# Patient Record
Sex: FEMALE | Race: BLACK OR AFRICAN AMERICAN | Employment: UNEMPLOYED | URBAN - METROPOLITAN AREA
[De-identification: names, ages, dates, MRNs, and addresses within clinical notes are randomized per-mention and may not be internally consistent; named-entity substitution may affect disease eponyms.]

---

## 2017-01-19 ENCOUNTER — NURSE NAVIGATOR (OUTPATIENT)
Dept: FAMILY MEDICINE CLINIC | Age: 72
End: 2017-01-19

## 2017-03-21 RX ORDER — HYDROXYCHLOROQUINE SULFATE 200 MG/1
TABLET, FILM COATED ORAL
Qty: 90 TAB | Refills: 1 | Status: SHIPPED | OUTPATIENT
Start: 2017-03-21 | End: 2017-10-03 | Stop reason: SDUPTHER

## 2017-04-28 NOTE — TELEPHONE ENCOUNTER
Pt needs a refill for:  Per Lindsey Card - guardian     levothyroxine (SYNTHROID) 25 mcg tablet     Also wants topical gel, Valterin for joint pain  (originally written by another MD but Lindsey Card says she doesn't see the lupus doctor any more)     Best number to reach her is [phone]

## 2017-05-01 ENCOUNTER — TELEPHONE (OUTPATIENT)
Dept: FAMILY MEDICINE CLINIC | Age: 72
End: 2017-05-01

## 2017-05-01 RX ORDER — DICLOFENAC SODIUM 10 MG/G
GEL TOPICAL 4 TIMES DAILY
Qty: 1 EACH | Refills: 3 | Status: SHIPPED | OUTPATIENT
Start: 2017-05-01 | End: 2018-02-02

## 2017-05-01 RX ORDER — LEVOTHYROXINE SODIUM 25 UG/1
TABLET ORAL
Qty: 90 TAB | Refills: 3 | Status: SHIPPED | OUTPATIENT
Start: 2017-05-01

## 2017-05-01 NOTE — TELEPHONE ENCOUNTER
----- Message from Teri Vegas sent at 5/1/2017 10:55 AM EDT -----  Regarding: Dr. Irma Schulte / telephone  Pt requesting status update on her Rx request made on Thursday.  Pt best contact number is  [phone] call this number first.

## 2017-05-08 ENCOUNTER — OFFICE VISIT (OUTPATIENT)
Dept: FAMILY MEDICINE CLINIC | Age: 72
End: 2017-05-08

## 2017-05-08 VITALS
HEART RATE: 64 BPM | BODY MASS INDEX: 30.63 KG/M2 | RESPIRATION RATE: 16 BRPM | HEIGHT: 60 IN | WEIGHT: 156 LBS | TEMPERATURE: 95.8 F | DIASTOLIC BLOOD PRESSURE: 60 MMHG | OXYGEN SATURATION: 92 % | SYSTOLIC BLOOD PRESSURE: 132 MMHG

## 2017-05-08 DIAGNOSIS — Z00.00 ROUTINE GENERAL MEDICAL EXAMINATION AT A HEALTH CARE FACILITY: Primary | ICD-10-CM

## 2017-05-08 DIAGNOSIS — Z79.899 ENCOUNTER FOR LONG-TERM (CURRENT) USE OF MEDICATIONS: ICD-10-CM

## 2017-05-08 DIAGNOSIS — M35.00 SJOGREN'S SYNDROME, WITH UNSPECIFIED ORGAN INVOLVEMENT (HCC): ICD-10-CM

## 2017-05-08 DIAGNOSIS — D64.9 ANEMIA, UNSPECIFIED TYPE: ICD-10-CM

## 2017-05-08 DIAGNOSIS — M32.0 DRUG-INDUCED SYSTEMIC LUPUS ERYTHEMATOSUS, UNSPECIFIED ORGAN INVOLVEMENT STATUS (HCC): ICD-10-CM

## 2017-05-08 DIAGNOSIS — M81.0 OSTEOPOROSIS, SENILE: ICD-10-CM

## 2017-05-08 DIAGNOSIS — I10 ESSENTIAL HYPERTENSION: ICD-10-CM

## 2017-05-08 DIAGNOSIS — Z13.39 SCREENING FOR ALCOHOLISM: ICD-10-CM

## 2017-05-08 DIAGNOSIS — Z79.899 ENCOUNTER FOR LONG-TERM (CURRENT) USE OF HIGH-RISK MEDICATION: ICD-10-CM

## 2017-05-08 DIAGNOSIS — H61.23 BILATERAL IMPACTED CERUMEN: ICD-10-CM

## 2017-05-08 DIAGNOSIS — E03.9 ACQUIRED HYPOTHYROIDISM: ICD-10-CM

## 2017-05-08 NOTE — MR AVS SNAPSHOT
Visit Information Date & Time Provider Department Dept. Phone Encounter #  
 5/8/2017  2:00 PM Alexis Vyas MD [facility] at [address] [id number] Follow-up Instructions Return in about 6 months (around 11/8/2017), or if symptoms worsen or fail to improve. Upcoming Health Maintenance Date Due  
 MEDICARE YEARLY EXAM 5/3/2017 INFLUENZA AGE 9 TO ADULT 8/1/2017 Pneumococcal 65+ Low/Medium Risk (2 of 2 - PPSV23) 11/18/2017 GLAUCOMA SCREENING Q2Y 5/2/2018 COLONOSCOPY 2/17/2021 DTaP/Tdap/Td series (3 - Td) 5/8/2027 Allergies as of 5/8/2017  Review Complete On: 5/8/2017 By: Alexis Vyas MD  
  
 Severity Noted Reaction Type Reactions Adhesive Tape  04/27/2010    Other (comments) Redness under that adhesive Current Immunizations  Reviewed on 4/28/2015 Name Date Influenza High Dose Vaccine PF 10/2/2016, 10/9/2015  5:28 PM, 9/5/2014 Influenza Vaccine Split 9/18/2012 Pneumococcal Vaccine (Unspecified Type) 11/18/2012 TB Skin Test (PPD) Intradermal 6/28/2016, 4/28/2015 TDAP Vaccine 4/30/2007 Not reviewed this visit You Were Diagnosed With   
  
 Codes Comments Routine general medical examination at a health care facility    -  Primary ICD-10-CM: Z00.00 ICD-9-CM: V70.0 Screening for alcoholism     ICD-10-CM: Z13.89 ICD-9-CM: V79.1 Sjogren's syndrome, with unspecified organ involvement     ICD-10-CM: M35.00 ICD-9-CM: 710.2 Drug-induced systemic lupus erythematosus, unspecified organ involvement status     ICD-10-CM: M32.0 ICD-9-CM: 710.0, E980.5 Acquired hypothyroidism     ICD-10-CM: E03.9 ICD-9-CM: 244.9 Anemia, unspecified type     ICD-10-CM: D64.9 ICD-9-CM: 285.9 Essential hypertension     ICD-10-CM: I10 
ICD-9-CM: 401.9 Osteoporosis, senile     ICD-10-CM: M81.0 ICD-9-CM: 733.01 Bilateral impacted cerumen     ICD-10-CM: H61.23 
ICD-9-CM: 380.4 Encounter for long-term (current) use of medications     ICD-10-CM: Z79.899 ICD-9-CM: V58.69 Encounter for long-term (current) use of high-risk medication     ICD-10-CM: Z79.899 ICD-9-CM: V58.69 Vitals BP Pulse Temp Resp Height(growth percentile) Weight(growth percentile) 132/60 (BP 1 Location: Right arm, BP Patient Position: Sitting) 64 95.8 °F (35.4 °C) (Oral) 16 5' (1.524 m) 156 lb (70.8 kg) SpO2 BMI OB Status Smoking Status 92% 30.47 kg/m2 Postmenopausal Never Smoker Vitals History BMI and BSA Data Body Mass Index Body Surface Area  
 30.47 kg/m 2 1.73 m 2 Preferred Pharmacy Pharmacy Name Phone [facility] , [facility] [address] [facility] [address] [place] [address] [phone] Your Updated Medication List  
  
   
This list is accurate as of: 5/8/17  3:47 PM.  Always use your most recent med list.  
  
  
  
  
 cetirizine 10 mg tablet Commonly known as:  ZYRTEC Take 1 Tab by mouth daily as needed for Allergies. cholecalciferol 1,000 unit Cap Commonly known as:  VITAMIN D3 Take  by mouth. diclofenac 1 % Gel Commonly known as:  VOLTAREN Apply  to affected area four (4) times daily. hydroxychloroquine 200 mg tablet Commonly known as:  PLAQUENIL  
TAKE 1 TABLET EVERY DAY  
  
 ibuprofen 600 mg tablet Commonly known as:  MOTRIN Take 600 mg by mouth every four (4) hours as needed. levothyroxine 25 mcg tablet Commonly known as:  SYNTHROID  
TAKE 1 TABLET BY MOUTH DAILY BEFORE BREAKFAST  
  
 multivitamin tablet Commonly known as:  ONE A DAY Take 1 Tab by mouth daily. We Performed the Following CBC W/O DIFF [93575 CPT(R)] CRP, HIGH SENSITIVITY [99031 CPT(R)] LIPID PANEL [10112 CPT(R)] METABOLIC PANEL, COMPREHENSIVE [60558 CPT(R)] SED RATE (ESR) [id number] CPT(R)] TSH 3RD GENERATION [59020 CPT(R)] Follow-up Instructions Return in about 6 months (around 11/8/2017), or if symptoms worsen or fail to improve. Introducing [facility] HE[facility]! Antionette Guerrero introduces [facility] patient portal. Now you can access parts of your medical record, email your doctor's office, and request medication refills online. 1. In your internet browser, go to [email or web address] 2. Click on the First Time User? Click Here link in the Sign In box. You will see the New Member Sign Up page. 3. Enter your [facility] Access Code exactly as it appears below. You will not need to use this code after youve completed the sign-up process. If you do not sign up before the expiration date, you must request a new code. · [facility] Access Code: [email or web address] Expires: 7/6/2017  3:42 PM 
 
4. Enter the last four digits of your Social Security Number (xxxx) and Date of Birth (mm/dd/yyyy) as indicated and click Submit. You will be taken to the next sign-up page. 5. Create a [facility] ID. This will be your [facility] login ID and cannot be changed, so think of one that is secure and easy to remember. 6. Create a [facility] password. You can change your password at any time. 7. Enter your Password Reset Question and Answer. This can be used at a later time if you forget your password. 8. Enter your e-mail address. You will receive e-mail notification when new information is available in [address] 9. Click Sign Up. You can now view and download portions of your medical record. 10. Click the Download Summary menu link to download a portable copy of your medical information. If you have questions, please visit the Frequently Asked Questions section of the [facility] website. Remember, [facility] is NOT to be used for urgent needs. For medical emergencies, dial 911. Now available from your iPhone and Android! Please provide this summary of care documentation to your next provider. Your primary care clinician is listed as Momo Barge. If you have any questions after today's visit, please call [phone].

## 2017-05-08 NOTE — PROGRESS NOTES
This is a Subsequent Medicare Annual Wellness Visit providing Personalized Prevention Plan Services (PPPS) (Performed 12 months after initial AWV and PPPS )    I have reviewed the patient's medical history in detail and updated the computerized patient record. History   Poor historian - sister gives hx. Hx of MR. Prev followed by Hospice in the past few yeas but no longer doing this. Hx of HTN and hypothyroidism which are both stable. Anemic and doing well with last Hgb in 10 range and no sx. UTD on colonoscopies and not doing mammograms. Declines tx for osteoporosis. ROS:  Constitutional: negative except for fevers, chills and fatigue  Eyes: On Plaquenil for Sjogren's - Saw Rheum last in 2011 for Sjogren's.   Ears, nose, mouth, throat, and face: negative for hearing loss and sore throat  Respiratory: negative for cough or dyspnea on exertion  Cardiovascular: negative for chest pain, dyspnea, palpitations, fatigue  Gastrointestinal: negative for nausea, vomiting, change in bowel habits, diarrhea and abdominal pain  Genitourinary:negative for frequency and dysuria  Integument/breast: negative for rash and skin lesion(s)  Hematologic/lymphatic: negative for easy bruising and bleeding  Musculoskeletal: + Lupus thought to be from [place] and doing well after stopping this and with tx as above  Neurological: negative for headaches and dizziness  Behavioral/Psych: negative for anxiety and depression    Past Medical History:   Diagnosis Date    Arthritis     Colon polyp     Diabetes ([facility] [address] [address]     borderline no medications    Environmental allergies 4/28/2010    GERD (gastroesophageal reflux disease)     HTN (hypertension) 4/28/2010    dosent take medication now    Lupus     MR (mental retardation)     Osteoporosis, senile     Other ill-defined conditions     parkinson's disease possibly    Psychiatric disorder     anxious    PUD (peptic ulcer disease)     Scolioses     Sjogren's syndrome ([facility] [address] 3/4/2015      Past Surgical History:   Procedure Laterality Date    HX TONSILLECTOMY      GA COLONOSCOPY FLX DX W/COLLJ SPEC WHEN PFRMD  2/17/2011    due 2013     Current Outpatient Prescriptions   Medication Sig Dispense Refill    levothyroxine (SYNTHROID) 25 mcg tablet TAKE 1 TABLET BY MOUTH DAILY BEFORE BREAKFAST 90 Tab 3    diclofenac (VOLTAREN) 1 % gel Apply  to affected area four (4) times daily. 1 Each 3    hydroxychloroquine (PLAQUENIL) 200 mg tablet TAKE 1 TABLET EVERY DAY 90 Tab 1    cetirizine (ZYRTEC) 10 mg tablet Take 1 Tab by mouth daily as needed for Allergies. 30 Tab 0    Cholecalciferol, Vitamin D3, 1,000 unit cap Take  by mouth.  ibuprofen (MOTRIN) 600 mg tablet Take 600 mg by mouth every four (4) hours as needed.  multivitamin (ONE A DAY) tablet Take 1 Tab by mouth daily. Allergies   Allergen Reactions    Adhesive Tape Other (comments)     Redness under that adhesive     Family History   Problem Relation Age of Onset    Cancer Mother      pancreas    Heart Disease Father     Heart Attack Father      Social History   Substance Use Topics    Smoking status: Never Smoker    Smokeless tobacco: Never Used    Alcohol use No     Patient Active Problem List   Diagnosis Code    Environmental allergies Z91.09    HTN (hypertension) I10    Weight loss R63.4    Anemia D64.9    MR (mental retardation) F79    Osteoporosis, senile M81.0    Colon polyp K63.5    Hypothyroid E03.9    Hypersomnia, unspecified G47.10    Encounter for long-term (current) use of medications Z79.899    SLE (systemic lupus erythematosus) ([facility] M32.9    Sjogren's syndrome ([facility] [address] M35.00    Advance care planning Z71.89       Depression Risk Factor Screening:     PHQ over the last two weeks 5/8/2017   PHQ Not Done -   Little interest or pleasure in doing things Not at all   Feeling down, depressed or hopeless Not at all   Total Score PHQ 2 0     Alcohol Risk Factor Screening:    On any occasion during the past 3 months, have you had more than 3 drinks containing alcohol? No    Do you average more than 7 drinks per week? No      Functional Ability and Level of Safety:     Hearing Loss   none    Activities of Daily Living   Self-care. Requires assistance with: all ADLs    Fall Risk     Fall Risk Assessment, last 12 mths 5/8/2017   Able to walk? Yes   Fall in past 12 months? No     Abuse Screen   Patient is not abused    Review of Systems   A comprehensive review of systems was negative except for that written in the HPI. Physical Examination     Evaluation of Cognitive Function:  Mood/affect:  neutral  Appearance: age appropriate  Family member/caregiver input: Sister    Physical Examination:  General appearance - alert, well appearing, and in no distress, in wheelchair, mostly non-verbal  Eyes -sclera anicteric  Neck - supple, no significant adenopathy, no thyromegaly, no bruits  Breast - Nml exam with no masses or LAD, nml areola, no discharge  Chest - clear to auscultation, no wheezes, rales or rhonchi, symmetric air entry  Heart - normal rate, regular rhythm, normal S1, S2, no murmurs, rubs, clicks or gallops  Extr - no edema    Patient Care Team:  Bryce Waldron MD as PCP - General (Family Practice)  Zulma Fierro MD (Gastroenterology)    Advice/Referrals/Counseling   Education and counseling provided:  Are appropriate based on today's review and evaluation      Assessment/Plan       ICD-10-CM ICD-9-CM    1. Routine general medical examination at a health care facility Z00.00 V70.0    2. Screening for alcoholism Z13.89 V79.1    3. Sjogren's syndrome, with unspecified organ involvement M35.00 710.2    4. Drug-induced systemic lupus erythematosus, unspecified organ involvement status M32.0 710.0      E980.5    5. Acquired hypothyroidism E03.9 244.9    6. Anemia, unspecified type D64.9 285.9    7. Essential hypertension I10 401.9    8. Osteoporosis, senile M81.0 733.01    9.  Bilateral impacted cerumen H61.23 380.4

## 2017-05-09 LAB
ALBUMIN SERPL-MCNC: 3.6 G/DL (ref 3.5–4.8)
ALBUMIN/GLOB SERPL: 0.9 {RATIO} (ref 1.2–2.2)
ALP SERPL-CCNC: 121 IU/L (ref 39–117)
ALT SERPL-CCNC: 13 IU/L (ref 0–32)
AST SERPL-CCNC: 20 IU/L (ref 0–40)
BILIRUB SERPL-MCNC: <0.2 MG/DL (ref 0–1.2)
BUN SERPL-MCNC: 21 MG/DL (ref 8–27)
BUN/CREAT SERPL: 25 (ref 12–28)
CALCIUM SERPL-MCNC: 9.3 MG/DL (ref 8.7–10.3)
CHLORIDE SERPL-SCNC: 102 MMOL/L (ref 96–106)
CHOLEST SERPL-MCNC: 280 MG/DL (ref 100–199)
CO2 SERPL-SCNC: 27 MMOL/L (ref 18–29)
CREAT SERPL-MCNC: 0.85 MG/DL (ref 0.57–1)
CRP SERPL HS-MCNC: 1.94 MG/L (ref 0–3)
ERYTHROCYTE [DISTWIDTH] IN BLOOD BY AUTOMATED COUNT: 16 % (ref 12.3–15.4)
ERYTHROCYTE [SEDIMENTATION RATE] IN BLOOD BY WESTERGREN METHOD: 44 MM/HR (ref 0–40)
GLOBULIN SER CALC-MCNC: 3.8 G/DL (ref 1.5–4.5)
GLUCOSE SERPL-MCNC: 100 MG/DL (ref 65–99)
HCT VFR BLD AUTO: 34.5 % (ref 34–46.6)
HDLC SERPL-MCNC: 105 MG/DL
HGB BLD-MCNC: 10.9 G/DL (ref 11.1–15.9)
LDLC SERPL CALC-MCNC: 164 MG/DL (ref 0–99)
MCH RBC QN AUTO: 28.4 PG (ref 26.6–33)
MCHC RBC AUTO-ENTMCNC: 31.6 G/DL (ref 31.5–35.7)
MCV RBC AUTO: 90 FL (ref 79–97)
PLATELET # BLD AUTO: 176 X10E3/UL (ref 150–379)
POTASSIUM SERPL-SCNC: 4.6 MMOL/L (ref 3.5–5.2)
PROT SERPL-MCNC: 7.4 G/DL (ref 6–8.5)
RBC # BLD AUTO: 3.84 X10E6/UL (ref 3.77–5.28)
SODIUM SERPL-SCNC: 141 MMOL/L (ref 134–144)
TRIGL SERPL-MCNC: 56 MG/DL (ref 0–149)
TSH SERPL DL<=0.005 MIU/L-ACNC: 1.56 UIU/ML (ref 0.45–4.5)
VLDLC SERPL CALC-MCNC: 11 MG/DL (ref 5–40)
WBC # BLD AUTO: 5.4 X10E3/UL (ref 3.4–10.8)

## 2017-05-23 ENCOUNTER — APPOINTMENT (OUTPATIENT)
Dept: CT IMAGING | Age: 72
End: 2017-05-23
Attending: EMERGENCY MEDICINE
Payer: MEDICARE

## 2017-05-23 ENCOUNTER — HOSPITAL ENCOUNTER (EMERGENCY)
Age: 72
Discharge: HOME | End: 2017-05-23
Attending: EMERGENCY MEDICINE
Payer: MEDICARE

## 2017-05-23 VITALS
OXYGEN SATURATION: 98 % | SYSTOLIC BLOOD PRESSURE: 119 MMHG | DIASTOLIC BLOOD PRESSURE: 81 MMHG | TEMPERATURE: 98.1 F | RESPIRATION RATE: 18 BRPM | HEIGHT: 61 IN | BODY MASS INDEX: 29.45 KG/M2 | HEART RATE: 78 BPM | WEIGHT: 156 LBS

## 2017-05-23 DIAGNOSIS — S01.91XA LACERATION OF HEAD WITHOUT FOREIGN BODY, UNSPECIFIED PART OF HEAD, INITIAL ENCOUNTER: ICD-10-CM
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
## 2017-05-23 PROCEDURE — 90471 IMMUNIZATION ADMIN: CPT

## 2017-05-23 PROCEDURE — 93005 ELECTROCARDIOGRAM TRACING: CPT

## 2017-05-23 PROCEDURE — 77030031132 HC SUT NYL COVD -A

## 2017-05-23 PROCEDURE — 74011000250 HC RX REV CODE- 250: Performed by: PHYSICIAN ASSISTANT

## 2017-05-23 PROCEDURE — 90715 TDAP VACCINE 7 YRS/> IM: CPT | Performed by: EMERGENCY MEDICINE

## 2017-05-23 PROCEDURE — 99285 EMERGENCY DEPT VISIT HI MDM: CPT

## 2017-05-23 PROCEDURE — 74011250636 HC RX REV CODE- 250/636: Performed by: EMERGENCY MEDICINE

## 2017-05-23 PROCEDURE — 75810000293 HC SIMP/SUPERF WND  RPR

## 2017-05-23 PROCEDURE — 77030018836 HC SOL IRR NACL ICUM -A

## 2017-05-23 RX ORDER — LIDOCAINE HYDROCHLORIDE 20 MG/ML
5 INJECTION, SOLUTION EPIDURAL; INFILTRATION; INTRACAUDAL; PERINEURAL ONCE
Status: COMPLETED | OUTPATIENT
Start: 2017-05-23 | End: 2017-05-23

## 2017-05-23 RX ORDER — LIDOCAINE HYDROCHLORIDE 20 MG/ML
5 INJECTION, SOLUTION INFILTRATION; PERINEURAL ONCE
Status: DISCONTINUED | OUTPATIENT
Start: 2017-05-23 | End: 2017-05-23

## 2017-05-23 RX ORDER — BUPIVACAINE HYDROCHLORIDE 5 MG/ML
5 INJECTION, SOLUTION EPIDURAL; INTRACAUDAL
Status: COMPLETED | OUTPATIENT
Start: 2017-05-23 | End: 2017-05-23

## 2017-05-23 RX ADMIN — TETANUS TOXOID, REDUCED DIPHTHERIA TOXOID AND ACELLULAR PERTUSSIS VACCINE, ADSORBED 0.5 ML: 5; 2.5; 8; 8; 2.5 SUSPENSION INTRAMUSCULAR at 17:50

## 2017-05-23 RX ADMIN — LIDOCAINE HYDROCHLORIDE 100 MG: 20 INJECTION, SOLUTION EPIDURAL; INFILTRATION; INTRACAUDAL; PERINEURAL at 17:26

## 2017-05-23 RX ADMIN — Medication 2 ML: at 17:25

## 2017-05-23 RX ADMIN — BUPIVACAINE HYDROCHLORIDE 25 MG: 5 INJECTION, SOLUTION EPIDURAL; INTRACAUDAL at 17:26

## 2017-05-23 NOTE — ED NOTES
Pt seen and discharged by RN and MD. Pt and family informed of wound care technique. Pt helped into wheelchair, and family accompanied pt with wheelchair to ED exit. VSS.

## 2017-05-23 NOTE — PROGRESS NOTES
Spiritual Care Assessment/Progress Notes    Elli Lewis [id number]  [id number]    1945  81 y.o.  female    Patient Telephone Number: [phone] (home)   Christianity Affiliation: Govind Feliz   Language: English   Extended Emergency Contact Information  Primary Emergency Contact: Karolina Jacobsen  Mobile Phone: [phone]  Relation: Other Relative  Secondary Emergency Contact: [address] [place] [address]  Mobile Phone: [phone]  Relation: Other Relative   Patient Active Problem List    Diagnosis Date Noted    Advance care planning 05/02/2016    SLE (systemic lupus erythematosus) ([facility] [address] 03/04/2015    Sjogren's syndrome ([facility] 03/04/2015    Encounter for long-term (current) use of medications 03/05/2013    Hypersomnia, unspecified 09/26/2011    Hypothyroid 06/13/2011    MR (mental retardation)     Osteoporosis, senile     Colon polyp     Weight loss 02/17/2011    Anemia 02/17/2011    Environmental allergies 04/28/2010    HTN (hypertension) 04/28/2010        Date: 5/23/2017       Level of Christianity/Spiritual Activity:  [x]         Involved in racquel tradition/spiritual practice    []         Not involved in racquel tradition/spiritual practice  [x]         Spiritually oriented    []         Claims no spiritual orientation    []         seeking spiritual identity  []         Feels alienated from Adventism practice/tradition  []         Feels angry about Adventism practice/tradition  [x]         Spirituality/Adventism tradition is a resource for coping at this time.   []         Not able to assess due to medical condition    Services Provided Today:  []         crisis intervention    []         reading Scriptures  [x]         spiritual assessment    [x]         prayer  [x]         empathic listening/emotional support  []         rites and rituals (cite in comments)  []         life review     []         Adventism support  []         theological development   [] advocacy  []         ethical dialog     []         blessing  []         bereavement support    [x]         support to family  []         anticipatory grief support   []         help with AMD  []         spiritual guidance    []         meditation      Spiritual Care Needs  [x]         Emotional Support  [x]         Spiritual/Hinduism Care  []         Loss/Adjustment  []         Advocacy/Referral                /Ethics  []         No needs expressed at               this time  []         Other: (note in               comments)  [address]  [x]         Follow up visits with               pt/family as able  []         Provide materials  []         Schedule sacraments  []         Contact Community               Clergy  []         Follow up as needed  []         Other: (note in               comments)     Comments:  requested for support to sister of pt who fell. Per sister, pt is mentally disabled and so the sister Irving Tatum)  had been the main caretaker for the pt for 30 plus years and was concerned for the pt's safety when pt fell in the bathroom. Ms. Lang Mcintosh shared that she is a member of [address] [place] [address] but they have not been very supportive through this time. She mentioned that she will call the  later for spiritual support. Ms. Lang Mcintosh has 2 grown children in the area.  provided empathic listening, compassionate presence, and prayer.  acknowledged Ms. Jackson's emotions and her fatigue of caring for pt by herself. She appreciated  support and prayer. Advised of  availability and assured her of continued support through this time. Marlo Lema M.S.   Spiritual Care Department  If needs arise please call [facility] ([phone])

## 2017-05-23 NOTE — ED NOTES
Patient moved to ED room 24. Verbal report given to Heber Payne RN using SBAR format. Opportunity for questions and clarification was provided.

## 2017-05-23 NOTE — DISCHARGE INSTRUCTIONS
Cuts Closed With Stitches: Care Instructions  Your Care Instructions  A cut can happen anywhere on your body. The doctor used stitches to close the cut. Using stitches also helps the cut heal and reduces scarring. Sometimes pieces of tape called Steri-Strips are put over the stitches. If the cut went deep and through the skin, the doctor may have put in two layers of stitches. The deeper layer brings the deep part of the cut together. These stitches will dissolve and don't need to be removed. The stitches in the upper layer are the ones you see on the cut. You will probably have a bandage over the stitches. You will need to have the stitches removed, usually in 10 to 14 days. The doctor has checked you carefully, but problems can develop later. If you notice any problems or new symptoms, get medical treatment right away. Follow-up care is a key part of your treatment and safety. Be sure to make and go to all appointments, and call your doctor if you are having problems. It's also a good idea to know your test results and keep a list of the medicines you take. How can you care for yourself at home? · Keep the cut dry for the first 24 to 48 hours. After this, you can shower if your doctor okays it. Pat the cut dry. · Don't soak the cut, such as in a bathtub. Your doctor will tell you when it's safe to get the cut wet. · If your doctor told you how to care for your cut, follow your doctor's instructions. If you did not get instructions, follow this general advice:  ¨ After the first 24 to 48 hours, wash around the cut with clean water 2 times a day. Don't use hydrogen peroxide or alcohol, which can slow healing. ¨ You may cover the cut with a thin layer of petroleum jelly, such as Vaseline, and a nonstick bandage. ¨ Apply more petroleum jelly and replace the bandage as needed. · Prop up the sore area on a pillow anytime you sit or lie down during the next 3 days.  Try to keep it above the level of your heart. This will help reduce swelling. · Avoid any activity that could cause your cut to reopen. · Do not remove the stitches on your own. Your doctor will tell you when to come back to have the stitches removed. · Leave Steri-Strips on until they fall off. · Be safe with medicines. Read and follow all instructions on the label. ¨ If the doctor gave you a prescription medicine for pain, take it as prescribed. ¨ If you are not taking a prescription pain medicine, ask your doctor if you can take an over-the-counter medicine. When should you call for help? Call your doctor now or seek immediate medical care if:  · You have new pain, or your pain gets worse. · The skin near the cut is cold or pale or changes color. · You have tingling, weakness, or numbness near the cut. · The cut starts to bleed, and blood soaks through the bandage. Oozing small amounts of blood is normal.  · You have trouble moving the area near the cut. · You have symptoms of infection, such as:  ¨ Increased pain, swelling, warmth, or redness around the cut. ¨ Red streaks leading from the cut. ¨ Pus draining from the cut. ¨ A fever. Watch closely for changes in your health, and be sure to contact your doctor if:  · The cut reopens. · You do not get better as expected. Where can you learn more? Go to http://hector-kev.info/. Enter R217 in the search box to learn more about \"Cuts Closed With Stitches: Care Instructions. \"  Current as of: May 27, 2016  Content Version: 11.2  © 2290-5099 [employer]. Care instructions adapted under license by [employer] (which disclaims liability or warranty for this information). If you have questions about a medical condition or this instruction, always ask your healthcare professional. [address] any warranty or liability for your use of this information.

## 2017-05-23 NOTE — ED TRIAGE NOTES
Family states she ambulated pt with walker to bathroom and left to grab pt's medications. [place] thud and came in to bathroom to see pt had fallen and hit her head on toilet and had a bloody nose. EMS bandaged head and reported no LOC and hx of MR. Sister is pt's caregiver. Patient unable to verbalize pain. Baseline does not verbalized well per sister.

## 2017-05-23 NOTE — ED PROVIDER NOTES
HPI Comments: Gayle Grande, 81 y.o. Female with PMHx of PUD, GERD, HTN, DM, arthritis, and mental retardation presents via EMS to the ED with cc of GLF and multiple lacerations to face. Pt's sister notes that the pt is normally ambulatory with a walker and needs assistance going to the bathroom at baseline. Today, the sister assisted the pt to the bathroom. She left the room and then heard a thud and rushed back into the room, finding the pt on the floor. The sister believes the pt attempted to get herself up from the toilet and then fell. EMS reports right-sided nose bleed and lacerations to R and L eyebrows. Pt has not vomited since and sister denies LOC. Of note the pt has severe mental retardation and communicates nonverbally. She is not taking any anticoagulants. PCP: Nikole James MD    Social history significant for: - Tobacco, - EtOH, - Illicit Drug Use    HPI is limited as pt is nonverbal  Written by CAROL Grossibsharon, as dictated by Sarah Perla MD.      The history is provided by a relative and the EMS personnel. No  was used.         Past Medical History:   Diagnosis Date    Arthritis     Colon polyp     Diabetes ([address]     borderline no medications    Environmental allergies 4/28/2010    GERD (gastroesophageal reflux disease)     HTN (hypertension) 4/28/2010    dosent take medication now    Lupus     MR (mental retardation)     Osteoporosis, senile     Other ill-defined conditions     parkinson's disease possibly    Psychiatric disorder     anxious    PUD (peptic ulcer disease)     Scolioses     Sjogren's syndrome ([address] 3/4/2015       Past Surgical History:   Procedure Laterality Date    HX TONSILLECTOMY      MS COLONOSCOPY FLX DX W/COLLJ SPEC WHEN PFRMD  2/17/2011    due 2013         Family History:   Problem Relation Age of Onset    Cancer Mother      pancreas    Heart Disease Father     Heart Attack Father        Social History     Social History    Marital status: SINGLE     Spouse name: N/A    Number of children: N/A    Years of education: N/A     Occupational History    Not on file. Social History Main Topics    Smoking status: Never Smoker    Smokeless tobacco: Never Used    Alcohol use No    Drug use: No    Sexual activity: No     Other Topics Concern    Not on file     Social History Narrative         ALLERGIES: Adhesive tape    Review of Systems   Unable to perform ROS: Patient nonverbal       Patient Vitals for the past 12 hrs:   Temp Pulse Resp BP SpO2   05/23/17 1611 98.1 °F (36.7 °C) 78 18 119/81 98 %       Physical Exam   Constitutional: She is oriented to person, place, and time. She appears well-developed and well-nourished. No distress. Pt is nonverbal at baseline   HENT:   Nose: Nose normal.   Mouth/Throat: Oropharynx is clear and moist. No oropharyngeal exudate. Eyes: Conjunctivae and EOM are normal. Pupils are equal, round, and reactive to light. Right eye exhibits no discharge. Left eye exhibits no discharge. No scleral icterus. Neck: Normal range of motion. Neck supple. No JVD present. Cardiovascular: Normal rate, regular rhythm, normal heart sounds and intact distal pulses. No murmur heard. Pulmonary/Chest: Effort normal and breath sounds normal. No stridor. No respiratory distress. She has no wheezes. She has no rales. Abdominal: Soft. Bowel sounds are normal. She exhibits no distension. There is no tenderness. There is no rebound and no guarding. Musculoskeletal: She exhibits no edema or tenderness. Neurological: She is alert and oriented to person, place, and time. Skin: Skin is warm and dry. No rash noted. She is not diaphoretic. 3 cm laceration just above L eyebrow  1 mm laceration above right eyebrow  2 mm laceration to R intranasolabial fold  Contusion to central forehead   Psychiatric: She has a normal mood and affect. Nursing note and vitals reviewed.        MDM  Number of Diagnoses or Management Options  Fall, initial encounter:   Laceration of head without foreign body, unspecified part of head, initial encounter:   Diagnosis management comments: GLF with a few facial lacerations. Pt alert and at her neurologic baseline. Tdap updated, stable for d/c. Amount and/or Complexity of Data Reviewed  Tests in the medicine section of CPT®: ordered and reviewed  Obtain history from someone other than the patient: yes (Sister, EMS)  Review and summarize past medical records: yes  Independent visualization of images, tracings, or specimens: yes    Patient Progress  Patient progress: stable    ED Course       Procedures     EKG interpretation: (Preliminary) 16:28  Rhythm: accelerated junctional rhythm; and irregular. Rate (approx.): 72; Axis: normal; W[place] interval: normal; QRS interval: normal ; ST/T wave: nonspecific ST and T wave abnormality; Other findings: left ventricular hypertrophy. Written by Jesus Velázquez ED Scribe, as dictated by Corina Olea MD.    PROCEDURES:  Procedure Note - Laceration Repair:  6:15 PM  Procedure by Chano Murrieta PA-C. Complexity: Simple  4 cm linear laceration to left eyebrown was irrigated copiously with NS under jet lavage, prepped with Chlorprep and draped in a sterile fashion. The area was anesthetized with topical LET and 1 mLs of 50/50 mix of lidocaine 2% without epinephrine and Bupivacaine 0.5% without epinephrine via local infiltration. The wound was explored with the following results: No foreign bodies found. The wound was repaired with One layer suture closure: Skin Layer:  7 sutures placed, stitch type:simple interrupted, suture: 6-0 nylon. .  The wound was closed with good hemostasis and approximation. Sterile dressing applied. Estimated blood loss: 5 mL  The procedure took 20 minutes, and pt tolerated moderately. Procedure Note - Laceration Repair:  6:30 PM  Procedure by Chano Murrieta PA-C.   Complexity: Simple  0.5 cm linear laceration to right lateral forehead  was irrigated copiously with NS under jet lavage, prepped with Chlorprep and draped in a sterile fashion. The area was anesthetized with 1/2 mLs of 50/50 mix of lidocaine 2% without epinephrine and Bupivacaine 0.5% without epinephrine via local infiltration. The wound was explored with the following results: No foreign bodies found. The wound was repaired with One layer suture closure: Skin Layer:  1 sutures placed, stitch type:simple interrupted, suture: 6-0 nylon. .  The wound was closed with good hemostasis and approximation. Sterile dressing applied. Estimated blood loss: < 5 mL  The procedure took 10 minutes, and pt tolerated moderately. Procedure Note - Laceration Repair:  6:40 PM  Procedure by Sean Heredia PA-C. Complexity: Simple  1 cm linear laceration to right lateral nose  was irrigated copiously with NS under jet lavage, prepped with Chlorprep and draped in a sterile fashion. The area was anesthetized with 1 mLs of 50/50 mix of lidocaine 2% without epinephrine and Bupivacaine 0.5% without epinephrine via local infiltration. The wound was explored with the following results: No foreign bodies found. The wound was repaired with One layer suture closure: Skin Layer:  1 sutures placed, stitch type:simple interrupted, suture: 6-0 nylon. The wound was closed with good hemostasis and approximation. Sterile dressing applied. Estimated blood loss: 5 mL  The procedure took 10 minutes, and pt tolerated moderately.       LABORATORY TESTS:  Recent Results (from the past 12 hour(s))   EKG, 12 LEAD, INITIAL    Collection Time: 05/23/17  4:28 PM   Result Value Ref Range    Ventricular Rate 72 BPM    Atrial Rate 75 BPM    P-R Interval 194 ms    QRS Duration 92 ms    Q-T Interval 458 ms    QTC Calculation (Bezet) 501 ms    Calculated P Axis 14 degrees    Calculated R Axis 13 degrees    Calculated T Axis 69 degrees    Diagnosis       Accelerated Junctional rhythm  Minimal voltage criteria for LVH, may be normal variant  Nonspecific ST and T wave abnormality  Prolonged QT  Abnormal ECG  When compared with ECG of 05-AUG-2013 10:14,  Junctional rhythm has replaced Sinus rhythm  T wave inversion no longer evident in Inferior leads  Nonspecific T wave abnormality no longer evident in Anterior leads  Nonspecific T wave abnormality, worse in Lateral leads         MEDICATIONS GIVEN:  Medications   lidocaine/EPINEPHrine/tetracaine (LET) topical soln (2 mL Topical Given 5/23/17 1725)   lidocaine/EPINEPHrine/tetracaine (LET) topical soln (2 mL Topical Given 5/23/17 1725)   bupivacaine (PF) (MARCAINE) 0.5 % (5 mg/mL) injection 25 mg (25 mg SubCUTAneous Given 5/23/17 1726)   diph,Pertuss(AC),Tet Vac-PF (BOOSTRIX) suspension 0.5 mL (0.5 mL IntraMUSCular Given 5/23/17 1750)   lidocaine (PF) (XYLOCAINE) 20 mg/mL (2 %) injection 100 mg (100 mg SubCUTAneous Given 5/23/17 1726)       IMPRESSION:  1. Fall, initial encounter    2. Laceration of head without foreign body, unspecified part of head, initial encounter        PLAN:  1. Discharge home  2. F/u with PCP in 5 days to have sutures removed  3. Return precautions reviewed    Discharge Medication List as of 5/23/2017  7:35 PM        2. Follow-up Information     Follow up With Details Comments Contact Info    Bryce Waldron MD Go in 5 days For suture removal [address]  [phone]      \[facility]\"" EMERGENCY DEPT  As needed [address]  [address] [facility]  [phone]        Return to ED if worse     Discharge Note:  6:59 PM  The pt is ready for discharge. The pt's signs, symptoms, diagnosis, and discharge instructions have been discussed and pt has conveyed their understanding. The pt is to follow up as recommended or return to ER should their symptoms worsen. Plan has been discussed and pt is in agreement.     This note is prepared by Chanelle Floyd acting as a Scribe for Katherine Mcgraw MD.    Katherine Mcgraw MD: The scribe's documentation has been prepared under my direction and personally reviewed by me in its entirety. I confirm that the notes above accurately reflects all work, treatment, procedures, and medical decision making performed by me.

## 2017-05-24 ENCOUNTER — TELEPHONE (OUTPATIENT)
Dept: FAMILY MEDICINE CLINIC | Age: 72
End: 2017-05-24

## 2017-05-24 LAB
ATRIAL RATE: 75 BPM
CALCULATED P AXIS, ECG09: 14 DEGREES
CALCULATED R AXIS, ECG10: 13 DEGREES
CALCULATED T AXIS, ECG11: 69 DEGREES
DIAGNOSIS, 93000: NORMAL
P-R INTERVAL, ECG05: 194 MS
Q-T INTERVAL, ECG07: 458 MS
QRS DURATION, ECG06: 92 MS
QTC CALCULATION (BEZET), ECG08: 501 MS
VENTRICULAR RATE, ECG03: 72 BPM

## 2017-05-24 NOTE — TELEPHONE ENCOUNTER
Pt went to ER yesterday (5/23/17) and needs stitch removal approx.  5-30-17       Best number to reach her is [phone]

## 2017-05-31 ENCOUNTER — OFFICE VISIT (OUTPATIENT)
Dept: FAMILY MEDICINE CLINIC | Age: 72
End: 2017-05-31

## 2017-05-31 VITALS
BODY MASS INDEX: 29.45 KG/M2 | OXYGEN SATURATION: 98 % | WEIGHT: 156 LBS | DIASTOLIC BLOOD PRESSURE: 53 MMHG | SYSTOLIC BLOOD PRESSURE: 119 MMHG | HEART RATE: 68 BPM | HEIGHT: 61 IN | RESPIRATION RATE: 14 BRPM | TEMPERATURE: 97 F

## 2017-05-31 DIAGNOSIS — Z48.02 ENCOUNTER FOR REMOVAL OF SUTURES: Primary | ICD-10-CM

## 2017-05-31 NOTE — PROGRESS NOTES
Gayle Grande is a 81 y.o. female, pt of Dr. Jhon Zepeda    PMHx of PUD, GERD, HTN, DM, arthritis, and mental retardation     5/23/17 walks using walker. Had a fall hit face and went via EMS to the ED with cc of GLF and multiple lacerations to face. EKG was done. Laceration repair done. Since discharge she is at base line per her sister. Denies vomiting, LOC, AMS. Report baseline Mental status. Here for sutures removal.     Reviewed: active problem list, medication list, allergies, notes from last encounter    Pertinent items are noted in HPI. Allergies   Allergen Reactions    Adhesive Tape Other (comments)     Redness under that adhesive     Current Outpatient Prescriptions on File Prior to Visit   Medication Sig Dispense Refill    levothyroxine (SYNTHROID) 25 mcg tablet TAKE 1 TABLET BY MOUTH DAILY BEFORE BREAKFAST 90 Tab 3    diclofenac (VOLTAREN) 1 % gel Apply  to affected area four (4) times daily. 1 Each 3    hydroxychloroquine (PLAQUENIL) 200 mg tablet TAKE 1 TABLET EVERY DAY 90 Tab 1    cetirizine (ZYRTEC) 10 mg tablet Take 1 Tab by mouth daily as needed for Allergies. 30 Tab 0    multivitamin (ONE A DAY) tablet Take 1 Tab by mouth daily.  Cholecalciferol, Vitamin D3, 1,000 unit cap Take  by mouth.  ibuprofen (MOTRIN) 600 mg tablet Take 600 mg by mouth every four (4) hours as needed. No current facility-administered medications on file prior to visit.       Patient Active Problem List   Diagnosis Code    Environmental allergies Z91.09    HTN (hypertension) I10    Weight loss R63.4    Anemia D64.9    MR (mental retardation) F79    Osteoporosis, senile M81.0    Colon polyp K63.5    Hypothyroid E03.9    Hypersomnia, unspecified G47.10    Encounter for long-term (current) use of medications Z79.899    SLE (systemic lupus erythematosus) ([facility] M32.9    Sjogren's syndrome ([facility] M35.00    Advance care planning Z71.89       Visit Vitals    /53 (BP 1 Location: Left arm, BP Patient Position: Sitting)    Pulse 68    Temp 97 °F (36.1 °C) (Oral)    Resp 14    Ht 5' 1\" (1.549 m)    Wt 156 lb (70.8 kg)    SpO2 98%    BMI 29.48 kg/m2     General appearance: alert, cooperative, no distress, appears stated age  Neurologic: Alert, sits in wheel chairs, doesn't speak. Baseline per her sister. Head: Normocephalic, without obvious abnormality, atraumatic  Eyes: conjunctivae/corneas clear. PERRL, EOM's intact. Lungs: clear to auscultation bilaterally  Heart: regular rate and rhythm, S1, S2 normal, no murmur, click, rub or gallop    9 sutures removed. Pt tolerated procedure well. Assessment/Plans:    Jagruti was seen today for suture removal.    Diagnoses and all orders for this visit:    Encounter for removal of sutures  -     REMOVAL OF SUTURES      Discussed plans, risk/benefits of treatments/observations. Through the use of shared decision making, above plans were agreed upon. Medication compliance advised. Patient verbalized understanding. Follow-up Disposition:  Return for to Dr. Gamal Martinez as needed.       Júnior Calix MD  5/31/2017

## 2017-05-31 NOTE — MR AVS SNAPSHOT
Visit Information Date & Time Provider Department Dept. Phone Encounter #  
 5/31/2017 11:30 AM Yee Weaver MD [facility] at [address] [id number] Follow-up Instructions Return for to Dr. Osei Chawla as needed. Follow-up and Disposition History Upcoming Health Maintenance Date Due INFLUENZA AGE 9 TO ADULT 8/1/2017 Pneumococcal 65+ Low/Medium Risk (2 of 2 - PPSV23) 11/18/2017 GLAUCOMA SCREENING Q2Y 5/2/2018 MEDICARE YEARLY EXAM 5/9/2018 COLONOSCOPY 2/17/2021 DTaP/Tdap/Td series (3 - Td) 5/23/2027 Allergies as of 5/31/2017  Review Complete On: 5/31/2017 By: Yee Weaver MD  
  
 Severity Noted Reaction Type Reactions Adhesive Tape  04/27/2010    Other (comments) Redness under that adhesive Current Immunizations  Reviewed on 4/28/2015 Name Date Influenza High Dose Vaccine PF 10/2/2016, 10/9/2015  5:28 PM, 9/5/2014 Influenza Vaccine Split 9/18/2012 Pneumococcal Vaccine (Unspecified Type) 11/18/2012 TB Skin Test (PPD) Intradermal 6/28/2016, 4/28/2015 TDAP Vaccine 4/30/2007 Tdap 5/23/2017  5:50 PM  
  
 Not reviewed this visit You Were Diagnosed With   
  
 Codes Comments Encounter for removal of sutures    -  Primary ICD-10-CM: Z48.02 
ICD-9-CM: V58.32 Vitals BP Pulse Temp Resp Height(growth percentile) Weight(growth percentile) 119/53 (BP 1 Location: Left arm, BP Patient Position: Sitting) 68 97 °F (36.1 °C) (Oral) 14 5' 1\" (1.549 m) 156 lb (70.8 kg) SpO2 BMI OB Status Smoking Status 98% 29.48 kg/m2 Postmenopausal Never Smoker BMI and BSA Data Body Mass Index Body Surface Area  
 29.48 kg/m 2 1.75 m 2 Preferred Pharmacy Pharmacy Name [facility] [place] [address] [phone] Your Updated Medication List  
  
   
This list is accurate as of: 5/31/17 12:14 PM.  Always use your most recent med list.  
  
  
  
 cetirizine 10 mg tablet Commonly known as:  ZYRTEC Take 1 Tab by mouth daily as needed for Allergies. cholecalciferol 1,000 unit Cap Commonly known as:  VITAMIN D3 Take  by mouth. diclofenac 1 % Gel Commonly known as:  VOLTAREN Apply  to affected area four (4) times daily. hydroxychloroquine 200 mg tablet Commonly known as:  PLAQUENIL  
TAKE 1 TABLET EVERY DAY  
  
 ibuprofen 600 mg tablet Commonly known as:  MOTRIN Take 600 mg by mouth every four (4) hours as needed. levothyroxine 25 mcg tablet Commonly known as:  SYNTHROID  
TAKE 1 TABLET BY MOUTH DAILY BEFORE BREAKFAST  
  
 multivitamin tablet Commonly known as:  ONE A DAY Take 1 Tab by mouth daily. We Performed the Following REMOVAL OF SUTURES [ [facility]] Follow-up Instructions Return for to Dr. Juwan Kramer as needed. Introducing [facility]! [employer] introduces [employer] patient portal. Now you can access parts of your medical record, email your doctor's office, and request medication refills online. 1. In your internet browser, go to [email or web address] 2. Click on the First Time User? Click Here link in the Sign In box. You will see the New Member Sign Up page. 3. Enter your [employer] Access Code exactly as it appears below. You will not need to use this code after youve completed the sign-up process. If you do not sign up before the expiration date, you must request a new code. · [employer] Access Code: [email or web address] Expires: 7/6/2017  3:42 PM 
 
4. Enter the last four digits of your Social Security Number (xxxx) and Date of Birth (mm/dd/yyyy) as indicated and click Submit. You will be taken to the next sign-up page. 5. Create a [employer] ID. This will be your [employer] login ID and cannot be changed, so think of one that is secure and easy to remember. 6. Create a [employer] password. You can change your password at any time. 7. Enter your Password Reset Question and Answer. This can be used at a later time if you forget your password. 8. Enter your e-mail address. You will receive e-mail notification when new information is available in [address] 9. Click Sign Up. You can now view and download portions of your medical record. 10. Click the Download Summary menu link to download a portable copy of your medical information. If you have questions, please visit the Frequently Asked Questions section of the [employer] website. Remember, [employer] is NOT to be used for urgent needs. For medical emergencies, dial 911. Now available from your iPhone and Android! Please provide this summary of care documentation to your next provider. Your primary care clinician is listed as Abad Lorenzo. If you have any questions after today's visit, please call [phone].

## 2017-10-03 RX ORDER — HYDROXYCHLOROQUINE SULFATE 200 MG/1
TABLET, FILM COATED ORAL
Qty: 90 TAB | Refills: 1 | Status: SHIPPED | OUTPATIENT
Start: 2017-10-03

## 2018-02-02 ENCOUNTER — APPOINTMENT (OUTPATIENT)
Dept: CT IMAGING | Age: 73
DRG: 871 | End: 2018-02-02
Attending: EMERGENCY MEDICINE
Payer: MEDICARE

## 2018-02-02 ENCOUNTER — APPOINTMENT (OUTPATIENT)
Dept: GENERAL RADIOLOGY | Age: 73
DRG: 871 | End: 2018-02-02
Attending: INTERNAL MEDICINE
Payer: MEDICARE

## 2018-02-02 ENCOUNTER — APPOINTMENT (OUTPATIENT)
Dept: GENERAL RADIOLOGY | Age: 73
DRG: 871 | End: 2018-02-02
Attending: EMERGENCY MEDICINE
Payer: MEDICARE

## 2018-02-02 ENCOUNTER — HOSPITAL ENCOUNTER (INPATIENT)
Age: 73
LOS: 38 days | Discharge: HOME HEALTH | DRG: 871 | End: 2018-03-12
Attending: EMERGENCY MEDICINE | Admitting: INTERNAL MEDICINE
Payer: MEDICARE

## 2018-02-02 DIAGNOSIS — N17.0 ACUTE RENAL FAILURE WITH TUBULAR NECROSIS (HCC): ICD-10-CM

## 2018-02-02 DIAGNOSIS — A41.9 SEPSIS, DUE TO UNSPECIFIED ORGANISM: Primary | ICD-10-CM

## 2018-02-02 DIAGNOSIS — E16.2 HYPOGLYCEMIA: ICD-10-CM

## 2018-02-02 DIAGNOSIS — E87.5 ACUTE HYPERKALEMIA: ICD-10-CM

## 2018-02-02 DIAGNOSIS — T17.908D ASPIRATION INTO RESPIRATORY TRACT, SUBSEQUENT ENCOUNTER: ICD-10-CM

## 2018-02-02 DIAGNOSIS — J11.1 INFLUENZA: ICD-10-CM

## 2018-02-02 DIAGNOSIS — I95.0 IDIOPATHIC HYPOTENSION: ICD-10-CM

## 2018-02-02 DIAGNOSIS — R56.9 SEIZURE (HCC): ICD-10-CM

## 2018-02-02 DIAGNOSIS — Z71.89 ADVANCE CARE PLANNING: ICD-10-CM

## 2018-02-02 DIAGNOSIS — R41.82 ALTERED MENTAL STATUS, UNSPECIFIED ALTERED MENTAL STATUS TYPE: ICD-10-CM

## 2018-02-02 DIAGNOSIS — R63.4 WEIGHT LOSS: ICD-10-CM

## 2018-02-02 DIAGNOSIS — T68.XXXA HYPOTHERMIA, INITIAL ENCOUNTER: ICD-10-CM

## 2018-02-02 DIAGNOSIS — Z71.89 COUNSELING REGARDING GOALS OF CARE: ICD-10-CM

## 2018-02-02 DIAGNOSIS — F79 MENTAL RETARDATION: ICD-10-CM

## 2018-02-02 DIAGNOSIS — Z79.899 ENCOUNTER FOR LONG-TERM (CURRENT) USE OF MEDICATIONS: ICD-10-CM

## 2018-02-02 PROBLEM — Z86.79 HX OF ESSENTIAL HYPERTENSION: Status: ACTIVE | Noted: 2018-02-02

## 2018-02-02 PROBLEM — N39.0 UTI (URINARY TRACT INFECTION): Status: ACTIVE | Noted: 2018-02-02

## 2018-02-02 PROBLEM — Z86.39 HX OF DIABETES MELLITUS: Status: ACTIVE | Noted: 2018-02-02

## 2018-02-02 PROBLEM — M32.9 HX OF SYSTEMIC LUPUS ERYTHEMATOSUS (SLE) (HCC): Status: ACTIVE | Noted: 2018-02-02

## 2018-02-02 LAB
ALBUMIN SERPL-MCNC: 2.9 G/DL (ref 3.5–5)
ALBUMIN/GLOB SERPL: 0.6 {RATIO} (ref 1.1–2.2)
ALP SERPL-CCNC: 159 U/L (ref 45–117)
ALT SERPL-CCNC: 251 U/L (ref 12–78)
AMPHET UR QL SCN: NEGATIVE
ANION GAP BLD CALC-SCNC: 13 MMOL/L (ref 5–15)
ANION GAP SERPL CALC-SCNC: 4 MMOL/L (ref 5–15)
APPEARANCE UR: ABNORMAL
APTT PPP: 29.6 SEC (ref 22.1–32.5)
ARTERIAL PATENCY WRIST A: ABNORMAL
AST SERPL-CCNC: 224 U/L (ref 15–37)
BACTERIA URNS QL MICRO: ABNORMAL /HPF
BARBITURATES UR QL SCN: NEGATIVE
BASE DEFICIT BLDA-SCNC: 0.8 MMOL/L
BASOPHILS # BLD: 0 K/UL (ref 0–0.1)
BASOPHILS NFR BLD: 0 % (ref 0–1)
BDY SITE: ABNORMAL
BENZODIAZ UR QL: NEGATIVE
BILIRUB SERPL-MCNC: 0.2 MG/DL (ref 0.2–1)
BILIRUB UR QL: NEGATIVE
BUN BLD-MCNC: 28 MG/DL (ref 9–20)
BUN SERPL-MCNC: 29 MG/DL (ref 6–20)
BUN/CREAT SERPL: 39 (ref 12–20)
CA-I BLD-MCNC: 1.21 MMOL/L (ref 1.12–1.32)
CALCIUM SERPL-MCNC: 9.1 MG/DL (ref 8.5–10.1)
CANNABINOIDS UR QL SCN: NEGATIVE
CHLORIDE BLD-SCNC: 110 MMOL/L (ref 98–107)
CHLORIDE SERPL-SCNC: 107 MMOL/L (ref 97–108)
CK SERPL-CCNC: 149 U/L (ref 26–192)
CO2 BLD-SCNC: 27 MMOL/L (ref 21–32)
CO2 SERPL-SCNC: 33 MMOL/L (ref 21–32)
COCAINE UR QL SCN: NEGATIVE
COLOR UR: ABNORMAL
CREAT BLD-MCNC: 0.8 MG/DL (ref 0.6–1.3)
CREAT SERPL-MCNC: 0.75 MG/DL (ref 0.55–1.02)
DIFFERENTIAL METHOD BLD: ABNORMAL
DRUG SCRN COMMENT,DRGCM: NORMAL
EOSINOPHIL # BLD: 0 K/UL (ref 0–0.4)
EOSINOPHIL NFR BLD: 1 % (ref 0–7)
EPITH CASTS URNS QL MICRO: ABNORMAL /LPF
ERYTHROCYTE [DISTWIDTH] IN BLOOD BY AUTOMATED COUNT: 16.7 % (ref 11.5–14.5)
ETHANOL SERPL-MCNC: <10 MG/DL
FLUAV AG NPH QL IA: NEGATIVE
FLUBV AG NOSE QL IA: POSITIVE
GAS FLOW.O2 O2 DELIVERY SYS: 1.5 L/MIN
GLOBULIN SER CALC-MCNC: 4.5 G/DL (ref 2–4)
GLUCOSE BLD STRIP.AUTO-MCNC: 114 MG/DL (ref 65–100)
GLUCOSE BLD STRIP.AUTO-MCNC: 200 MG/DL (ref 65–100)
GLUCOSE BLD STRIP.AUTO-MCNC: 27 MG/DL (ref 65–100)
GLUCOSE BLD STRIP.AUTO-MCNC: 54 MG/DL (ref 65–100)
GLUCOSE BLD STRIP.AUTO-MCNC: 91 MG/DL (ref 65–100)
GLUCOSE BLD-MCNC: 41 MG/DL (ref 65–100)
GLUCOSE SERPL-MCNC: 26 MG/DL (ref 65–100)
GLUCOSE UR STRIP.AUTO-MCNC: 100 MG/DL
HCO3 BLDA-SCNC: 28 MMOL/L (ref 22–26)
HCT VFR BLD AUTO: 32.2 % (ref 35–47)
HCT VFR BLD CALC: 24 % (ref 35–47)
HGB BLD-MCNC: 10 G/DL (ref 11.5–16)
HGB BLD-MCNC: 8.2 GM/DL (ref 11.5–16)
HGB UR QL STRIP: ABNORMAL
HYALINE CASTS URNS QL MICRO: ABNORMAL /LPF (ref 0–5)
IMM GRANULOCYTES # BLD: 0 K/UL
IMM GRANULOCYTES NFR BLD AUTO: 0 %
INR PPP: 1.1 (ref 0.9–1.1)
KETONES UR QL STRIP.AUTO: 15 MG/DL
LACTATE SERPL-SCNC: 0.4 MMOL/L (ref 0.4–2)
LEUKOCYTE ESTERASE UR QL STRIP.AUTO: ABNORMAL
LIPASE SERPL-CCNC: 504 U/L (ref 73–393)
LYMPHOCYTES # BLD: 0.5 K/UL (ref 0.8–3.5)
LYMPHOCYTES NFR BLD: 17 % (ref 12–49)
MAGNESIUM SERPL-MCNC: 2.2 MG/DL (ref 1.6–2.4)
MCH RBC QN AUTO: 28.2 PG (ref 26–34)
MCHC RBC AUTO-ENTMCNC: 31.1 G/DL (ref 30–36.5)
MCV RBC AUTO: 91 FL (ref 80–99)
METHADONE UR QL: NEGATIVE
MONOCYTES # BLD: 0.2 K/UL (ref 0–1)
MONOCYTES NFR BLD: 7 % (ref 5–13)
NEUTS SEG # BLD: 2.4 K/UL (ref 1.8–8)
NEUTS SEG NFR BLD: 75 % (ref 32–75)
NITRITE UR QL STRIP.AUTO: POSITIVE
NRBC # BLD: 0.02 K/UL (ref 0–0.01)
NRBC BLD-RTO: 0.6 PER 100 WBC
OPIATES UR QL: NEGATIVE
PCO2 BLDA: 63 MMHG (ref 35–45)
PCP UR QL: NEGATIVE
PH BLDA: 7.26 [PH] (ref 7.35–7.45)
PH UR STRIP: 5.5 [PH] (ref 5–8)
PLATELET # BLD AUTO: 54 K/UL (ref 150–400)
PMV BLD AUTO: 12.2 FL (ref 8.9–12.9)
PO2 BLDA: 247 MMHG (ref 80–100)
POTASSIUM BLD-SCNC: 4.8 MMOL/L (ref 3.5–5.1)
POTASSIUM SERPL-SCNC: 6.5 MMOL/L (ref 3.5–5.1)
PROT SERPL-MCNC: 7.4 G/DL (ref 6.4–8.2)
PROT UR STRIP-MCNC: ABNORMAL MG/DL
PROTHROMBIN TIME: 10.6 SEC (ref 9–11.1)
RBC # BLD AUTO: 3.54 M/UL (ref 3.8–5.2)
RBC #/AREA URNS HPF: ABNORMAL /HPF (ref 0–5)
RBC MORPH BLD: ABNORMAL
RBC MORPH BLD: ABNORMAL
SAO2 % BLD: 99 % (ref 92–97)
SAO2% DEVICE SAO2% SENSOR NAME: ABNORMAL
SERVICE CMNT-IMP: ABNORMAL
SERVICE CMNT-IMP: NORMAL
SODIUM BLD-SCNC: 145 MMOL/L (ref 136–145)
SODIUM SERPL-SCNC: 144 MMOL/L (ref 136–145)
SP GR UR REFRACTOMETRY: 1.02 (ref 1–1.03)
SPECIMEN SITE: ABNORMAL
THERAPEUTIC RANGE,PTTT: NORMAL SECS (ref 58–77)
TROPONIN I SERPL-MCNC: <0.04 NG/ML
UA: UC IF INDICATED,UAUC: ABNORMAL
UROBILINOGEN UR QL STRIP.AUTO: 0.2 EU/DL (ref 0.2–1)
WBC # BLD AUTO: 3.1 K/UL (ref 3.6–11)
WBC URNS QL MICRO: ABNORMAL /HPF (ref 0–4)

## 2018-02-02 PROCEDURE — 74011000250 HC RX REV CODE- 250: Performed by: INTERNAL MEDICINE

## 2018-02-02 PROCEDURE — 77030011256 HC DRSG MEPILEX <16IN NO BORD MOLN -A

## 2018-02-02 PROCEDURE — 77030013079 HC BLNKT BAIR HGGR 3M -A

## 2018-02-02 PROCEDURE — 82803 BLOOD GASES ANY COMBINATION: CPT | Performed by: EMERGENCY MEDICINE

## 2018-02-02 PROCEDURE — 74011000250 HC RX REV CODE- 250: Performed by: EMERGENCY MEDICINE

## 2018-02-02 PROCEDURE — 87040 BLOOD CULTURE FOR BACTERIA: CPT | Performed by: EMERGENCY MEDICINE

## 2018-02-02 PROCEDURE — 93005 ELECTROCARDIOGRAM TRACING: CPT

## 2018-02-02 PROCEDURE — 77010033678 HC OXYGEN DAILY

## 2018-02-02 PROCEDURE — 74011250636 HC RX REV CODE- 250/636: Performed by: INTERNAL MEDICINE

## 2018-02-02 PROCEDURE — 81001 URINALYSIS AUTO W/SCOPE: CPT | Performed by: EMERGENCY MEDICINE

## 2018-02-02 PROCEDURE — 83735 ASSAY OF MAGNESIUM: CPT | Performed by: EMERGENCY MEDICINE

## 2018-02-02 PROCEDURE — 74011250636 HC RX REV CODE- 250/636: Performed by: EMERGENCY MEDICINE

## 2018-02-02 PROCEDURE — 77030005534 HC CATH URETH FOL TEMP SENS SIMS -B

## 2018-02-02 PROCEDURE — 96361 HYDRATE IV INFUSION ADD-ON: CPT

## 2018-02-02 PROCEDURE — 96375 TX/PRO/DX INJ NEW DRUG ADDON: CPT

## 2018-02-02 PROCEDURE — 85610 PROTHROMBIN TIME: CPT | Performed by: EMERGENCY MEDICINE

## 2018-02-02 PROCEDURE — 75810000137 HC PLCMT CENT VENOUS CATH

## 2018-02-02 PROCEDURE — 80053 COMPREHEN METABOLIC PANEL: CPT | Performed by: EMERGENCY MEDICINE

## 2018-02-02 PROCEDURE — 83690 ASSAY OF LIPASE: CPT | Performed by: EMERGENCY MEDICINE

## 2018-02-02 PROCEDURE — 87804 INFLUENZA ASSAY W/OPTIC: CPT | Performed by: EMERGENCY MEDICINE

## 2018-02-02 PROCEDURE — 77030038269 HC DRN EXT URIN PURWCK BARD -A

## 2018-02-02 PROCEDURE — 82962 GLUCOSE BLOOD TEST: CPT

## 2018-02-02 PROCEDURE — 85730 THROMBOPLASTIN TIME PARTIAL: CPT | Performed by: EMERGENCY MEDICINE

## 2018-02-02 PROCEDURE — C1751 CATH, INF, PER/CENT/MIDLINE: HCPCS

## 2018-02-02 PROCEDURE — 96365 THER/PROPH/DIAG IV INF INIT: CPT

## 2018-02-02 PROCEDURE — 80047 BASIC METABLC PNL IONIZED CA: CPT

## 2018-02-02 PROCEDURE — 96366 THER/PROPH/DIAG IV INF ADDON: CPT

## 2018-02-02 PROCEDURE — 71250 CT THORAX DX C-: CPT

## 2018-02-02 PROCEDURE — 74011250636 HC RX REV CODE- 250/636

## 2018-02-02 PROCEDURE — 36415 COLL VENOUS BLD VENIPUNCTURE: CPT | Performed by: EMERGENCY MEDICINE

## 2018-02-02 PROCEDURE — 87086 URINE CULTURE/COLONY COUNT: CPT | Performed by: EMERGENCY MEDICINE

## 2018-02-02 PROCEDURE — 85025 COMPLETE CBC W/AUTO DIFF WBC: CPT | Performed by: EMERGENCY MEDICINE

## 2018-02-02 PROCEDURE — 74011250637 HC RX REV CODE- 250/637: Performed by: INTERNAL MEDICINE

## 2018-02-02 PROCEDURE — 84484 ASSAY OF TROPONIN QUANT: CPT | Performed by: EMERGENCY MEDICINE

## 2018-02-02 PROCEDURE — 80307 DRUG TEST PRSMV CHEM ANLYZR: CPT | Performed by: EMERGENCY MEDICINE

## 2018-02-02 PROCEDURE — 77030008771 HC TU NG SALEM SUMP -A

## 2018-02-02 PROCEDURE — 36600 WITHDRAWAL OF ARTERIAL BLOOD: CPT | Performed by: EMERGENCY MEDICINE

## 2018-02-02 PROCEDURE — 70450 CT HEAD/BRAIN W/O DYE: CPT

## 2018-02-02 PROCEDURE — 99285 EMERGENCY DEPT VISIT HI MDM: CPT

## 2018-02-02 PROCEDURE — 83605 ASSAY OF LACTIC ACID: CPT | Performed by: EMERGENCY MEDICINE

## 2018-02-02 PROCEDURE — 74018 RADEX ABDOMEN 1 VIEW: CPT

## 2018-02-02 PROCEDURE — 87186 SC STD MICRODIL/AGAR DIL: CPT | Performed by: EMERGENCY MEDICINE

## 2018-02-02 PROCEDURE — 65610000006 HC RM INTENSIVE CARE

## 2018-02-02 PROCEDURE — 51702 INSERT TEMP BLADDER CATH: CPT

## 2018-02-02 PROCEDURE — 87077 CULTURE AEROBIC IDENTIFY: CPT | Performed by: EMERGENCY MEDICINE

## 2018-02-02 PROCEDURE — 82550 ASSAY OF CK (CPK): CPT | Performed by: EMERGENCY MEDICINE

## 2018-02-02 PROCEDURE — 77030019563 HC DEV ATTCH FEED HOLL -A

## 2018-02-02 PROCEDURE — 74011000250 HC RX REV CODE- 250

## 2018-02-02 PROCEDURE — 71045 X-RAY EXAM CHEST 1 VIEW: CPT

## 2018-02-02 PROCEDURE — 74011000258 HC RX REV CODE- 258: Performed by: EMERGENCY MEDICINE

## 2018-02-02 PROCEDURE — 74176 CT ABD & PELVIS W/O CONTRAST: CPT

## 2018-02-02 RX ORDER — LORAZEPAM 2 MG/ML
INJECTION INTRAMUSCULAR
Status: COMPLETED
Start: 2018-02-02 | End: 2018-02-02

## 2018-02-02 RX ORDER — LORAZEPAM 2 MG/ML
1 INJECTION INTRAMUSCULAR
Status: COMPLETED | OUTPATIENT
Start: 2018-02-02 | End: 2018-02-02

## 2018-02-02 RX ORDER — VANCOMYCIN 1.75 GRAM/500 ML IN 0.9 % SODIUM CHLORIDE INTRAVENOUS
1750
Status: COMPLETED | OUTPATIENT
Start: 2018-02-02 | End: 2018-02-02

## 2018-02-02 RX ORDER — DEXTROSE 50 % IN WATER (D50W) INTRAVENOUS SYRINGE
Status: COMPLETED
Start: 2018-02-02 | End: 2018-02-02

## 2018-02-02 RX ORDER — LORAZEPAM 2 MG/ML
INJECTION INTRAMUSCULAR
Status: DISCONTINUED
Start: 2018-02-02 | End: 2018-02-02

## 2018-02-02 RX ORDER — SODIUM CHLORIDE 0.9 % (FLUSH) 0.9 %
5-10 SYRINGE (ML) INJECTION AS NEEDED
Status: DISCONTINUED | OUTPATIENT
Start: 2018-02-02 | End: 2018-02-05 | Stop reason: SDUPTHER

## 2018-02-02 RX ORDER — SODIUM BICARBONATE 1 MEQ/ML
50 SYRINGE (ML) INTRAVENOUS
Status: COMPLETED | OUTPATIENT
Start: 2018-02-02 | End: 2018-02-02

## 2018-02-02 RX ORDER — DEXTROMETHORPHAN HYDROBROMIDE, GUAIFENESIN 5; 100 MG/5ML; MG/5ML
650 LIQUID ORAL
COMMUNITY

## 2018-02-02 RX ORDER — CALCIUM GLUCONATE 94 MG/ML
1 INJECTION, SOLUTION INTRAVENOUS
Status: COMPLETED | OUTPATIENT
Start: 2018-02-02 | End: 2018-02-02

## 2018-02-02 RX ORDER — SODIUM CHLORIDE 0.9 % (FLUSH) 0.9 %
5-10 SYRINGE (ML) INJECTION EVERY 8 HOURS
Status: DISCONTINUED | OUTPATIENT
Start: 2018-02-02 | End: 2018-02-13

## 2018-02-02 RX ORDER — DEXTROSE MONOHYDRATE AND SODIUM CHLORIDE 5; .45 G/100ML; G/100ML
50 INJECTION, SOLUTION INTRAVENOUS CONTINUOUS
Status: DISCONTINUED | OUTPATIENT
Start: 2018-02-02 | End: 2018-02-02

## 2018-02-02 RX ORDER — MUPIROCIN 20 MG/G
OINTMENT TOPICAL 2 TIMES DAILY
Status: DISCONTINUED | OUTPATIENT
Start: 2018-02-03 | End: 2018-02-03

## 2018-02-02 RX ORDER — OSELTAMIVIR PHOSPHATE 6 MG/ML
75 FOR SUSPENSION ORAL 2 TIMES DAILY
Status: DISCONTINUED | OUTPATIENT
Start: 2018-02-02 | End: 2018-02-03

## 2018-02-02 RX ORDER — SODIUM POLYSTYRENE SULFONATE 15 G/60ML
45 SUSPENSION ORAL; RECTAL
Status: DISCONTINUED | OUTPATIENT
Start: 2018-02-02 | End: 2018-02-03

## 2018-02-02 RX ORDER — LEVETIRACETAM 10 MG/ML
1000 INJECTION INTRAVASCULAR ONCE
Status: COMPLETED | OUTPATIENT
Start: 2018-02-02 | End: 2018-02-02

## 2018-02-02 RX ORDER — CHOLECALCIFEROL (VITAMIN D3) 125 MCG
1 CAPSULE ORAL DAILY
COMMUNITY

## 2018-02-02 RX ORDER — HEPARIN SODIUM 5000 [USP'U]/ML
5000 INJECTION, SOLUTION INTRAVENOUS; SUBCUTANEOUS EVERY 8 HOURS
Status: DISCONTINUED | OUTPATIENT
Start: 2018-02-02 | End: 2018-02-04

## 2018-02-02 RX ORDER — SODIUM CHLORIDE 0.9 % (FLUSH) 0.9 %
5-10 SYRINGE (ML) INJECTION AS NEEDED
Status: DISCONTINUED | OUTPATIENT
Start: 2018-02-02 | End: 2018-03-12 | Stop reason: HOSPADM

## 2018-02-02 RX ORDER — NOREPINEPHRINE BITARTRATE/D5W 8 MG/250ML
2-30 PLASTIC BAG, INJECTION (ML) INTRAVENOUS
Status: DISCONTINUED | OUTPATIENT
Start: 2018-02-02 | End: 2018-02-03

## 2018-02-02 RX ORDER — DEXTROSE 50 % IN WATER (D50W) INTRAVENOUS SYRINGE
25 AS NEEDED
Status: DISCONTINUED | OUTPATIENT
Start: 2018-02-02 | End: 2018-02-12 | Stop reason: SDUPTHER

## 2018-02-02 RX ORDER — NOREPINEPHRINE BITARTRATE/D5W 4MG/250ML
2-16 PLASTIC BAG, INJECTION (ML) INTRAVENOUS
Status: DISCONTINUED | OUTPATIENT
Start: 2018-02-02 | End: 2018-02-02 | Stop reason: SDUPTHER

## 2018-02-02 RX ORDER — AMPICILLIN TRIHYDRATE 250 MG
600 CAPSULE ORAL DAILY
COMMUNITY

## 2018-02-02 RX ADMIN — VANCOMYCIN HYDROCHLORIDE 1750 MG: 10 INJECTION, POWDER, LYOPHILIZED, FOR SOLUTION INTRAVENOUS at 18:01

## 2018-02-02 RX ADMIN — Medication 20 MCG/MIN: at 22:00

## 2018-02-02 RX ADMIN — LEVETIRACETAM 1000 MG: 1000 INJECTION, SOLUTION INTRAVENOUS at 15:30

## 2018-02-02 RX ADMIN — Medication 10 ML: at 22:51

## 2018-02-02 RX ADMIN — OSELTAMIVIR PHOSPHATE 75 MG: 6 POWDER, FOR SUSPENSION ORAL at 22:51

## 2018-02-02 RX ADMIN — SODIUM BICARBONATE 50 MEQ: 84 INJECTION, SOLUTION INTRAVENOUS at 17:16

## 2018-02-02 RX ADMIN — HEPARIN SODIUM 5000 UNITS: 5000 INJECTION, SOLUTION INTRAVENOUS; SUBCUTANEOUS at 22:51

## 2018-02-02 RX ADMIN — PIPERACILLIN SODIUM AND TAZOBACTAM SODIUM 3.38 G: .375; 3 INJECTION, POWDER, LYOPHILIZED, FOR SOLUTION INTRAVENOUS at 17:15

## 2018-02-02 RX ADMIN — SODIUM CHLORIDE: 234 INJECTION, SOLUTION, CONCENTRATE INTRAVENOUS; SUBCUTANEOUS at 18:55

## 2018-02-02 RX ADMIN — DEXTROSE MONOHYDRATE 25 G: 25 INJECTION, SOLUTION INTRAVENOUS at 17:12

## 2018-02-02 RX ADMIN — CEFTRIAXONE SODIUM 1 G: 1 INJECTION, POWDER, FOR SOLUTION INTRAMUSCULAR; INTRAVENOUS at 22:51

## 2018-02-02 RX ADMIN — LORAZEPAM 2 MG: 2 INJECTION INTRAMUSCULAR; INTRAVENOUS at 15:04

## 2018-02-02 RX ADMIN — Medication 5 MCG/MIN: at 18:19

## 2018-02-02 RX ADMIN — DEXTROSE MONOHYDRATE 25 G: 25 INJECTION, SOLUTION INTRAVENOUS at 14:46

## 2018-02-02 RX ADMIN — SODIUM CHLORIDE 1000 ML: 900 INJECTION, SOLUTION INTRAVENOUS at 15:30

## 2018-02-02 RX ADMIN — CALCIUM GLUCONATE 1 G: 94 INJECTION, SOLUTION INTRAVENOUS at 17:16

## 2018-02-02 RX ADMIN — SODIUM CHLORIDE 2124 ML: 900 INJECTION, SOLUTION INTRAVENOUS at 16:15

## 2018-02-02 RX ADMIN — LORAZEPAM 2 MG: 2 INJECTION INTRAMUSCULAR at 15:04

## 2018-02-02 NOTE — H&P
Hospitalist Admission Note    NAME: Ce Lovelace   :  1945   MRN:  [id number]     Date/Time:  2018 6:30 PM    Patient PCP: Sofia Tatum MD  ________________________________________________________________________    My assessment of this patient's clinical condition and my plan of care is as follows. Assessment / Plan:    1) Sepsis: Combined etiology Influenza B and UTI. Patient with Mental retardation non responsive, will put and NGT to give Oseltamir liquid. Continue with ceftriaxone for UTI. F/u CX. Also patient has been hypotensive despite fluid resuscitation, will start levophed and transfer patient to ICU. 2) Mental retardation    3) Hx Lupus: Holding po meds    4) Hx HTN: hold for now     5) Hx DM: actually with hypoglycemia. Will continue fluids with D5, monitor FSG Q 2 hrs    6) poor hear rate response: Despite hypotension, sepsis, patient HR in the 40;s-50; EKG no heart block. Maybe related to lupus? Conduction abnormality. ?? Code Status: full  Surrogate Decision Maker:    DVT Prophylaxis:yes  GI Prophylaxis: yes    Baseline: Mental retardation, lives with family        Subjective:   CHIEF COMPLAINT: Not eating and becoming more unresponsive than usual    HISTORY OF PRESENT ILLNESS:     Ce Lovelace is a 72 y.o. Hx MR, LE,HTN,DM, who presnts from a group home ( daughter who normally takes care of her had a recent sx so she put the patient ion a group home until she recovers from sx) apparently while in the group home satff from it reported that she was not eating much and it was more lethargic. She was then transferred here for further evaluation    We were asked to admit for work up and evaluation of the above problems.      Past Medical History:   Diagnosis Date    Arthritis     Colon polyp     Diabetes ([facility] [address]     borderline no medications    Environmental allergies 2010    GERD (gastroesophageal reflux disease)     HTN (hypertension) 2010 dosent take medication now    Lupus     MR (mental retardation)     Osteoporosis, senile     Other ill-defined conditions     parkinson's disease possibly    Psychiatric disorder     anxious    PUD (peptic ulcer disease)     Scolioses     Sjogren's syndrome ([facility] [address] 3/4/2015        Past Surgical History:   Procedure Laterality Date    HX TONSILLECTOMY      DE COLONOSCOPY FLX DX W/COLLJ SPEC WHEN PFRMD  2/17/2011    due 2013       Social History   Substance Use Topics    Smoking status: Never Smoker    Smokeless tobacco: Never Used    Alcohol use No      Allergies   Allergen Reactions    Adhesive Tape Other (comments)     Redness under that adhesive        Prior to Admission medications    Medication Sig Start Date End Date Taking? Authorizing Provider   cholecalciferol, vitamin D3, (VITAMIN D3) 2,000 unit tab Take 1 Tab by mouth daily. Yes Historical Provider   red yeast rice extract 600 mg cap Take 600 mg by mouth daily. Yes Historical Provider   acetaminophen (TYLENOL ARTHRITIS PAIN) 650 mg TbER Take 650 mg by mouth every eight (8) hours as needed for Pain. Yes Historical Provider   hydroxychloroquine (PLAQUENIL) 200 mg tablet TAKE 1 TABLET EVERY DAY 10/3/17  Yes Georgette Cristobal MD   levothyroxine (SYNTHROID) 25 mcg tablet TAKE 1 TABLET BY MOUTH DAILY BEFORE BREAKFAST 5/1/17  Yes Georgette Cristobal MD   cetirizine (ZYRTEC) 10 mg tablet Take 1 Tab by mouth daily as needed for Allergies. 2/1/16   Georgette Cristobal MD       REVIEW OF SYSTEMS:     I am not able to complete the review of systems because:    The patient is intubated and sedated   x The patient has altered mental status due to his acute medical problems    The patient has baseline aphasia from prior stroke(s)    The patient has baseline dementia and is not reliable historian    The patient is in acute medical distress and unable to provide information           Total of 12 systems reviewed as follows:       POSITIVE= underlined text Negative = text not underlined  General:  fever, chills, sweats, generalized weakness, weight loss/gain,      loss of appetite   Eyes:    blurred vision, eye pain, loss of vision, double vision  ENT:    rhinorrhea, pharyngitis   Respiratory:   cough, sputum production, SOB, YOUNG, wheezing, pleuritic pain   Cardiology:   chest pain, palpitations, orthopnea, PND, edema, syncope   Gastrointestinal:  abdominal pain , N/V, diarrhea, dysphagia, constipation, bleeding   Genitourinary:  frequency, urgency, dysuria, hematuria, incontinence   Muskuloskeletal :  arthralgia, myalgia, back pain  Hematology:  easy bruising, nose or gum bleeding, lymphadenopathy   Dermatological: rash, ulceration, pruritis, color change / jaundice  Endocrine:   hot flashes or polydipsia   Neurological:  headache, dizziness, confusion, focal weakness, paresthesia,     Speech difficulties, memory loss, gait difficulty  Psychological: Feelings of anxiety, depression, agitation    Objective:   VITALS:    Visit Vitals    BP (!) 63/33 (BP 1 Location: Left arm, BP Patient Position: At rest)    Pulse (!) 54    Temp (!) 90.7 °F (32.6 °C)    Resp 15    Ht 5' 1\" (1.549 m)    Wt 70.8 kg (156 lb 1.4 oz)    SpO2 100%    BMI 29.49 kg/m2       PHYSICAL EXAM:    General:    Lethargic, poor response . HEENT: Atraumatic, anicteric sclerae, pink conjunctivae     No oral ulcers, mucosa moist, throat clear, dentition fair  Neck:  Supple, symmetrical,  thyroid: non tender  Lungs:   Clear to auscultation bilaterally. No Wheezing or Rhonchi. No rales. Chest wall:  No tenderness  No Accessory muscle use. Heart:   Bradycardia , No  murmur   No edema  Abdomen:   Soft, non-tender. Not distended. Bowel sounds normal  Extremities: No cyanosis. No clubbing,      Skin turgor normal, Capillary refill normal, Radial dial pulse 2+  Skin:     Not pale. Not Jaundiced  No rashes   Psych:  Good insight. Not depressed.   Not anxious or agitated.    _______________________________________________________________________  Care Plan discussed with:    Comments   Patient     Family      RN x    Care Manager                    Consultant:      _______________________________________________________________________  Expected  Disposition:   Home with Family    HH/PT/OT/RN    SNF/LTC x   LUIS    ________________________________________________________________________  TOTAL TIME:  48 Minutes    Critical Care Provided     Minutes non procedure based      Comments    x Reviewed previous records   >50% of visit spent in counseling and coordination of care  Discussion with patient and/or family and questions answered       ________________________________________________________________________  Signed: Federico Ellis MD    Procedures: see electronic medical records for all procedures/Xrays and details which were not copied into this note but were reviewed prior to creation of Plan.     LAB DATA REVIEWED:    Recent Results (from the past 24 hour(s))   GLUCOSE, POC    Collection Time: 02/02/18  2:39 PM   Result Value Ref Range    Glucose (POC) 27 (LL) 65 - 100 mg/dL    Performed by Lucero Ya    GLUCOSE, POC    Collection Time: 02/02/18  2:53 PM   Result Value Ref Range    Glucose (POC) 200 (H) 65 - 100 mg/dL    Performed by Aleksandra Guidry (SON)    CBC WITH AUTOMATED DIFF    Collection Time: 02/02/18  3:06 PM   Result Value Ref Range    WBC 3.1 (L) 3.6 - 11.0 K/uL    RBC 3.54 (L) 3.80 - 5.20 M/uL    HGB 10.0 (L) 11.5 - 16.0 g/dL    HCT 32.2 (L) 35.0 - 47.0 %    MCV 91.0 80.0 - 99.0 FL    MCH 28.2 26.0 - 34.0 PG    MCHC 31.1 30.0 - 36.5 g/dL    RDW 16.7 (H) 11.5 - 14.5 %    PLATELET 54 (L) 827 - 400 K/uL    MPV 12.2 8.9 - 12.9 FL    NRBC 0.6 (H) 0  WBC    ABSOLUTE NRBC 0.02 (H) 0.00 - 0.01 K/uL    NEUTROPHILS 75 32 - 75 %    LYMPHOCYTES 17 12 - 49 %    MONOCYTES 7 5 - 13 %    EOSINOPHILS 1 0 - 7 %    BASOPHILS 0 0 - 1 %    IMMATURE GRANULOCYTES 0 %    ABS. NEUTROPHILS 2.4 1.8 - 8.0 K/UL    ABS. LYMPHOCYTES 0.5 (L) 0.8 - 3.5 K/UL    ABS. MONOCYTES 0.2 0.0 - 1.0 K/UL    ABS. EOSINOPHILS 0.0 0.0 - 0.4 K/UL    ABS. BASOPHILS 0.0 0.0 - 0.1 K/UL    ABS. IMM. GRANS. 0.0 K/UL    DF SMEAR SCANNED      RBC COMMENTS TARGET CELLS  1+        RBC COMMENTS ANISOCYTOSIS  1+       METABOLIC PANEL, COMPREHENSIVE    Collection Time: 02/02/18  3:06 PM   Result Value Ref Range    Sodium 144 136 - 145 mmol/L    Potassium 6.5 (H) 3.5 - 5.1 mmol/L    Chloride 107 97 - 108 mmol/L    CO2 33 (H) 21 - 32 mmol/L    Anion gap 4 (L) 5 - 15 mmol/L    Glucose 26 (LL) 65 - 100 mg/dL    BUN 29 (H) 6 - 20 MG/DL    Creatinine 0.75 0.55 - 1.02 MG/DL    BUN/Creatinine ratio 39 (H) 12 - 20      GFR est AA >60 >60 ml/min/1.73m2    GFR est non-AA >60 >60 ml/min/1.73m2    Calcium 9.1 8.5 - 10.1 MG/DL    Bilirubin, total 0.2 0.2 - 1.0 MG/DL    ALT (SGPT) 251 (H) 12 - 78 U/L    AST (SGOT) 224 (H) 15 - 37 U/L    Alk.  phosphatase 159 (H) 45 - 117 U/L    Protein, total 7.4 6.4 - 8.2 g/dL    Albumin 2.9 (L) 3.5 - 5.0 g/dL    Globulin 4.5 (H) 2.0 - 4.0 g/dL    A-G Ratio 0.6 (L) 1.1 - 2.2     LACTIC ACID    Collection Time: 02/02/18  3:06 PM   Result Value Ref Range    Lactic acid 0.4 0.4 - 2.0 MMOL/L   LIPASE    Collection Time: 02/02/18  3:06 PM   Result Value Ref Range    Lipase 504 (H) 73 - 393 U/L   TROPONIN I    Collection Time: 02/02/18  3:06 PM   Result Value Ref Range    Troponin-I, Qt. <0.04 <0.05 ng/mL   MAGNESIUM    Collection Time: 02/02/18  3:06 PM   Result Value Ref Range    Magnesium 2.2 1.6 - 2.4 mg/dL   PROTHROMBIN TIME + INR    Collection Time: 02/02/18  3:06 PM   Result Value Ref Range    INR 1.1 0.9 - 1.1      Prothrombin time 10.6 9.0 - 11.1 sec   PTT    Collection Time: 02/02/18  3:06 PM   Result Value Ref Range    aPTT 29.6 22.1 - 32.5 sec    aPTT, therapeutic range     58.0 - 77.0 SECS   CK    Collection Time: 02/02/18  3:06 PM   Result Value Ref Range     26 - 192 U/L   EKG, 12 LEAD, INITIAL    Collection Time: 02/02/18  3:17 PM   Result Value Ref Range    Ventricular Rate 40 BPM    Atrial Rate 24 BPM    QRS Duration 124 ms    Q-T Interval 634 ms    QTC Calculation (Bezet) 516 ms    Calculated R Axis 7 degrees    Calculated T Axis -74 degrees    Diagnosis       Atrial fibrillation with slow ventricular response  Left ventricular hypertrophy with QRS widening  Nonspecific ST and T wave abnormality  Abnormal ECG  When compared with ECG of 23-MAY-2017 16:28,  Significant changes have occurred     INFLUENZA A & B AG (RAPID TEST)    Collection Time: 02/02/18  3:25 PM   Result Value Ref Range    Influenza A Antigen NEGATIVE  NEG      Influenza B Antigen POSITIVE (A) NEG     ETHYL ALCOHOL    Collection Time: 02/02/18  3:25 PM   Result Value Ref Range    ALCOHOL(ETHYL),SERUM <10 <10 MG/DL   GLUCOSE, POC    Collection Time: 02/02/18  3:58 PM   Result Value Ref Range    Glucose (POC) 114 (H) 65 - 100 mg/dL    Performed by [employer]    BLOOD GAS, ARTERIAL    Collection Time: 02/02/18  4:15 PM   Result Value Ref Range    pH 7.26 (L) 7.35 - 7.45      PCO2 63 (H) 35.0 - 45.0 mmHg    PO2 247 (H) 80 - 100 mmHg    O2 SAT 99 (H) 92 - 97 %    BICARBONATE 28 (H) 22 - 26 mmol/L    BASE DEFICIT 0.8 mmol/L    O2 METHOD NASAL O2      O2 FLOW RATE 1.50 L/min    Sample source ARTERIAL      SITE RIGHT BRACHIAL      HUNTER'S TEST N/A     GLUCOSE, POC    Collection Time: 02/02/18  4:54 PM   Result Value Ref Range    Glucose (POC) 54 (L) 65 - 100 mg/dL    Performed by Wynn Collette    URINALYSIS W/ REFLEX CULTURE    Collection Time: 02/02/18  4:57 PM   Result Value Ref Range    Color YELLOW/STRAW      Appearance CLOUDY (A) CLEAR      Specific gravity 1.020 1.003 - 1.030      pH (UA) 5.5 5.0 - 8.0      Protein TRACE (A) NEG mg/dL    Glucose 100 (A) NEG mg/dL    Ketone 15 (A) NEG mg/dL    Bilirubin NEGATIVE  NEG      Blood MODERATE (A) NEG      Urobilinogen 0.2 0.2 - 1.0 EU/dL    Nitrites POSITIVE (A) NEG Leukocyte Esterase MODERATE (A) NEG      WBC 20-50 0 - 4 /hpf    RBC 5-10 0 - 5 /hpf    Epithelial cells FEW FEW /lpf    Bacteria 4+ (A) NEG /hpf    UA:UC IF INDICATED URINE CULTURE ORDERED (A) CNI      Hyaline cast 2-5 0 - 5 /lpf   DRUG SCREEN, URINE    Collection Time: 02/02/18  4:57 PM   Result Value Ref Range    AMPHETAMINES NEGATIVE  NEG      BARBITURATES NEGATIVE  NEG      BENZODIAZEPINES NEGATIVE  NEG      COCAINE NEGATIVE  NEG      METHADONE NEGATIVE  NEG      OPIATES NEGATIVE  NEG      PCP(PHENCYCLIDINE) NEGATIVE  NEG      THC (TH-CANNABINOL) NEGATIVE  NEG      Drug screen comment (NOTE)    POC CHEM8    Collection Time: 02/02/18  5:23 PM   Result Value Ref Range    Calcium, ionized (POC) 1.21 1.12 - 1.32 MMOL/L    Sodium (POC) 145 136 - 145 MMOL/L    Potassium (POC) 4.8 3.5 - 5.1 MMOL/L    Chloride (POC) 110 (H) 98 - 107 MMOL/L    CO2 (POC) 27 21 - 32 MMOL/L    Anion gap (POC) 13 5 - 15 mmol/L    Glucose (POC) 41 (LL) 65 - 100 MG/DL    BUN (POC) 28 (H) 9 - 20 MG/DL    Creatinine (POC) 0.8 0.6 - 1.3 MG/DL    GFRAA, POC >60 >60 ml/min/1.73m2    GFRNA, POC >60 >60 ml/min/1.73m2    Hemoglobin (POC) 8.2 (L) 11.5 - 16.0 GM/DL    Hematocrit (POC) 24 (L) 35.0 - 47.0 %    Comment Comment Not Indicated.

## 2018-02-02 NOTE — ED NOTES
Assumed care of pt, report received from Tan Vragas RN. Updated family on plan of care. Pt placed on seizure precautions, Elizabet Palencia on pt. Started 2nd fluid bolus using fluid warmer. 1615: Pastoral Care at bedside with family.

## 2018-02-02 NOTE — ED NOTES
Bedside shift change report given to RODRIGUEZ Mills (oncoming nurse) by RODRIGUEZ Allison (offgoing nurse). Report included the following information SBAR, ED Summary and MAR.

## 2018-02-02 NOTE — PROGRESS NOTES
Pharmacy Clarification of Prior to Admission Medication Regimen     The patient was not interviewed regarding clarification of the prior to admission medication regimen due to the patient being unresponsive. MHT spoke to the patient's family who verified the patient has been at [facility] for the past two weeks and Fortino Sales manages her medications. MHT called [facility], [phone], and spoke with Fortino Sales CNA, who verified the patients medications, medication strengths, the dosing, and the last doses administered. Information Obtained From: Rx Query, patient's family, and SNF    Pertinent Pharmacy Findings:   Updated patients preferred outpatient pharmacy to: Pharmacy did not update the outpatient pharmacy due to patient living at a SNF  · cetirizine (ZYRTEC) 10 mg tablet: Per SNF, the patient has this agent and takes it on an \"as needed basis. \"      PTA medication list was corrected to the following:     Prior to Admission Medications   Prescriptions Last Dose Informant Patient Reported? Taking?   acetaminophen (TYLENOL ARTHRITIS PAIN) 650 mg TbER 1/26/2018 at Unknown time Other Yes Yes   Sig: Take 650 mg by mouth every eight (8) hours as needed for Pain. cetirizine (ZYRTEC) 10 mg tablet Not Taking at Unknown time Other No No   Sig: Take 1 Tab by mouth daily as needed for Allergies. cholecalciferol, vitamin D3, (VITAMIN D3) 2,000 unit tab 1/31/2018 at Unknown time Other Yes Yes   Sig: Take 1 Tab by mouth daily. hydroxychloroquine (PLAQUENIL) 200 mg tablet 1/31/2018 at Unknown time Other No Yes   Sig: TAKE 1 TABLET EVERY DAY   levothyroxine (SYNTHROID) 25 mcg tablet 1/31/2018 at Unknown time Other No Yes   Sig: TAKE 1 TABLET BY MOUTH DAILY BEFORE BREAKFAST   red yeast rice extract 600 mg cap 1/31/2018 at Unknown time Other Yes Yes   Sig: Take 600 mg by mouth daily.       Facility-Administered Medications: None          Thank you,  Abbey Fuentes, CPhT  Medication History Pharmacy Technician

## 2018-02-02 NOTE — ED NOTES
MD Cornel Morales at bedside to evaluate patient. Patient medicated for seizure activity and taken to CT by this RN. No seizure activity noted after Ativan given.  Warm blankets and Elizabet Hugger placed on patient for temperature of 85.3

## 2018-02-02 NOTE — ED NOTES
Temperature sensing raines placed, urine specimen collected. Pt responds to painful stimuli, not opening eyes at this time. According to sister patient speaks couple of words, baseline patient is awake, ambulatory with assistance. Updated family on plan of care to be admitted. Pt placed on droplet precautions, updated family on Flu status.

## 2018-02-02 NOTE — IP AVS SNAPSHOT
[address] [place] [address] 
[phone] Patient: Suzette Cam MRN: [id number] AEY:[birth year] You are allergic to the following Allergen Reactions Adhesive Tape Other (comments) Redness under that adhesive Recent Documentation Height Weight BMI OB Status Smoking Status 1.549 m 75.9 kg 31.63 kg/m2 Postmenopausal Never Smoker Emergency Contacts  (Rel.) Home Phone Work Phone Mobile Phone Giovana Jackson (Sister) [phone] -- [phone] Vonda Cervantes (Other Relative) -- -- [phone] About your hospitalization You were admitted on:  February 2, 2018 You last received care in the:  [address] You were discharged on:  March 12, 2018 Why you were hospitalized Your primary diagnosis was:  Not on File Your diagnoses also included:  Uti (Urinary Tract Infection), Mental Retardation, Sepsis (Hcc), Hx Of Systemic Lupus Erythematosus (Sle), Hx Of Diabetes Mellitus, Hx Of Essential Hypertension, Idiopathic Hypotension, Acute Renal Failure With Tubular Necrosis (Hcc), Counseling Regarding Goals Of Care, Aspiration Into Respiratory Tract Providers Seen During Your Hospitalization Provider Specialty Primary office phone Renea Feng MD Emergency Medicine [phone] Renita Bernstein MD Internal Medicine [phone] Jose Alberto Gross MD Internal Medicine [phone] Desi Knox MD Internal Medicine [phone] Germán Sorto MD Internal Medicine [phone] Destiny Huerta MD Internal Medicine [phone] Queen Shreyas MD Internal Medicine [phone] Chasity Santos MD Internal Medicine [phone] Olinda Guo MD Internal Medicine [phone] Daksha Woody MD Internal Medicine [phone] Adán Betancourt MD Internal Medicine [phone] Your Primary Care Physician (PCP) Primary Care Physician Office Phone Office Fax Baljeet Ayala [phone] Follow-up Information Follow up With Details Comments Contact Info Carlos Navarro MD In 5 days 5 days after discharge [address] [id number] [phone] My Medications TAKE these medications as instructed Instructions Each Dose to Equal  
 Morning Noon Evening Bedtime  
 cetirizine 10 mg tablet Commonly known as:  ZYRTEC Your last dose was: Your next dose is: Take 1 Tab by mouth daily as needed for Allergies. 10 mg  
    
   
   
   
  
 hydroxychloroquine 200 mg tablet Commonly known as:  PLAQUENIL Your last dose was: Your next dose is: TAKE 1 TABLET EVERY DAY  
     
   
   
   
  
 levothyroxine 25 mcg tablet Commonly known as:  SYNTHROID Your last dose was: Your next dose is: TAKE 1 TABLET BY MOUTH DAILY BEFORE BREAKFAST  
     
   
   
   
  
 metoclopramide HCl 5 mg tablet Commonly known as:  REGLAN Your last dose was: Your next dose is: Take 1 Tab by mouth Before breakfast, lunch, dinner and at bedtime for 10 days. 5 mg  
    
   
   
   
  
 midodrine 10 mg tablet Commonly known as:  Junious Ashly Your last dose was: Your next dose is: Take 1 Tab by mouth three (3) times daily for 30 days. 10 mg  
    
   
   
   
  
 pantoprazole 40 mg tablet Commonly known as:  PROTONIX Your last dose was: Your next dose is: Take 1 Tab by mouth Daily (before breakfast). 40 mg  
    
   
   
   
  
 red yeast rice extract 600 mg Cap Your last dose was: Your next dose is: Take 600 mg by mouth daily. 600 mg  
    
   
   
   
  
 TYLENOL ARTHRITIS PAIN 650 mg Tber Generic drug:  acetaminophen Your last dose was: Your next dose is: Take 650 mg by mouth every eight (8) hours as needed for Pain. 650 mg  
    
   
   
   
  
 VITAMIN D3 2,000 unit Tab Generic drug:  cholecalciferol (vitamin D3) Your last dose was: Your next dose is: Take 1 Tab by mouth daily. 1 Tab Where to Get Your Medications Information on where to get these meds will be given to you by the nurse or doctor. ! Ask your nurse or doctor about these medications  
  metoclopramide HCl 5 mg tablet  
 midodrine 10 mg tablet  
 pantoprazole 40 mg tablet Discharge Instructions None Discharge Orders None [employer] Announcement We are excited to announce that we are making your provider's discharge notes available to you in [employer]. You will see these notes when they are completed and signed by the physician that discharged you from your recent hospital stay. If you have any questions or concerns about any information you see in [employer], please call the Health Information Department where you were seen or reach out to your Primary Care Provider for more information about your plan of care. Introducing [facility] SERVICES! Jarrell Lema introduces [employer] patient portal. Now you can access parts of your medical record, email your doctor's office, and request medication refills online. 1. In your internet browser, go to [email or web address] 2. Click on the First Time User? Click Here link in the Sign In box. You will see the New Member Sign Up page. 3. Enter your [employer] Access Code exactly as it appears below. You will not need to use this code after youve completed the sign-up process. If you do not sign up before the expiration date, you must request a new code. · [employer] Access Code: [email or web address] Expires: 6/6/2018  4:25 PM 
 
4.  Enter the last four digits of your Social Security Number (xxxx) and Date of Birth (mm/dd/yyyy) as indicated and click Submit. You will be taken to the next sign-up page. 5. Create a [employer] ID. This will be your [employer] login ID and cannot be changed, so think of one that is secure and easy to remember. 6. Create a [employer] password. You can change your password at any time. 7. Enter your Password Reset Question and Answer. This can be used at a later time if you forget your password. 8. Enter your e-mail address. You will receive e-mail notification when new information is available in [address] 9. Click Sign Up. You can now view and download portions of your medical record. 10. Click the Download Summary menu link to download a portable copy of your medical information. If you have questions, please visit the Frequently Asked Questions section of the [employer] website. Remember, [employer] is NOT to be used for urgent needs. For medical emergencies, dial 911. Now available from your iPhone and Android! General Information Please provide this summary of care documentation to your next provider. Patient Signature:  ____________________________________________________________ Date:  ____________________________________________________________  
  
Maria G Levi Provider Signature:  ____________________________________________________________ Date:  ____________________________________________________________

## 2018-02-02 NOTE — ED NOTES
Patient arrived responsive per EMS. Glucose noted to be 27 MD Marius Crigler at bedside to evaluate patient. Dextrose IVP given. Patient actively seizing.  Patient placed on seizure precautions

## 2018-02-02 NOTE — ED NOTES
MD Racquel Evans at bedside placing central line. Pressors to follow. 1805: Hematoma formed around central line. Pressure placed for 5 minutes. Will continue to monitor. 1900: Checked femoral site, no acute changes.

## 2018-02-02 NOTE — PROGRESS NOTES
Spiritual Care Assessment/Progress Notes    Vishnu Harris [id number]  [id number]    1945  72 y.o.  female    Patient Telephone Number: [phone] (home)   Jainism Affiliation: Fredrick Moreland   Language: English   Extended Emergency Contact Information  Primary Emergency Contact: Karolina Jacobsen  Mobile Phone: [phone]  Relation: Other Relative  Secondary Emergency Contact: [address] [place] [address]  Mobile Phone: [phone]  Relation: Other Relative   Patient Active Problem List    Diagnosis Date Noted    Advance care planning 05/02/2016    SLE (systemic lupus erythematosus) ([facility] 03/04/2015    Sjogren's syndrome ([facility] 03/04/2015    Encounter for long-term (current) use of medications 03/05/2013    Hypersomnia, unspecified 09/26/2011    Hypothyroid 06/13/2011    MR (mental retardation)     Osteoporosis, senile     Colon polyp     Weight loss 02/17/2011    Anemia 02/17/2011    Environmental allergies 04/28/2010    HTN (hypertension) 04/28/2010        Date: 2/2/2018       Level of Jainism/Spiritual Activity:  [x]         Involved in racquel tradition/spiritual practice    []         Not involved in racquel tradition/spiritual practice  [x]         Spiritually oriented    []         Claims no spiritual orientation    []         seeking spiritual identity  []         Feels alienated from Synagogue practice/tradition  []         Feels angry about Synagogue practice/tradition  [x]         Spirituality/Synagogue tradition is a resource for coping at this time.   []         Not able to assess due to medical condition    Services Provided Today:  [x]         crisis intervention    []         reading Scriptures  [x]         spiritual assessment    [x]         prayer  [x]         empathic listening/emotional support  []         rites and rituals (cite in comments)  [x]         life review     []         Synagogue support  []         theological development   [] advocacy  []         ethical dialog     []         blessing  []         bereavement support    [x]         support to family  []         anticipatory grief support   []         help with AMD  []         spiritual guidance    []         meditation      Spiritual Care Needs  [x]         Emotional Support  [x]         Spiritual/Jewish Care  []         Loss/Adjustment  []         Advocacy/Referral                /Ethics  []         No needs expressed at               this time  []         Other: (note in               comments)  [address]  []         Follow up visits with               pt/family  []         Provide materials  []         Schedule sacraments  []         Contact Community               Clergy  [x]         Follow up as needed  []         Other: (note in               comments)     Responded to request from staff to support family of pt in ER18. Met pt's sister Heike Torres who has cared for pt over 30 years. Jacob Randolph's daughter entered shortly after. Heike Torres shared how she was feeling bad because she had had to put pt in a home for the past two weeks as Heike Torres recovered from surgery on her back. Encouraged Heike Torres not to dwell on feelings of guilt especially as it was clear how dedicated and caring she has been towards pt. Instead encouraged to focus on receiving peace during this crisis. Sister invoked God's presence, provided words of reassurance drawing from [employer]. Prayed with family as requested and provided water and chairs afterwards. RN reported on diagnosis of flu. Stepped away at this time advising of availability for support as desired throughout admittance. Heike Torres expressed appreciation for pastoral visit. TESHA Foster

## 2018-02-03 ENCOUNTER — APPOINTMENT (OUTPATIENT)
Dept: MRI IMAGING | Age: 73
DRG: 871 | End: 2018-02-03
Attending: PSYCHIATRY & NEUROLOGY
Payer: MEDICARE

## 2018-02-03 LAB
ALBUMIN SERPL-MCNC: 2.5 G/DL (ref 3.5–5)
ALBUMIN/GLOB SERPL: 0.6 {RATIO} (ref 1.1–2.2)
ALP SERPL-CCNC: 187 U/L (ref 45–117)
ALT SERPL-CCNC: 259 U/L (ref 12–78)
ANION GAP SERPL CALC-SCNC: 2 MMOL/L (ref 5–15)
AST SERPL-CCNC: 253 U/L (ref 15–37)
ATRIAL RATE: 24 BPM
BILIRUB SERPL-MCNC: 0.3 MG/DL (ref 0.2–1)
BUN SERPL-MCNC: 25 MG/DL (ref 6–20)
BUN/CREAT SERPL: 25 (ref 12–20)
CALCIUM SERPL-MCNC: 7.8 MG/DL (ref 8.5–10.1)
CALCULATED R AXIS, ECG10: 7 DEGREES
CALCULATED T AXIS, ECG11: -74 DEGREES
CHLORIDE SERPL-SCNC: 111 MMOL/L (ref 97–108)
CO2 SERPL-SCNC: 29 MMOL/L (ref 21–32)
CREAT SERPL-MCNC: 1.02 MG/DL (ref 0.55–1.02)
DIAGNOSIS, 93000: NORMAL
ERYTHROCYTE [DISTWIDTH] IN BLOOD BY AUTOMATED COUNT: 17.2 % (ref 11.5–14.5)
GLOBULIN SER CALC-MCNC: 4.2 G/DL (ref 2–4)
GLUCOSE BLD STRIP.AUTO-MCNC: 114 MG/DL (ref 65–100)
GLUCOSE BLD STRIP.AUTO-MCNC: 118 MG/DL (ref 65–100)
GLUCOSE BLD STRIP.AUTO-MCNC: 138 MG/DL (ref 65–100)
GLUCOSE SERPL-MCNC: 121 MG/DL (ref 65–100)
HCT VFR BLD AUTO: 30.5 % (ref 35–47)
HGB BLD-MCNC: 9.5 G/DL (ref 11.5–16)
MCH RBC QN AUTO: 28.5 PG (ref 26–34)
MCHC RBC AUTO-ENTMCNC: 31.1 G/DL (ref 30–36.5)
MCV RBC AUTO: 91.6 FL (ref 80–99)
NRBC # BLD: 0.15 K/UL (ref 0–0.01)
NRBC BLD-RTO: 4.4 PER 100 WBC
PLATELET # BLD AUTO: 56 K/UL (ref 150–400)
PMV BLD AUTO: 11.8 FL (ref 8.9–12.9)
POTASSIUM SERPL-SCNC: 5.8 MMOL/L (ref 3.5–5.1)
PROT SERPL-MCNC: 6.7 G/DL (ref 6.4–8.2)
Q-T INTERVAL, ECG07: 634 MS
QRS DURATION, ECG06: 124 MS
QTC CALCULATION (BEZET), ECG08: 516 MS
RBC # BLD AUTO: 3.33 M/UL (ref 3.8–5.2)
SERVICE CMNT-IMP: ABNORMAL
SODIUM SERPL-SCNC: 142 MMOL/L (ref 136–145)
VENTRICULAR RATE, ECG03: 40 BPM
WBC # BLD AUTO: 3.4 K/UL (ref 3.6–11)

## 2018-02-03 PROCEDURE — 95816 EEG AWAKE AND DROWSY: CPT | Performed by: PSYCHIATRY & NEUROLOGY

## 2018-02-03 PROCEDURE — 70551 MRI BRAIN STEM W/O DYE: CPT

## 2018-02-03 PROCEDURE — 74011000258 HC RX REV CODE- 258: Performed by: EMERGENCY MEDICINE

## 2018-02-03 PROCEDURE — 85027 COMPLETE CBC AUTOMATED: CPT | Performed by: INTERNAL MEDICINE

## 2018-02-03 PROCEDURE — 82962 GLUCOSE BLOOD TEST: CPT

## 2018-02-03 PROCEDURE — 74011250636 HC RX REV CODE- 250/636: Performed by: PSYCHIATRY & NEUROLOGY

## 2018-02-03 PROCEDURE — 74011250637 HC RX REV CODE- 250/637: Performed by: INTERNAL MEDICINE

## 2018-02-03 PROCEDURE — 65610000006 HC RM INTENSIVE CARE

## 2018-02-03 PROCEDURE — 74011250636 HC RX REV CODE- 250/636: Performed by: INTERNAL MEDICINE

## 2018-02-03 PROCEDURE — 74011250636 HC RX REV CODE- 250/636: Performed by: EMERGENCY MEDICINE

## 2018-02-03 PROCEDURE — 36415 COLL VENOUS BLD VENIPUNCTURE: CPT | Performed by: INTERNAL MEDICINE

## 2018-02-03 PROCEDURE — 74011000250 HC RX REV CODE- 250: Performed by: INTERNAL MEDICINE

## 2018-02-03 PROCEDURE — 77030038269 HC DRN EXT URIN PURWCK BARD -A

## 2018-02-03 PROCEDURE — 80053 COMPREHEN METABOLIC PANEL: CPT | Performed by: INTERNAL MEDICINE

## 2018-02-03 RX ORDER — OSELTAMIVIR PHOSPHATE 6 MG/ML
30 FOR SUSPENSION ORAL 2 TIMES DAILY
Status: COMPLETED | OUTPATIENT
Start: 2018-02-03 | End: 2018-02-08

## 2018-02-03 RX ORDER — NOREPINEPHRINE BITARTRATE/D5W 8 MG/250ML
2-200 PLASTIC BAG, INJECTION (ML) INTRAVENOUS
Status: DISPENSED | OUTPATIENT
Start: 2018-02-03 | End: 2018-02-03

## 2018-02-03 RX ORDER — MUPIROCIN 20 MG/G
OINTMENT TOPICAL 2 TIMES DAILY
Status: COMPLETED | OUTPATIENT
Start: 2018-02-03 | End: 2018-02-07

## 2018-02-03 RX ORDER — LEVETIRACETAM 5 MG/ML
500 INJECTION INTRAVASCULAR EVERY 12 HOURS
Status: DISCONTINUED | OUTPATIENT
Start: 2018-02-04 | End: 2018-02-08

## 2018-02-03 RX ORDER — ACETAMINOPHEN 650 MG/1
650 SUPPOSITORY RECTAL
Status: DISCONTINUED | OUTPATIENT
Start: 2018-02-03 | End: 2018-03-12 | Stop reason: HOSPADM

## 2018-02-03 RX ORDER — ONDANSETRON 2 MG/ML
4 INJECTION INTRAMUSCULAR; INTRAVENOUS
Status: DISCONTINUED | OUTPATIENT
Start: 2018-02-03 | End: 2018-02-03

## 2018-02-03 RX ORDER — LEVETIRACETAM 10 MG/ML
1000 INJECTION INTRAVASCULAR ONCE
Status: COMPLETED | OUTPATIENT
Start: 2018-02-03 | End: 2018-02-03

## 2018-02-03 RX ADMIN — Medication 10 ML: at 15:03

## 2018-02-03 RX ADMIN — Medication 35 MCG/MIN: at 01:23

## 2018-02-03 RX ADMIN — HEPARIN SODIUM 5000 UNITS: 5000 INJECTION, SOLUTION INTRAVENOUS; SUBCUTANEOUS at 10:45

## 2018-02-03 RX ADMIN — HEPARIN SODIUM 5000 UNITS: 5000 INJECTION, SOLUTION INTRAVENOUS; SUBCUTANEOUS at 03:37

## 2018-02-03 RX ADMIN — Medication 50 MCG/MIN: at 07:37

## 2018-02-03 RX ADMIN — SODIUM CHLORIDE: 234 INJECTION, SOLUTION, CONCENTRATE INTRAVENOUS; SUBCUTANEOUS at 10:00

## 2018-02-03 RX ADMIN — MUPIROCIN: 20 OINTMENT TOPICAL at 17:31

## 2018-02-03 RX ADMIN — CEFTRIAXONE SODIUM 1 G: 1 INJECTION, POWDER, FOR SOLUTION INTRAMUSCULAR; INTRAVENOUS at 17:34

## 2018-02-03 RX ADMIN — NOREPINEPHRINE BITARTRATE 45 MCG/MIN: 1 INJECTION INTRAVENOUS at 10:18

## 2018-02-03 RX ADMIN — MUPIROCIN: 20 OINTMENT TOPICAL at 08:17

## 2018-02-03 RX ADMIN — Medication 10 ML: at 03:44

## 2018-02-03 RX ADMIN — HEPARIN SODIUM 5000 UNITS: 5000 INJECTION, SOLUTION INTRAVENOUS; SUBCUTANEOUS at 21:03

## 2018-02-03 RX ADMIN — Medication 10 ML: at 21:05

## 2018-02-03 RX ADMIN — Medication 50 MCG/MIN: at 04:55

## 2018-02-03 RX ADMIN — LEVETIRACETAM 1000 MG: 1000 INJECTION, SOLUTION INTRAVENOUS at 16:02

## 2018-02-03 RX ADMIN — OSELTAMIVIR PHOSPHATE 30 MG: 6 POWDER, FOR SUSPENSION ORAL at 17:31

## 2018-02-03 RX ADMIN — NOREPINEPHRINE BITARTRATE 40 MCG/MIN: 1 INJECTION INTRAVENOUS at 21:04

## 2018-02-03 RX ADMIN — OSELTAMIVIR PHOSPHATE 75 MG: 6 POWDER, FOR SUSPENSION ORAL at 08:16

## 2018-02-03 NOTE — PROGRESS NOTES
2100  Patient arrived on the unit. Drips verified: Levophed at 8 mcg/min and D10NS at 75 mL/hr. Primary Nurse Andrew Conroy, RN and Jennifer Rockwell RN performed a dual skin assessment on this patient. No impairment noted. Adolph score is 11.     2200  Admission assessment completed. Patient is only responsive to pain and does not open eyes to any stimulus. Per family, this is not patient's baseline. At baseline, patient is alert and able to communicate needs to the family. Airway patent and oxygen saturation is 99% on room air. NGT inserted per order for meds, placement verified with KUB. Elizabet Hugger applied for hypothermia. 2300  BP continuing to drop, increased Levophed to maintain MAP >65.     0338  T 99.2, Elizabet Hugger off.    0730  Bedside and Verbal shift change report given to Maite Baker (oncoming nurse) by Susan Austin (offgoing nurse). Report included the following information SBAR, Kardex, ED Summary, Intake/Output, MAR, Recent Results and Cardiac Rhythm SR/ST. Drips verified: Levophed at 50 mcg/min and D10NS at 75 mL/hr.

## 2018-02-03 NOTE — PROGRESS NOTES
Problem: Falls - Risk of  Goal: *Absence of Falls  Document Cole Fall Risk and appropriate interventions in the flowsheet.    Outcome: Progressing Towards Goal  Fall Risk Interventions:            Medication Interventions: Bed/chair exit alarm    Elimination Interventions: Bed/chair exit alarm, Call light in reach    History of Falls Interventions: Bed/chair exit alarm, Consult care management for discharge planning        Problem: Hypotension  Goal: *Blood pressure within specified parameters  Outcome: Not Progressing Towards Goal  On Levophed

## 2018-02-03 NOTE — ED NOTES
Rounded on pt and family, updated on plan of care. Pt still only responding to pain, appears to be comfortable, no distress noted.

## 2018-02-03 NOTE — PROGRESS NOTES
0730-Bedside and Verbal shift change report given to Omar Osorio, RN (oncoming nurse) by Jael Richardson RN (offgoing nurse). Report included the following information SBAR, Kardex, ED Summary, Procedure Summary, Intake/Output, MAR, Recent Results and Cardiac Rhythm NSR.     0745-Patient noted to be having what appears to be myoclonus. Patient rhythmically pulling arms into decorticate posture and eye deviating off to the right. Pupils 1s and very sluggish. Dr. Shahla Vegas consulted Dr. Kalie Alicea. 0755-Dr. Kalie Alicea verbalized that he will order an MRI and an EEG.    0900-Dr. Keith at bedside and updated about patient. Aware of her myoclonus and minimal UOP.      0900-Spoke to Dr. Shahla Vegas. Aware that patient will have to have raines with temp probe removed for MRI today. Agrees with leaving it in until MRI and then attempting to use a Purwick after MRI. 1025-MRI screening sheet completed. 1330-Raines removed for MRI. 1345-Patient downstairs for MRI. 1400-Each time patient is turned or stimulated in any way, BP increases to 200/100s. It then takes about 5 minutes for it to return to normal.  Levophed at 40mcgs most of the day. 1530-Patient back from MRI. Dr. Kalie Alicea at bedside. Plan is for EEG tomorrow. Keppra to start today. 1550-Patient's sister Jaynie Buerger updated. 1630-Went to place a purwick post MRI. Patient had already voided. Cleaned up and purwick place with 2 people. Suction on.    1800-Patient desating to 89%. 2L NC applied.

## 2018-02-03 NOTE — PROGRESS NOTES
Hospitalist Progress Note    NAME: Juliette Calderon   :  1945   MRN:  [id number]       Assessment / Plan:  Acute encephalopathy and septic shock due to UTI and Influenza B, present on admission: +eye deviation, remains nonresponsive but will withdrawal to touch  - CT head no acute findings  - CT chest/abd/pelvis with severe scoliosis. Atelectasis in the lower lobes, left greater than right. Anemia. Atherosclerotic abdominal aorta without aneurysm.  - get MRI brain today if remains stable  - UA >263971 gram negative rods, blood cultures pending  - influenza B positive, con't tamiflu  - con't emperic rocephin  - IV fluids and levophed prn for BP support  Hypoglycemia:  H/o \"borderline DM,\" not on meds at home  - con't D10 gtt  - serial glucose checks  - check HgA1c in AM  Transaminitis:  Presumed due to sepsis  - serial LFTs  - consider hepatitis panel and US if not improving  Baseline mental retardation  Lupus: holding plaquenil while NPO  Hypothyroidism:  Holding synthroid while NPO  Obesity (Body mass index is 31.37 kg/(m^2)    Code Status: full  Surrogate Decision Maker: sister   DVT Prophylaxis: heparin     Baseline: Mental retardation, lives with sister normally but placed in group home after recent back surgery     Subjective:     Chief Complaint / Reason for Physician Visit  Nonresponsive. Discussed with RN events overnight. Review of Systems:  Symptom Y/N Comments  Symptom Y/N Comments   Fever/Chills    Chest Pain     Poor Appetite    Edema     Cough    Abdominal Pain     Sputum    Joint Pain     SOB/YOUNG    Pruritis/Rash     Nausea/vomit    Tolerating PT/OT     Diarrhea    Tolerating Diet     Constipation    Other       Could NOT obtain due to: encephalopathy     Objective:     VITALS:   Last 24hrs VS reviewed since prior progress note.  Most recent are:  Patient Vitals for the past 24 hrs:   Temp Pulse Resp BP SpO2   18 0830 - 99 27 130/67 99 %   18 0815 - 99 26 112/69 99 % 02/03/18 0800 98.2 °F (36.8 °C) 99 22 118/56 100 %   02/03/18 0746 - (!) 104 25 108/56 100 %   02/03/18 0730 - 99 22 - 99 %   02/03/18 0700 - 99 21 - 100 %   02/03/18 0630 - (!) 101 24 - 99 %   02/03/18 0600 - 98 22 - 99 %   02/03/18 0530 - 99 23 - 99 %   02/03/18 0500 - 92 24 (!) 92/39 98 %   02/03/18 0430 - (!) 103 24 (!) 104/37 99 %   02/03/18 0400 98.8 °F (37.1 °C) (!) 101 24 100/48 98 %   02/03/18 0338 99.1 °F (37.3 °C) - - - -   02/03/18 0330 - 100 25 (!) 84/39 97 %   02/03/18 0300 - (!) 101 24 94/44 97 %   02/03/18 0230 - 98 21 98/42 98 %   02/03/18 0215 - 98 19 97/44 97 %   02/03/18 0200 - 97 20 98/45 98 %   02/03/18 0145 - 96 20 101/45 98 %   02/03/18 0130 - 93 20 94/51 97 %   02/03/18 0115 - 94 20 100/47 98 %   02/03/18 0100 - 92 17 90/56 98 %   02/03/18 0045 - 90 17 94/41 98 %   02/03/18 0037 95.2 °F (35.1 °C) - - - -   02/03/18 0030 - 88 17 92/45 99 %   02/03/18 0015 - 87 17 94/43 100 %   02/03/18 0000 96.1 °F (35.6 °C) 88 15 96/46 100 %   02/02/18 2345 - 85 15 98/45 99 %   02/02/18 2330 - 84 15 102/48 99 %   02/02/18 2315 - 83 15 100/46 99 %   02/02/18 2300 - 81 14 95/41 98 %   02/02/18 2245 - 81 14 97/47 97 %   02/02/18 2230 - 77 14 (!) 87/46 97 %   02/02/18 2215 - 74 13 90/46 97 %   02/02/18 2200 - 73 15 90/48 97 %   02/02/18 2145 - 73 15 90/47 97 %   02/02/18 2130 - 72 14 94/47 98 %   02/02/18 2115 - 76 13 104/50 100 %   02/02/18 2100 (!) 93.4 °F (34.1 °C) 71 16 (!) 87/42 100 %   02/02/18 1935 (!) 93.2 °F (34 °C) 67 15 102/58 100 %   02/02/18 1910 - 60 15 97/54 100 %   02/02/18 1900 (!) 91.9 °F (33.3 °C) (!) 57 16 92/54 100 %   02/02/18 1815 (!) 90.7 °F (32.6 °C) (!) 54 15 (!) 63/33 100 %   02/02/18 1808 (!) 90.1 °F (32.3 °C) (!) 56 14 (!) 65/40 100 %   02/02/18 1745 - (!) 57 14 (!) 68/41 100 %   02/02/18 1700 - (!) 47 16 (!) 60/41 100 %   02/02/18 1635 - (!) 47 15 (!) 69/42 100 %   02/02/18 1615 - (!) 42 16 (!) 70/46 100 %   02/02/18 1545 - (!) 40 16 90/51 100 %   02/02/18 1530 - (!) 37 15 103/60 100 %   02/02/18 1451 (!) 85.3 °F (29.6 °C) (!) 52 20 (!) 131/109 -       Intake/Output Summary (Last 24 hours) at 02/03/18 0902  Last data filed at 02/03/18 0800   Gross per 24 hour   Intake           1557.2 ml   Output              605 ml   Net            952.2 ml        PHYSICAL EXAM:  General: WD, WN. Not alert, not cooperative, no acute distress    EENT:  EOMI. Anicteric sclerae. MMM. Pupils nonreactive. Resp:  CTA bilaterally, no wheezing or rales. No accessory muscle use  CV:  Regular rhythm,  No edema  GI:  Soft, moderately distended, Non tender.  +Bowel sounds  Neurologic:  Not alert and oriented X 0, no speech,   Psych:   No insight. Not anxious nor agitated  Skin:  No rashes. No jaundice    Reviewed most current lab test results and cultures  YES  Reviewed most current radiology test results   YES  Review and summation of old records today    NO  Reviewed patient's current orders and MAR    YES  PMH/ reviewed - no change compared to H&P  ________________________________________________________________________  Care Plan discussed with:    Comments   Patient x    Family      RN x    Care Manager     Consultant                        Multidiciplinary team rounds were held today with , nursing, pharmacist and clinical coordinator. Patient's plan of care was discussed; medications were reviewed and discharge planning was addressed. ________________________________________________________________________  Total NON critical care TIME:  35 Minutes    Total CRITICAL CARE TIME Spent:   Minutes non procedure based      Comments   >50% of visit spent in counseling and coordination of care x    ________________________________________________________________________  Eliseonia MD Shayna     Procedures: see electronic medical records for all procedures/Xrays and details which were not copied into this note but were reviewed prior to creation of Plan.       LABS:  I reviewed today's most current labs and imaging studies.   Pertinent labs include:  Recent Labs      02/03/18   0339  02/02/18   1506   WBC  3.4*  3.1*   HGB  9.5*  10.0*   HCT  30.5*  32.2*   PLT  56*  54*     Recent Labs      02/03/18   0339  02/02/18   1506   NA  142  144   K  5.8*  6.5*   CL  111*  107   CO2  29  33*   GLU  121*  26*   BUN  25*  29*   CREA  1.02  0.75   CA  7.8*  9.1   MG   --   2.2   ALB  2.5*  2.9*   TBILI  0.3  0.2   SGOT  253*  224*   ALT  259*  251*   INR   --   1.1       Signed: Dolly Ferraro MD

## 2018-02-03 NOTE — PROGRESS NOTES
Pharmacy Automatic Renal Dosing Protocol - Antimicrobials    Indication for Antimicrobials: UTI; Influenza B    Current Regimen of Each Antimicrobial:  Ceftriaxone 1 gm IV every 24 hours (Started 18; Day #2)  Oseltamivir 75 mg PO twice daily (Started 18; Day #2)    Previous Antimicrobial Therapy:  Zosyn 3.375g IV x 1 in ED (Start Date ; Day # 1)  Vancomycin loading dose 1750mg IV x 1 (start date: , day #1)    Significant Cultures:   2/3/18 Influenza = Influenza B positive (FINAL)  18 Urine culture = Results pending  18 Blood culture = No growth x 16 hours (Results pending)    Labs:  Recent Labs      18   0339  18   1506   CREA  1.02  0.75   BUN  25*  29*   WBC  3.4*  3.1*   Temp (24hrs), Av.8 °  F (34.3 °  C), Min:85.3 °  F (29.6 °  C), Max:99.1 °  F (37.3 °  C)    Creatinine Clearance (mL/min) or Dialysis: 38-47 mL/min    No results found for: PCT    Impression/Plan:   - Ceftriaxone dosed appropriately based on indication. Continue current regimen. - Due to worsening renal function, oseltamivir dose adjusted to 30 mg PO twice daily for CrCl between 30-60 mL/min. Pharmacy will follow daily and adjust medications as appropriate for renal function and/or serum levels.     Thank you,  Severiano Fox, PHARMD

## 2018-02-03 NOTE — PROGRESS NOTES
[facility]  Pulmonary, Critical Care, and Sleep Medicine    Name: Robin Harvey MRN: [id number]   : 1945 Hospital: [facility]   Date: 2/3/2018        Critical Care Initial Patient Consult    IMPRESSION:   · Encephalopathy, possible myoclonus versus seizures. Was down for unknown duration of time. Possible severe hypoglycemia event. Possible CNS injury. Possible Seizure. · Severe Hypothermia: Temp of 85.3. · Severe Hypoglycemia on D10 infusion. · Shock, on levophed. · Sepsis  · Flu Positive. · UTI, GNR. · Mental Retardation  · Lupus holding plaquenil. · Hypothyroid  · Obese  · Hematoma at right neck from CVC placement. · Has right femoral CVC in place. · GERD  · DM  · Critically ill, 35 min CC, EOP. Very high risk of decompensation. Multiple organ failure. · Guarded prognosis. · Full code at this point. RECOMMENDATIONS:   · ON Ctx, Tamiflu,. · Discussed with nurse and Neurology  · Will need further neuro work up. · Pressors as needed  · D10 infusion  · Close monitoring of glucose levels. · Serial labs     Subjective/History: This patient has been seen and evaluated at the request of Dr. Brittani Gerber for above. Patient is a 72 y.o. female   Who present for above. Was found unresponsive by care provider. Brought in from assisted living facility. Noted to have blood glucose of 27. HR of 30. Was given glucagon. The patient is critically ill and can not provide additional history due to Unable to speak.      Past Medical History:   Diagnosis Date    Arthritis     Colon polyp     Diabetes ([facility] [address]     borderline no medications    Environmental allergies 2010    GERD (gastroesophageal reflux disease)     HTN (hypertension) 2010    dosent take medication now    Lupus     MR (mental retardation)     Osteoporosis, senile     Other ill-defined conditions     parkinson's disease possibly    Psychiatric disorder     anxious  PUD (peptic ulcer disease)     Scolioses     Sjogren's syndrome ([facility] [address] 3/4/2015      Past Surgical History:   Procedure Laterality Date    HX TONSILLECTOMY      MA COLONOSCOPY FLX DX W/COLLJ SPEC WHEN PFRMD  2/17/2011    due 2013      Prior to Admission medications    Medication Sig Start Date End Date Taking? Authorizing Provider   cholecalciferol, vitamin D3, (VITAMIN D3) 2,000 unit tab Take 1 Tab by mouth daily. Yes Historical Provider   red yeast rice extract 600 mg cap Take 600 mg by mouth daily. Yes Historical Provider   acetaminophen (TYLENOL ARTHRITIS PAIN) 650 mg TbER Take 650 mg by mouth every eight (8) hours as needed for Pain. Yes Historical Provider   hydroxychloroquine (PLAQUENIL) 200 mg tablet TAKE 1 TABLET EVERY DAY 10/3/17  Yes Carlos Navarro MD   levothyroxine (SYNTHROID) 25 mcg tablet TAKE 1 TABLET BY MOUTH DAILY BEFORE BREAKFAST 5/1/17  Yes Carlos Navarro MD   cetirizine (ZYRTEC) 10 mg tablet Take 1 Tab by mouth daily as needed for Allergies.  2/1/16   Carlos Navarro MD     Current Facility-Administered Medications   Medication Dose Route Frequency    NOREPINephrine (LEVOPHED) 8 mg in 5% dextrose 250mL infusion  2-200 mcg/min IntraVENous TITRATE    sodium chloride 0.9 % in dextrose 10% 1,040 mL infusion   IntraVENous CONTINUOUS    sodium chloride (NS) flush 5-10 mL  5-10 mL IntraVENous Q8H    heparin (porcine) injection 5,000 Units  5,000 Units SubCUTAneous Q8H    cefTRIAXone (ROCEPHIN) 1 g/50 mL NS IVPB  1 g IntraVENous Q24H    oseltamivir (TAMIFLU) 6 mg/mL oral suspension 75 mg  75 mg Per G Tube BID    mupirocin (BACTROBAN) 2 % ointment   Both Nostrils BID     Allergies   Allergen Reactions    Adhesive Tape Other (comments)     Redness under that adhesive      Social History   Substance Use Topics    Smoking status: Never Smoker    Smokeless tobacco: Never Used    Alcohol use No      Family History   Problem Relation Age of Onset    Cancer Mother      pancreas  Heart Disease Father     Heart Attack Father         Review of Systems:  Review of systems not obtained due to patient factors. Objective:   Vital Signs:    Visit Vitals    BP 94/44    Pulse (!) 101    Temp 99.1 °F (37.3 °C)    Resp 24    Ht 5' 1\" (1.549 m)    Wt 75.3 kg (166 lb 0.1 oz)    SpO2 97%    BMI 31.37 kg/m2       O2 Device: Room air   O2 Flow Rate (L/min): 2 l/min   Temp (24hrs), Av.8 °F (33.8 °C), Min:85.3 °F (29.6 °C), Max:99.1 °F (37.3 °C)       Intake/Output:   Last shift:         Last 3 shifts:  1901 -  0700  In: 653 [I.V.:628]  Out: 515 [Urine:515]    Intake/Output Summary (Last 24 hours) at 18 0736  Last data filed at 18 0645   Gross per 24 hour   Intake           652.98 ml   Output              515 ml   Net           137.98 ml     Hemodynamics:   PAP:   CO:     Wedge:   CI:     CVP:    SVR:       PVR:       Ventilator Settings:  Mode Rate Tidal Volume Pressure FiO2 PEEP                    Peak airway pressure:      Minute ventilation:        Physical Exam:    General:  Eye deviated to the Right. no distress, appears stated age. Intermittent myoclonus. Head:  Normocephalic, without obvious abnormality, atraumatic. Eyes:  Conjunctivae/corneas clear. PERRL, EOMs intact. Nose: Nares normal. Septum midline. Mucosa normal. No drainage or sinus tenderness. Throat: Lips, mucosa, and tongue normal. Teeth and gums normal.   Neck: Supple, symmetrical, trachea midline, no adenopathy, thyroid: no enlargment/tenderness/nodules, no carotid bruit and no JVD. Back:   Symmetric, no curvature. ROM normal.   Lungs:   Clear to auscultation bilaterally. Chest wall:  No tenderness or deformity. Heart:  Regular rate and rhythm, S1, S2 normal, no murmur, click, rub or gallop. Abdomen:   Soft, non-tender. Bowel sounds normal. No masses,  No organomegaly. Extremities: Extremities normal, atraumatic, no cyanosis or edema. Pulses: 2+ and symmetric all extremities. Skin: Skin color, texture, turgor normal. No rashes or lesions   Lymph nodes: Cervical, supraclavicular, and axillary nodes normal.   Neurologic: The seems to have rightward gaze, Pupils are pinpoint. Pt seems to withdrawal to pain UE and LE       Data:     Recent Results (from the past 24 hour(s))   GLUCOSE, POC    Collection Time: 02/02/18  2:39 PM   Result Value Ref Range    Glucose (POC) 27 (LL) 65 - 100 mg/dL    Performed by Ernstita Erp, POC    Collection Time: 02/02/18  2:53 PM   Result Value Ref Range    Glucose (POC) 200 (H) 65 - 100 mg/dL    Performed by Marah Guerra (SON)    CBC WITH AUTOMATED DIFF    Collection Time: 02/02/18  3:06 PM   Result Value Ref Range    WBC 3.1 (L) 3.6 - 11.0 K/uL    RBC 3.54 (L) 3.80 - 5.20 M/uL    HGB 10.0 (L) 11.5 - 16.0 g/dL    HCT 32.2 (L) 35.0 - 47.0 %    MCV 91.0 80.0 - 99.0 FL    MCH 28.2 26.0 - 34.0 PG    MCHC 31.1 30.0 - 36.5 g/dL    RDW 16.7 (H) 11.5 - 14.5 %    PLATELET 54 (L) 535 - 400 K/uL    MPV 12.2 8.9 - 12.9 FL    NRBC 0.6 (H) 0  WBC    ABSOLUTE NRBC 0.02 (H) 0.00 - 0.01 K/uL    NEUTROPHILS 75 32 - 75 %    LYMPHOCYTES 17 12 - 49 %    MONOCYTES 7 5 - 13 %    EOSINOPHILS 1 0 - 7 %    BASOPHILS 0 0 - 1 %    IMMATURE GRANULOCYTES 0 %    ABS. NEUTROPHILS 2.4 1.8 - 8.0 K/UL    ABS. LYMPHOCYTES 0.5 (L) 0.8 - 3.5 K/UL    ABS. MONOCYTES 0.2 0.0 - 1.0 K/UL    ABS. EOSINOPHILS 0.0 0.0 - 0.4 K/UL    ABS. BASOPHILS 0.0 0.0 - 0.1 K/UL    ABS. IMM.  GRANS. 0.0 K/UL    DF SMEAR SCANNED      RBC COMMENTS TARGET CELLS  1+        RBC COMMENTS ANISOCYTOSIS  1+       METABOLIC PANEL, COMPREHENSIVE    Collection Time: 02/02/18  3:06 PM   Result Value Ref Range    Sodium 144 136 - 145 mmol/L    Potassium 6.5 (H) 3.5 - 5.1 mmol/L    Chloride 107 97 - 108 mmol/L    CO2 33 (H) 21 - 32 mmol/L    Anion gap 4 (L) 5 - 15 mmol/L    Glucose 26 (LL) 65 - 100 mg/dL    BUN 29 (H) 6 - 20 MG/DL    Creatinine 0.75 0.55 - 1.02 MG/DL    BUN/Creatinine ratio 39 (H) 12 - 20      GFR est AA >60 >60 ml/min/1.73m2    GFR est non-AA >60 >60 ml/min/1.73m2    Calcium 9.1 8.5 - 10.1 MG/DL    Bilirubin, total 0.2 0.2 - 1.0 MG/DL    ALT (SGPT) 251 (H) 12 - 78 U/L    AST (SGOT) 224 (H) 15 - 37 U/L    Alk.  phosphatase 159 (H) 45 - 117 U/L    Protein, total 7.4 6.4 - 8.2 g/dL    Albumin 2.9 (L) 3.5 - 5.0 g/dL    Globulin 4.5 (H) 2.0 - 4.0 g/dL    A-G Ratio 0.6 (L) 1.1 - 2.2     LACTIC ACID    Collection Time: 02/02/18  3:06 PM   Result Value Ref Range    Lactic acid 0.4 0.4 - 2.0 MMOL/L   LIPASE    Collection Time: 02/02/18  3:06 PM   Result Value Ref Range    Lipase 504 (H) 73 - 393 U/L   TROPONIN I    Collection Time: 02/02/18  3:06 PM   Result Value Ref Range    Troponin-I, Qt. <0.04 <0.05 ng/mL   MAGNESIUM    Collection Time: 02/02/18  3:06 PM   Result Value Ref Range    Magnesium 2.2 1.6 - 2.4 mg/dL   PROTHROMBIN TIME + INR    Collection Time: 02/02/18  3:06 PM   Result Value Ref Range    INR 1.1 0.9 - 1.1      Prothrombin time 10.6 9.0 - 11.1 sec   PTT    Collection Time: 02/02/18  3:06 PM   Result Value Ref Range    aPTT 29.6 22.1 - 32.5 sec    aPTT, therapeutic range     58.0 - 77.0 SECS   CK    Collection Time: 02/02/18  3:06 PM   Result Value Ref Range     26 - 192 U/L   EKG, 12 LEAD, INITIAL    Collection Time: 02/02/18  3:17 PM   Result Value Ref Range    Ventricular Rate 40 BPM    Atrial Rate 24 BPM    QRS Duration 124 ms    Q-T Interval 634 ms    QTC Calculation (Bezet) 516 ms    Calculated R Axis 7 degrees    Calculated T Axis -74 degrees    Diagnosis       Marked sinus bradycardia  First degree AV conduction delay  Left ventricular hypertrophy with QRS widening  Nonspecific ST and T wave abnormality  Prolonged QT  Abnormal ECG  When compared with ECG of 23-MAY-2017 16:28,  Significant changes have occurred  Confirmed by Mercedes Hinkle ([id number]) on 2/3/2018 7:19:31 AM     INFLUENZA A & B AG (RAPID TEST)    Collection Time: 02/02/18  3:25 PM   Result Value Ref Range    Influenza A Antigen NEGATIVE  NEG      Influenza B Antigen POSITIVE (A) NEG     ETHYL ALCOHOL    Collection Time: 02/02/18  3:25 PM   Result Value Ref Range    ALCOHOL(ETHYL),SERUM <10 <10 MG/DL   GLUCOSE, POC    Collection Time: 02/02/18  3:58 PM   Result Value Ref Range    Glucose (POC) 114 (H) 65 - 100 mg/dL    Performed by Duffy Collette    BLOOD GAS, ARTERIAL    Collection Time: 02/02/18  4:15 PM   Result Value Ref Range    pH 7.26 (L) 7.35 - 7.45      PCO2 63 (H) 35.0 - 45.0 mmHg    PO2 247 (H) 80 - 100 mmHg    O2 SAT 99 (H) 92 - 97 %    BICARBONATE 28 (H) 22 - 26 mmol/L    BASE DEFICIT 0.8 mmol/L    O2 METHOD NASAL O2      O2 FLOW RATE 1.50 L/min    Sample source ARTERIAL      SITE RIGHT BRACHIAL      HUNTER'S TEST N/A     GLUCOSE, POC    Collection Time: 02/02/18  4:54 PM   Result Value Ref Range    Glucose (POC) 54 (L) 65 - 100 mg/dL    Performed by Wynny Collette    URINALYSIS W/ REFLEX CULTURE    Collection Time: 02/02/18  4:57 PM   Result Value Ref Range    Color YELLOW/STRAW      Appearance CLOUDY (A) CLEAR      Specific gravity 1.020 1.003 - 1.030      pH (UA) 5.5 5.0 - 8.0      Protein TRACE (A) NEG mg/dL    Glucose 100 (A) NEG mg/dL    Ketone 15 (A) NEG mg/dL    Bilirubin NEGATIVE  NEG      Blood MODERATE (A) NEG      Urobilinogen 0.2 0.2 - 1.0 EU/dL    Nitrites POSITIVE (A) NEG      Leukocyte Esterase MODERATE (A) NEG      WBC 20-50 0 - 4 /hpf    RBC 5-10 0 - 5 /hpf    Epithelial cells FEW FEW /lpf    Bacteria 4+ (A) NEG /hpf    UA:UC IF INDICATED URINE CULTURE ORDERED (A) CNI      Hyaline cast 2-5 0 - 5 /lpf   DRUG SCREEN, URINE    Collection Time: 02/02/18  4:57 PM   Result Value Ref Range    AMPHETAMINES NEGATIVE  NEG      BARBITURATES NEGATIVE  NEG      BENZODIAZEPINES NEGATIVE  NEG      COCAINE NEGATIVE  NEG      METHADONE NEGATIVE  NEG      OPIATES NEGATIVE  NEG      PCP(PHENCYCLIDINE) NEGATIVE  NEG      THC (TH-CANNABINOL) NEGATIVE  NEG      Drug screen comment (NOTE)    POC CHEM8    Collection Time: 02/02/18 5:23 PM   Result Value Ref Range    Calcium, ionized (POC) 1.21 1.12 - 1.32 MMOL/L    Sodium (POC) 145 136 - 145 MMOL/L    Potassium (POC) 4.8 3.5 - 5.1 MMOL/L    Chloride (POC) 110 (H) 98 - 107 MMOL/L    CO2 (POC) 27 21 - 32 MMOL/L    Anion gap (POC) 13 5 - 15 mmol/L    Glucose (POC) 41 (LL) 65 - 100 MG/DL    BUN (POC) 28 (H) 9 - 20 MG/DL    Creatinine (POC) 0.8 0.6 - 1.3 MG/DL    GFRAA, POC >60 >60 ml/min/1.73m2    GFRNA, POC >60 >60 ml/min/1.73m2    Hemoglobin (POC) 8.2 (L) 11.5 - 16.0 GM/DL    Hematocrit (POC) 24 (L) 35.0 - 47.0 %    Comment Comment Not Indicated. GLUCOSE, POC    Collection Time: 02/02/18  8:55 PM   Result Value Ref Range    Glucose (POC) 91 65 - 100 mg/dL    Performed by [place], COMPREHENSIVE    Collection Time: 02/03/18  3:39 AM   Result Value Ref Range    Sodium 142 136 - 145 mmol/L    Potassium 5.8 (H) 3.5 - 5.1 mmol/L    Chloride 111 (H) 97 - 108 mmol/L    CO2 29 21 - 32 mmol/L    Anion gap 2 (L) 5 - 15 mmol/L    Glucose 121 (H) 65 - 100 mg/dL    BUN 25 (H) 6 - 20 MG/DL    Creatinine 1.02 0.55 - 1.02 MG/DL    BUN/Creatinine ratio 25 (H) 12 - 20      GFR est AA >60 >60 ml/min/1.73m2    GFR est non-AA 53 (L) >60 ml/min/1.73m2    Calcium 7.8 (L) 8.5 - 10.1 MG/DL    Bilirubin, total 0.3 0.2 - 1.0 MG/DL    ALT (SGPT) 259 (H) 12 - 78 U/L    AST (SGOT) 253 (H) 15 - 37 U/L    Alk.  phosphatase 187 (H) 45 - 117 U/L    Protein, total 6.7 6.4 - 8.2 g/dL    Albumin 2.5 (L) 3.5 - 5.0 g/dL    Globulin 4.2 (H) 2.0 - 4.0 g/dL    A-G Ratio 0.6 (L) 1.1 - 2.2     CBC W/O DIFF    Collection Time: 02/03/18  3:39 AM   Result Value Ref Range    WBC 3.4 (L) 3.6 - 11.0 K/uL    RBC 3.33 (L) 3.80 - 5.20 M/uL    HGB 9.5 (L) 11.5 - 16.0 g/dL    HCT 30.5 (L) 35.0 - 47.0 %    MCV 91.6 80.0 - 99.0 FL    MCH 28.5 26.0 - 34.0 PG    MCHC 31.1 30.0 - 36.5 g/dL    RDW 17.2 (H) 11.5 - 14.5 %    PLATELET 56 (L) 993 - 400 K/uL    MPV 11.8 8.9 - 12.9 FL    NRBC 4.4 (H) 0  WBC    ABSOLUTE NRBC 0.15 (H) 0.00 - 0.01 K/uL   GLUCOSE, POC    Collection Time: 02/03/18  5:35 AM   Result Value Ref Range    Glucose (POC) 114 (H) 65 - 100 mg/dL    Performed by Jennifer Sequeira              Telemetry:   Marked sinus bradycardia   First degree AV conduction delay   Left ventricular hypertrophy with QRS widening   Nonspecific ST and T wave abnormality   Prolonged QT     Imaging:  I have personally reviewed the patients radiographs and have reviewed the reports:  2-2-18: CXR is normal    CT of head: 2/2/18: NO acute changes. Ct of chest: 2-2-18: IMPRESSION:   1. Severe scoliosis. 2. Atelectasis in the lower lobes, left greater than right. 3. Anemia. 4. Atherosclerotic abdominal aorta without aneurysm. 5. Status post hysterectomy.        Clarisa Tobar MD

## 2018-02-03 NOTE — ED NOTES
TRANSFER - OUT REPORT:    Verbal report given to RODRIGUEZ Mcfadden(name) on Elis Singh  being transferred to CCU(unit) for routine progression of care       Report consisted of patients Situation, Background, Assessment and   Recommendations(SBAR). Information from the following report(s) SBAR, Kardex and ED Summary was reviewed with the receiving nurse. Lines:   Triple Lumen Right Femoral 02/02/18 Right Femoral (Active)   Central Line Being Utilized Yes 2/2/2018  6:42 PM   Site Assessment Clean, dry, & intact 2/2/2018  6:42 PM   Infiltration Assessment 0 2/2/2018  6:42 PM   Dressing Status Clean, dry, & intact 2/2/2018  6:42 PM   Positive Blood Return (Medial Site) Yes 2/2/2018  6:42 PM   Positive Blood Return (Lateral Site) Yes 2/2/2018  6:42 PM   Positive Blood Return (Site #3) Yes 2/2/2018  6:42 PM       Peripheral IV 02/02/18 Right Antecubital (Active)   Site Assessment Clean, dry, & intact 2/2/2018  3:09 PM   Phlebitis Assessment 0 2/2/2018  3:09 PM   Infiltration Assessment 0 2/2/2018  3:09 PM   Dressing Status Clean, dry, & intact 2/2/2018  3:09 PM   Dressing Type Transparent 2/2/2018  3:09 PM   Hub Color/Line Status Pink 2/2/2018  3:09 PM       Peripheral IV 02/02/18 Left Antecubital (Active)   Site Assessment Clean, dry, & intact 2/2/2018  6:39 PM   Phlebitis Assessment 0 2/2/2018  6:39 PM   Infiltration Assessment 0 2/2/2018  6:39 PM   Dressing Status Clean, dry, & intact 2/2/2018  6:39 PM   Hub Color/Line Status Pink 2/2/2018  6:39 PM        Opportunity for questions and clarification was provided.       Patient transported with:   Registered Nurse

## 2018-02-03 NOTE — PROGRESS NOTES
Problem: Falls - Risk of  Goal: *Absence of Falls  Document Cole Fall Risk and appropriate interventions in the flowsheet.    Outcome: Progressing Towards Goal  Fall Risk Interventions:            Medication Interventions: Bed/chair exit alarm, Evaluate medications/consider consulting pharmacy    Elimination Interventions: Bed/chair exit alarm, Call light in reach    History of Falls Interventions: Bed/chair exit alarm, Door open when patient unattended, Evaluate medications/consider consulting pharmacy, Room close to nurse's station

## 2018-02-03 NOTE — PROGRESS NOTES
TRANSFER - IN REPORT:    Verbal report received from Lina Wynn(name) on Eugene Bone  being received from ED(unit) for change in patient condition(increased monitoring)      Report consisted of patients Situation, Background, Assessment and Recommendations(SBAR). Information from the following report(s) ED Summary was reviewed with the receiving nurse. Opportunity for questions and clarification was provided.

## 2018-02-04 ENCOUNTER — APPOINTMENT (OUTPATIENT)
Dept: GENERAL RADIOLOGY | Age: 73
DRG: 871 | End: 2018-02-04
Attending: INTERNAL MEDICINE
Payer: MEDICARE

## 2018-02-04 LAB
ALBUMIN SERPL-MCNC: 2.1 G/DL (ref 3.5–5)
ALBUMIN/GLOB SERPL: 0.5 {RATIO} (ref 1.1–2.2)
ALP SERPL-CCNC: 170 U/L (ref 45–117)
ALT SERPL-CCNC: 191 U/L (ref 12–78)
ANION GAP SERPL CALC-SCNC: 5 MMOL/L (ref 5–15)
ARTERIAL PATENCY WRIST A: YES
ARTERIAL PATENCY WRIST A: YES
AST SERPL-CCNC: 145 U/L (ref 15–37)
BACTERIA SPEC CULT: ABNORMAL
BASE DEFICIT BLDA-SCNC: 2.4 MMOL/L
BASE DEFICIT BLDA-SCNC: 3.3 MMOL/L
BASOPHILS # BLD: 0 K/UL (ref 0–0.1)
BASOPHILS NFR BLD: 0 % (ref 0–1)
BDY SITE: ABNORMAL
BDY SITE: ABNORMAL
BILIRUB SERPL-MCNC: 0.2 MG/DL (ref 0.2–1)
BREATHS.SPONTANEOUS ON VENT: 30
BUN SERPL-MCNC: 20 MG/DL (ref 6–20)
BUN/CREAT SERPL: 17 (ref 12–20)
CALCIUM SERPL-MCNC: 7.6 MG/DL (ref 8.5–10.1)
CC UR VC: ABNORMAL
CHLORIDE SERPL-SCNC: 109 MMOL/L (ref 97–108)
CO2 SERPL-SCNC: 28 MMOL/L (ref 21–32)
CREAT SERPL-MCNC: 1.17 MG/DL (ref 0.55–1.02)
DIFFERENTIAL METHOD BLD: ABNORMAL
EOSINOPHIL # BLD: 0 K/UL (ref 0–0.4)
EOSINOPHIL NFR BLD: 0 % (ref 0–7)
ERYTHROCYTE [DISTWIDTH] IN BLOOD BY AUTOMATED COUNT: 16.9 % (ref 11.5–14.5)
GAS FLOW.O2 O2 DELIVERY SYS: 2 L/MIN
GAS FLOW.O2 O2 DELIVERY SYS: 6 L/MIN
GLOBULIN SER CALC-MCNC: 4.3 G/DL (ref 2–4)
GLUCOSE BLD STRIP.AUTO-MCNC: 104 MG/DL (ref 65–100)
GLUCOSE BLD STRIP.AUTO-MCNC: 142 MG/DL (ref 65–100)
GLUCOSE BLD STRIP.AUTO-MCNC: 145 MG/DL (ref 65–100)
GLUCOSE BLD STRIP.AUTO-MCNC: 98 MG/DL (ref 65–100)
GLUCOSE SERPL-MCNC: 166 MG/DL (ref 65–100)
HCO3 BLDA-SCNC: 20 MMOL/L (ref 22–26)
HCO3 BLDA-SCNC: 24 MMOL/L (ref 22–26)
HCT VFR BLD AUTO: 31.1 % (ref 35–47)
HGB BLD-MCNC: 9.8 G/DL (ref 11.5–16)
IMM GRANULOCYTES # BLD: 0 K/UL (ref 0–0.04)
IMM GRANULOCYTES NFR BLD AUTO: 0 % (ref 0–0.5)
LYMPHOCYTES # BLD: 0.9 K/UL (ref 0.8–3.5)
LYMPHOCYTES NFR BLD: 7 % (ref 12–49)
MAGNESIUM SERPL-MCNC: 1.6 MG/DL (ref 1.6–2.4)
MCH RBC QN AUTO: 28.3 PG (ref 26–34)
MCHC RBC AUTO-ENTMCNC: 31.5 G/DL (ref 30–36.5)
MCV RBC AUTO: 89.9 FL (ref 80–99)
METAMYELOCYTES NFR BLD MANUAL: 8 %
MONOCYTES # BLD: 0.2 K/UL (ref 0–1)
MONOCYTES NFR BLD: 2 % (ref 5–13)
NEUTS BAND NFR BLD MANUAL: 25 %
NEUTS SEG # BLD: 10.2 K/UL (ref 1.8–8)
NEUTS SEG NFR BLD: 58 % (ref 32–75)
NRBC # BLD: 0.06 K/UL (ref 0–0.01)
NRBC BLD-RTO: 0.5 PER 100 WBC
PATH REV BLD -IMP: NORMAL
PCO2 BLDA: 33 MMHG (ref 35–45)
PCO2 BLDA: 47 MMHG (ref 35–45)
PH BLDA: 7.33 [PH] (ref 7.35–7.45)
PH BLDA: 7.41 [PH] (ref 7.35–7.45)
PHOSPHATE SERPL-MCNC: 1.7 MG/DL (ref 2.6–4.7)
PLATELET # BLD AUTO: 34 K/UL (ref 150–400)
PLATELET COMMENTS,PCOM: ABNORMAL
PMV BLD AUTO: 11.6 FL (ref 8.9–12.9)
PO2 BLDA: 50 MMHG (ref 80–100)
PO2 BLDA: 70 MMHG (ref 80–100)
POTASSIUM SERPL-SCNC: 5.6 MMOL/L (ref 3.5–5.1)
PROT SERPL-MCNC: 6.4 G/DL (ref 6.4–8.2)
RBC # BLD AUTO: 3.46 M/UL (ref 3.8–5.2)
RBC MORPH BLD: ABNORMAL
SAO2 % BLD: 83 % (ref 92–97)
SAO2 % BLD: 94 % (ref 92–97)
SAO2% DEVICE SAO2% SENSOR NAME: ABNORMAL
SAO2% DEVICE SAO2% SENSOR NAME: ABNORMAL
SERVICE CMNT-IMP: ABNORMAL
SERVICE CMNT-IMP: NORMAL
SODIUM SERPL-SCNC: 142 MMOL/L (ref 136–145)
SPECIMEN SITE: ABNORMAL
SPECIMEN SITE: ABNORMAL
WBC # BLD AUTO: 12.3 K/UL (ref 3.6–11)
WBC MORPH BLD: ABNORMAL

## 2018-02-04 PROCEDURE — 77010033678 HC OXYGEN DAILY

## 2018-02-04 PROCEDURE — 80053 COMPREHEN METABOLIC PANEL: CPT | Performed by: INTERNAL MEDICINE

## 2018-02-04 PROCEDURE — 71045 X-RAY EXAM CHEST 1 VIEW: CPT

## 2018-02-04 PROCEDURE — 36600 WITHDRAWAL OF ARTERIAL BLOOD: CPT | Performed by: INTERNAL MEDICINE

## 2018-02-04 PROCEDURE — 74011250636 HC RX REV CODE- 250/636: Performed by: INTERNAL MEDICINE

## 2018-02-04 PROCEDURE — 74011000250 HC RX REV CODE- 250: Performed by: INTERNAL MEDICINE

## 2018-02-04 PROCEDURE — 82803 BLOOD GASES ANY COMBINATION: CPT

## 2018-02-04 PROCEDURE — 36600 WITHDRAWAL OF ARTERIAL BLOOD: CPT

## 2018-02-04 PROCEDURE — 51798 US URINE CAPACITY MEASURE: CPT

## 2018-02-04 PROCEDURE — 84100 ASSAY OF PHOSPHORUS: CPT | Performed by: INTERNAL MEDICINE

## 2018-02-04 PROCEDURE — 74011250636 HC RX REV CODE- 250/636: Performed by: EMERGENCY MEDICINE

## 2018-02-04 PROCEDURE — 83735 ASSAY OF MAGNESIUM: CPT | Performed by: INTERNAL MEDICINE

## 2018-02-04 PROCEDURE — 36415 COLL VENOUS BLD VENIPUNCTURE: CPT | Performed by: INTERNAL MEDICINE

## 2018-02-04 PROCEDURE — 74011250637 HC RX REV CODE- 250/637: Performed by: INTERNAL MEDICINE

## 2018-02-04 PROCEDURE — 85025 COMPLETE CBC W/AUTO DIFF WBC: CPT | Performed by: INTERNAL MEDICINE

## 2018-02-04 PROCEDURE — 82962 GLUCOSE BLOOD TEST: CPT

## 2018-02-04 PROCEDURE — 74011250636 HC RX REV CODE- 250/636: Performed by: PSYCHIATRY & NEUROLOGY

## 2018-02-04 PROCEDURE — 74011000258 HC RX REV CODE- 258: Performed by: INTERNAL MEDICINE

## 2018-02-04 PROCEDURE — 74011000258 HC RX REV CODE- 258: Performed by: EMERGENCY MEDICINE

## 2018-02-04 PROCEDURE — 65610000006 HC RM INTENSIVE CARE

## 2018-02-04 RX ORDER — FUROSEMIDE 10 MG/ML
20 INJECTION INTRAMUSCULAR; INTRAVENOUS ONCE
Status: COMPLETED | OUTPATIENT
Start: 2018-02-04 | End: 2018-02-04

## 2018-02-04 RX ORDER — MAGNESIUM SULFATE HEPTAHYDRATE 40 MG/ML
2 INJECTION, SOLUTION INTRAVENOUS ONCE
Status: COMPLETED | OUTPATIENT
Start: 2018-02-04 | End: 2018-02-04

## 2018-02-04 RX ORDER — BUMETANIDE 0.25 MG/ML
0.5 INJECTION INTRAMUSCULAR; INTRAVENOUS ONCE
Status: COMPLETED | OUTPATIENT
Start: 2018-02-04 | End: 2018-02-04

## 2018-02-04 RX ORDER — HYDROCORTISONE SODIUM SUCCINATE 100 MG/2ML
50 INJECTION, POWDER, FOR SOLUTION INTRAMUSCULAR; INTRAVENOUS EVERY 8 HOURS
Status: DISCONTINUED | OUTPATIENT
Start: 2018-02-04 | End: 2018-02-05

## 2018-02-04 RX ADMIN — SODIUM CHLORIDE: 234 INJECTION, SOLUTION, CONCENTRATE INTRAVENOUS; SUBCUTANEOUS at 16:43

## 2018-02-04 RX ADMIN — BUMETANIDE 0.5 MG: 0.25 INJECTION INTRAMUSCULAR; INTRAVENOUS at 14:53

## 2018-02-04 RX ADMIN — Medication 10 ML: at 14:44

## 2018-02-04 RX ADMIN — MUPIROCIN: 20 OINTMENT TOPICAL at 09:26

## 2018-02-04 RX ADMIN — NOREPINEPHRINE BITARTRATE 30 MCG/MIN: 1 INJECTION INTRAVENOUS at 15:12

## 2018-02-04 RX ADMIN — SODIUM PHOSPHATE, MONOBASIC, MONOHYDRATE AND SODIUM PHOSPHATE, DIBASIC ANHYDROUS: 276; 142 INJECTION, SOLUTION INTRAVENOUS at 12:05

## 2018-02-04 RX ADMIN — SODIUM CHLORIDE: 234 INJECTION, SOLUTION, CONCENTRATE INTRAVENOUS; SUBCUTANEOUS at 00:17

## 2018-02-04 RX ADMIN — LEVETIRACETAM 500 MG: 5 INJECTION INTRAVENOUS at 12:08

## 2018-02-04 RX ADMIN — FUROSEMIDE 20 MG: 10 INJECTION, SOLUTION INTRAMUSCULAR; INTRAVENOUS at 03:11

## 2018-02-04 RX ADMIN — MAGNESIUM SULFATE HEPTAHYDRATE 2 G: 40 INJECTION, SOLUTION INTRAVENOUS at 10:52

## 2018-02-04 RX ADMIN — Medication 10 ML: at 21:11

## 2018-02-04 RX ADMIN — OSELTAMIVIR PHOSPHATE 30 MG: 6 POWDER, FOR SUSPENSION ORAL at 09:28

## 2018-02-04 RX ADMIN — OSELTAMIVIR PHOSPHATE 30 MG: 6 POWDER, FOR SUSPENSION ORAL at 17:28

## 2018-02-04 RX ADMIN — PIPERACILLIN SODIUM AND TAZOBACTAM SODIUM 3.38 G: .375; 3 INJECTION, POWDER, LYOPHILIZED, FOR SOLUTION INTRAVENOUS at 19:54

## 2018-02-04 RX ADMIN — HYDROCORTISONE SODIUM SUCCINATE 50 MG: 100 INJECTION, POWDER, FOR SOLUTION INTRAMUSCULAR; INTRAVENOUS at 14:47

## 2018-02-04 RX ADMIN — MUPIROCIN: 20 OINTMENT TOPICAL at 17:34

## 2018-02-04 RX ADMIN — LEVETIRACETAM 500 MG: 5 INJECTION INTRAVENOUS at 00:19

## 2018-02-04 RX ADMIN — HEPARIN SODIUM 5000 UNITS: 5000 INJECTION, SOLUTION INTRAVENOUS; SUBCUTANEOUS at 03:12

## 2018-02-04 RX ADMIN — HYDROCORTISONE SODIUM SUCCINATE 50 MG: 100 INJECTION, POWDER, FOR SOLUTION INTRAMUSCULAR; INTRAVENOUS at 21:11

## 2018-02-04 RX ADMIN — Medication 10 ML: at 05:14

## 2018-02-04 RX ADMIN — PIPERACILLIN SODIUM AND TAZOBACTAM SODIUM 3.38 G: .375; 3 INJECTION, POWDER, LYOPHILIZED, FOR SOLUTION INTRAVENOUS at 14:47

## 2018-02-04 NOTE — PROGRESS NOTES
1900  Received bedside/verbal report and assumed care of patient from Rea Hernandez RN. Drips verified: Levophed at 40 mcg/min and D10NS at 75 mL/hr. 2000  Shift assessment completed. See doc flowsheet for details. Patient remains mostly unresponsive. She grimaces with noxious stimuli, withdraws from pain in all extremities, and opens eyes to pain. Eyes deviated to the right. Large amount of pink tinged tan secretions suctioned from the back of throat. Patient is tachypneic, work of breathing increases with stimulation. Patient makes snoring/grunting sounds while breathing. Lung sounds coarse with vast rhonchi. 0012  Patient has not voided since beginning of the shift, bladder scan showed 425 mL in bladder. Patient also has audible crackles, is more tachypneic with respirations in the 40's, and work of breathing has increased. Suctioned pink frothy sputum out of the back of throat. Will request CXR to r/o pulmonary edema. 5  Spoke with  regarding patient's CXR results of bilateral lung infiltrates indicating pna and/or pulmonary edema. MD ordered ABGs due to patient's unresponsive status, previously low pH and high CO2. RT notified. Also advised MD that patient is retaining urine since raines removal during the day, order for new raines obtained. 0130  Increased NC to 6L due to low pO2 on blood gases. 0230  Spoke with  regarding patient's current condition and ABG results (pH 7.3, CO2 46, pO2 50, HCO3 23). Only received order for Lasix at this time. 0031  Raines catheter inserted using sterile technique. 7919  Lab called critical Platelet value of 34.  notified. 0700  Bedside and Verbal shift change report given to Rea Hernandez (oncoming nurse) by Shahnaz Rios (offgoing nurse). Report included the following information SBAR, Kardex, ED Summary, Intake/Output, MAR, Recent Results and Cardiac Rhythm NSR.   Jennifer verified: Levophed at 15 mcg/min and D10NS at 75 mL/hr.

## 2018-02-04 NOTE — PROGRESS NOTES
0700: Bedside shift change report given to Earnestine Benoit (oncoming nurse) by Royer Bryant RN (offgoing nurse). Report included the following information SBAR.     0466: Pt with low blood pressure. Map below 65. Titrated Levophed up to 25mcg/min. Map up to 65 after increasing Levophed. 0830: AM assessment complete. 8610: Dr. Freddy Rogers in to see pt. New orders received to d/c heparin due to low platelets. 1015: Dr. Freddy Rogers aware of mag and phos level. New orders received to replace both. 1200: Pt temp is 99.5 axillary. Turned warmer off. Will monitor temp and turn back on if needed. 1239: Reassessment complete. 1315: EEG tech present at bedside hooking patient up for test.  1405: Dr. Claudette Roup in to see pt.  1600: Reassessment complete. 1730: Pt resting comfortably in the bed.   1900: Bedside shift change report given to Ashly Lemos RN (oncoming nurse) by Jason Ott RN (offgoing nurse). Report included the following information SBAR.

## 2018-02-04 NOTE — PROGRESS NOTES
1520-Patient continues to become increasingly tachypnic in the mid to upper 40's. Dr. Ravindra Agudelo made aware. ABG ordered. Respiratory notified. Results for Jeri Bar (MRN [id number]) as of 2/4/2018 15:55   Ref.  Range 2/4/2018 15:33   pH Latest Ref Range: 7.35 - 7.45   7.41   PCO2 Latest Ref Range: 35.0 - 45.0 mmHg 33 (L)   PO2 Latest Ref Range: 80 - 100 mmHg 70 (L)   BICARBONATE Latest Ref Range: 22 - 26 mmol/L 20 (L)   O2 SAT Latest Ref Range: 92 - 97 % 94   BASE DEFICIT Latest Units: mmol/L 3.3   Sample source Latest Units:   ARTERIAL   SITE Latest Units:   RIGHT RADIAL   HUNTER'S TEST Latest Units:   YES   O2 FLOW RATE Latest Units: L/min 6.00   O2 METHOD Latest Units:   NASAL O2

## 2018-02-04 NOTE — PROGRESS NOTES
Hospitalist Progress Note    NAME: Rishi Harvey   :  1945   MRN:  [id number]       Assessment / Plan:  Acute hypoxic respiratory failure due to acute pulmonary edema:  +respiratory distress, concern that she may need intubation  - CXR today with interval development of diffuse bilateral airspace disease, representing either pneumonia or pulmonary edema.  - ABG 7.33//50, worsening hypoxia (was on room air on admission)  - repeat CXR and ABG in AM  - Abx changed to zosyn  - BP unfortunately too low for diuresis at this time  - appreciate pulmonology assistance with respiratory issues. Palliative care consult requested for tomorrow as well. Acute encephalopathy and septic shock due to UTI and Influenza B, present on admission: +eye deviation, remains nonresponsive with ?myoclonus. Worsening renal function today with hyperkalemia but not yet \"TIFFANIE\"  - MRI brain with mild small vessel ischemic changes. No acute abnormality. - CT chest/abd/pelvis with severe scoliosis. Atelectasis in the lower lobes, left greater than right. Anemia. Atherosclerotic abdominal aorta without aneurysm. - UA >[id number] pansensitive E Coli, blood cultures no growth. Con't zosyn as above. - influenza B positive, con't tamiflu  - IV fluids and levophed prn for BP support  - appreciate neuro consult. Marshall Pennant started for possible seizures, EEG pending.   Hypoglycemia:  H/o \"borderline DM,\" not on meds at home  - con't D10 gtt as needed  - serial glucose checks  - will check HgA1c in AM  Transaminitis:  Presumed due to sepsis, LFTs with slight improvement today  Baseline mental retardation  Lupus: holding plaquenil while NPO  Hypothyroidism:  Holding synthroid while NPO  Obesity (Body mass index is 33.07 kg/(m^2)     Code Status: full  Surrogate Decision Maker: sister   DVT Prophylaxis: heparin      Baseline: Mental retardation, lives with sister normally but placed in group home after recent back surgery     Subjective:     Chief Complaint / Reason for Physician Visit  Remains unresponsive. Discussed with RN events overnight. Review of Systems:  Symptom Y/N Comments  Symptom Y/N Comments   Fever/Chills    Chest Pain     Poor Appetite    Edema     Cough    Abdominal Pain     Sputum    Joint Pain     SOB/YOUNG    Pruritis/Rash     Nausea/vomit    Tolerating PT/OT     Diarrhea    Tolerating Diet     Constipation    Other       Could NOT obtain due to: unresponsive     Objective:     VITALS:   Last 24hrs VS reviewed since prior progress note.  Most recent are:  Patient Vitals for the past 24 hrs:   Temp Pulse Resp BP SpO2   02/04/18 0809 - - - (!) 77/49 -   02/04/18 0807 - - - (!) 77/35 -   02/04/18 0800 97.6 °F (36.4 °C) 83 (!) 31 (!) 75/44 99 %   02/04/18 0700 - 73 (!) 34 (!) 86/54 100 %   02/04/18 0630 - 71 (!) 33 (!) 87/55 100 %   02/04/18 0600 - 71 (!) 33 105/72 100 %   02/04/18 0530 - 71 28 108/65 100 %   02/04/18 0500 - 80 (!) 31 121/69 97 %   02/04/18 0430 - 74 27 111/48 100 %   02/04/18 0400 96.2 °F (35.7 °C) 76 27 100/63 100 %   02/04/18 0330 - 73 (!) 31 104/61 100 %   02/04/18 0300 - 81 (!) 39 104/57 95 %   02/04/18 0230 - 76 (!) 31 118/68 97 %   02/04/18 0200 - 77 27 117/63 98 %   02/04/18 0130 - 77 28 123/73 99 %   02/04/18 0100 - 77 (!) 37 115/71 97 %   02/04/18 0030 - 86 22 119/74 95 %   02/04/18 0000 96.8 °F (36 °C) 83 26 108/53 97 %   02/03/18 2330 - 81 (!) 31 111/73 97 %   02/03/18 2300 - 80 (!) 31 109/67 98 %   02/03/18 2230 - 82 30 110/64 97 %   02/03/18 2200 - 85 (!) 32 106/52 97 %   02/03/18 2130 - 82 25 104/57 97 %   02/03/18 2100 - 88 25 (!) 107/36 96 %   02/03/18 2030 - 87 30 114/63 98 %   02/03/18 2000 97.8 °F (36.6 °C) 86 30 114/63 97 %   02/03/18 1930 - 85 29 107/57 96 %   02/03/18 1900 - 86 (!) 32 120/64 96 %   02/03/18 1800 - 79 30 120/72 99 %   02/03/18 1730 - 85 28 110/58 93 %   02/03/18 1700 - 84 26 116/52 93 %   02/03/18 1630 - 86 17 (!) 157/129 (!) 89 %   02/03/18 1615 - 84 30 121/78 93 %   02/03/18 1600 97.2 °F (36.2 °C) 85 (!) 31 123/64 94 %   02/03/18 1545 - 85 (!) 36 118/66 93 %   02/03/18 1530 - 87 (!) 32 117/55 95 %   02/03/18 1515 - 86 (!) 34 116/64 96 %   02/03/18 1500 - 89 25 (!) 230/160 93 %   02/03/18 1430 - 92 18 137/75 93 %   02/03/18 1415 - 93 20 136/78 94 %   02/03/18 1400 - 93 20 146/80 97 %   02/03/18 1300 - 92 24 124/52 93 %   02/03/18 1245 - 96 22 131/62 97 %   02/03/18 1230 - 88 23 148/64 97 %   02/03/18 1215 - 90 22 148/66 97 %   02/03/18 1200 97.3 °F (36.3 °C) 90 24 145/70 98 %   02/03/18 1145 - 88 24 148/66 98 %   02/03/18 1130 - 90 24 145/66 99 %   02/03/18 1115 - 96 28 123/60 96 %   02/03/18 1100 - 85 24 (!) 84/47 99 %   02/03/18 1050 - 79 23 142/63 98 %   02/03/18 1045 - 82 28 (!) 66/32 99 %   02/03/18 1041 - 83 23 (!) 60/27 98 %   02/03/18 1015 - 84 23 (!) 56/22 99 %   02/03/18 1000 - 96 24 112/55 98 %   02/03/18 0945 - 94 23 115/52 99 %   02/03/18 0930 - 95 25 113/49 99 %   02/03/18 0915 - 94 22 111/49 99 %   02/03/18 0900 - 95 26 109/47 99 %   02/03/18 0845 - 98 24 121/63 99 %   02/03/18 0830 - 99 27 130/67 99 %   02/03/18 0815 - 99 26 112/69 99 %       Intake/Output Summary (Last 24 hours) at 02/04/18 0811  Last data filed at 02/04/18 0800   Gross per 24 hour   Intake          3402.16 ml   Output             1570 ml   Net          1832.16 ml        PHYSICAL EXAM:  General: WD, WN. Not alert, not cooperative, no acute distress    EENT:  EOM not intact (still with R deviation). Anicteric sclerae. MMM  Resp:  Tachypneic, courses. No wheeze. No accessory muscle use  CV:  Regular rhythm,  No edema  GI:  Soft, Non distended, Non tender.  +Bowel sounds  Neurologic:  Not alert and oriented X 0, no speech,   Psych:   No insight. Not anxious nor agitated  Skin:  No rashes.   No jaundice    Reviewed most current lab test results and cultures  YES  Reviewed most current radiology test results   YES  Review and summation of old records today    NO  Reviewed patient's current orders and [facility] YES  PMH/SH reviewed - no change compared to H&P  ________________________________________________________________________  Care Plan discussed with:    Comments   Patient x    Family      RN x    Care Manager     Consultant  x pulm                     Multidiciplinary team rounds were held today with , nursing, pharmacist and clinical coordinator. Patient's plan of care was discussed; medications were reviewed and discharge planning was addressed. ________________________________________________________________________  Total NON critical care TIME:  25 Minutes    Total CRITICAL CARE TIME Spent:   Minutes non procedure based      Comments   >50% of visit spent in counseling and coordination of care x    ________________________________________________________________________  Eze Lutz MD     Procedures: see electronic medical records for all procedures/Xrays and details which were not copied into this note but were reviewed prior to creation of Plan. LABS:  I reviewed today's most current labs and imaging studies.   Pertinent labs include:  Recent Labs      02/04/18   0433  02/03/18   0339  02/02/18   1506   WBC  12.3*  3.4*  3.1*   HGB  9.8*  9.5*  10.0*   HCT  31.1*  30.5*  32.2*   PLT  34*  56*  54*     Recent Labs      02/04/18   0433  02/03/18   0339  02/02/18   1506   NA  142  142  144   K  5.6*  5.8*  6.5*   CL  109*  111*  107   CO2  28  29  33*   GLU  166*  121*  26*   BUN  20  25*  29*   CREA  1.17*  1.02  0.75   CA  7.6*  7.8*  9.1   MG  1.6   --   2.2   PHOS  1.7*   --    --    ALB  2.1*  2.5*  2.9*   TBILI  0.2  0.3  0.2   SGOT  145*  253*  224*   ALT  191*  259*  251*   INR   --    --   1.1       Signed: Eze Lutz MD

## 2018-02-04 NOTE — PROGRESS NOTES
[facility]  Pulmonary, Critical Care, and Sleep Medicine    Name: Kike Mckoy MRN: [id number]   : 1945 Hospital: [facility]   Date: 2018        Critical Care Patient Consult    IMPRESSION:   · Hypoxia: on 5-6 L. Appears tachypneic. May be due to  Acute Pulmonary edema, ARDS? ? Not able to diurese due to hypotension. · Encephalopathy, possible myoclonus versus seizures. Was down for unknown duration of time. Possible severe hypoglycemia event. Possible CNS injury. Possible Seizure. Ongoing evaluation per Neurology. · Given shock, hypoglycemia, hypothermia. Will add stress steroids. · Severe Hypothermia: Temp of 85.3. · Severe Hypoglycemia on D10 infusion. · Shock, on levophed. · Sepsis  · Flu Positive. · UTI, GNR. · Mental Retardation  · Lupus holding plaquenil. · Hypothyroid  · Obese  · Hematoma at right neck from CVC placement. · Has right femoral CVC in place. · GERD  · DM  · Critically ill, 35 min CC, EOP. Very high risk of decompensation. Multiple organ failure. And progression of organ failure. · Very poor and Guarded prognosis. · Full code at this point. RECOMMENDATIONS:   · Will ask Palliative Care to evaluate in am.   · ON Ctx, Tamiflu,-will broaden abx to zosyn and d/c Ctx. .   · Discussed with nurse. · Will need further neuro work up. · Pressors as needed  · D10 infusion  · Close monitoring of glucose levels. · Serial labs     Subjective/History:   Last 24 hrs, events, testing and clinical course reviewed. Required increased oxygen. Still not responsive. HPI and ROS not obtainable from pt. This patient has been seen and evaluated at the request of Dr. Jose Ervin for above. Patient is a 72 y.o. female   Who present for above. Was found unresponsive by care provider. Brought in from assisted living facility. Noted to have blood glucose of 27. HR of 30. Was given glucagon.        The patient is critically ill and can not provide additional history due to Unable to speak. Past Medical History:   Diagnosis Date    Arthritis     Colon polyp     Diabetes ([facility] [address]     borderline no medications    Environmental allergies 4/28/2010    GERD (gastroesophageal reflux disease)     HTN (hypertension) 4/28/2010    dosent take medication now    Lupus     MR (mental retardation)     Osteoporosis, senile     Other ill-defined conditions     parkinson's disease possibly    Psychiatric disorder     anxious    PUD (peptic ulcer disease)     Scolioses     Sjogren's syndrome ([facility] [address] 3/4/2015      Past Surgical History:   Procedure Laterality Date    HX TONSILLECTOMY      AL COLONOSCOPY FLX DX W/COLLJ SPEC WHEN PFRMD  2/17/2011    due 2013      Prior to Admission medications    Medication Sig Start Date End Date Taking? Authorizing Provider   cholecalciferol, vitamin D3, (VITAMIN D3) 2,000 unit tab Take 1 Tab by mouth daily. Yes Historical Provider   red yeast rice extract 600 mg cap Take 600 mg by mouth daily. Yes Historical Provider   acetaminophen (TYLENOL ARTHRITIS PAIN) 650 mg TbER Take 650 mg by mouth every eight (8) hours as needed for Pain. Yes Historical Provider   hydroxychloroquine (PLAQUENIL) 200 mg tablet TAKE 1 TABLET EVERY DAY 10/3/17  Yes Saurabh Lantigua MD   levothyroxine (SYNTHROID) 25 mcg tablet TAKE 1 TABLET BY MOUTH DAILY BEFORE BREAKFAST 5/1/17  Yes Saurabh Lantigua MD   cetirizine (ZYRTEC) 10 mg tablet Take 1 Tab by mouth daily as needed for Allergies.  2/1/16   Saurabh Lantigua MD     Current Facility-Administered Medications   Medication Dose Route Frequency    sodium phosphate 30 mmol in dextrose 5% 250 mL infusion   IntraVENous ONCE    NOREPINephrine (LEVOPHED) 32 mg in 5% dextrose 250 mL infusion  2-200 mcg/min IntraVENous TITRATE    mupirocin (BACTROBAN) 2 % ointment   Both Nostrils BID    oseltamivir (TAMIFLU) 6 mg/mL oral suspension 30 mg  30 mg Per G Tube BID    levETIRAcetam (KEPPRA) 500 mg in saline (iso-osm) 100 ml IVPB  500 mg IntraVENous Q12H    sodium chloride 0.9 % in dextrose 10% 1,040 mL infusion   IntraVENous CONTINUOUS    sodium chloride (NS) flush 5-10 mL  5-10 mL IntraVENous Q8H    cefTRIAXone (ROCEPHIN) 1 g/50 mL NS IVPB  1 g IntraVENous Q24H     Allergies   Allergen Reactions    Adhesive Tape Other (comments)     Redness under that adhesive      Social History   Substance Use Topics    Smoking status: Never Smoker    Smokeless tobacco: Never Used    Alcohol use No      Family History   Problem Relation Age of Onset    Cancer Mother      pancreas    Heart Disease Father     Heart Attack Father         Review of Systems:  Review of systems not obtained due to patient factors. Objective:   Vital Signs:    Visit Vitals    BP 91/50    Pulse 99    Temp 97.6 °F (36.4 °C)    Resp (!) 39    Ht 5' 1\" (1.549 m)    Wt 79.4 kg (175 lb 0.7 oz)    SpO2 100%    BMI 33.07 kg/m2       O2 Device: Nasal cannula   O2 Flow Rate (L/min): 6 l/min   Temp (24hrs), Av.2 °F (36.2 °C), Min:96.2 °F (35.7 °C), Max:97.8 °F (36.6 °C)       Intake/Output:   Last shift:       07 -  1900  In: 319.6 [I.V.:254.6]  Out: 620 [Urine:600]  Last 3 shifts:  190 -  0700  In: 5334.6 [I.V.:5257.1]  Out: 4427 [Urine:1470]    Intake/Output Summary (Last 24 hours) at 18 1152  Last data filed at 18 1000   Gross per 24 hour   Intake          3244.97 ml   Output             1840 ml   Net          1404.97 ml     Hemodynamics:   PAP:   CO:     Wedge:   CI:     CVP:    SVR:       PVR:       Ventilator Settings:  Mode Rate Tidal Volume Pressure FiO2 PEEP                    Peak airway pressure:      Minute ventilation:        Physical Exam:    General:  Eye deviated to the Right. no distress, appears stated age. Intermittent myoclonus. Has warming blanket in place. Head:  Normocephalic, without obvious abnormality, atraumatic. Eyes:  Conjunctivae/corneas clear. PERRL, EOMs intact. Nose: Nares normal. Septum midline. Mucosa normal. No drainage or sinus tenderness. Throat: Lips, mucosa, and tongue normal. Teeth and gums normal.   Neck: Supple, symmetrical, trachea midline, no adenopathy, thyroid: no enlargment/tenderness/nodules, no carotid bruit and no JVD. Back:   Symmetric, no curvature. ROM normal.   Lungs:   Has increased resp rate but actually pretty clear. Chest wall:  No tenderness or deformity. Heart:  Regular rate and rhythm, S1, S2 normal, no murmur, click, rub or gallop. Abdomen:   Soft, non-tender. Bowel sounds decreased. No masses,  No organomegaly. Extremities: Extremities normal, atraumatic, no cyanosis or edema. Pulses: 2+ and symmetric all extremities. Skin: Skin color, texture, turgor normal. No rashes or lesions   Lymph nodes: Cervical, supraclavicular, and axillary nodes normal.   Neurologic: The seems to have rightward gaze, Pupils are pinpoint.   Pt seems to withdrawal to pain with bilateral  UE and LEs       Data:     Recent Results (from the past 24 hour(s))   GLUCOSE, POC    Collection Time: 02/03/18 12:27 PM   Result Value Ref Range    Glucose (POC) 118 (H) 65 - 100 mg/dL    Performed by Inna Burnette    GLUCOSE, POC    Collection Time: 02/03/18  7:22 PM   Result Value Ref Range    Glucose (POC) 138 (H) 65 - 100 mg/dL    Performed by Inna Burnette    GLUCOSE, POC    Collection Time: 02/04/18 12:23 AM   Result Value Ref Range    Glucose (POC) 145 (H) 65 - 100 mg/dL    Performed by Ronald Phalen    BLOOD GAS, ARTERIAL    Collection Time: 02/04/18  1:13 AM   Result Value Ref Range    pH 7.33 (L) 7.35 - 7.45      PCO2 47 (H) 35.0 - 45.0 mmHg    PO2 50 (L) 80 - 100 mmHg    O2 SAT 83 (L) 92 - 97 %    BICARBONATE 24 22 - 26 mmol/L    BASE DEFICIT 2.4 mmol/L    O2 METHOD NASAL O2      O2 FLOW RATE 2.00 L/min    SPONTANEOUS RATE 30.0      Sample source ARTERIAL      SITE RIGHT RADIAL      HUNTER'S TEST YES     CBC WITH AUTOMATED DIFF    Collection Time: 02/04/18  4:33 AM   Result Value Ref Range    WBC 12.3 (H) 3.6 - 11.0 K/uL    RBC 3.46 (L) 3.80 - 5.20 M/uL    HGB 9.8 (L) 11.5 - 16.0 g/dL    HCT 31.1 (L) 35.0 - 47.0 %    MCV 89.9 80.0 - 99.0 FL    MCH 28.3 26.0 - 34.0 PG    MCHC 31.5 30.0 - 36.5 g/dL    RDW 16.9 (H) 11.5 - 14.5 %    PLATELET 34 (LL) 045 - 400 K/uL    MPV 11.6 8.9 - 12.9 FL    NRBC 0.5 (H) 0  WBC    ABSOLUTE NRBC 0.06 (H) 0.00 - 0.01 K/uL    NEUTROPHILS 58 32 - 75 %    BAND NEUTROPHILS 25 %    LYMPHOCYTES 7 (L) 12 - 49 %    MONOCYTES 2 (L) 5 - 13 %    EOSINOPHILS 0 0 - 7 %    BASOPHILS 0 0 - 1 %    METAMYELOCYTES 8 %    IMMATURE GRANULOCYTES 0 0.0 - 0.5 %    ABS. NEUTROPHILS 10.2 (H) 1.8 - 8.0 K/UL    ABS. LYMPHOCYTES 0.9 0.8 - 3.5 K/UL    ABS. MONOCYTES 0.2 0.0 - 1.0 K/UL    ABS. EOSINOPHILS 0.0 0.0 - 0.4 K/UL    ABS. BASOPHILS 0.0 0.0 - 0.1 K/UL    ABS. IMM. GRANS. 0.0 0.00 - 0.04 K/UL    DF MANUAL      PLATELET COMMENTS DECREASED PLATELETS      RBC COMMENTS ANISOCYTOSIS  1+        WBC COMMENTS TOXIC GRANULATION     METABOLIC PANEL, COMPREHENSIVE    Collection Time: 02/04/18  4:33 AM   Result Value Ref Range    Sodium 142 136 - 145 mmol/L    Potassium 5.6 (H) 3.5 - 5.1 mmol/L    Chloride 109 (H) 97 - 108 mmol/L    CO2 28 21 - 32 mmol/L    Anion gap 5 5 - 15 mmol/L    Glucose 166 (H) 65 - 100 mg/dL    BUN 20 6 - 20 MG/DL    Creatinine 1.17 (H) 0.55 - 1.02 MG/DL    BUN/Creatinine ratio 17 12 - 20      GFR est AA 55 (L) >60 ml/min/1.73m2    GFR est non-AA 46 (L) >60 ml/min/1.73m2    Calcium 7.6 (L) 8.5 - 10.1 MG/DL    Bilirubin, total 0.2 0.2 - 1.0 MG/DL    ALT (SGPT) 191 (H) 12 - 78 U/L    AST (SGOT) 145 (H) 15 - 37 U/L    Alk.  phosphatase 170 (H) 45 - 117 U/L    Protein, total 6.4 6.4 - 8.2 g/dL    Albumin 2.1 (L) 3.5 - 5.0 g/dL    Globulin 4.3 (H) 2.0 - 4.0 g/dL    A-G Ratio 0.5 (L) 1.1 - 2.2     MAGNESIUM    Collection Time: 02/04/18  4:33 AM   Result Value Ref Range    Magnesium 1.6 1.6 - 2.4 mg/dL   PHOSPHORUS    Collection Time: 02/04/18  4:33 AM   Result Value Ref Range    Phosphorus 1.7 (L) 2.6 - 4.7 MG/DL   GLUCOSE, POC    Collection Time: 02/04/18  6:18 AM   Result Value Ref Range    Glucose (POC) 142 (H) 65 - 100 mg/dL    Performed by Zuleyka Wilkerson              Telemetry:   Marked sinus bradycardia   First degree AV conduction delay   Left ventricular hypertrophy with QRS widening   Nonspecific ST and T wave abnormality   Prolonged QT     Imaging:  I have personally reviewed the patients radiographs and have reviewed the reports:  2-2-18: CXR is normal    CT of head: 2/2/18: NO acute changes. Ct of chest: 2-2-18: IMPRESSION:   1. Severe scoliosis. 2. Atelectasis in the lower lobes, left greater than right. 3. Anemia. 4. Atherosclerotic abdominal aorta without aneurysm. 5. Status post hysterectomy. 2-4-18: CXR:   Has bilateral acute pulmonary edema. Versus ARDS, possible acute pneumonia.        Tabitha Chapa MD

## 2018-02-04 NOTE — PROGRESS NOTES
NEUROLOGY NOTE         Chief Complaint   Patient presents with   Maxine Calvo     patient arrived via EMS for unresponsive and glucose of 27       SUBJECTIVE:  Myoclonus resolved  Continues to be encephalopathic    HISTORY OF PRESENT ILLNESS  Brian Carreon is a 72 y.o. female who presents to the hospital because of altered mental status. Hx obtained by chart review. According to ER notes \" presents via EMS from assisted living home to the ED after being found unresponsive by a caregiver. EMS reports vitals of HR in the 30s and a BG of 27. EMS gave the pt Glucagon IM en route and now has a BG of 200 in the ED. Pt is currently seizing and has a NRB in place. She has had 2 mg of ativan since arrival without change in seizing. \"    Pt did have MRi of the brain and it was normal. Pt has baseline MR.    ROS  Cannot obtain    PMH  Past Medical History:   Diagnosis Date    Arthritis     Colon polyp     Diabetes ([address]     borderline no medications    Environmental allergies 4/28/2010    GERD (gastroesophageal reflux disease)     HTN (hypertension) 4/28/2010    dosent take medication now    Lupus     MR (mental retardation)     Osteoporosis, senile     Other ill-defined conditions     parkinson's disease possibly    Psychiatric disorder     anxious    PUD (peptic ulcer disease)     Scolioses     Sjogren's syndrome ([address] 3/4/2015       FH  Family History   Problem Relation Age of Onset    Cancer Mother      pancreas    Heart Disease Father     Heart Attack Father        SH  Social History     Social History    Marital status: SINGLE     Spouse name: N/A    Number of children: N/A    Years of education: N/A     Social History Main Topics    Smoking status: Never Smoker    Smokeless tobacco: Never Used    Alcohol use No    Drug use: No    Sexual activity: No     Other Topics Concern    Not on file     Social History Narrative       ALLERGIES  Allergies   Allergen Reactions    Adhesive Tape Other (comments)     Redness under that adhesive       PHYSICAL EXAM  EXAMINATION:   Patient Vitals for the past 24 hrs:   Temp Pulse Resp BP SpO2   02/04/18 1300 - 96 (!) 35 94/51 99 %   02/04/18 1230 - 97 (!) 31 100/52 100 %   02/04/18 1200 99.5 °F (37.5 °C) 97 (!) 36 96/51 100 %   02/04/18 1130 - 98 (!) 34 91/54 100 %   02/04/18 1100 - 99 (!) 39 91/50 100 %   02/04/18 1030 - 95 (!) 36 90/57 100 %   02/04/18 1000 - 91 (!) 37 94/56 100 %   02/04/18 0930 - 90 (!) 37 97/57 100 %   02/04/18 0900 - 93 (!) 33 100/59 100 %   02/04/18 0830 - 83 29 102/58 100 %   02/04/18 0816 - - - 95/55 -   02/04/18 0812 - - - (!) 84/48 -   02/04/18 0809 - - - (!) 77/49 -   02/04/18 0807 - - - (!) 77/35 -   02/04/18 0800 97.6 °F (36.4 °C) 83 (!) 31 (!) 75/44 99 %   02/04/18 0730 - 78 (!) 36 (!) 86/54 100 %   02/04/18 0700 - 73 (!) 34 (!) 86/54 100 %   02/04/18 0630 - 71 (!) 33 (!) 87/55 100 %   02/04/18 0600 - 71 (!) 33 105/72 100 %   02/04/18 0530 - 71 28 108/65 100 %   02/04/18 0500 - 80 (!) 31 121/69 97 %   02/04/18 0430 - 74 27 111/48 100 %   02/04/18 0400 96.2 °F (35.7 °C) 76 27 100/63 100 %   02/04/18 0330 - 73 (!) 31 104/61 100 %   02/04/18 0300 - 81 (!) 39 104/57 95 %   02/04/18 0230 - 76 (!) 31 118/68 97 %   02/04/18 0200 - 77 27 117/63 98 %   02/04/18 0130 - 77 28 123/73 99 %   02/04/18 0100 - 77 (!) 37 115/71 97 %   02/04/18 0030 - 86 22 119/74 95 %   02/04/18 0000 96.8 °F (36 °C) 83 26 108/53 97 %   02/03/18 2330 - 81 (!) 31 111/73 97 %   02/03/18 2300 - 80 (!) 31 109/67 98 %   02/03/18 2230 - 82 30 110/64 97 %   02/03/18 2200 - 85 (!) 32 106/52 97 %   02/03/18 2130 - 82 25 104/57 97 %   02/03/18 2100 - 88 25 (!) 107/36 96 %   02/03/18 2030 - 87 30 114/63 98 %   02/03/18 2000 97.8 °F (36.6 °C) 86 30 114/63 97 %   02/03/18 1930 - 85 29 107/57 96 %   02/03/18 1900 - 86 (!) 32 120/64 96 %   02/03/18 1800 - 79 30 120/72 99 %   02/03/18 1730 - 85 28 110/58 93 %   02/03/18 1700 - 84 26 116/52 93 %   02/03/18 1630 - 86 17 (!) 157/129 (!) 89 %   02/03/18 1615 - 84 30 121/78 93 %   02/03/18 1600 97.2 °F (36.2 °C) 85 (!) 31 123/64 94 %   02/03/18 1545 - 85 (!) 36 118/66 93 %   02/03/18 1530 - 87 (!) 32 117/55 95 %   02/03/18 1515 - 86 (!) 34 116/64 96 %   02/03/18 1500 - 89 25 (!) 230/160 93 %   02/03/18 1430 - 92 18 137/75 93 %   02/03/18 1415 - 93 20 136/78 94 %        General:   General appearance: Pt is in no acute distress   Distal pulses are preserved    Neurological Examination:   Mental Status:  Pt stuporous. Does not follow commands. Cranial Nerves: Pupils are not reactive. Face symm. Motor: 1/5.      Sensation: Responds to pain    LAB DATA REVIEWED:    Recent Results (from the past 24 hour(s))   GLUCOSE, POC    Collection Time: 02/03/18  7:22 PM   Result Value Ref Range    Glucose (POC) 138 (H) 65 - 100 mg/dL    Performed by Rosemary Madrid, POC    Collection Time: 02/04/18 12:23 AM   Result Value Ref Range    Glucose (POC) 145 (H) 65 - 100 mg/dL    Performed by Paula Neal    BLOOD GAS, ARTERIAL    Collection Time: 02/04/18  1:13 AM   Result Value Ref Range    pH 7.33 (L) 7.35 - 7.45      PCO2 47 (H) 35.0 - 45.0 mmHg    PO2 50 (L) 80 - 100 mmHg    O2 SAT 83 (L) 92 - 97 %    BICARBONATE 24 22 - 26 mmol/L    BASE DEFICIT 2.4 mmol/L    O2 METHOD NASAL O2      O2 FLOW RATE 2.00 L/min    SPONTANEOUS RATE 30.0      Sample source ARTERIAL      SITE RIGHT RADIAL      HUNTER'S TEST YES     CBC WITH AUTOMATED DIFF    Collection Time: 02/04/18  4:33 AM   Result Value Ref Range    WBC 12.3 (H) 3.6 - 11.0 K/uL    RBC 3.46 (L) 3.80 - 5.20 M/uL    HGB 9.8 (L) 11.5 - 16.0 g/dL    HCT 31.1 (L) 35.0 - 47.0 %    MCV 89.9 80.0 - 99.0 FL    MCH 28.3 26.0 - 34.0 PG    MCHC 31.5 30.0 - 36.5 g/dL    RDW 16.9 (H) 11.5 - 14.5 %    PLATELET 34 (LL) 008 - 400 K/uL    MPV 11.6 8.9 - 12.9 FL    NRBC 0.5 (H) 0  WBC    ABSOLUTE NRBC 0.06 (H) 0.00 - 0.01 K/uL    NEUTROPHILS 58 32 - 75 %    BAND NEUTROPHILS 25 %    LYMPHOCYTES 7 (L) 12 - 49 %    MONOCYTES 2 (L) 5 - 13 %    EOSINOPHILS 0 0 - 7 %    BASOPHILS 0 0 - 1 %    METAMYELOCYTES 8 %    IMMATURE GRANULOCYTES 0 0.0 - 0.5 %    ABS. NEUTROPHILS 10.2 (H) 1.8 - 8.0 K/UL    ABS. LYMPHOCYTES 0.9 0.8 - 3.5 K/UL    ABS. MONOCYTES 0.2 0.0 - 1.0 K/UL    ABS. EOSINOPHILS 0.0 0.0 - 0.4 K/UL    ABS. BASOPHILS 0.0 0.0 - 0.1 K/UL    ABS. IMM. GRANS. 0.0 0.00 - 0.04 K/UL    DF MANUAL      PLATELET COMMENTS DECREASED PLATELETS      RBC COMMENTS ANISOCYTOSIS  1+        WBC COMMENTS TOXIC GRANULATION     METABOLIC PANEL, COMPREHENSIVE    Collection Time: 02/04/18  4:33 AM   Result Value Ref Range    Sodium 142 136 - 145 mmol/L    Potassium 5.6 (H) 3.5 - 5.1 mmol/L    Chloride 109 (H) 97 - 108 mmol/L    CO2 28 21 - 32 mmol/L    Anion gap 5 5 - 15 mmol/L    Glucose 166 (H) 65 - 100 mg/dL    BUN 20 6 - 20 MG/DL    Creatinine 1.17 (H) 0.55 - 1.02 MG/DL    BUN/Creatinine ratio 17 12 - 20      GFR est AA 55 (L) >60 ml/min/1.73m2    GFR est non-AA 46 (L) >60 ml/min/1.73m2    Calcium 7.6 (L) 8.5 - 10.1 MG/DL    Bilirubin, total 0.2 0.2 - 1.0 MG/DL    ALT (SGPT) 191 (H) 12 - 78 U/L    AST (SGOT) 145 (H) 15 - 37 U/L    Alk.  phosphatase 170 (H) 45 - 117 U/L    Protein, total 6.4 6.4 - 8.2 g/dL    Albumin 2.1 (L) 3.5 - 5.0 g/dL    Globulin 4.3 (H) 2.0 - 4.0 g/dL    A-G Ratio 0.5 (L) 1.1 - 2.2     MAGNESIUM    Collection Time: 02/04/18  4:33 AM   Result Value Ref Range    Magnesium 1.6 1.6 - 2.4 mg/dL   PHOSPHORUS    Collection Time: 02/04/18  4:33 AM   Result Value Ref Range    Phosphorus 1.7 (L) 2.6 - 4.7 MG/DL   PATHOLOGIST REVIEW    Collection Time: 02/04/18  4:33 AM   Result Value Ref Range    Pathologist review (NOTE)    GLUCOSE, POC    Collection Time: 02/04/18  6:18 AM   Result Value Ref Range    Glucose (POC) 142 (H) 65 - 100 mg/dL    Performed by Jeannie Sandhu, POC    Collection Time: 02/04/18 12:04 PM   Result Value Ref Range    Glucose (POC) 98 65 - 100 mg/dL    Performed by Augusta Marlow         Imaging review:  MRI brain Normal    HOME MEDS  Prior to Admission Medications   Prescriptions Last Dose Informant Patient Reported? Taking?   acetaminophen (TYLENOL ARTHRITIS PAIN) 650 mg TbER 1/26/2018 at Unknown time Other Yes Yes   Sig: Take 650 mg by mouth every eight (8) hours as needed for Pain. cetirizine (ZYRTEC) 10 mg tablet Not Taking at Unknown time Other No No   Sig: Take 1 Tab by mouth daily as needed for Allergies. cholecalciferol, vitamin D3, (VITAMIN D3) 2,000 unit tab 1/31/2018 at Unknown time Other Yes Yes   Sig: Take 1 Tab by mouth daily. hydroxychloroquine (PLAQUENIL) 200 mg tablet 1/31/2018 at Unknown time Other No Yes   Sig: TAKE 1 TABLET EVERY DAY   levothyroxine (SYNTHROID) 25 mcg tablet 1/31/2018 at Unknown time Other No Yes   Sig: TAKE 1 TABLET BY MOUTH DAILY BEFORE BREAKFAST   red yeast rice extract 600 mg cap 1/31/2018 at Unknown time Other Yes Yes   Sig: Take 600 mg by mouth daily.       Facility-Administered Medications: None       CURRENT MEDS  Current Facility-Administered Medications   Medication Dose Route Frequency    sodium phosphate 30 mmol in dextrose 5% 250 mL infusion   IntraVENous ONCE    bumetanide (BUMEX) injection 0.5 mg  0.5 mg IntraVENous ONCE    hydrocortisone Sod Succ (PF) (SOLU-CORTEF) injection 50 mg  50 mg IntraVENous Q8H    piperacillin-tazobactam (ZOSYN) 3.375 g in  ml premix/cmpd  3.375 g IntraVENous ONCE    piperacillin-tazobactam (ZOSYN) 3.375 g in  ml premix/cmpd  3.375 g IntraVENous Q8H    NOREPINephrine (LEVOPHED) 32 mg in 5% dextrose 250 mL infusion  2-200 mcg/min IntraVENous TITRATE    mupirocin (BACTROBAN) 2 % ointment   Both Nostrils BID    oseltamivir (TAMIFLU) 6 mg/mL oral suspension 30 mg  30 mg Per G Tube BID    levETIRAcetam (KEPPRA) 500 mg in saline (iso-osm) 100 ml IVPB  500 mg IntraVENous Q12H    sodium chloride 0.9 % in dextrose 10% 1,040 mL infusion   IntraVENous CONTINUOUS    sodium chloride (NS) flush 5-10 mL  5-10 mL IntraVENous Q8H IMPRESSION:  Elis Singh is a 72 y.o. female who presents with altered mental status. According to ER, pt was actively seizing. MRI brain ruled out a stroke. I do not see any myoclonus today but she remains encephalopathic. Will get an EEG and see if she his having non convulsive status epilepticus. RECOMMENDATIONS:  1. Keppra  500 mg q 12  2. EEG    35 min CCT provided. Thank you very much for this consultation.      Monet Jenkins MD  Neurologist

## 2018-02-04 NOTE — PROGRESS NOTES
Pharmacy Automatic Renal Dosing Protocol - Antimicrobials    Indication for Antimicrobials: UTI; Influenza B; CAP    Current Regimen of Each Antimicrobial:  Oseltamivir 30 mg PO twice daily (Started 18; Day #3)  Piperacillin-tazobactam 4.5 gm IV every 8 hours (Started 18; Day #1)    Previous Antimicrobial Therapy:  Zosyn 3.375g IV x 1 in ED (Start Date ; Day # 1)  Vancomycin loading dose 1750mg IV x 1 (start date: , day #1)  Ceftriaxone 1 gm IV every 24 hours (Started 18; Stopped 18)    Significant Cultures:   2/3/18 Influenza = Influenza B positive (FINAL)  18 Urine culture = Greater than 100K CFU/mL GNR (Results pending)  18 Blood culture = No growth x 2 days (Results pending)    Labs:  Recent Labs      18   0433  18   0339  18   1506   CREA  1.17*  1.02  0.75   BUN  20  25*  29*   WBC  12.3*  3.4*  3.1*   Temp (24hrs), Av.5 ° F (36.4 ° C), Min:96.2 ° F (35.7 ° C), Max:99.5 ° F (37.5 ° C)    Creatinine Clearance (mL/min) or Dialysis: 33-42 mL/min    No results found for: PCT    Impression/Plan:   - Based on indication for CAP and UTI, piperacillin-tazobactam dosed at 3.375 gm IV once over 30 minutes and then 3.375 gm IV every 8 hours over 240 minutes (starting 6 hours after the first infusion). - Oseltamivir dosed appropriately based on indication and renal function. Continue current regimen. Follow-up for oseltamivir stop date on 18 CCU rounds. Pharmacy will follow daily and adjust medications as appropriate for renal function and/or serum levels.     Thank you,  Stefani Pizarro, PHARMD

## 2018-02-05 ENCOUNTER — APPOINTMENT (OUTPATIENT)
Dept: GENERAL RADIOLOGY | Age: 73
DRG: 871 | End: 2018-02-05
Attending: INTERNAL MEDICINE
Payer: MEDICARE

## 2018-02-05 LAB
ALBUMIN SERPL-MCNC: 1.9 G/DL (ref 3.5–5)
ALBUMIN/GLOB SERPL: 0.5 {RATIO} (ref 1.1–2.2)
ALP SERPL-CCNC: 146 U/L (ref 45–117)
ALT SERPL-CCNC: 135 U/L (ref 12–78)
ANION GAP SERPL CALC-SCNC: 5 MMOL/L (ref 5–15)
ANION GAP SERPL CALC-SCNC: 6 MMOL/L (ref 5–15)
AST SERPL-CCNC: 95 U/L (ref 15–37)
BASOPHILS # BLD: 0 K/UL (ref 0–0.1)
BASOPHILS NFR BLD: 0 % (ref 0–1)
BILIRUB SERPL-MCNC: 0.4 MG/DL (ref 0.2–1)
BUN SERPL-MCNC: 19 MG/DL (ref 6–20)
BUN SERPL-MCNC: 20 MG/DL (ref 6–20)
BUN/CREAT SERPL: 15 (ref 12–20)
BUN/CREAT SERPL: 17 (ref 12–20)
CALCIUM SERPL-MCNC: 7.7 MG/DL (ref 8.5–10.1)
CALCIUM SERPL-MCNC: 7.8 MG/DL (ref 8.5–10.1)
CHLORIDE SERPL-SCNC: 110 MMOL/L (ref 97–108)
CHLORIDE SERPL-SCNC: 111 MMOL/L (ref 97–108)
CO2 SERPL-SCNC: 25 MMOL/L (ref 21–32)
CO2 SERPL-SCNC: 29 MMOL/L (ref 21–32)
CREAT SERPL-MCNC: 1.2 MG/DL (ref 0.55–1.02)
CREAT SERPL-MCNC: 1.24 MG/DL (ref 0.55–1.02)
DIFFERENTIAL METHOD BLD: ABNORMAL
EOSINOPHIL # BLD: 0 K/UL (ref 0–0.4)
EOSINOPHIL NFR BLD: 0 % (ref 0–7)
ERYTHROCYTE [DISTWIDTH] IN BLOOD BY AUTOMATED COUNT: 16.6 % (ref 11.5–14.5)
EST. AVERAGE GLUCOSE BLD GHB EST-MCNC: ABNORMAL MG/DL
GLOBULIN SER CALC-MCNC: 4 G/DL (ref 2–4)
GLUCOSE BLD STRIP.AUTO-MCNC: 101 MG/DL (ref 65–100)
GLUCOSE BLD STRIP.AUTO-MCNC: 90 MG/DL (ref 65–100)
GLUCOSE SERPL-MCNC: 108 MG/DL (ref 65–100)
GLUCOSE SERPL-MCNC: 91 MG/DL (ref 65–100)
HBA1C MFR BLD: <3.5 % (ref 4.2–6.3)
HCT VFR BLD AUTO: 26.1 % (ref 35–47)
HGB BLD-MCNC: 8.7 G/DL (ref 11.5–16)
IMM GRANULOCYTES # BLD: 0 K/UL (ref 0–0.04)
IMM GRANULOCYTES NFR BLD AUTO: 0 % (ref 0–0.5)
LYMPHOCYTES # BLD: 0.5 K/UL (ref 0.8–3.5)
LYMPHOCYTES NFR BLD: 3 % (ref 12–49)
MAGNESIUM SERPL-MCNC: 2.2 MG/DL (ref 1.6–2.4)
MCH RBC QN AUTO: 28.9 PG (ref 26–34)
MCHC RBC AUTO-ENTMCNC: 33.3 G/DL (ref 30–36.5)
MCV RBC AUTO: 86.7 FL (ref 80–99)
METAMYELOCYTES NFR BLD MANUAL: 1 %
MONOCYTES # BLD: 0.2 K/UL (ref 0–1)
MONOCYTES NFR BLD: 1 % (ref 5–13)
NEUTS BAND NFR BLD MANUAL: 7 %
NEUTS SEG # BLD: 15 K/UL (ref 1.8–8)
NEUTS SEG NFR BLD: 88 % (ref 32–75)
NRBC # BLD: 0.05 K/UL (ref 0–0.01)
NRBC BLD-RTO: 0.3 PER 100 WBC
PHOSPHATE SERPL-MCNC: 3.2 MG/DL (ref 2.6–4.7)
PLATELET # BLD AUTO: 23 K/UL (ref 150–400)
PLATELET COMMENTS,PCOM: ABNORMAL
PMV BLD AUTO: 12.8 FL (ref 8.9–12.9)
POTASSIUM SERPL-SCNC: 3.9 MMOL/L (ref 3.5–5.1)
POTASSIUM SERPL-SCNC: 4.9 MMOL/L (ref 3.5–5.1)
PROT SERPL-MCNC: 5.9 G/DL (ref 6.4–8.2)
RBC # BLD AUTO: 3.01 M/UL (ref 3.8–5.2)
RBC MORPH BLD: ABNORMAL
RBC MORPH BLD: ABNORMAL
SERVICE CMNT-IMP: ABNORMAL
SERVICE CMNT-IMP: NORMAL
SODIUM SERPL-SCNC: 142 MMOL/L (ref 136–145)
SODIUM SERPL-SCNC: 144 MMOL/L (ref 136–145)
WBC # BLD AUTO: 15.8 K/UL (ref 3.6–11)
WBC MORPH BLD: ABNORMAL

## 2018-02-05 PROCEDURE — 85025 COMPLETE CBC W/AUTO DIFF WBC: CPT | Performed by: INTERNAL MEDICINE

## 2018-02-05 PROCEDURE — 36415 COLL VENOUS BLD VENIPUNCTURE: CPT | Performed by: INTERNAL MEDICINE

## 2018-02-05 PROCEDURE — 83036 HEMOGLOBIN GLYCOSYLATED A1C: CPT | Performed by: INTERNAL MEDICINE

## 2018-02-05 PROCEDURE — 84145 PROCALCITONIN (PCT): CPT | Performed by: INTERNAL MEDICINE

## 2018-02-05 PROCEDURE — 74011250636 HC RX REV CODE- 250/636: Performed by: INTERNAL MEDICINE

## 2018-02-05 PROCEDURE — 82962 GLUCOSE BLOOD TEST: CPT

## 2018-02-05 PROCEDURE — 76450000000

## 2018-02-05 PROCEDURE — 80048 BASIC METABOLIC PNL TOTAL CA: CPT | Performed by: INTERNAL MEDICINE

## 2018-02-05 PROCEDURE — 77010033678 HC OXYGEN DAILY

## 2018-02-05 PROCEDURE — 74011250636 HC RX REV CODE- 250/636: Performed by: PSYCHIATRY & NEUROLOGY

## 2018-02-05 PROCEDURE — 74011250637 HC RX REV CODE- 250/637: Performed by: INTERNAL MEDICINE

## 2018-02-05 PROCEDURE — 80053 COMPREHEN METABOLIC PANEL: CPT | Performed by: INTERNAL MEDICINE

## 2018-02-05 PROCEDURE — 83735 ASSAY OF MAGNESIUM: CPT | Performed by: INTERNAL MEDICINE

## 2018-02-05 PROCEDURE — 74011000250 HC RX REV CODE- 250: Performed by: INTERNAL MEDICINE

## 2018-02-05 PROCEDURE — 93306 TTE W/DOPPLER COMPLETE: CPT

## 2018-02-05 PROCEDURE — 77030020847 HC STATLOK BARD -A

## 2018-02-05 PROCEDURE — 76937 US GUIDE VASCULAR ACCESS: CPT

## 2018-02-05 PROCEDURE — 77030018719 HC DRSG PTCH ANTIMIC J&J -A

## 2018-02-05 PROCEDURE — 36569 INSJ PICC 5 YR+ W/O IMAGING: CPT | Performed by: INTERNAL MEDICINE

## 2018-02-05 PROCEDURE — 77030018786 HC NDL GD F/USND BARD -B

## 2018-02-05 PROCEDURE — C1751 CATH, INF, PER/CENT/MIDLINE: HCPCS

## 2018-02-05 PROCEDURE — 71045 X-RAY EXAM CHEST 1 VIEW: CPT

## 2018-02-05 PROCEDURE — 65610000006 HC RM INTENSIVE CARE

## 2018-02-05 PROCEDURE — 84100 ASSAY OF PHOSPHORUS: CPT | Performed by: INTERNAL MEDICINE

## 2018-02-05 RX ORDER — HEPARIN 100 UNIT/ML
300-500 SYRINGE INTRAVENOUS AS NEEDED
Status: DISCONTINUED | OUTPATIENT
Start: 2018-02-05 | End: 2018-03-02 | Stop reason: SDUPTHER

## 2018-02-05 RX ORDER — SODIUM CHLORIDE 0.9 % (FLUSH) 0.9 %
20 SYRINGE (ML) INJECTION EVERY 24 HOURS
Status: DISCONTINUED | OUTPATIENT
Start: 2018-02-05 | End: 2018-03-09

## 2018-02-05 RX ORDER — VANCOMYCIN 2 GRAM/500 ML IN 0.9 % SODIUM CHLORIDE INTRAVENOUS
2000 ONCE
Status: COMPLETED | OUTPATIENT
Start: 2018-02-05 | End: 2018-02-05

## 2018-02-05 RX ORDER — SODIUM CHLORIDE 0.9 % (FLUSH) 0.9 %
10 SYRINGE (ML) INJECTION EVERY 24 HOURS
Status: DISCONTINUED | OUTPATIENT
Start: 2018-02-05 | End: 2018-03-09

## 2018-02-05 RX ORDER — HEPARIN 100 UNIT/ML
300 SYRINGE INTRAVENOUS AS NEEDED
Status: DISCONTINUED | OUTPATIENT
Start: 2018-02-05 | End: 2018-03-12 | Stop reason: HOSPADM

## 2018-02-05 RX ORDER — SODIUM CHLORIDE 0.9 % (FLUSH) 0.9 %
10-30 SYRINGE (ML) INJECTION AS NEEDED
Status: DISCONTINUED | OUTPATIENT
Start: 2018-02-05 | End: 2018-03-12 | Stop reason: HOSPADM

## 2018-02-05 RX ORDER — FUROSEMIDE 10 MG/ML
60 INJECTION INTRAMUSCULAR; INTRAVENOUS ONCE
Status: COMPLETED | OUTPATIENT
Start: 2018-02-05 | End: 2018-02-05

## 2018-02-05 RX ORDER — SODIUM CHLORIDE 0.9 % (FLUSH) 0.9 %
10-40 SYRINGE (ML) INJECTION EVERY 8 HOURS
Status: DISCONTINUED | OUTPATIENT
Start: 2018-02-05 | End: 2018-03-12 | Stop reason: HOSPADM

## 2018-02-05 RX ORDER — VANCOMYCIN HYDROCHLORIDE
1250 EVERY 24 HOURS
Status: DISCONTINUED | OUTPATIENT
Start: 2018-02-06 | End: 2018-02-07

## 2018-02-05 RX ADMIN — NOREPINEPHRINE BITARTRATE 10 MCG/MIN: 1 INJECTION INTRAVENOUS at 21:39

## 2018-02-05 RX ADMIN — LEVETIRACETAM 500 MG: 5 INJECTION INTRAVENOUS at 00:15

## 2018-02-05 RX ADMIN — MUPIROCIN: 20 OINTMENT TOPICAL at 18:37

## 2018-02-05 RX ADMIN — HYDROCORTISONE SODIUM SUCCINATE 50 MG: 100 INJECTION, POWDER, FOR SOLUTION INTRAMUSCULAR; INTRAVENOUS at 05:40

## 2018-02-05 RX ADMIN — OSELTAMIVIR PHOSPHATE 30 MG: 6 POWDER, FOR SUSPENSION ORAL at 18:35

## 2018-02-05 RX ADMIN — Medication 10 ML: at 22:16

## 2018-02-05 RX ADMIN — VANCOMYCIN HYDROCHLORIDE 2000 MG: 10 INJECTION, POWDER, LYOPHILIZED, FOR SOLUTION INTRAVENOUS at 12:18

## 2018-02-05 RX ADMIN — PIPERACILLIN SODIUM AND TAZOBACTAM SODIUM 3.38 G: .375; 3 INJECTION, POWDER, LYOPHILIZED, FOR SOLUTION INTRAVENOUS at 03:48

## 2018-02-05 RX ADMIN — MUPIROCIN: 20 OINTMENT TOPICAL at 10:03

## 2018-02-05 RX ADMIN — PIPERACILLIN SODIUM AND TAZOBACTAM SODIUM 4.5 G: .5; 4 INJECTION, POWDER, LYOPHILIZED, FOR SOLUTION INTRAVENOUS at 13:49

## 2018-02-05 RX ADMIN — OSELTAMIVIR PHOSPHATE 30 MG: 6 POWDER, FOR SUSPENSION ORAL at 09:56

## 2018-02-05 RX ADMIN — PIPERACILLIN SODIUM AND TAZOBACTAM SODIUM 4.5 G: .5; 4 INJECTION, POWDER, LYOPHILIZED, FOR SOLUTION INTRAVENOUS at 20:16

## 2018-02-05 RX ADMIN — LEVETIRACETAM 500 MG: 5 INJECTION INTRAVENOUS at 12:18

## 2018-02-05 RX ADMIN — Medication 10 ML: at 05:40

## 2018-02-05 RX ADMIN — FUROSEMIDE 60 MG: 10 INJECTION, SOLUTION INTRAMUSCULAR; INTRAVENOUS at 09:56

## 2018-02-05 NOTE — PROGRESS NOTES
Pressure Ulcer Prevention Alert Received for Adolph < 14 (moderate risk).        Care Plan/Interventions for Nursin. Complete Adolph Pressure Ulcer Risk Scale and use sub scores to identify appropriate interventions. 2. Perform Assessment: skin, changes in LOC, visual cues for pain, monitor skin under medical devices  3. Respond to Reduced Sensory Perception: changes in LOC, check visual cues for pain, float heels, suspension boots, pressure redistribution bed/mattress/chair cushion, turning and reposition approximately every 2 hours (pillows & wedges), pad between skin to skin, turn & reposition  4. Manage Moisture: absorbent under pads, internal / external urinary device, internal /  external fecal device, minimize layers, contain wound drainage, access need for specialty bed, limit adult briefs, maintain skin hydration (lotion/cream), moisture barrier, offer toileting every hour  5. Promote Activity: increase time out of bed, chair cushion, PT/OT evaluation, trapeze to reposition, pressure redistribution bed/mattress/chair  6. Address Reduced Mobility: float heels / suspension boot, HOB 30 degrees or less, pressure redistribution bed/mattress/cushion, PT / OT evaluation, turn and reposition approximately every 2 hours (pillows & wedges)  7. Promote Nutrition: document food / fluid / supplement intake, encourage/assist with meals as needed  8. Reduce Friction and Shear: transferring/repositioning devices (lift/draw sheet), lift team/ patient mobility team, feet elevated on foot rest, minimize layers, foam dressing / transparent film / skin sealants, protective barrier creams and emollients, transfer aides (board, Zaki lift, ceiling lift, stand assist), HOB 30 degrees or less, trapeze to reposition.   Wound Care Team

## 2018-02-05 NOTE — PROGRESS NOTES
0715: Bedside shift change report given to Kaylie Suero, RN (oncoming nurse) by Navya Trent, RN (offgoing nurse). Report included the following information SBAR.     4672: AM assessment complete. 8070: Dr. Karen Slade in to see pt.   Harjit Shanna: Dr. Mildred Barnes in to see pt.   1200: Reassessment complete. 1300: Echo complete  1336: Dr. Maddi Henson in to see pt.   1600: Reassessment complete. Pt with EKG changes and increase in urine out put over 300ml/hr for 4 hours. Dr. America Moore aware. New orders received for a cardiology consult and to monitor UO and to let him know by 1700 if the out put was still greater than 300.  1630: Dr. Alexandrea Turner in to see pt.  1700: Dr. America Moore notified of UO of 380 for the last hour. New orders received to draw a BMP on the patient. 1900: Bedside shift change report given to Lisa Cevallos RN (oncoming nurse) by Kaylie Suero RN (offgoing nurse). Report included the following information SBAR.

## 2018-02-05 NOTE — PROGRESS NOTES
1900  Received bedside/verbal report and assumed care of patient from Jovanni Hudson RN. Drips verified: Levophed at 25 mcg/min and D10NS at 25 mL/hr.    1945  Patient noted to be alternating between NSR and 2nd Degree, Type I block. No changes to HR or BP. Will continue to monitor. 2000  Shift assessment completed. See doc flowsheet for details. Patient appears to be slightly more responsive to stimulation, opens eyes more frequently, but does not follow commands. Temp stable, Elizabet Hugger off.    0300  Weaning Levophed slowly, BP tolerating well thus far. 0430  Lab called critical Platelet value of 23.  notified, no orders received. 0700  Bedside and Verbal shift change report given to Mirta Spence (oncoming nurse) by Kath Hammond (offgoing nurse). Report included the following information SBAR, Kardex, ED Summary, Intake/Output, MAR, Recent Results and Cardiac Rhythm Wenckebach/NSR. Drips verified: Levophed at 12 mcg/min and D10NS at 25 mL/hr.

## 2018-02-05 NOTE — PROGRESS NOTES
[facility]  Pulmonary, Critical Care, and Sleep Medicine    Name: Mai Olmedo MRN: [id number]   : 1945 Hospital: [facility]   Date: 2018        IMPRESSION:   · Acute hypoxic respiratory failure  · Acute influenza  · Can not rule out superinfection  · Diffuse bilateral infiltrates - rapid development argues more for pulmonary edema but can't exclude ARDS  · Encephalopathy with seizure or myoclonus - possibly due to hypoglycemia   · Shock   · Severe hypoglycemia  · GNR UTI  · Lupus   · Mental retardation   · GERD  · DM      PLAN:   · O2   · Diurese - may need to increase pressors as we do this  · Check echo  · Empiric antibiotics, Tamiflu  · Pressors  · D10 infusion with glycemic monitoring  · Check procalcitonin   · Plaquenil on hold  · Antiepileptics, neuro evaluation ongoing  · DVT prophylaxis  · Critically ill with high risk for further decompensation/death     Subjective/Interval History:   I have reviewed the flowsheet and previous days notes. The patient is unable to give any meaningful history or review of systems because the patient is:  Lethargic      The patient is critically ill on:      Pressors      Review of Systems   Unable to perform ROS: Mental status change     Objective:   Vital Signs:    Visit Vitals    /54    Pulse 82    Temp 97.6 °F (36.4 °C)    Resp 30    Ht 5' 1\" (1.549 m)    Wt 77.9 kg (171 lb 11.8 oz)    SpO2 100%    BMI 32.45 kg/m2       O2 Device: Nasal cannula   O2 Flow Rate (L/min): 6 l/min   Temp (24hrs), Av.2 °F (36.8 °C), Min:97.6 °F (36.4 °C), Max:99.5 °F (37.5 °C)       Intake/Output:   Last shift:      701 -  190  In: 30.6 [I.V.:30.6]  Out: 260 [Urine:260]  Last 3 shifts: 1901 -  07  In: 3530.4 [I.V.:3425.4]  Out: 0303 [Urine:3265]    Intake/Output Summary (Last 24 hours) at 18 0923  Last data filed at 18 0900   Gross per 24 hour   Intake          1658. 85 ml   Output 2180 ml   Net          -521.15 ml     Hemodynamics:   PAP:   CO:     Wedge:   CI:     CVP:    SVR:       PVR:       Ventilator Settings:  Mode Rate Tidal Volume Pressure FiO2 PEEP                    Peak airway pressure:      Minute ventilation:         Physical Exam   Constitutional: She appears lethargic. No distress. HENT:   Head: Normocephalic and atraumatic. Eyes: No scleral icterus. Cardiovascular: Normal rate. An irregular rhythm present. Pulmonary/Chest: She has wheezes. She has rales. Abdominal: Soft. Bowel sounds are normal. She exhibits no distension. There is no tenderness. Musculoskeletal: She exhibits no edema. Neurological: She appears lethargic. Skin: Skin is warm and dry. No rash noted.      Data:     Current Facility-Administered Medications   Medication Dose Route Frequency    hydrocortisone Sod Succ (PF) (SOLU-CORTEF) injection 50 mg  50 mg IntraVENous Q8H    piperacillin-tazobactam (ZOSYN) 3.375 g in  ml premix/cmpd  3.375 g IntraVENous Q8H    NOREPINephrine (LEVOPHED) 32 mg in 5% dextrose 250 mL infusion  2-200 mcg/min IntraVENous TITRATE    mupirocin (BACTROBAN) 2 % ointment   Both Nostrils BID    oseltamivir (TAMIFLU) 6 mg/mL oral suspension 30 mg  30 mg Per G Tube BID    levETIRAcetam (KEPPRA) 500 mg in saline (iso-osm) 100 ml IVPB  500 mg IntraVENous Q12H    sodium chloride 0.9 % in dextrose 10% 1,040 mL infusion   IntraVENous CONTINUOUS    sodium chloride (NS) flush 5-10 mL  5-10 mL IntraVENous Q8H                Labs:  Recent Labs      02/05/18   0357  02/04/18   0433  02/03/18   0339   WBC  15.8*  12.3*  3.4*   HGB  8.7*  9.8*  9.5*   HCT  26.1*  31.1*  30.5*   PLT  23*  34*  56*     Recent Labs      02/05/18   0357  02/04/18   0433  02/03/18   0339  02/02/18   1506   NA  142  142  142  144   K  4.9  5.6*  5.8*  6.5*   CL  111*  109*  111*  107   CO2  25  28  29  33*   GLU  108*  166*  121*  26*   BUN  19  20  25*  29*   CREA  1.24*  1.17*  1.02 0. 75   CA  7.8*  7.6*  7.8*  9.1   MG  2.2  1.6   --   2.2   PHOS  3.2  1.7*   --    --    ALB  1.9*  2.1*  2.5*  2.9*   TBILI  0.4  0.2  0.3  0.2   SGOT  95*  145*  253*  224*   ALT  135*  191*  259*  251*   INR   --    --    --   1.1     Recent Labs      02/04/18   1533  02/04/18   0113  02/02/18   1615   PH  7.41  7.33*  7.26*   PCO2  33*  47*  63*   PO2  70*  50*  247*   HCO3  20*  24  28*     Imaging:  I have personally reviewed the patients radiographs and have reviewed the reports:  Marked edema vs infiltrate, unchanged        Total critical care time exclusive of procedures: 35 minutes  Dank Morgan MD

## 2018-02-05 NOTE — PROGRESS NOTES
Hospitalist Progress Note    NAME: Devorah Hastings   :  1945   MRN:  [id number]       Assessment / Plan:  Acute hypoxic respiratory failure due to acute pulmonary edema:  still with significant acute hypoxia, tachypnea  - CXR today with diffuse airspace disease/edema. No significant change. - con't vanco, zosyn and tamiflu  - not diuresing with labile BP  - appreciate pulmonology assistance with respiratory issues. Palliative care consult requested. Acute kidney injury:  Cr continues to decline  - +E Coli UTI, treating as below  - con't IV fluids  Acute encephalopathy and septic shock due to UTI and Influenza B, present on admission: +eye deviation, remains nonresponsive with ?myoclonus. Worsening renal function today with hyperkalemia but not yet \"TIFFANIE\"  - MRI brain with mild small vessel ischemic changes. No acute abnormality. - CT chest/abd/pelvis with severe scoliosis. Atelectasis in the lower lobes, left greater than right. Anemia. Atherosclerotic abdominal aorta without aneurysm. - UA >[id number] pansensitive E Coli, blood cultures no growth. Con't zosyn as above. - influenza B positive, con't tamiflu  - IV fluids and levophed prn for BP support  - EEG with cerebral dysfunction with no seizure activity  - appreciate neuro consult, recommending con't keppra for now  Hypoglycemia:  H/o \"borderline DM,\" not on meds at home.   HgA1c <3.5.  - con't D10 gtt  - serial gl  - will checkucose checks  Transaminitis:  Presumed due to sepsis, improving  Baseline mental retardation  Lupus: holding plaquenil while NPO  Hypothyroidism:  Holding synthroid while NPO, will need to consider starting IV soon if not able to take po  Obesity (Body mass index is 32.45 kg/(m^2)      Code Status: full  Surrogate Decision Maker: sister   DVT Prophylaxis: heparin      Baseline: Mental retardation, lives with sister normally but placed in group home after recent back surgery     Subjective:     Chief Complaint / Reason for Physician Visit  Remains unresponsive. Discussed with RN events overnight. Review of Systems:  Symptom Y/N Comments  Symptom Y/N Comments   Fever/Chills    Chest Pain     Poor Appetite    Edema     Cough    Abdominal Pain     Sputum    Joint Pain     SOB/YOUNG    Pruritis/Rash     Nausea/vomit    Tolerating PT/OT     Diarrhea    Tolerating Diet     Constipation    Other       Could NOT obtain due to: unresponsive     Objective:     VITALS:   Last 24hrs VS reviewed since prior progress note.  Most recent are:  Patient Vitals for the past 24 hrs:   Temp Pulse Resp BP SpO2   02/05/18 0800 97.6 °F (36.4 °C) 87 (!) 31 118/68 100 %   02/05/18 0700 - 76 (!) 32 103/59 100 %   02/05/18 0630 - 70 28 105/65 100 %   02/05/18 0600 - 73 26 110/75 100 %   02/05/18 0530 - 75 26 114/83 100 %   02/05/18 0500 - 78 26 110/69 100 %   02/05/18 0430 - 78 29 115/64 100 %   02/05/18 0400 97.8 °F (36.6 °C) 83 30 120/48 100 %   02/05/18 0330 - 69 25 104/45 100 %   02/05/18 0300 - 80 (!) 35 (!) 113/39 99 %   02/05/18 0230 - 70 (!) 33 112/61 100 %   02/05/18 0200 - 72 (!) 32 109/62 100 %   02/05/18 0130 - 70 29 117/65 100 %   02/05/18 0100 - 100 (!) 35 121/67 94 %   02/05/18 0030 - 78 30 120/68 100 %   02/05/18 0000 97.9 °F (36.6 °C) 90 28 123/55 100 %   02/04/18 2330 - 70 (!) 34 117/73 100 %   02/04/18 2300 - 68 24 109/68 100 %   02/04/18 2230 - 71 24 115/48 99 %   02/04/18 2200 - 70 25 107/55 100 %   02/04/18 2130 - 68 22 116/42 100 %   02/04/18 2100 - 74 25 100/49 100 %   02/04/18 2030 - 81 (!) 36 102/66 100 %   02/04/18 2000 97.8 °F (36.6 °C) 77 29 (!) 88/40 100 %   02/04/18 1930 - 84 (!) 34 115/66 100 %   02/04/18 1900 - 88 (!) 38 105/63 100 %   02/04/18 1800 - 75 (!) 34 122/71 100 %   02/04/18 1700 - 73 (!) 31 116/40 100 %   02/04/18 1600 98.6 °F (37 °C) 78 (!) 32 103/55 100 %   02/04/18 1528 - - (!) 47 - -   02/04/18 1525 - - (!) 41 - -   02/04/18 1521 - - (!) 44 - -   02/04/18 1500 - 91 (!) 36 (!) 88/57 98 %   02/04/18 1453 - 96 - 92/67 -   02/04/18 1430 - 95 (!) 40 92/67 99 %   02/04/18 1400 - 95 (!) 41 101/54 100 %   02/04/18 1330 - 96 (!) 36 95/56 99 %   02/04/18 1300 - 96 (!) 35 94/51 99 %   02/04/18 1230 - 97 (!) 31 100/52 100 %   02/04/18 1200 99.5 °F (37.5 °C) 97 (!) 36 96/51 100 %   02/04/18 1130 - 98 (!) 34 91/54 100 %   02/04/18 1100 - 99 (!) 39 91/50 100 %   02/04/18 1030 - 95 (!) 36 90/57 100 %   02/04/18 1000 - 91 (!) 37 94/56 100 %   02/04/18 0930 - 90 (!) 37 97/57 100 %   02/04/18 0900 - 93 (!) 33 100/59 100 %       Intake/Output Summary (Last 24 hours) at 02/05/18 0858  Last data filed at 02/05/18 0800   Gross per 24 hour   Intake           1744.7 ml   Output             2255 ml   Net           -510.3 ml        PHYSICAL EXAM:  General: WD, WN. Not alert, not cooperative, no acute distress    EENT:  EOMI. Anicteric sclerae. MMM, NG tube in place  Resp:  CTA bilaterally, no wheezing or rales. No accessory muscle use  CV:  Regular rhythm,  asymmetric pedal edema  GI:  Soft, mildly distended, Non tender.  +Bowel sounds  Neurologic:  Oriented X 0, no speech, withdrawals feet to stimili  Psych:   No insight. Not anxious nor agitated  Skin:  No rashes. No jaundice    Reviewed most current lab test results and cultures  YES  Reviewed most current radiology test results   YES  Review and summation of old records today    NO  Reviewed patient's current orders and MAR    YES  PMH/SH reviewed - no change compared to H&P  ________________________________________________________________________  Care Plan discussed with:    Comments   Patient x    Family      RN x    Care Manager     Consultant                        Multidiciplinary team rounds were held today with , nursing, pharmacist and clinical coordinator. Patient's plan of care was discussed; medications were reviewed and discharge planning was addressed.      ________________________________________________________________________  Total NON critical care TIME:  25 Minutes    Total CRITICAL CARE TIME Spent:   Minutes non procedure based      Comments   >50% of visit spent in counseling and coordination of care x    ________________________________________________________________________  Nate Young MD     Procedures: see electronic medical records for all procedures/Xrays and details which were not copied into this note but were reviewed prior to creation of Plan. LABS:  I reviewed today's most current labs and imaging studies.   Pertinent labs include:  Recent Labs      02/05/18 0357 02/04/18 0433 02/03/18 0339   WBC  15.8*  12.3*  3.4*   HGB  8.7*  9.8*  9.5*   HCT  26.1*  31.1*  30.5*   PLT  23*  34*  56*     Recent Labs      02/05/18 0357 02/04/18 0433 02/03/18 0339  02/02/18   1506   NA  142  142  142  144   K  4.9  5.6*  5.8*  6.5*   CL  111*  109*  111*  107   CO2  25  28  29  33*   GLU  108*  166*  121*  26*   BUN  19  20  25*  29*   CREA  1.24*  1.17*  1.02  0.75   CA  7.8*  7.6*  7.8*  9.1   MG  2.2  1.6   --   2.2   PHOS  3.2  1.7*   --    --    ALB  1.9*  2.1*  2.5*  2.9*   TBILI  0.4  0.2  0.3  0.2   SGOT  95*  145*  253*  224*   ALT  135*  191*  259*  251*   INR   --    --    --   1.1       Signed: Nate Young MD

## 2018-02-05 NOTE — PROGRESS NOTES
Problem: Falls - Risk of  Goal: *Absence of Falls  Document Cole Fall Risk and appropriate interventions in the flowsheet.    Outcome: Progressing Towards Goal  Fall Risk Interventions:            Medication Interventions: Bed/chair exit alarm, Evaluate medications/consider consulting pharmacy    Elimination Interventions: Call light in reach, Bed/chair exit alarm    History of Falls Interventions: Bed/chair exit alarm, Door open when patient unattended, Room close to nurse's station

## 2018-02-05 NOTE — CONSULTS
Palliative Medicine Consult  [place]: [phone] ([phone])    Patient Name: Allyson Bernard  YOB: 1945    Date of Initial Consult: 2/5/18  Reason for Consult: Overwhelming Symptoms  Requesting Provider: Stephanie Wakefield MD  Primary Care Physician: Alma Flores MD     SUMMARY:   Allyson Bernard is a 72 y.o. with a past history of  Lupus, PUD, GERD, HTN, DM, and mental retardation, who was admitted on 2/2/2018 from [facility] with a diagnosis of severe sepsis, hypothermia, hypoglycemia, seizuers. Current medical issues leading to Palliative Medicine involvement include: family support. Psychosocial: lives with sister, who recently placed pt in half-way while she had surgery on her back, then once recovered will bring patent back home. normal baseline pt speaks a few words, ambulates with assistance feeds self unless sick. Per aleshia Isauralakeisha Daysi functions at the level of a 14 y/o. PALLIATIVE DIAGNOSES:   1. Severe sepsis: influenza B pos and UTI  2. Hypothermia  3. Hypoglycemia  4. Seizures  5. Acute hyperkalemia  6. Influenza   7. Hypotension      PLAN:   1. No family at bedside. Spoke with niece Alejandro Eric, Sister had recent back surgery, moved pt to an [facility] until she is recovered, than back home. Pt likes TV, music (old R&B pr Jazz). Provided Palliative contact info. 2. Initial consult note routed to primary continuity provider  3. Communicated plan of care with: Palliative IDT, RN     GOALS OF CARE / TREATMENT PREFERENCES:     GOALS OF CARE:  Patient/Health Care Proxy Stated Goals: Iveth Akhtar. Recover  2. Return home      TREATMENT PREFERENCES:   Code Status: Full Code    Advance Care Planning:  No flowsheet data found. Medical Interventions: Full interventions   Other Instructions: Other:    As far as possible, the palliative care team has discussed with patient / health care proxy about goals of care / treatment preferences for patient.            HISTORY:     History obtained from: Chart, family    CHIEF COMPLAINT: found unresponsive    HPI/SUBJECTIVE:    The patient is:   [] Verbal and participatory  [x] Non-participatory due to:     BIBA after being found unresponsive at the [facility] by caregiver. Per EMS, HR in 30s and BG 27, EMS administered IM glucagon en route, bringing BG up to 200. In ED, pt began seizing, which resolved following 2mg ativan. ED w/u:  CT of chest/abd/pelvis showed severe scoliosis, atelectasis in lower lobes, left greater than right, atherosclerotic abdominal aorta without aneurysm, s/p hysterectomy. Head CT no acute findings, CXR neg. EKG showed afib with slow ventricular responsive and irregular rate of 40. Clinical Pain Assessment (nonverbal scale for severity on nonverbal patients):   Clinical Pain Assessment  Severity: 2     Activity (Movement): Lying quietly, normal position    Duration: for how long has pt been experiencing pain (e.g., 2 days, 1 month, years)  Frequency: how often pain is an issue (e.g., several times per day, once every few days, constant)     FUNCTIONAL ASSESSMENT:     Palliative Performance Scale (PPS):  PPS: 10       PSYCHOSOCIAL/SPIRITUAL SCREENING:     Palliative IDT has assessed this patient for cultural preferences / practices and a referral made as appropriate to needs (Cultural Services, Patient Advocacy, Ethics, etc.)    Advance Care Planning:  No flowsheet data found. Any spiritual / Jewish concerns:  [] Yes /  [x] No    Caregiver Burnout:  [] Yes /  [] No /  [x] No Caregiver Present      Anticipatory grief assessment:   [x] Normal  / [] Maladaptive       ESAS Anxiety: Anxiety: 2    ESAS Depression:   : unable to assess due to pt factors       REVIEW OF SYSTEMS:     Positive and pertinent negative findings in ROS are noted above in HPI. The following systems were [x] reviewed / [] unable to be reviewed as noted in HPI  Other findings are noted below.   Systems: constitutional, ears/nose/mouth/throat, respiratory, gastrointestinal, genitourinary, musculoskeletal, integumentary, neurologic, psychiatric, endocrine. Positive findings noted below. Modified ESAS Completed by: provider   Fatigue: 10 Drowsiness: 10     Pain: 2   Anxiety: 2       Dyspnea: 0     Constipation: No              PHYSICAL EXAM:     From RN flowsheet:  Wt Readings from Last 3 Encounters:   02/05/18 171 lb 11.8 oz (77.9 kg)   05/31/17 156 lb (70.8 kg)   05/23/17 156 lb (70.8 kg)     Blood pressure 107/62, pulse 63, temperature 97 °F (36.1 °C), resp. rate (!) 32, height 5' 1\" (1.549 m), weight 171 lb 11.8 oz (77.9 kg), SpO2 99 %.     Pain Scale 1: Adult Nonverbal Pain Scale  Pain Intensity 1: 0                 Last bowel movement, if known:     Constitutional: unresponsive, sedated, intubated, pressors  Eyes: pupils equal, anicteric  ENMT: no nasal discharge, moist mucous membranes  Cardiovascular: regular rhythm, distal pulses intact  Respiratory: breathing not labored, symmetric  Gastrointestinal: soft non-tender, +bowel sounds  Musculoskeletal: no deformity, no tenderness to palpation  Skin: warm, dry  Neurologic: following no commands, not moving all extremities  Psychiatric: unable to assess due to pt factors          HISTORY:     Active Problems:    UTI (urinary tract infection) (2/2/2018)      Mental retardation (2/2/2018)      Sepsis ([address] (2/2/2018)      Hx of systemic lupus erythematosus (SLE) (2/2/2018)      Hx of diabetes mellitus (2/2/2018)      Hx of essential hypertension (2/2/2018)      Past Medical History:   Diagnosis Date    Arthritis     Colon polyp     Diabetes ([facility] [address]     borderline no medications    Environmental allergies 4/28/2010    GERD (gastroesophageal reflux disease)     HTN (hypertension) 4/28/2010    dosent take medication now    Lupus     MR (mental retardation)     Osteoporosis, senile     Other ill-defined conditions     parkinson's disease possibly    Psychiatric disorder     anxious    PUD (peptic ulcer disease)  Scolioses     Sjogren's syndrome ([facility] [address] 3/4/2015      Past Surgical History:   Procedure Laterality Date    HX TONSILLECTOMY      [place] COLONOSCOPY FLX DX W/COLLJ SPEC WHEN PFRMD  2/17/2011    due 2013      Family History   Problem Relation Age of Onset    Cancer Mother      pancreas    Heart Disease Father     Heart Attack Father       History reviewed, no pertinent family history.   Social History   Substance Use Topics    Smoking status: Never Smoker    Smokeless tobacco: Never Used    Alcohol use No     Allergies   Allergen Reactions    Adhesive Tape Other (comments)     Redness under that adhesive      Current Facility-Administered Medications   Medication Dose Route Frequency    sodium chloride (NS) flush 10-30 mL  10-30 mL InterCATHeter PRN    sodium chloride (NS) flush 10 mL  10 mL InterCATHeter Q24H    sodium chloride (NS) flush 10-40 mL  10-40 mL InterCATHeter Q8H    heparin (porcine) pf 300-500 Units  300-500 Units InterCATHeter PRN    sodium chloride (NS) flush 20 mL  20 mL InterCATHeter Q24H    Vancomycin- Pharmacy to dose   Other Rx Dosing/Monitoring    piperacillin-tazobactam (ZOSYN) 4.5 g in  ml premix/cpmd  4.5 g IntraVENous Q8H    [START ON 2/6/2018] vancomycin (VANCOCIN) 1250 mg in  ml infusion  1,250 mg IntraVENous Q24H    heparin (porcine) pf 300 Units  300 Units InterCATHeter PRN    NOREPINephrine (LEVOPHED) 32 mg in 5% dextrose 250 mL infusion  2-200 mcg/min IntraVENous TITRATE    mupirocin (BACTROBAN) 2 % ointment   Both Nostrils BID    oseltamivir (TAMIFLU) 6 mg/mL oral suspension 30 mg  30 mg Per G Tube BID    acetaminophen (TYLENOL) suppository 650 mg  650 mg Rectal Q4H PRN    levETIRAcetam (KEPPRA) 500 mg in saline (iso-osm) 100 ml IVPB  500 mg IntraVENous Q12H    dextrose (D50W) injection syrg 25 g  25 g IntraVENous PRN    sodium chloride 0.9 % in dextrose 10% 1,040 mL infusion   IntraVENous CONTINUOUS    sodium chloride (NS) flush 5-10 mL  5-10 mL IntraVENous Q8H    sodium chloride (NS) flush 5-10 mL  5-10 mL IntraVENous PRN          LAB AND IMAGING FINDINGS:     Lab Results   Component Value Date/Time    WBC 15.8 02/05/2018 03:57 AM    HGB 8.7 02/05/2018 03:57 AM    PLATELET 23 38/90/5956 03:57 AM     Lab Results   Component Value Date/Time    Sodium 144 02/05/2018 05:08 PM    Potassium 3.9 02/05/2018 05:08 PM    Chloride 110 02/05/2018 05:08 PM    CO2 29 02/05/2018 05:08 PM    BUN 20 02/05/2018 05:08 PM    Creatinine 1.20 02/05/2018 05:08 PM    Calcium 7.7 02/05/2018 05:08 PM    Magnesium 2.2 02/05/2018 03:57 AM    Phosphorus 3.2 02/05/2018 03:57 AM      Lab Results   Component Value Date/Time    AST (SGOT) 95 02/05/2018 03:57 AM    Alk. phosphatase 146 02/05/2018 03:57 AM    Protein, total 5.9 02/05/2018 03:57 AM    Albumin 1.9 02/05/2018 03:57 AM    Globulin 4.0 02/05/2018 03:57 AM     Lab Results   Component Value Date/Time    INR 1.1 02/02/2018 03:06 PM    Prothrombin time 10.6 02/02/2018 03:06 PM    aPTT 29.6 02/02/2018 03:06 PM      Lab Results   Component Value Date/Time    Iron 80 12/27/2010 04:18 PM    TIBC 261 12/27/2010 04:18 PM    Iron % saturation 31 12/27/2010 04:18 PM    Ferritin 81 02/17/2010 02:21 PM      Lab Results   Component Value Date/Time    pH 7.41 02/04/2018 03:33 PM    PCO2 33 02/04/2018 03:33 PM    PO2 70 02/04/2018 03:33 PM     No components found for: [place]   Lab Results   Component Value Date/Time     02/02/2018 03:06 PM    CK - MB 4.0 08/05/2013 10:00 AM                Total time:   Counseling / coordination time, spent as noted above:   > 50% counseling / coordination?:     Prolonged service was provided for  []30 min   []75 min in face to face time in the presence of the patient, spent as noted above. Time Start:   Time End:   Note: this can only be billed with 40423 (initial) or 81260 (follow up). If multiple start / stop times, list each separately.

## 2018-02-05 NOTE — PROGRESS NOTES
NEUROLOGY NOTE         Chief Complaint   Patient presents with   Hannah Keturah     patient arrived via EMS for unresponsive and glucose of 27       SUBJECTIVE:  Myoclonus resolved  Continues to be encephalopathic    HISTORY OF PRESENT ILLNESS  Liliya Dyer is a 72 y.o. female who presents to the hospital because of altered mental status. Hx obtained by chart review. According to ER notes \" presents via EMS from assisted living home to the ED after being found unresponsive by a caregiver. EMS reports vitals of HR in the 30s and a BG of 27. EMS gave the pt Glucagon IM en route and now has a BG of 200 in the ED. Pt is currently seizing and has a NRB in place. She has had 2 mg of ativan since arrival without change in seizing. \"    Pt did have MRi of the brain and it was normal. Pt has baseline MR.    PAMELA  Cannot obtain    PMH  Past Medical History:   Diagnosis Date    Arthritis     Colon polyp     Diabetes ([address]     borderline no medications    Environmental allergies 4/28/2010    GERD (gastroesophageal reflux disease)     HTN (hypertension) 4/28/2010    dosent take medication now    Lupus     MR (mental retardation)     Osteoporosis, senile     Other ill-defined conditions     parkinson's disease possibly    Psychiatric disorder     anxious    PUD (peptic ulcer disease)     Scolioses     Sjogren's syndrome ([address] 3/4/2015       FH  Family History   Problem Relation Age of Onset    Cancer Mother      pancreas    Heart Disease Father     Heart Attack Father        SH  Social History     Social History    Marital status: SINGLE     Spouse name: N/A    Number of children: N/A    Years of education: N/A     Social History Main Topics    Smoking status: Never Smoker    Smokeless tobacco: Never Used    Alcohol use No    Drug use: No    Sexual activity: No     Other Topics Concern    Not on file     Social History Narrative       ALLERGIES  Allergies   Allergen Reactions    Adhesive Tape Other (comments)     Redness under that adhesive       PHYSICAL EXAM  EXAMINATION:   Patient Vitals for the past 24 hrs:   Temp Pulse Resp BP SpO2   02/05/18 1300 - 84 (!) 34 113/76 98 %   02/05/18 1230 - 62 25 121/51 100 %   02/05/18 1200 97.4 °F (36.3 °C) 63 25 116/57 100 %   02/05/18 1100 - 89 27 115/49 100 %   02/05/18 1000 - 67 (!) 31 117/49 100 %   02/05/18 0900 - 82 30 109/54 100 %   02/05/18 0830 - 97 27 114/50 100 %   02/05/18 0800 97.6 °F (36.4 °C) 87 (!) 31 118/68 100 %   02/05/18 0700 - 76 (!) 32 103/59 100 %   02/05/18 0630 - 70 28 105/65 100 %   02/05/18 0600 - 73 26 110/75 100 %   02/05/18 0530 - 75 26 114/83 100 %   02/05/18 0500 - 78 26 110/69 100 %   02/05/18 0430 - 78 29 115/64 100 %   02/05/18 0400 97.8 °F (36.6 °C) 83 30 120/48 100 %   02/05/18 0330 - 69 25 104/45 100 %   02/05/18 0300 - 80 (!) 35 (!) 113/39 99 %   02/05/18 0230 - 70 (!) 33 112/61 100 %   02/05/18 0200 - 72 (!) 32 109/62 100 %   02/05/18 0130 - 70 29 117/65 100 %   02/05/18 0100 - 100 (!) 35 121/67 94 %   02/05/18 0030 - 78 30 120/68 100 %   02/05/18 0000 97.9 °F (36.6 °C) 90 28 123/55 100 %   02/04/18 2330 - 70 (!) 34 117/73 100 %   02/04/18 2300 - 68 24 109/68 100 %   02/04/18 2230 - 71 24 115/48 99 %   02/04/18 2200 - 70 25 107/55 100 %   02/04/18 2130 - 68 22 116/42 100 %   02/04/18 2100 - 74 25 100/49 100 %   02/04/18 2030 - 81 (!) 36 102/66 100 %   02/04/18 2000 97.8 °F (36.6 °C) 77 29 (!) 88/40 100 %   02/04/18 1930 - 84 (!) 34 115/66 100 %   02/04/18 1900 - 88 (!) 38 105/63 100 %   02/04/18 1800 - 75 (!) 34 122/71 100 %   02/04/18 1700 - 73 (!) 31 116/40 100 %   02/04/18 1600 98.6 °F (37 °C) 78 (!) 32 103/55 100 %   02/04/18 1528 - - (!) 47 - -   02/04/18 1525 - - (!) 41 - -   02/04/18 1521 - - (!) 44 - -   02/04/18 1500 - 91 (!) 36 (!) 88/57 98 %   02/04/18 1453 - 96 - 92/67 -   02/04/18 1430 - 95 (!) 40 92/67 99 %   02/04/18 1400 - 95 (!) 41 101/54 100 %        General:   General appearance: Pt is in no acute distress Distal pulses are preserved    Neurological Examination:   Mental Status:  Pt stuporous. Does not follow commands. Cranial Nerves: Pupils are not reactive. Face symm. Motor: 1/5. Sensation: Responds to pain    LAB DATA REVIEWED:    Recent Results (from the past 24 hour(s))   BLOOD GAS, ARTERIAL    Collection Time: 02/04/18  3:33 PM   Result Value Ref Range    pH 7.41 7.35 - 7.45      PCO2 33 (L) 35.0 - 45.0 mmHg    PO2 70 (L) 80 - 100 mmHg    O2 SAT 94 92 - 97 %    BICARBONATE 20 (L) 22 - 26 mmol/L    BASE DEFICIT 3.3 mmol/L    O2 METHOD NASAL O2      O2 FLOW RATE 6.00 L/min    Sample source ARTERIAL      SITE RIGHT RADIAL      HUNTER'S TEST YES     GLUCOSE, POC    Collection Time: 02/04/18  5:23 PM   Result Value Ref Range    Glucose (POC) 104 (H) 65 - 100 mg/dL    Performed by Max Medina    GLUCOSE, POC    Collection Time: 02/05/18 12:36 AM   Result Value Ref Range    Glucose (POC) 101 (H) 65 - 100 mg/dL    Performed by Ronald Phalen    CBC WITH AUTOMATED DIFF    Collection Time: 02/05/18  3:57 AM   Result Value Ref Range    WBC 15.8 (H) 3.6 - 11.0 K/uL    RBC 3.01 (L) 3.80 - 5.20 M/uL    HGB 8.7 (L) 11.5 - 16.0 g/dL    HCT 26.1 (L) 35.0 - 47.0 %    MCV 86.7 80.0 - 99.0 FL    MCH 28.9 26.0 - 34.0 PG    MCHC 33.3 30.0 - 36.5 g/dL    RDW 16.6 (H) 11.5 - 14.5 %    PLATELET 23 (LL) 170 - 400 K/uL    MPV 12.8 8.9 - 12.9 FL    NRBC 0.3 (H) 0  WBC    ABSOLUTE NRBC 0.05 (H) 0.00 - 0.01 K/uL    NEUTROPHILS 88 (H) 32 - 75 %    BAND NEUTROPHILS 7 %    LYMPHOCYTES 3 (L) 12 - 49 %    MONOCYTES 1 (L) 5 - 13 %    EOSINOPHILS 0 0 - 7 %    BASOPHILS 0 0 - 1 %    METAMYELOCYTES 1 %    IMMATURE GRANULOCYTES 0 0.0 - 0.5 %    ABS. NEUTROPHILS 15.0 (H) 1.8 - 8.0 K/UL    ABS. LYMPHOCYTES 0.5 (L) 0.8 - 3.5 K/UL    ABS. MONOCYTES 0.2 0.0 - 1.0 K/UL    ABS. EOSINOPHILS 0.0 0.0 - 0.4 K/UL    ABS. BASOPHILS 0.0 0.0 - 0.1 K/UL    ABS. IMM.  GRANS. 0.0 0.00 - 0.04 K/UL    DF AUTOMATED      PLATELET COMMENTS DOHLE BODIES RBC COMMENTS TARGET CELLS  PRESENT        RBC COMMENTS ANISOCYTOSIS  1+        WBC COMMENTS TOXIC GRANULATION     METABOLIC PANEL, COMPREHENSIVE    Collection Time: 02/05/18  3:57 AM   Result Value Ref Range    Sodium 142 136 - 145 mmol/L    Potassium 4.9 3.5 - 5.1 mmol/L    Chloride 111 (H) 97 - 108 mmol/L    CO2 25 21 - 32 mmol/L    Anion gap 6 5 - 15 mmol/L    Glucose 108 (H) 65 - 100 mg/dL    BUN 19 6 - 20 MG/DL    Creatinine 1.24 (H) 0.55 - 1.02 MG/DL    BUN/Creatinine ratio 15 12 - 20      GFR est AA 52 (L) >60 ml/min/1.73m2    GFR est non-AA 43 (L) >60 ml/min/1.73m2    Calcium 7.8 (L) 8.5 - 10.1 MG/DL    Bilirubin, total 0.4 0.2 - 1.0 MG/DL    ALT (SGPT) 135 (H) 12 - 78 U/L    AST (SGOT) 95 (H) 15 - 37 U/L    Alk. phosphatase 146 (H) 45 - 117 U/L    Protein, total 5.9 (L) 6.4 - 8.2 g/dL    Albumin 1.9 (L) 3.5 - 5.0 g/dL    Globulin 4.0 2.0 - 4.0 g/dL    A-G Ratio 0.5 (L) 1.1 - 2.2     MAGNESIUM    Collection Time: 02/05/18  3:57 AM   Result Value Ref Range    Magnesium 2.2 1.6 - 2.4 mg/dL   PHOSPHORUS    Collection Time: 02/05/18  3:57 AM   Result Value Ref Range    Phosphorus 3.2 2.6 - 4.7 MG/DL   HEMOGLOBIN A1C WITH EAG    Collection Time: 02/05/18  3:57 AM   Result Value Ref Range    Hemoglobin A1c <3.5 (L) 4.2 - 6.3 %    Est. average glucose Cannot be calculated mg/dL        Imaging review:  MRI brain   Normal    HOME MEDS  Prior to Admission Medications   Prescriptions Last Dose Informant Patient Reported? Taking?   acetaminophen (TYLENOL ARTHRITIS PAIN) 650 mg TbER 1/26/2018 at Unknown time Other Yes Yes   Sig: Take 650 mg by mouth every eight (8) hours as needed for Pain. cetirizine (ZYRTEC) 10 mg tablet Not Taking at Unknown time Other No No   Sig: Take 1 Tab by mouth daily as needed for Allergies. cholecalciferol, vitamin D3, (VITAMIN D3) 2,000 unit tab 1/31/2018 at Unknown time Other Yes Yes   Sig: Take 1 Tab by mouth daily.    hydroxychloroquine (PLAQUENIL) 200 mg tablet 1/31/2018 at Unknown time Other No Yes   Sig: TAKE 1 TABLET EVERY DAY   levothyroxine (SYNTHROID) 25 mcg tablet 1/31/2018 at Unknown time Other No Yes   Sig: TAKE 1 TABLET BY MOUTH DAILY BEFORE BREAKFAST   red yeast rice extract 600 mg cap 1/31/2018 at Unknown time Other Yes Yes   Sig: Take 600 mg by mouth daily. Facility-Administered Medications: None       CURRENT MEDS  Current Facility-Administered Medications   Medication Dose Route Frequency    vancomycin (VANCOCIN) 2000 mg in  ml infusion  2,000 mg IntraVENous ONCE    Vancomycin- Pharmacy to dose   Other Rx Dosing/Monitoring    piperacillin-tazobactam (ZOSYN) 4.5 g in  ml premix/cpmd  4.5 g IntraVENous Q8H    [START ON 2/6/2018] vancomycin (VANCOCIN) 1250 mg in  ml infusion  1,250 mg IntraVENous Q24H    NOREPINephrine (LEVOPHED) 32 mg in 5% dextrose 250 mL infusion  2-200 mcg/min IntraVENous TITRATE    mupirocin (BACTROBAN) 2 % ointment   Both Nostrils BID    oseltamivir (TAMIFLU) 6 mg/mL oral suspension 30 mg  30 mg Per G Tube BID    levETIRAcetam (KEPPRA) 500 mg in saline (iso-osm) 100 ml IVPB  500 mg IntraVENous Q12H    sodium chloride 0.9 % in dextrose 10% 1,040 mL infusion   IntraVENous CONTINUOUS    sodium chloride (NS) flush 5-10 mL  5-10 mL IntraVENous Q8H       IMPRESSION:  Caesar Trinidad is a 72 y.o. female who presents with altered mental status. According to ER, pt was actively seizing. MRI brain ruled out a stroke. I do not see any myoclonus today but she remains encephalopathic. EEG showed significant cerebral dysfunction but no seizures. RECOMMENDATIONS:  1. Keppra  500 mg q 12  2. EEG - cerebral dysfunction with no seizure activity  3. Altered mental status is multifactorial.       Thank you very much for this consultation.      Ayla Blanco MD  Neurologist

## 2018-02-05 NOTE — PROGRESS NOTES
Pharmacy Automatic Renal Dosing Protocol - Antimicrobials    Indication for Antimicrobials: UTI; Influenza B; HAP    Current Regimen of Each Antimicrobial:  Vancomycin - pharmacy to dose (Started 18; Day #1)  Oseltamivir 30 mg PO twice daily (Started 18; Day #4)  Piperacillin-tazobactam 4.5 gm IV every 8 hours (Started 18; Day #2)    Previous Antimicrobial Therapy:  Zosyn 3.375g IV x 1 in ED (Start Date ; Day # 1)  Vancomycin loading dose 1750mg IV x 1 (start date: , day #1)  Ceftriaxone 1 gm IV every 24 hours (Started 18; Stopped 18)    Goal Level: VANCOMYCIN TROUGH GOAL RANGE  Vancomycin Trough: 15 - 20 mcg/mL    Measured / Extrapolated Vancomycin Level:    /     Significant Cultures:   2/3/18 Influenza = Influenza B positive (FINAL)  18 Urine culture = Greater than 100K CFU/mL E coli, pan-sens (FINAL)  18 Blood culture = No growth x 3 days (Results pending)    Labs:  Recent Labs      18   0357  18   0433  18   0339   CREA  1.24*  1.17*  1.02   BUN  19  20  25*   WBC  15.8*  12.3*  3.4*   Temp (24hrs), Av.2 °F (36.8 °C), Min:97.6 °F (36.4 °C), Max:99.5 °F (37.5 °C)    Creatinine Clearance (mL/min) or Dialysis: 31.4 mL/min    No results found for: PCT    Impression/Plan:   - Will adjust Zosyn dose to 4.5 gm IV q8hr for current indication of HAP  - Vancomycin ordered to start today per discussion on rounds. Note that patient previously received vancomycin 1750 mg IV x 1 dose on . Will re-order vancomycin loading dose as it has been 3 days since this dose was given. Patient to receive vancomycin 2000 mg IV x 1 dose, followed by a maintenance dose of vancomycin 1250 q24hr to yield a predicted trough of 17.19 mcg/ml. - Oseltamivir dosed appropriately based on indication and renal function. 5 day stop date entered on rounds today. Pharmacy will follow daily and adjust medications as appropriate for renal function and/or serum levels.     Thank you,  Evangelista Vaughn Gwendolyn Helton

## 2018-02-05 NOTE — CONSULTS
CARDIOLOGY CONSULT    Patient ID:  Patient: Michelle Lockwood  MRN: [id number]  Age: 72 y.o.  : 1945    Date of  Admission: 2018  2:33 PM   PCP:  Rosas Acuña MD    Assessment: 1. Sinus rhythm with frequent PAC's, short atrial runs. 2. First degree AV block. 3. Echo EF 65%, mild concentric LVH, no significant valve disease. 4. Influenza B antigen positive, pneumonia. 5. UTI POA, E. Coli. 6. Diabetes mellitus type 2.  HgbA1c<3.5.  7. Hypoglycemia with seizures. 8. Lupus. Plan:     1. No specific treatment needed at this time for the atrial ectopy. May calm down with time. 2. Wonder if the bilateral air space pattern is ARDS. []       High complexity decision making was performed in this patient at high risk for decompensation with multiple organ involvement. Michelle Lockwood is a 72 y.o. female with a history of ICU admission, found to have wide HR variability on tele. She is intubated and cannot give a history. Review of the strips show 1AVB with PAC's, short atrial runs. No profound bradycardia or tachycardia. No sustained events. I was called to evaluate and treat.         Past Medical History:   Diagnosis Date    Arthritis     Colon polyp     [address] [address] [address]     borderline no medications    Environmental allergies 2010    GERD (gastroesophageal reflux disease)     HTN (hypertension) 2010    dosent take medication now    Lupus     MR (mental retardation)     Osteoporosis, senile     Other ill-defined conditions     parkinson's disease possibly    Psychiatric disorder     anxious    PUD (peptic ulcer disease)     Scolioses     Sjogren's syndrome ([address] 3/4/2015        Past Surgical History:   Procedure Laterality Date    HX TONSILLECTOMY      PA COLONOSCOPY FLX DX W/COLLJ SPEC WHEN PFRMD  2011    due        Social History   Substance Use Topics    Smoking status: Never Smoker    Smokeless tobacco: Never Used   Naga Villarreal Alcohol use No        Family History   Problem Relation Age of Onset    Cancer Mother      pancreas    Heart Disease Father     Heart Attack Father         Allergies   Allergen Reactions    Adhesive Tape Other (comments)     Redness under that adhesive          Current Facility-Administered Medications   Medication Dose Route Frequency    sodium chloride (NS) flush 10-30 mL  10-30 mL InterCATHeter PRN    sodium chloride (NS) flush 10 mL  10 mL InterCATHeter Q24H    sodium chloride (NS) flush 10-40 mL  10-40 mL InterCATHeter Q8H    heparin (porcine) pf 300-500 Units  300-500 Units InterCATHeter PRN    sodium chloride (NS) flush 20 mL  20 mL InterCATHeter Q24H    Vancomycin- Pharmacy to dose   Other Rx Dosing/Monitoring    piperacillin-tazobactam (ZOSYN) 4.5 g in  ml premix/cpmd  4.5 g IntraVENous Q8H    [START ON 2018] vancomycin (VANCOCIN) 1250 mg in  ml infusion  1,250 mg IntraVENous Q24H    heparin (porcine) pf 300 Units  300 Units InterCATHeter PRN    NOREPINephrine (LEVOPHED) 32 mg in 5% dextrose 250 mL infusion  2-200 mcg/min IntraVENous TITRATE    mupirocin (BACTROBAN) 2 % ointment   Both Nostrils BID    oseltamivir (TAMIFLU) 6 mg/mL oral suspension 30 mg  30 mg Per G Tube BID    acetaminophen (TYLENOL) suppository 650 mg  650 mg Rectal Q4H PRN    levETIRAcetam (KEPPRA) 500 mg in saline (iso-osm) 100 ml IVPB  500 mg IntraVENous Q12H    dextrose (D50W) injection syrg 25 g  25 g IntraVENous PRN    sodium chloride 0.9 % in dextrose 10% 1,040 mL infusion   IntraVENous CONTINUOUS    sodium chloride (NS) flush 5-10 mL  5-10 mL IntraVENous Q8H    sodium chloride (NS) flush 5-10 mL  5-10 mL IntraVENous PRN       Review of Symptoms:  Patient cannot communicate.        Objective:      Physical Exam:  Temp (24hrs), Av.6 °F (36.4 °C), Min:97 °F (36.1 °C), Max:97.9 °F (36.6 °C)    Patient Vitals for the past 8 hrs:   Pulse   18 1606 73   18 1600 75   18 1330 80 02/05/18 1300 84   02/05/18 1230 62   02/05/18 1200 63   02/05/18 1100 89   02/05/18 1000 67   02/05/18 0900 82   02/05/18 0830 97    Patient Vitals for the past 8 hrs:   Resp   02/05/18 1606 (!) 32   02/05/18 1600 (!) 32   02/05/18 1330 27   02/05/18 1300 (!) 34   02/05/18 1230 25   02/05/18 1200 25   02/05/18 1100 27   02/05/18 1000 (!) 31   02/05/18 0900 30   02/05/18 0830 27    Patient Vitals for the past 8 hrs:   BP   02/05/18 1606 110/56   02/05/18 1600 128/67   02/05/18 1330 122/54   02/05/18 1300 113/76   02/05/18 1230 121/51   02/05/18 1200 116/57   02/05/18 1100 115/49   02/05/18 1000 117/49   02/05/18 0900 109/54   02/05/18 0830 114/50        Intake/Output Summary (Last 24 hours) at 02/05/18 1612  Last data filed at 02/05/18 1500   Gross per 24 hour   Intake          1726.52 ml   Output             3600 ml   Net         -1873.48 ml       Nondiaphoretic, not in acute distress. No scleral icterus, mucous membranes moist, conjuctivae pink, no xanthelasma. Unlabored, coarse BS on auscultation bilaterally, symmetric air movement. Regular rate and rhythm, no murmur, pericardial rub, knock, or gallop. No JVD or peripheral edema. Palpable radial pulses bilaterally. Abdomen, soft, nontender, nondistended. Extremities without cyanosis or clubbing. Muscle tone and bulk normal.  Skin warm and dry. Minimally responsive. No tremor. CARDIOGRAPHICS and STUDIES, I reviewed:    Telemetry:  Sinus peg with PAC's, atrial runs. ECG:  Sinus bradycardia. Echo:  LEFT VENTRICLE: Size was normal. Ejection fraction was estimated in the  range of 60 %. No obvious wall motion abnormalities identified in the  views obtained. There was mild concentric hypertrophy. DOPPLER: Features  were consistent with a pseudonormal left ventricular filling pattern, with  concomitant abnormal relaxation and increased filling pressure (grade 2  diastolic dysfunction).     RIGHT VENTRICLE: The size was normal. Systolic function was normal. Wall  thickness was normal.    LEFT ATRIUM: The atrium was moderately dilated. RIGHT ATRIUM: Size was normal.    MITRAL VALVE: Normal valve structure. There was normal leaflet separation. DOPPLER: The transmitral velocity was within the normal range. There was  no evidence for stenosis. There was no significant regurgitation. AORTIC VALVE: Leaflets exhibited normal thickness and normal cuspal  separation. Not well visualized. DOPPLER: Transaortic velocity was within  the normal range. There was no stenosis. There was no regurgitation. TRICUSPID VALVE: Normal valve structure. There was normal leaflet  separation. DOPPLER: The transtricuspid velocity was within the normal  range. There was no evidence for tricuspid stenosis. There was no  significant regurgitation. Pulmonary artery systolic pressure: 22 mmHg. PULMONIC VALVE: Leaflets exhibited normal thickness, no calcification, and  normal cuspal separation. DOPPLER: The transpulmonic velocity was within  the normal range. There was no regurgitation. AORTA: The root exhibited normal size. PERICARDIUM: There was no pericardial effusion. The pericardium was normal  in appearance. Labs:  No results for input(s): CPK, CKMB, CKNDX, TROIQ in the last 72 hours. No lab exists for component: CPKMB  Lab Results   Component Value Date/Time    Cholesterol, total 280 05/08/2017 04:34 PM    HDL Cholesterol 105 05/08/2017 04:34 PM    LDL, calculated 164 05/08/2017 04:34 PM    Triglyceride 56 05/08/2017 04:34 PM     No results for input(s): INR, PTP, APTT in the last 72 hours.     No lab exists for component: INREXT   Recent Labs      02/05/18   0357  02/04/18   0433  02/03/18   0339   NA  142  142  142   K  4.9  5.6*  5.8*   CL  111*  109*  111*   CO2  25  28  29   BUN  19  20  25*   CREA  1.24*  1.17*  1.02   GLU  108*  166*  121*   PHOS  3.2  1.7*   --    CA  7.8*  7.6*  7.8*   ALB  1.9*  2.1*  2.5*   WBC  15.8*  12.3*  3.4*   HGB  8.7*  9.8* 9.5*   HCT  26.1*  31.1*  30.5*   PLT  23*  34*  56*     Recent Labs      02/05/18   0357  02/04/18   0433  02/03/18   0339   SGOT  95*  145*  253*   AP  146*  170*  187*   TP  5.9*  6.4  6.7   ALB  1.9*  2.1*  2.5*   GLOB  4.0  4.3*  4.2*     No components found for: Martir Omar  Recent Labs      02/04/18   1533  02/04/18   0113   PH  7.41  7.33*   PCO2  33*  47*   PO2  70*  50*           Connie Nance MD  2/5/2018

## 2018-02-05 NOTE — PROGRESS NOTES
Pt is a 71 yo female admitted from a group home after being found unresponsive by staff who called EMS. Pt was found to be positive for both influenza B and UTI. Pt has hx of mental retardation, lupus, DM. Pt was stabilized on NRB but did have some seizure activity after arriving in ED. Neuro is following and pt's MRI of brain was normal.    Pt's sister Tiburcio Anton, has been her Guardian since 1989. Pt lived with her sister until recently when pt went to a group home because sister was ill. CM will contact sister to determine pt's functional status pta and name of group home. CM will follow to assist with dc planning as appropriate. Care Management Interventions  PCP Verified by CM: Yes  Palliative Care Criteria Met (RRAT>21 & CHF Dx)?: Yes  Palliative Consult Recommended?: Yes  Mode of Transport at Discharge: Other (see comment) (TBD)  Transition of Care Consult (CM Consult): Discharge Planning (Pt was evaluated for possible dc needs.)  Discharge Durable Medical Equipment: No  Physical Therapy Consult: No  Occupational Therapy Consult: No  Speech Therapy Consult: No  Current Support Network:  Adult Group Home  Discharge Location  Discharge Placement: Group home     CHONG Hwang

## 2018-02-05 NOTE — INTERDISCIPLINARY ROUNDS
Patient discussed in rounds. Medications discussed. Patient remains on levophed.  Dose of lasix ordered per MD.

## 2018-02-05 NOTE — PROGRESS NOTES
PICC (Peripherally Inserted Central Catheter) line insertion  procedure note :     Procedure explained to patient's sister along with risks and benefits  and patient's sister agreed to proceed. Informed consent obtained from  Patient's sister. Patient teaching completed. Timeout completed. Pre-procedure assessment done. Maximum sterile barrier precautions observed throughout procedure. PICC nurse Ricky Valenzuela initiated procedures , had difficulties to access vein. PICC nurse Melania Hernandez took over the procedures. Lidocaine 1%  3.0    ml sq given prior to cannulation. Cannulated brachial  vein using ultrasound guidance and modified seldinger technique. Inserted 6  [place] triple  lumen PICC to right arm using [employer] Tip Location System and  [address]. Pt has    sinus   rhythm. PICC tip location was confirmed by 3 CG tip positioning system, indicating tall P wave and no negative deflection before P wave which would indicate that the PICC tip is properly placed in the distal SVC or at the [place]. PICC tip location was  confirmed by 2 PICC nurses and 3CG printout placed on patient's chart. Blood return verified and flushed with 20 ml normal saline in each port. Sterile dressing applied with biopatch, statLock and occlusive dressing as per protocol. Curos caps applied to each port. Patient tolerated procedure well with minimal blood loss ( less than 5 ml.)   Patient education material provided. PICC procedure performed by  :  Melania Hernandez RN. PICC nurse  Assisted by : Isis Narayanan RN  PICC nurse  Reason for access : reliable access / MD order /   Hemodynamically unstable  / Poor vascular access Vesicant IV medication infusion / Replace femoral central line  Complications related to insertion  : none  X-Ray : not applicable  Notified primary nurse  Yaima Sinha RN  that  PICC line can be used.    Total Trimmed Length :  35   cm   External Length : 0  cm   PICC line site arm circumference:  31    cm   PICC catheter occupies  26   % of vein  Type of PICC: Bard Solo Power PICC   Ref # : [id number]     Lot # :  [id number]   Expiration Date :    2018-12-31     Anuhsa Young RN. BSN. LEANNE NATHANRN. Clinician IV .  PICC Nurse, Vascular Access Team.

## 2018-02-05 NOTE — PROCEDURES
Patient Name: Visnhu Harris  : 1945  Age: 72 y.o. Ordering physician: No ref. provider found  Date of EE2018  EEG procedure number: [id number]  Diagnosis: altered mental status  Interpreting physician: Gato Henriquez MD    ELECTROENCEPHALOGRAM REPORT     PROCEDURE: EEG. CLINICAL INDICATION: The patient is a 72 y.o. female with a history of possible seizures. EEG to rule out seizures, rule out stroke, rule out cortical abnormality. EEG CLASSIFICATION: Abnormal     DESCRIPTION OF THE RECORD:   The background of this recording contains high amplitude delta range activity. Throughout the recording, there were no clear areas of focal slowing nor spike or spike-and-wave discharges seen. Photic stimulation produced no response. During the recording the patient did not achieve stage II sleep    INTERPRETATION:   Abnormal. Severe diffuse cerebral dysfunction which is non specific for etiology but can be seen in toxic/metabolic states. No seizures. Clinical correlation recommended.       Gato Henriquez MD  Neurologist

## 2018-02-06 ENCOUNTER — APPOINTMENT (OUTPATIENT)
Dept: GENERAL RADIOLOGY | Age: 73
DRG: 871 | End: 2018-02-06
Attending: INTERNAL MEDICINE
Payer: MEDICARE

## 2018-02-06 LAB
ALBUMIN SERPL-MCNC: 1.8 G/DL (ref 3.5–5)
ALBUMIN/GLOB SERPL: 0.4 {RATIO} (ref 1.1–2.2)
ALP SERPL-CCNC: 144 U/L (ref 45–117)
ALT SERPL-CCNC: 113 U/L (ref 12–78)
ANION GAP SERPL CALC-SCNC: 5 MMOL/L (ref 5–15)
AST SERPL-CCNC: 65 U/L (ref 15–37)
BASOPHILS # BLD: 0 K/UL (ref 0–0.1)
BASOPHILS NFR BLD: 0 % (ref 0–1)
BILIRUB SERPL-MCNC: 0.7 MG/DL (ref 0.2–1)
BUN SERPL-MCNC: 19 MG/DL (ref 6–20)
BUN/CREAT SERPL: 15 (ref 12–20)
CALCIUM SERPL-MCNC: 7.8 MG/DL (ref 8.5–10.1)
CHLORIDE SERPL-SCNC: 110 MMOL/L (ref 97–108)
CO2 SERPL-SCNC: 28 MMOL/L (ref 21–32)
CREAT SERPL-MCNC: 1.26 MG/DL (ref 0.55–1.02)
DIFFERENTIAL METHOD BLD: ABNORMAL
EOSINOPHIL # BLD: 0 K/UL (ref 0–0.4)
EOSINOPHIL NFR BLD: 0 % (ref 0–7)
ERYTHROCYTE [DISTWIDTH] IN BLOOD BY AUTOMATED COUNT: 16.5 % (ref 11.5–14.5)
GLOBULIN SER CALC-MCNC: 4.3 G/DL (ref 2–4)
GLUCOSE BLD STRIP.AUTO-MCNC: 104 MG/DL (ref 65–100)
GLUCOSE BLD STRIP.AUTO-MCNC: 107 MG/DL (ref 65–100)
GLUCOSE BLD STRIP.AUTO-MCNC: 118 MG/DL (ref 65–100)
GLUCOSE BLD STRIP.AUTO-MCNC: 132 MG/DL (ref 65–100)
GLUCOSE SERPL-MCNC: 107 MG/DL (ref 65–100)
HCT VFR BLD AUTO: 24.3 % (ref 35–47)
HGB BLD-MCNC: 8.2 G/DL (ref 11.5–16)
IMM GRANULOCYTES # BLD: 0 K/UL (ref 0–0.04)
IMM GRANULOCYTES NFR BLD AUTO: 0 % (ref 0–0.5)
LYMPHOCYTES # BLD: 0.4 K/UL (ref 0.8–3.5)
LYMPHOCYTES NFR BLD: 3 % (ref 12–49)
MAGNESIUM SERPL-MCNC: 1.9 MG/DL (ref 1.6–2.4)
MCH RBC QN AUTO: 29.4 PG (ref 26–34)
MCHC RBC AUTO-ENTMCNC: 33.7 G/DL (ref 30–36.5)
MCV RBC AUTO: 87.1 FL (ref 80–99)
MONOCYTES # BLD: 0.4 K/UL (ref 0–1)
MONOCYTES NFR BLD: 3 % (ref 5–13)
NEUTS BAND NFR BLD MANUAL: 3 %
NEUTS SEG # BLD: 12.7 K/UL (ref 1.8–8)
NEUTS SEG NFR BLD: 91 % (ref 32–75)
NRBC # BLD: 0.02 K/UL (ref 0–0.01)
NRBC BLD-RTO: 0.1 PER 100 WBC
PHOSPHATE SERPL-MCNC: 2.2 MG/DL (ref 2.6–4.7)
PLATELET # BLD AUTO: 21 K/UL (ref 150–400)
POTASSIUM SERPL-SCNC: 3.6 MMOL/L (ref 3.5–5.1)
PROT SERPL-MCNC: 6.1 G/DL (ref 6.4–8.2)
RBC # BLD AUTO: 2.79 M/UL (ref 3.8–5.2)
RBC MORPH BLD: ABNORMAL
SERVICE CMNT-IMP: ABNORMAL
SODIUM SERPL-SCNC: 143 MMOL/L (ref 136–145)
WBC # BLD AUTO: 13.5 K/UL (ref 3.6–11)

## 2018-02-06 PROCEDURE — 82962 GLUCOSE BLOOD TEST: CPT

## 2018-02-06 PROCEDURE — 71045 X-RAY EXAM CHEST 1 VIEW: CPT

## 2018-02-06 PROCEDURE — 74011250636 HC RX REV CODE- 250/636: Performed by: INTERNAL MEDICINE

## 2018-02-06 PROCEDURE — 80053 COMPREHEN METABOLIC PANEL: CPT | Performed by: INTERNAL MEDICINE

## 2018-02-06 PROCEDURE — 84100 ASSAY OF PHOSPHORUS: CPT | Performed by: INTERNAL MEDICINE

## 2018-02-06 PROCEDURE — 74011250637 HC RX REV CODE- 250/637: Performed by: INTERNAL MEDICINE

## 2018-02-06 PROCEDURE — 80177 DRUG SCRN QUAN LEVETIRACETAM: CPT | Performed by: PSYCHIATRY & NEUROLOGY

## 2018-02-06 PROCEDURE — 74011250636 HC RX REV CODE- 250/636: Performed by: PSYCHIATRY & NEUROLOGY

## 2018-02-06 PROCEDURE — 85025 COMPLETE CBC W/AUTO DIFF WBC: CPT | Performed by: INTERNAL MEDICINE

## 2018-02-06 PROCEDURE — 65610000006 HC RM INTENSIVE CARE

## 2018-02-06 PROCEDURE — 74011000258 HC RX REV CODE- 258: Performed by: INTERNAL MEDICINE

## 2018-02-06 PROCEDURE — 36415 COLL VENOUS BLD VENIPUNCTURE: CPT | Performed by: INTERNAL MEDICINE

## 2018-02-06 PROCEDURE — 77010033678 HC OXYGEN DAILY

## 2018-02-06 PROCEDURE — 83735 ASSAY OF MAGNESIUM: CPT | Performed by: INTERNAL MEDICINE

## 2018-02-06 RX ORDER — FUROSEMIDE 10 MG/ML
60 INJECTION INTRAMUSCULAR; INTRAVENOUS ONCE
Status: COMPLETED | OUTPATIENT
Start: 2018-02-06 | End: 2018-02-06

## 2018-02-06 RX ADMIN — MUPIROCIN: 20 OINTMENT TOPICAL at 17:44

## 2018-02-06 RX ADMIN — VANCOMYCIN HYDROCHLORIDE 1250 MG: 10 INJECTION, POWDER, LYOPHILIZED, FOR SOLUTION INTRAVENOUS at 11:53

## 2018-02-06 RX ADMIN — Medication 10 ML: at 14:25

## 2018-02-06 RX ADMIN — MUPIROCIN: 20 OINTMENT TOPICAL at 08:23

## 2018-02-06 RX ADMIN — Medication 30 ML: at 04:47

## 2018-02-06 RX ADMIN — PIPERACILLIN SODIUM AND TAZOBACTAM SODIUM 4.5 G: .5; 4 INJECTION, POWDER, LYOPHILIZED, FOR SOLUTION INTRAVENOUS at 14:25

## 2018-02-06 RX ADMIN — SODIUM CHLORIDE: 234 INJECTION, SOLUTION, CONCENTRATE INTRAVENOUS; SUBCUTANEOUS at 00:41

## 2018-02-06 RX ADMIN — LEVETIRACETAM 500 MG: 5 INJECTION INTRAVENOUS at 11:36

## 2018-02-06 RX ADMIN — Medication 10 ML: at 21:51

## 2018-02-06 RX ADMIN — PIPERACILLIN SODIUM AND TAZOBACTAM SODIUM 4.5 G: .5; 4 INJECTION, POWDER, LYOPHILIZED, FOR SOLUTION INTRAVENOUS at 04:47

## 2018-02-06 RX ADMIN — OSELTAMIVIR PHOSPHATE 30 MG: 6 POWDER, FOR SUSPENSION ORAL at 08:23

## 2018-02-06 RX ADMIN — FUROSEMIDE 60 MG: 10 INJECTION, SOLUTION INTRAMUSCULAR; INTRAVENOUS at 10:23

## 2018-02-06 RX ADMIN — LEVETIRACETAM 500 MG: 5 INJECTION INTRAVENOUS at 00:36

## 2018-02-06 RX ADMIN — OSELTAMIVIR PHOSPHATE 30 MG: 6 POWDER, FOR SUSPENSION ORAL at 17:44

## 2018-02-06 RX ADMIN — PIPERACILLIN SODIUM AND TAZOBACTAM SODIUM 4.5 G: .5; 4 INJECTION, POWDER, LYOPHILIZED, FOR SOLUTION INTRAVENOUS at 21:51

## 2018-02-06 NOTE — PROGRESS NOTES
[facility]  Pulmonary, Critical Care, and Sleep Medicine    Name: Vishnu Harris MRN: [id number]   : 1945 Hospital: [facility]   Date: 2018        IMPRESSION:   · Acute hypoxic respiratory failure  · Acute influenza  · Can not rule out superinfection  · Diffuse bilateral infiltrates - rapid development argues more for pulmonary edema but can't exclude ARDS  · Encephalopathy with seizure or myoclonus - possibly due to hypoglycemia   · Shock   · Severe hypoglycemia  · GNR UTI  · Lupus   · Mental retardation   · GERD  · DM      PLAN:   · O2   · Diurese again today  · Empiric antibiotics, Tamiflu  · Pressors  · D10 infusion with glycemic monitoring  · Follow up procalcitonin   · Plaquenil on hold  · Antiepileptics, neuro evaluation ongoing  · DVT prophylaxis  · Remains critically ill with high risk for further decompensation/death     Subjective/Interval History:   I have reviewed the flowsheet and previous days notes.   The patient is unable to give any meaningful history or review of systems because the patient is:  Lethargic      The patient is critically ill on:      Pressors      Review of Systems   Unable to perform ROS: Mental status change     Objective:   Vital Signs:    Visit Vitals    BP 93/40 (BP 1 Location: Left arm, BP Patient Position: Lying right side)    Pulse 85    Temp 98.2 °F (36.8 °C)    Resp (!) 36    Ht 5' 1\" (1.549 m)    Wt 76.7 kg (169 lb 1.5 oz)    SpO2 95%    BMI 31.95 kg/m2       O2 Device: Nasal cannula   O2 Flow Rate (L/min): 2 l/min   Temp (24hrs), Av.7 °F (35.9 °C), Min:94.2 °F (34.6 °C), Max:98.3 °F (36.8 °C)       Intake/Output:   Last shift:         Last 3 shifts:  1901 -  0700  In: 2534 [I.V.:2394]  Out: 5210 [Urine:4890]    Intake/Output Summary (Last 24 hours) at 18 0857  Last data filed at 18 0700   Gross per 24 hour   Intake          1693.69 ml   Output             3965 ml   Net -2271.31 ml     Hemodynamics:   PAP:   CO:     Wedge:   CI:     CVP:    SVR:       PVR:       Ventilator Settings:  Mode Rate Tidal Volume Pressure FiO2 PEEP                    Peak airway pressure:      Minute ventilation:         Physical Exam   Constitutional: She appears lethargic. No distress. HENT:   Head: Normocephalic and atraumatic. Eyes: No scleral icterus. Cardiovascular: Normal rate. An irregular rhythm present. Pulmonary/Chest: She has wheezes. She has rales. Abdominal: Soft. Bowel sounds are normal. She exhibits no distension. There is no tenderness. Musculoskeletal: She exhibits no edema. Neurological: She appears lethargic. Skin: Skin is warm and dry. No rash noted.      Data:     Current Facility-Administered Medications   Medication Dose Route Frequency    sodium chloride (NS) flush 10 mL  10 mL InterCATHeter Q24H    sodium chloride (NS) flush 10-40 mL  10-40 mL InterCATHeter Q8H    sodium chloride (NS) flush 20 mL  20 mL InterCATHeter Q24H    Vancomycin- Pharmacy to dose   Other Rx Dosing/Monitoring    piperacillin-tazobactam (ZOSYN) 4.5 g in  ml premix/cpmd  4.5 g IntraVENous Q8H    vancomycin (VANCOCIN) 1250 mg in  ml infusion  1,250 mg IntraVENous Q24H    NOREPINephrine (LEVOPHED) 32 mg in 5% dextrose 250 mL infusion  2-200 mcg/min IntraVENous TITRATE    mupirocin (BACTROBAN) 2 % ointment   Both Nostrils BID    oseltamivir (TAMIFLU) 6 mg/mL oral suspension 30 mg  30 mg Per G Tube BID    levETIRAcetam (KEPPRA) 500 mg in saline (iso-osm) 100 ml IVPB  500 mg IntraVENous Q12H    sodium chloride 0.9 % in dextrose 10% 1,040 mL infusion   IntraVENous CONTINUOUS    sodium chloride (NS) flush 5-10 mL  5-10 mL IntraVENous Q8H                Labs:  Recent Labs      02/06/18   0332  02/05/18   0357  02/04/18   0433   WBC  13.5*  15.8*  12.3*   HGB  8.2*  8.7*  9.8*   HCT  24.3*  26.1*  31.1*   PLT  21*  23*  34*     Recent Labs      02/06/18   0332  02/05/18   1708 02/05/18   0357  02/04/18   0433   NA  143  144  142  142   K  3.6  3.9  4.9  5.6*   CL  110*  110*  111*  109*   CO2  28  29  25  28   GLU  107*  91  108*  166*   BUN  19  20  19  20   CREA  1.26*  1.20*  1.24*  1.17*   CA  7.8*  7.7*  7.8*  7.6*   MG  1.9   --   2.2  1.6   PHOS  2.2*   --   3.2  1.7*   ALB  1.8*   --   1.9*  2.1*   TBILI  0.7   --   0.4  0.2   SGOT  65*   --   95*  145*   ALT  113*   --   135*  191*     Recent Labs      02/04/18   1533  02/04/18   0113   PH  7.41  7.33*   PCO2  33*  47*   PO2  70*  50*   HCO3  20*  24     Imaging:  I have personally reviewed the patients radiographs and have reviewed the reports:  Slight decrease in infiltrates        Total critical care time exclusive of procedures: 30 minutes  Prema Araujo MD

## 2018-02-06 NOTE — PROGRESS NOTES
1900 Report received from Gloria Gaffney RN & Chele Sanders RN.    7794 Rectal temp 94.2. Elizabet hugger placed on pt as well as a rectal temp probe placed for continuous core temp monitoring. 0130 R femoral central line pulled. Pressure held for 5 min & dressing applied. No bleeding, hematoma noted. All lines changed over to R PICC with new tubing & fluid/medication bags. 0400 Rectal temp 98.3. Elizabet hugger put on standby. 0700 Report given to Mayra Concepcion RN.

## 2018-02-06 NOTE — CONSULTS
CARDIOLOGY CONSULT    Patient ID:  Patient: Mai Olmedo  MRN: [id number]  Age: 72 y.o.  : 1945    Date of  Admission: 2018  2:33 PM   PCP:  Saqib Valencia MD    Assessment: 1. Sinus rhythm with frequent PAC's, short atrial runs. 2. First degree AV block. 3. Echo EF 65%, mild concentric LVH, no significant valve disease. 4. Influenza B antigen positive, pneumonia. 5. UTI POA, E. Coli. 6. Diabetes mellitus type 2.  HgbA1c<3.5.  7. Hypoglycemia with seizures. 8. Lupus. Plan:     1. Came by to see if any further developments. No specific treatment needed at this time for the atrial ectopy. I'll sign off, but please call if any new cardiac issues arise. []       High complexity decision making was performed in this patient at high risk for decompensation with multiple organ involvement. Mai Olmedo is a 72 y.o. female with a history of ICU admission, found to have wide HR variability on tele. She is intubated and cannot give a history. Review of the strips show 1AVB with PAC's, short atrial runs. No profound bradycardia or tachycardia. No sustained events. I was called to evaluate and treat.          Allergies   Allergen Reactions    Adhesive Tape Other (comments)     Redness under that adhesive          Current Facility-Administered Medications   Medication Dose Route Frequency    [START ON 2018] Vancomycin - please send level before starting dose due at 1200   1 Each Other ONCE    sodium chloride (NS) flush 10-30 mL  10-30 mL InterCATHeter PRN    sodium chloride (NS) flush 10 mL  10 mL InterCATHeter Q24H    sodium chloride (NS) flush 10-40 mL  10-40 mL InterCATHeter Q8H    heparin (porcine) pf 300-500 Units  300-500 Units InterCATHeter PRN    sodium chloride (NS) flush 20 mL  20 mL InterCATHeter Q24H    Vancomycin- Pharmacy to dose   Other Rx Dosing/Monitoring    piperacillin-tazobactam (ZOSYN) 4.5 g in  ml premix/cpmd  4.5 g IntraVENous Q8H    vancomycin (VANCOCIN) 1250 mg in  ml infusion  1,250 mg IntraVENous Q24H    heparin (porcine) pf 300 Units  300 Units InterCATHeter PRN    NOREPINephrine (LEVOPHED) 32 mg in 5% dextrose 250 mL infusion  2-200 mcg/min IntraVENous TITRATE    mupirocin (BACTROBAN) 2 % ointment   Both Nostrils BID    oseltamivir (TAMIFLU) 6 mg/mL oral suspension 30 mg  30 mg Per G Tube BID    acetaminophen (TYLENOL) suppository 650 mg  650 mg Rectal Q4H PRN    levETIRAcetam (KEPPRA) 500 mg in saline (iso-osm) 100 ml IVPB  500 mg IntraVENous Q12H    dextrose (D50W) injection syrg 25 g  25 g IntraVENous PRN    sodium chloride 0.9 % in dextrose 10% 1,040 mL infusion   IntraVENous CONTINUOUS    sodium chloride (NS) flush 5-10 mL  5-10 mL IntraVENous Q8H    sodium chloride (NS) flush 5-10 mL  5-10 mL IntraVENous PRN       Review of Symptoms:  Patient cannot communicate. Objective:      Physical Exam:  Temp (24hrs), Av.7 °F (35.9 °C), Min:94.2 °F (34.6 °C), Max:98.3 °F (36.8 °C)    Patient Vitals for the past 8 hrs:   Pulse   18 1400 66   18 1300 73   18 1200 91   18 1100 77   18 1054 85   18 1000 84   18 0911 88    Patient Vitals for the past 8 hrs:   Resp   18 1400 (!) 34   18 1300 29   18 1200 28   18 1100 (!) 33   18 1054 23   18 1000 (!) 33   18 0911 (!) 35    Patient Vitals for the past 8 hrs:   BP   18 1400 121/57   18 1300 117/61   18 1200 123/47   18 1100 97/51   18 1000 122/48   18 0911 112/56          Intake/Output Summary (Last 24 hours) at 18 1625  Last data filed at 18 1521   Gross per 24 hour   Intake          1635.89 ml   Output             2955 ml   Net         -1319.11 ml       Nondiaphoretic, not in acute distress. No scleral icterus, mucous membranes moist, conjuctivae pink, no xanthelasma.   Unlabored, coarse BS on auscultation bilaterally, symmetric air movement. Regular rate and rhythm, no murmur, pericardial rub, knock, or gallop. No JVD or peripheral edema. Palpable radial pulses bilaterally. Abdomen, soft, nontender, nondistended. Extremities without cyanosis or clubbing. Muscle tone and bulk normal.  Skin warm and dry. Minimally responsive. No tremor. CARDIOGRAPHICS and STUDIES, I reviewed:    Telemetry:  Sinus peg with PAC's, atrial runs. ECG:  Sinus bradycardia. Echo:  LEFT VENTRICLE: Size was normal. Ejection fraction was estimated in the  range of 60 %. No obvious wall motion abnormalities identified in the  views obtained. There was mild concentric hypertrophy. DOPPLER: Features  were consistent with a pseudonormal left ventricular filling pattern, with  concomitant abnormal relaxation and increased filling pressure (grade 2  diastolic dysfunction). RIGHT VENTRICLE: The size was normal. Systolic function was normal. Wall  thickness was normal.    LEFT ATRIUM: The atrium was moderately dilated. RIGHT ATRIUM: Size was normal.    MITRAL VALVE: Normal valve structure. There was normal leaflet separation. DOPPLER: The transmitral velocity was within the normal range. There was  no evidence for stenosis. There was no significant regurgitation. AORTIC VALVE: Leaflets exhibited normal thickness and normal cuspal  separation. Not well visualized. DOPPLER: Transaortic velocity was within  the normal range. There was no stenosis. There was no regurgitation. TRICUSPID VALVE: Normal valve structure. There was normal leaflet  separation. DOPPLER: The transtricuspid velocity was within the normal  range. There was no evidence for tricuspid stenosis. There was no  significant regurgitation. Pulmonary artery systolic pressure: 22 mmHg. PULMONIC VALVE: Leaflets exhibited normal thickness, no calcification, and  normal cuspal separation. DOPPLER: The transpulmonic velocity was within  the normal range.  There was no regurgitation. AORTA: The root exhibited normal size. PERICARDIUM: There was no pericardial effusion. The pericardium was normal  in appearance. Labs:  No results for input(s): CPK, CKMB, CKNDX, TROIQ in the last 72 hours. No lab exists for component: CPKMB  Lab Results   Component Value Date/Time    Cholesterol, total 280 05/08/2017 04:34 PM    HDL Cholesterol 105 05/08/2017 04:34 PM    LDL, calculated 164 05/08/2017 04:34 PM    Triglyceride 56 05/08/2017 04:34 PM     No results for input(s): INR, PTP, APTT in the last 72 hours.     No lab exists for component: INREXT, INREXT   Recent Labs      02/06/18 0332 02/05/18   1708 02/05/18 0357 02/04/18   0433   NA  143  144  142  142   K  3.6  3.9  4.9  5.6*   CL  110*  110*  111*  109*   CO2  28  29  25  28   BUN  19  20  19  20   CREA  1.26*  1.20*  1.24*  1.17*   GLU  107*  91  108*  166*   PHOS  2.2*   --   3.2  1.7*   CA  7.8*  7.7*  7.8*  7.6*   ALB  1.8*   --   1.9*  2.1*   WBC  13.5*   --   15.8*  12.3*   HGB  8.2*   --   8.7*  9.8*   HCT  24.3*   --   26.1*  31.1*   PLT  21*   --   23*  34*     Recent Labs      02/06/18 0332 02/05/18 0357 02/04/18   0433   SGOT  65*  95*  145*   AP  144*  146*  170*   TP  6.1*  5.9*  6.4   ALB  1.8*  1.9*  2.1*   GLOB  4.3*  4.0  4.3*     No components found for: [place]  Recent Labs      02/04/18   1533  02/04/18   0113   PH  7.41  7.33*   PCO2  33*  47*   PO2  70*  50*           aKte Spencer MD  2/6/2018

## 2018-02-06 NOTE — PROGRESS NOTES
Interdisciplinary team rounds were held  2/6/2018  with the following team members: Care management, Diabetes Treatment Specialist, Nursing, Nutrition, Pharmacy, Physical Therapy, Physician, Respiratory therapy and Clinical Coordinator. Plan of care discussed. Goal: See MD orders and progress notes for further  interventions and desired outcomes.

## 2018-02-06 NOTE — PROGRESS NOTES
Hospitalist Progress Note    NAME: Allyson Bernard   :  1945   MRN:  [id number]       Assessment / Plan:  Acute hypoxic respiratory failure due to acute pulmonary edema:  still with significant acute hypoxia, tachypnea  - CXR today with diffuse airspace disease/edema. No significant change. - con't vanco, zosyn and tamiflu  - appreciate pulmonology assistance with respiratory issues. - Palliative consult appreciated   - Pt continues to require Levophed in order to maintain MAP >65mmHg - being weaned as tolerated     Acute kidney injury:  Cr continues to decline  - +E Coli UTI, treating as below  - con't IV fluids    Acute encephalopathy and septic shock due to UTI and Influenza B, present on admission: +eye deviation, remains nonresponsive with ?myoclonus.  - MRI brain with mild small vessel ischemic changes. No acute abnormality. - CT chest/abd/pelvis with severe scoliosis. Atelectasis in the lower lobes, left greater than right. Anemia. Atherosclerotic abdominal aorta without aneurysm. - UA >[id number] pansensitive E Coli, blood cultures no growth.  Con't zosyn as above. - influenza B positive, con't tamiflu  - IV fluids and levophed prn for BP support  - EEG with cerebral dysfunction with no seizure activity  - appreciate neuro consult, recommending con't keppra for now - AMS is multifactorial in nature    Hypoglycemia:  H/o \"borderline DM,\" not on meds at home.   HgA1c <3.5.  - con't D10 gtt  - serial gl  - will checkucose checks    Transaminitis:  Presumed due to sepsis, improving  Baseline mental retardation  Lupus: holding plaquenil while NPO  Hypothyroidism:  Holding synthroid while NPO, will need to consider starting IV soon if not able to take po  Obesity (Body mass index is 32.45 kg/(m^2)      Code Status: full  Surrogate Decision Maker: sister   DVT Prophylaxis: heparin      Baseline: Mental retardation, lives with sister normally but placed in group home after sister had to undergo recent back surgery     Subjective:     Chief Complaint / Reason for Physician Visit  Nonverbal, not following commands. Discussed with RN events overnight. Review of Systems:  Symptom Y/N Comments  Symptom Y/N Comments   Fever/Chills    Chest Pain     Poor Appetite    Edema     Cough    Abdominal Pain     Sputum    Joint Pain     SOB/YOUNG    Pruritis/Rash     Nausea/vomit    Tolerating PT/OT     Diarrhea    Tolerating Diet     Constipation    Other       Could NOT obtain due to: Mental status      Objective:     VITALS:   Last 24hrs VS reviewed since prior progress note.  Most recent are:  Patient Vitals for the past 24 hrs:   Temp Pulse Resp BP SpO2   02/06/18 1300 - 73 29 117/61 96 %   02/06/18 1200 97.8 °F (36.6 °C) 91 28 123/47 96 %   02/06/18 1100 - 77 (!) 33 97/51 95 %   02/06/18 1054 - 85 23 - 96 %   02/06/18 1000 - 84 (!) 33 122/48 95 %   02/06/18 0911 - 88 (!) 35 112/56 94 %   02/06/18 0800 98.2 °F (36.8 °C) 85 (!) 36 93/40 95 %   02/06/18 0700 - 89 (!) 37 103/40 95 %   02/06/18 0600 - 85 (!) 39 97/40 94 %   02/06/18 0500 - 82 24 106/54 96 %   02/06/18 0400 98.3 °F (36.8 °C) 86 28 103/57 97 %   02/06/18 0313 - 83 29 120/59 97 %   02/06/18 0200 97.2 °F (36.2 °C) 85 21 121/59 98 %   02/06/18 0100 96.8 °F (36 °C) 74 (!) 31 117/64 96 %   02/06/18 0001 96.4 °F (35.8 °C) 81 (!) 36 103/57 95 %   02/05/18 2300 - 86 26 122/53 93 %   02/05/18 2200 (!) 94.7 °F (34.8 °C) 71 30 100/48 95 %   02/05/18 2130 - 74 (!) 35 111/55 94 %   02/05/18 2100 - (!) 59 (!) 32 (!) 81/43 97 %   02/05/18 2000 (!) 94.2 °F (34.6 °C) (!) 56 28 98/53 98 %   02/05/18 1900 - 69 (!) 33 97/51 97 %   02/05/18 1841 - 63 (!) 32 107/62 99 %   02/05/18 1800 - 70 (!) 31 90/68 100 %   02/05/18 1730 - 70 20 93/54 100 %   02/05/18 1700 - 68 30 104/54 100 %   02/05/18 1630 - 71 (!) 37 112/59 100 %   02/05/18 1606 - 73 (!) 32 110/56 100 %   02/05/18 1600 97 °F (36.1 °C) 75 (!) 32 128/67 100 %   02/05/18 1554 97 °F (36.1 °C) - - - -   02/05/18 1530 - (!) 54 24 132/58 100 %   02/05/18 1500 - 60 27 136/77 100 %   02/05/18 1430 - 81 23 139/73 (!) 76 %       Intake/Output Summary (Last 24 hours) at 02/06/18 1335  Last data filed at 02/06/18 1320   Gross per 24 hour   Intake          1573.06 ml   Output             3780 ml   Net         -2206.94 ml        PHYSICAL EXAM:  General: Altered, retracts to pain    EENT:  Anicteric sclerae  Resp:  CTA bilaterally, no wheezing or rales. No accessory muscle use  CV:  Regular  rhythm,  No edema  GI:  Soft, Non distended, Non tender.  +Bowel sounds  Neurologic:  Alert and oriented x 0   Psych:   No insight  Skin:  No rashes. No jaundice    Reviewed most current lab test results and cultures  YES  Reviewed most current radiology test results   YES  Review and summation of old records today    NO  Reviewed patient's current orders and MAR    YES  PMH/ reviewed - no change compared to H&P  ________________________________________________________________________  Care Plan discussed with:    Comments   Patient x    Family  x    RN     Care Manager     Consultant                        Multidiciplinary team rounds were held today with , nursing, pharmacist and clinical coordinator. Patient's plan of care was discussed; medications were reviewed and discharge planning was addressed. ________________________________________________________________________  Total NON critical care TIME:  25   Minutes    Total CRITICAL CARE TIME Spent:   Minutes non procedure based      Comments   >50% of visit spent in counseling and coordination of care     ________________________________________________________________________  Mark Veras MD     Procedures: see electronic medical records for all procedures/Xrays and details which were not copied into this note but were reviewed prior to creation of Plan. LABS:  I reviewed today's most current labs and imaging studies.   Pertinent labs include:  Recent Labs      02/06/18   0332 02/05/18 0357 02/04/18   0433   WBC  13.5*  15.8*  12.3*   HGB  8.2*  8.7*  9.8*   HCT  24.3*  26.1*  31.1*   PLT  21*  23*  34*     Recent Labs      02/06/18   0332  02/05/18   1708  02/05/18 0357 02/04/18 0433   NA  143  144  142  142   K  3.6  3.9  4.9  5.6*   CL  110*  110*  111*  109*   CO2  28  29  25  28   GLU  107*  91  108*  166*   BUN  19  20  19  20   CREA  1.26*  1.20*  1.24*  1.17*   CA  7.8*  7.7*  7.8*  7.6*   MG  1.9   --   2.2  1.6   PHOS  2.2*   --   3.2  1.7*   ALB  1.8*   --   1.9*  2.1*   TBILI  0.7   --   0.4  0.2   SGOT  65*   --   95*  145*   ALT  113*   --   135*  191*       Signed: Kelechi Day MD

## 2018-02-06 NOTE — PROGRESS NOTES
Palliative Medicine Consult  Piyush: [phone] ([phone])    Patient Name: Caesar Trinidad  YOB: 1945    Date of Initial Consult: 2/5/18  Reason for Consult: Overwhelming Symptoms  Requesting Provider: Wolfgang Desai MD  Primary Care Physician: Dionne Escalante MD     SUMMARY:   Caesar Trinidad is a 72 y.o. with a past history of  Lupus, PUD, GERD, HTN, DM, and mental retardation, who was admitted on 2/2/2018 from senior living with a diagnosis of severe sepsis, hypothermia, hypoglycemia, seizures. Current medical issues leading to Palliative Medicine involvement include: family support. MRI of brain 2/3 showed mild small vessel ischemic changes. No acute abnormality, EEG showed cerebral dysfunction with no seizure activity. 2/6: CCU day 4: CXR shows persistent moderate diffuse airspace disease with small bilateral effusions. Remains on levo for hypotension, D10 for hypoglycemia. Remains minimally responsive. Psychosocial: lives with sister, who recently placed pt in senior living while she had surgery on her back, then once recovered will bring patent back home. normal baseline pt speaks a few words, ambulates with assistance Manhermelindo Gil uses a gait belt) feeds self unless sick. Per nidelphine Rodriguez functions at the level of a 14 y/o. PALLIATIVE DIAGNOSES:   1. Severe sepsis: influenza B pos and UTI  2. Hypothermia  3. Hypoglycemia  4. Seizures  5. Acute hyperkalemia  6. Influenza   7. Hypotension   8. Care decisions     PLAN:   1. Spoke with Tab Giles: obtained history, discussed pt's overall critical condition, not worse but not getting better. Encouraged Tab Giles to visit tomorrow as she will be devastated if pt dies and she hasn't visited. Discussed code status: Tab Giles wants us to continue to do everything to help pt, however, if her heart stops beating and she stops breathing, no CPR. 2. Will change code status to DNR. 3. Provided Palliative contact info.   4. Initial consult note routed to primary continuity provider  5. Communicated plan of care with: Palliative IDT, RN     GOALS OF CARE / TREATMENT PREFERENCES:     GOALS OF CARE:  Patient/Health Care Proxy Stated Goals: Prolong life     1. Recover  2. Return home      TREATMENT PREFERENCES:   Code Status: Full Code    Advance Care Planning:  No flowsheet data found. Medical Interventions: Full interventions   Other Instructions: Other:    As far as possible, the palliative care team has discussed with patient / health care proxy about goals of care / treatment preferences for patient. HISTORY:     History obtained from:  Chart, family    CHIEF COMPLAINT: found unresponsive    HPI/SUBJECTIVE:    The patient is:   [] Verbal and participatory  [x] Non-participatory due to:     BIBA after being found unresponsive at the [facility] by caregiver. Per EMS, HR in 30s and BG 27, EMS administered IM glucagon en route, bringing BG up to 200. In ED, pt began seizing, which resolved following 2mg ativan. ED w/u:  CT of chest/abd/pelvis showed severe scoliosis, atelectasis in lower lobes, left greater than right, atherosclerotic abdominal aorta without aneurysm, s/p hysterectomy. Head CT no acute findings, CXR neg. EKG showed afib with slow ventricular responsive and irregular rate of 40. Kenn Gonzalez reports that when pt was ~7 y/o she was very sick, high fevers, her parents were crying, took her to the ED, shortly after she had her tonsils removed and has never been the same. Parents were told to put her in an institution, that she would never learn anything. Kenn Gonzalez was able to help pt walk using a gait belt, however, over the years pt's legs have been getting weaker. At one point, she was in hospice for lupus, but got better. Pt is able to feed self, however, sometimes shakes and loses the food off the spoon before it gets to her mouth. Likes TV, music, flipping pages in a book.   She can say a few words, usually delayed response but cannot carry a conversation. Clinical Pain Assessment (nonverbal scale for severity on nonverbal patients):   Clinical Pain Assessment  Severity: 2     Activity (Movement): Lying quietly, normal position    Duration: for how long has pt been experiencing pain (e.g., 2 days, 1 month, years)  Frequency: how often pain is an issue (e.g., several times per day, once every few days, constant)     FUNCTIONAL ASSESSMENT:     Palliative Performance Scale (PPS):  PPS: 10       PSYCHOSOCIAL/SPIRITUAL SCREENING:     Palliative IDT has assessed this patient for cultural preferences / practices and a referral made as appropriate to needs (Cultural Services, Patient Advocacy, Ethics, etc.)    Advance Care Planning:  No flowsheet data found. Any spiritual / Pentecostalism concerns:  [] Yes /  [x] No    Caregiver Burnout:  [] Yes /  [] No /  [x] No Caregiver Present      Anticipatory grief assessment:   [x] Normal  / [] Maladaptive       ESAS Anxiety: Anxiety: 2    ESAS Depression:   : unable to assess due to pt factors       REVIEW OF SYSTEMS:     Positive and pertinent negative findings in ROS are noted above in HPI. The following systems were [x] reviewed / [] unable to be reviewed as noted in HPI  Other findings are noted below. Systems: constitutional, ears/nose/mouth/throat, respiratory, gastrointestinal, genitourinary, musculoskeletal, integumentary, neurologic, psychiatric, endocrine. Positive findings noted below. Modified ESAS Completed by: provider   Fatigue: 10 Drowsiness: 10     Pain: 2   Anxiety: 2       Dyspnea: 0     Constipation: No              PHYSICAL EXAM:     From RN flowsheet:  Wt Readings from Last 3 Encounters:   02/06/18 169 lb 1.5 oz (76.7 kg)   05/31/17 156 lb (70.8 kg)   05/23/17 156 lb (70.8 kg)     Blood pressure 121/57, pulse 66, temperature 97.8 °F (36.6 °C), resp. rate (!) 34, height 5' 1\" (1.549 m), weight 169 lb 1.5 oz (76.7 kg), SpO2 95 %.     Pain Scale 1: Adult Nonverbal Pain Scale  Pain Intensity 1: 0 Last bowel movement, if known:     Constitutional: minimally responsive, sitting slumped over, resists attempts pt recline back  Eyes: pupils equal, anicteric  ENMT: no nasal discharge, moist mucous membranes  Cardiovascular: regular rhythm, distal pulses intact  Respiratory: breathing not labored, symmetric  Gastrointestinal: soft non-tender, +bowel sounds  Musculoskeletal: no deformity, no tenderness to palpation  Skin: warm, dry  Neurologic: following no commands, not moving all extremities  Psychiatric: unable to assess due to pt factors          HISTORY:     Active Problems:    UTI (urinary tract infection) (2/2/2018)      Mental retardation (2/2/2018)      Sepsis ([facility] [address] (2/2/2018)      Hx of systemic lupus erythematosus (SLE) (2/2/2018)      Hx of diabetes mellitus (2/2/2018)      Hx of essential hypertension (2/2/2018)      Past Medical History:   Diagnosis Date    Arthritis     Colon polyp     Diabetes ([facility] [address]     borderline no medications    Environmental allergies 4/28/2010    GERD (gastroesophageal reflux disease)     HTN (hypertension) 4/28/2010    dosent take medication now    Lupus     MR (mental retardation)     Osteoporosis, senile     Other ill-defined conditions     parkinson's disease possibly    Psychiatric disorder     anxious    PUD (peptic ulcer disease)     Scolioses     Sjogren's syndrome ([facility] [address] 3/4/2015      Past Surgical History:   Procedure Laterality Date    HX TONSILLECTOMY      FL COLONOSCOPY FLX DX W/COLLJ SPEC WHEN PFRMD  2/17/2011    due 2013      Family History   Problem Relation Age of Onset    Cancer Mother      pancreas    Heart Disease Father     Heart Attack Father       History reviewed, no pertinent family history.   Social History   Substance Use Topics    Smoking status: Never Smoker    Smokeless tobacco: Never Used    Alcohol use No     Allergies   Allergen Reactions    Adhesive Tape Other (comments)     Redness under that adhesive Current Facility-Administered Medications   Medication Dose Route Frequency    [START ON 2/7/2018] Vancomycin - please send level before starting dose due at 1200   1 Each Other ONCE    sodium chloride (NS) flush 10-30 mL  10-30 mL InterCATHeter PRN    sodium chloride (NS) flush 10 mL  10 mL InterCATHeter Q24H    sodium chloride (NS) flush 10-40 mL  10-40 mL InterCATHeter Q8H    heparin (porcine) pf 300-500 Units  300-500 Units InterCATHeter PRN    sodium chloride (NS) flush 20 mL  20 mL InterCATHeter Q24H    Vancomycin- Pharmacy to dose   Other Rx Dosing/Monitoring    piperacillin-tazobactam (ZOSYN) 4.5 g in  ml premix/cpmd  4.5 g IntraVENous Q8H    vancomycin (VANCOCIN) 1250 mg in  ml infusion  1,250 mg IntraVENous Q24H    heparin (porcine) pf 300 Units  300 Units InterCATHeter PRN    NOREPINephrine (LEVOPHED) 32 mg in 5% dextrose 250 mL infusion  2-200 mcg/min IntraVENous TITRATE    mupirocin (BACTROBAN) 2 % ointment   Both Nostrils BID    oseltamivir (TAMIFLU) 6 mg/mL oral suspension 30 mg  30 mg Per G Tube BID    acetaminophen (TYLENOL) suppository 650 mg  650 mg Rectal Q4H PRN    levETIRAcetam (KEPPRA) 500 mg in saline (iso-osm) 100 ml IVPB  500 mg IntraVENous Q12H    dextrose (D50W) injection syrg 25 g  25 g IntraVENous PRN    sodium chloride 0.9 % in dextrose 10% 1,040 mL infusion   IntraVENous CONTINUOUS    sodium chloride (NS) flush 5-10 mL  5-10 mL IntraVENous Q8H    sodium chloride (NS) flush 5-10 mL  5-10 mL IntraVENous PRN          LAB AND IMAGING FINDINGS:     Lab Results   Component Value Date/Time    WBC 13.5 02/06/2018 03:32 AM    HGB 8.2 02/06/2018 03:32 AM    PLATELET 21 12/11/9744 03:32 AM     Lab Results   Component Value Date/Time    Sodium 143 02/06/2018 03:32 AM    Potassium 3.6 02/06/2018 03:32 AM    Chloride 110 02/06/2018 03:32 AM    CO2 28 02/06/2018 03:32 AM    BUN 19 02/06/2018 03:32 AM    Creatinine 1.26 02/06/2018 03:32 AM    Calcium 7.8 02/06/2018 03:32 AM    Magnesium 1.9 02/06/2018 03:32 AM    Phosphorus 2.2 02/06/2018 03:32 AM      Lab Results   Component Value Date/Time    AST (SGOT) 65 02/06/2018 03:32 AM    Alk. phosphatase 144 02/06/2018 03:32 AM    Protein, total 6.1 02/06/2018 03:32 AM    Albumin 1.8 02/06/2018 03:32 AM    Globulin 4.3 02/06/2018 03:32 AM     Lab Results   Component Value Date/Time    INR 1.1 02/02/2018 03:06 PM    Prothrombin time 10.6 02/02/2018 03:06 PM    aPTT 29.6 02/02/2018 03:06 PM      Lab Results   Component Value Date/Time    Iron 80 12/27/2010 04:18 PM    TIBC 261 12/27/2010 04:18 PM    Iron % saturation 31 12/27/2010 04:18 PM    Ferritin 81 02/17/2010 02:21 PM      Lab Results   Component Value Date/Time    pH 7.41 02/04/2018 03:33 PM    PCO2 33 02/04/2018 03:33 PM    PO2 70 02/04/2018 03:33 PM     No components found for: [place]   Lab Results   Component Value Date/Time     02/02/2018 03:06 PM    CK - MB 4.0 08/05/2013 10:00 AM                Total time: 45  Counseling / coordination time, spent as noted above: 35  > 50% counseling / coordination?: yes    Prolonged service was provided for  []30 min   []75 min in face to face time in the presence of the patient, spent as noted above. Time Start:   Time End:   Note: this can only be billed with 49446 (initial) or 25093 (follow up). If multiple start / stop times, list each separately.

## 2018-02-06 NOTE — PROGRESS NOTES
0730: Report received from [facility].     0800: Pt. Responds to voice. Will open eyes spontaneously but does not interact. Pt. Pupils are equal and reactive. Bowel sounds hypoactive. Breath sounds coarse. All pulses palpable. Pt. Has Levophed infusing for hypotension and D10 for hypoglycemia. Will titrate levophed to keep a MAP greater than 65. See MAR for titrations. Pt. Has a Right PICC line present and PIV present. Meade in place for retention. Pt. withdrawals from pain and will occasionally open her eyes to voice. Pt. Is very tachypenic with a RR in the 40's. Dr. Simon Hutchinson made aware and at the bedside to assess. Awaiting orders. Pt. Is now a Partial code only ACLS meds, pressors, and BIPAP.     0838: Levophed drip stopped for a SBP in the 170's. Will recheck BP soon. 0900: Pt.'s BP is now 82/42. Restarting Levophed drip. 1200: Pt. Reassessed. No changes at this time. Pt. Is still tachypenic and Dr. Simon Hutchinson is aware. Levophed still infusing. Per Dr. Simon Hutchinson, stopping D10 infusion now. 1450: Dr. Simon Hutchinson made aware of patient's critical vancomycin trough of 22.6.     1537: Patient is hypotensive, Will increase Levophed now to keep a MAP greater than 65, and wean down as tolerated. 1600: Pt.'s RR is still in the 40's and occasionally the 50's. Spoke to Dr. Simon Hutchinson earlier in the day and attempted interventions such as IV lasix and breathing treatments. Patient is still non verbal and not interactive, but does withdrawal from pain. 1649: Spoke to Dr. Michelle Blanc concerning patient's RR of 40-50's. He stated that he does not want any other interventions at this time. Patient's Sister Lokesh Moulton updated on patient's condition. 1900: Report given to RODRIGUEZ Anne.

## 2018-02-06 NOTE — PROGRESS NOTES
CARDIOLOGY Progress Note    Patient ID:  Patient: Andrew Torres  MRN: [id number]  Age: 72 y.o.  : 1945    Date of  Admission: 2018  2:33 PM   PCP:  Katty Betancourt MD    Assessment: 1. Sinus rhythm with frequent PAC's, short atrial runs. 2. First degree AV block. 3. Echo EF 65%, mild concentric LVH, no significant valve disease. 4. Influenza B antigen positive, pneumonia. 5. UTI POA, E. Coli. 6. Diabetes mellitus type 2.  HgbA1c<3.5.  7. Hypoglycemia with seizures. 8. Lupus. Plan:     1. Came by to see if any further developments. No specific treatment needed at this time for the atrial ectopy. I'll sign off, but please call if any new cardiac issues arise. []       High complexity decision making was performed in this patient at high risk for decompensation with multiple organ involvement. Andrew Torres is a 72 y.o. female with a history of ICU admission, found to have wide HR variability on tele. She is intubated and cannot give a history. Review of the strips show 1AVB with PAC's, short atrial runs. No profound bradycardia or tachycardia. No sustained events. I was called to evaluate and treat.          Allergies   Allergen Reactions    Adhesive Tape Other (comments)     Redness under that adhesive          Current Facility-Administered Medications   Medication Dose Route Frequency    [START ON 2018] Vancomycin - please send level before starting dose due at 1200   1 Each Other ONCE    sodium chloride (NS) flush 10-30 mL  10-30 mL InterCATHeter PRN    sodium chloride (NS) flush 10 mL  10 mL InterCATHeter Q24H    sodium chloride (NS) flush 10-40 mL  10-40 mL InterCATHeter Q8H    heparin (porcine) pf 300-500 Units  300-500 Units InterCATHeter PRN    sodium chloride (NS) flush 20 mL  20 mL InterCATHeter Q24H    Vancomycin- Pharmacy to dose   Other Rx Dosing/Monitoring    piperacillin-tazobactam (ZOSYN) 4.5 g in  ml premix/cpmd  4.5 g IntraVENous Q8H    vancomycin (VANCOCIN) 1250 mg in  ml infusion  1,250 mg IntraVENous Q24H    heparin (porcine) pf 300 Units  300 Units InterCATHeter PRN    NOREPINephrine (LEVOPHED) 32 mg in 5% dextrose 250 mL infusion  2-200 mcg/min IntraVENous TITRATE    mupirocin (BACTROBAN) 2 % ointment   Both Nostrils BID    oseltamivir (TAMIFLU) 6 mg/mL oral suspension 30 mg  30 mg Per G Tube BID    acetaminophen (TYLENOL) suppository 650 mg  650 mg Rectal Q4H PRN    levETIRAcetam (KEPPRA) 500 mg in saline (iso-osm) 100 ml IVPB  500 mg IntraVENous Q12H    dextrose (D50W) injection syrg 25 g  25 g IntraVENous PRN    sodium chloride 0.9 % in dextrose 10% 1,040 mL infusion   IntraVENous CONTINUOUS    sodium chloride (NS) flush 5-10 mL  5-10 mL IntraVENous Q8H    sodium chloride (NS) flush 5-10 mL  5-10 mL IntraVENous PRN       Review of Symptoms:  Patient cannot communicate. Objective:      Physical Exam:  Temp (24hrs), Av.7 °F (35.9 °C), Min:94.2 °F (34.6 °C), Max:98.3 °F (36.8 °C)    Patient Vitals for the past 8 hrs:   Pulse   18 1400 66   18 1300 73   18 1200 91   18 1100 77   18 1054 85   18 1000 84   18 0911 88    Patient Vitals for the past 8 hrs:   Resp   18 1400 (!) 34   18 1300 29   18 1200 28   18 1100 (!) 33   18 1054 23   18 1000 (!) 33   18 0911 (!) 35    Patient Vitals for the past 8 hrs:   BP   18 1400 121/57   18 1300 117/61   18 1200 123/47   18 1100 97/51   18 1000 122/48   18 0911 112/56          Intake/Output Summary (Last 24 hours) at 18 1628  Last data filed at 18 1521   Gross per 24 hour   Intake          1635.89 ml   Output             2955 ml   Net         -1319.11 ml       Nondiaphoretic, not in acute distress. No scleral icterus, mucous membranes moist, conjuctivae pink, no xanthelasma.   Unlabored, coarse BS on auscultation bilaterally, symmetric air movement. Regular rate and rhythm, no murmur, pericardial rub, knock, or gallop. No JVD or peripheral edema. Palpable radial pulses bilaterally. Abdomen, soft, nontender, nondistended. Extremities without cyanosis or clubbing. Muscle tone and bulk normal.  Skin warm and dry. Minimally responsive. No tremor. CARDIOGRAPHICS and STUDIES, I reviewed:    Telemetry:  Sinus peg with PAC's, atrial runs. ECG:  Sinus bradycardia. Echo:  LEFT VENTRICLE: Size was normal. Ejection fraction was estimated in the  range of 60 %. No obvious wall motion abnormalities identified in the  views obtained. There was mild concentric hypertrophy. DOPPLER: Features  were consistent with a pseudonormal left ventricular filling pattern, with  concomitant abnormal relaxation and increased filling pressure (grade 2  diastolic dysfunction). RIGHT VENTRICLE: The size was normal. Systolic function was normal. Wall  thickness was normal.    LEFT ATRIUM: The atrium was moderately dilated. RIGHT ATRIUM: Size was normal.    MITRAL VALVE: Normal valve structure. There was normal leaflet separation. DOPPLER: The transmitral velocity was within the normal range. There was  no evidence for stenosis. There was no significant regurgitation. AORTIC VALVE: Leaflets exhibited normal thickness and normal cuspal  separation. Not well visualized. DOPPLER: Transaortic velocity was within  the normal range. There was no stenosis. There was no regurgitation. TRICUSPID VALVE: Normal valve structure. There was normal leaflet  separation. DOPPLER: The transtricuspid velocity was within the normal  range. There was no evidence for tricuspid stenosis. There was no  significant regurgitation. Pulmonary artery systolic pressure: 22 mmHg. PULMONIC VALVE: Leaflets exhibited normal thickness, no calcification, and  normal cuspal separation. DOPPLER: The transpulmonic velocity was within  the normal range.  There was no regurgitation. AORTA: The root exhibited normal size. PERICARDIUM: There was no pericardial effusion. The pericardium was normal  in appearance. Labs:  No results for input(s): CPK, CKMB, CKNDX, TROIQ in the last 72 hours. No lab exists for component: CPKMB  Lab Results   Component Value Date/Time    Cholesterol, total 280 05/08/2017 04:34 PM    HDL Cholesterol 105 05/08/2017 04:34 PM    LDL, calculated 164 05/08/2017 04:34 PM    Triglyceride 56 05/08/2017 04:34 PM     No results for input(s): INR, PTP, APTT in the last 72 hours.     No lab exists for component: INREXT, INREXT   Recent Labs      02/06/18 0332 02/05/18   1708 02/05/18 0357 02/04/18   0433   NA  143  144  142  142   K  3.6  3.9  4.9  5.6*   CL  110*  110*  111*  109*   CO2  28  29  25  28   BUN  19  20  19  20   CREA  1.26*  1.20*  1.24*  1.17*   GLU  107*  91  108*  166*   PHOS  2.2*   --   3.2  1.7*   CA  7.8*  7.7*  7.8*  7.6*   ALB  1.8*   --   1.9*  2.1*   WBC  13.5*   --   15.8*  12.3*   HGB  8.2*   --   8.7*  9.8*   HCT  24.3*   --   26.1*  31.1*   PLT  21*   --   23*  34*     Recent Labs      02/06/18 0332 02/05/18 0357 02/04/18   0433   SGOT  65*  95*  145*   AP  144*  146*  170*   TP  6.1*  5.9*  6.4   ALB  1.8*  1.9*  2.1*   GLOB  4.3*  4.0  4.3*     No components found for: [place]  Recent Labs      02/04/18   1533  02/04/18   0113   PH  7.41  7.33*   PCO2  33*  47*   PO2  70*  50*           Lili Tracy MD  2/6/2018

## 2018-02-06 NOTE — PROGRESS NOTES
Nutrition Assessment:    RECOMMENDATIONS:   Initiate TF via NGT:    TwoCal HN @ 10mL/h, advance rate 10mL q 12h as tolerated to Goal Rate of 30mL/h + 1 packet Prosource daily + 75mL H2O flush q 6h (provides 1500kcals/75gPro/804mL)    DIETITIANS INTERVENTIONS/PLAN:   Initiate TF     ASSESSMENT:   Pt admitted with UTI + flu. PMH: HTN, DM, GERD, MR. Chart reviewed, case discussed during CCU rounds. Pt nonverbal and mostly unresponsive. No family in the room. NGT to suction with minimal OP. Levo at 10. Pt is NPO 4 days, per discussion with Dr. Dominga Munoz, will start TF today. Phos 2.2, needs repletion. No skin breakdown noted. Pt being diuresed. TF at goal rate will meet 100% kcal and protein needs. SUBJECTIVE/OBJECTIVE:   Pt nonverbal, unresponsive  Diet Order: NPO  % Eaten:  Patient Vitals for the past 72 hrs:   % Diet Eaten   02/04/18 2000 0 %   02/04/18 0400 0 %   02/03/18 2000 0 %     Pertinent Medications:zosyn, vancomycin; IVF(D10, [email or web address]); Drips: levo. Chemistries:  Lab Results   Component Value Date/Time    Sodium 143 02/06/2018 03:32 AM    Potassium 3.6 02/06/2018 03:32 AM    Chloride 110 (H) 02/06/2018 03:32 AM    CO2 28 02/06/2018 03:32 AM    Anion gap 5 02/06/2018 03:32 AM    Glucose 107 (H) 02/06/2018 03:32 AM    BUN 19 02/06/2018 03:32 AM    Creatinine 1.26 (H) 02/06/2018 03:32 AM    BUN/Creatinine ratio 15 02/06/2018 03:32 AM    GFR est AA 51 (L) 02/06/2018 03:32 AM    GFR est non-AA 42 (L) 02/06/2018 03:32 AM    Calcium 7.8 (L) 02/06/2018 03:32 AM    AST (SGOT) 65 (H) 02/06/2018 03:32 AM    Alk.  phosphatase 144 (H) 02/06/2018 03:32 AM    Protein, total 6.1 (L) 02/06/2018 03:32 AM    Albumin 1.8 (L) 02/06/2018 03:32 AM    Globulin 4.3 (H) 02/06/2018 03:32 AM    A-G Ratio 0.4 (L) 02/06/2018 03:32 AM    ALT (SGPT) 113 (H) 02/06/2018 03:32 AM      Anthropometrics: Height: 5' 1\" (154.9 cm) Weight: 76.7 kg (169 lb 1.5 oz)  [x]bed scale (2/6)   []stated   []unknown    IBW (%IBW):   ( ) UBW (%UBW):   (  %)    BMI: Body mass index is 31.95 kg/(m^2). This BMI is indicative of:  []Underweight   []Normal   []Overweight   [x] Obesity   [] Extreme Obesity (BMI>40)  Estimated Nutrition Needs (Based on): 1463 Kcals/day (MSJ 1219 x 1.2) , 61 g (-77 (0.8-1gPro/kg)) Protein  Carbohydrate: At Least 130 g/day  Fluids: 1200 mL/day or per MD     Last BM: 2/2   []Active     []Hyperactive  [x]Hypoactive       [] Absent   BS  Skin:    [x] Intact   [] Incision  [] Breakdown   [] DTI   [] Tears/Excoriation/Abrasion  []Edema [] Other: Wt Readings from Last 30 Encounters:   02/06/18 76.7 kg (169 lb 1.5 oz)   05/31/17 70.8 kg (156 lb)   05/23/17 70.8 kg (156 lb)   05/08/17 70.8 kg (156 lb)   12/13/16 70.3 kg (155 lb)   06/27/16 61.7 kg (136 lb)   05/02/16 61.7 kg (136 lb)   10/09/15 55.8 kg (123 lb)   07/02/15 53.7 kg (118 lb 6.4 oz)   06/03/15 56.2 kg (124 lb)   04/28/15 52.6 kg (116 lb)   04/28/15 52.6 kg (116 lb)   04/07/15 52.2 kg (115 lb)   03/04/15 49.4 kg (109 lb)   02/28/14 47.1 kg (103 lb 12.8 oz)   08/07/13 47.4 kg (104 lb 8 oz)   03/05/13 44.3 kg (97 lb 9.6 oz)   11/18/12 44.5 kg (98 lb)   09/26/11 59.9 kg (132 lb)   09/12/11 59.9 kg (132 lb)   06/13/11 62.3 kg (137 lb 6.4 oz)   04/07/11 62.6 kg (138 lb)   03/29/11 66.2 kg (146 lb)   02/10/11 66.9 kg (147 lb 8 oz)   01/19/11 66.2 kg (146 lb)   12/27/10 66.2 kg (146 lb)   05/07/10 71.4 kg (157 lb 6.4 oz)   04/30/10 70.4 kg (155 lb 3.2 oz)   04/27/10 71.7 kg (158 lb)      NUTRITION DIAGNOSES:   Problem:  Inadequate protein-energy intake      Etiology: related to pt NPO      Signs/Symptoms: as evidenced by NPO meets 0% kcal and protein needs. NUTRITION INTERVENTIONS:    Enteral/Parenteral Nutrition: Initiate enteral nutrition                GOAL:   Pt will tolerate TF initiation with residuals <250mL in 2-3 days.      Cultural, Faith, or Ethnic Dietary Needs: None     LEARNING NEEDS (Diet, Food/Nutrient-Drug Interaction):    [x] None Identified   [] Identified and Education Provided/Documented   [] Identified and Pt declined/was not appropriate      [x] Interdisciplinary Care Plan Reviewed/Documented    [x] Participated in Discharge Planning: Unable to determine    [x] Interdisciplinary Rounds     NUTRITION RISK:    [x] High              [] Moderate           []  Low  []  Minimal/Uncompromised    PT SEEN FOR:    []  MD Consult: []Calorie Count      []Diabetic Diet Education        []Diet Education     []Electrolyte Management     []General Nutrition Management and Supplements     []Management of Tube Feeding     []TPN Recommendations    []  RN Referral:  []MST score >=2     []Enteral/Parenteral Nutrition PTA     []Pregnant: Gestational DM or Multigestation   []  Low BMI  []  Re-Screen   []  GIOVANI Saldana, RD, [address]   Pager [phone]  Weekend Pager [phone]

## 2018-02-07 ENCOUNTER — APPOINTMENT (OUTPATIENT)
Dept: GENERAL RADIOLOGY | Age: 73
DRG: 871 | End: 2018-02-07
Attending: INTERNAL MEDICINE
Payer: MEDICARE

## 2018-02-07 LAB
ALBUMIN SERPL-MCNC: 1.8 G/DL (ref 3.5–5)
ALBUMIN/GLOB SERPL: 0.4 {RATIO} (ref 1.1–2.2)
ALP SERPL-CCNC: 172 U/L (ref 45–117)
ALT SERPL-CCNC: 88 U/L (ref 12–78)
ANION GAP SERPL CALC-SCNC: 5 MMOL/L (ref 5–15)
AST SERPL-CCNC: 44 U/L (ref 15–37)
BACTERIA SPEC CULT: NORMAL
BASOPHILS # BLD: 0 K/UL (ref 0–0.1)
BASOPHILS NFR BLD: 0 % (ref 0–1)
BILIRUB SERPL-MCNC: 0.8 MG/DL (ref 0.2–1)
BUN SERPL-MCNC: 21 MG/DL (ref 6–20)
BUN/CREAT SERPL: 16 (ref 12–20)
CALCIUM SERPL-MCNC: 8 MG/DL (ref 8.5–10.1)
CHLORIDE SERPL-SCNC: 106 MMOL/L (ref 97–108)
CO2 SERPL-SCNC: 32 MMOL/L (ref 21–32)
CREAT SERPL-MCNC: 1.32 MG/DL (ref 0.55–1.02)
DATE LAST DOSE: ABNORMAL
DIFFERENTIAL METHOD BLD: ABNORMAL
EOSINOPHIL # BLD: 0.1 K/UL (ref 0–0.4)
EOSINOPHIL NFR BLD: 1 % (ref 0–7)
ERYTHROCYTE [DISTWIDTH] IN BLOOD BY AUTOMATED COUNT: 16 % (ref 11.5–14.5)
GLOBULIN SER CALC-MCNC: 4.5 G/DL (ref 2–4)
GLUCOSE BLD STRIP.AUTO-MCNC: 126 MG/DL (ref 65–100)
GLUCOSE BLD STRIP.AUTO-MCNC: 132 MG/DL (ref 65–100)
GLUCOSE BLD STRIP.AUTO-MCNC: 134 MG/DL (ref 65–100)
GLUCOSE SERPL-MCNC: 131 MG/DL (ref 65–100)
HCT VFR BLD AUTO: 24 % (ref 35–47)
HGB BLD-MCNC: 7.7 G/DL (ref 11.5–16)
IMM GRANULOCYTES # BLD: 0 K/UL (ref 0–0.04)
IMM GRANULOCYTES NFR BLD AUTO: 0 % (ref 0–0.5)
LEVETIRACETAM SERPL-MCNC: 40.2 UG/ML (ref 10–40)
LYMPHOCYTES # BLD: 0.7 K/UL (ref 0.8–3.5)
LYMPHOCYTES NFR BLD: 7 % (ref 12–49)
MAGNESIUM SERPL-MCNC: 1.8 MG/DL (ref 1.6–2.4)
MCH RBC QN AUTO: 28 PG (ref 26–34)
MCHC RBC AUTO-ENTMCNC: 32.1 G/DL (ref 30–36.5)
MCV RBC AUTO: 87.3 FL (ref 80–99)
MONOCYTES # BLD: 0.1 K/UL (ref 0–1)
MONOCYTES NFR BLD: 1 % (ref 5–13)
NEUTS BAND NFR BLD MANUAL: 6 %
NEUTS SEG # BLD: 9.1 K/UL (ref 1.8–8)
NEUTS SEG NFR BLD: 85 % (ref 32–75)
NRBC # BLD: 0.03 K/UL (ref 0–0.01)
NRBC BLD-RTO: 0.3 PER 100 WBC
PHOSPHATE SERPL-MCNC: 2.6 MG/DL (ref 2.6–4.7)
PLATELET # BLD AUTO: 25 K/UL (ref 150–400)
PLATELET COMMENTS,PCOM: ABNORMAL
PMV BLD AUTO: ABNORMAL FL (ref 8.9–12.9)
POTASSIUM SERPL-SCNC: 3.1 MMOL/L (ref 3.5–5.1)
PROCALCITONIN SERPL-MCNC: 1.57 NG/ML (ref 0–0.08)
PROT SERPL-MCNC: 6.3 G/DL (ref 6.4–8.2)
RBC # BLD AUTO: 2.75 M/UL (ref 3.8–5.2)
RBC MORPH BLD: ABNORMAL
RBC MORPH BLD: ABNORMAL
REPORTED DOSE,DOSE: ABNORMAL UNITS
REPORTED DOSE/TIME,TMG: ABNORMAL
SERVICE CMNT-IMP: ABNORMAL
SERVICE CMNT-IMP: NORMAL
SODIUM SERPL-SCNC: 143 MMOL/L (ref 136–145)
VANCOMYCIN TROUGH SERPL-MCNC: 22.6 UG/ML (ref 5–10)
WBC # BLD AUTO: 10 K/UL (ref 3.6–11)

## 2018-02-07 PROCEDURE — 74011000250 HC RX REV CODE- 250: Performed by: INTERNAL MEDICINE

## 2018-02-07 PROCEDURE — 80053 COMPREHEN METABOLIC PANEL: CPT | Performed by: INTERNAL MEDICINE

## 2018-02-07 PROCEDURE — 74011250636 HC RX REV CODE- 250/636: Performed by: INTERNAL MEDICINE

## 2018-02-07 PROCEDURE — 84100 ASSAY OF PHOSPHORUS: CPT | Performed by: INTERNAL MEDICINE

## 2018-02-07 PROCEDURE — 74011250637 HC RX REV CODE- 250/637: Performed by: INTERNAL MEDICINE

## 2018-02-07 PROCEDURE — 85025 COMPLETE CBC W/AUTO DIFF WBC: CPT | Performed by: INTERNAL MEDICINE

## 2018-02-07 PROCEDURE — 74011250636 HC RX REV CODE- 250/636: Performed by: PSYCHIATRY & NEUROLOGY

## 2018-02-07 PROCEDURE — 80202 ASSAY OF VANCOMYCIN: CPT | Performed by: INTERNAL MEDICINE

## 2018-02-07 PROCEDURE — 83735 ASSAY OF MAGNESIUM: CPT | Performed by: INTERNAL MEDICINE

## 2018-02-07 PROCEDURE — 36415 COLL VENOUS BLD VENIPUNCTURE: CPT | Performed by: INTERNAL MEDICINE

## 2018-02-07 PROCEDURE — 94640 AIRWAY INHALATION TREATMENT: CPT

## 2018-02-07 PROCEDURE — 77010033678 HC OXYGEN DAILY

## 2018-02-07 PROCEDURE — 77030029684 HC NEB SM VOL KT MONA -A

## 2018-02-07 PROCEDURE — 82962 GLUCOSE BLOOD TEST: CPT

## 2018-02-07 PROCEDURE — 71045 X-RAY EXAM CHEST 1 VIEW: CPT

## 2018-02-07 PROCEDURE — 65610000006 HC RM INTENSIVE CARE

## 2018-02-07 RX ORDER — LEVOTHYROXINE SODIUM 25 UG/1
25 TABLET ORAL
Status: DISCONTINUED | OUTPATIENT
Start: 2018-02-07 | End: 2018-02-26

## 2018-02-07 RX ORDER — FUROSEMIDE 10 MG/ML
60 INJECTION INTRAMUSCULAR; INTRAVENOUS EVERY 12 HOURS
Status: COMPLETED | OUTPATIENT
Start: 2018-02-07 | End: 2018-02-07

## 2018-02-07 RX ORDER — IPRATROPIUM BROMIDE AND ALBUTEROL SULFATE 2.5; .5 MG/3ML; MG/3ML
3 SOLUTION RESPIRATORY (INHALATION)
Status: DISCONTINUED | OUTPATIENT
Start: 2018-02-07 | End: 2018-02-10

## 2018-02-07 RX ORDER — POTASSIUM CHLORIDE 29.8 MG/ML
20 INJECTION INTRAVENOUS
Status: COMPLETED | OUTPATIENT
Start: 2018-02-07 | End: 2018-02-08

## 2018-02-07 RX ADMIN — IPRATROPIUM BROMIDE AND ALBUTEROL SULFATE 3 ML: .5; 3 SOLUTION RESPIRATORY (INHALATION) at 15:50

## 2018-02-07 RX ADMIN — Medication 30 ML: at 06:23

## 2018-02-07 RX ADMIN — IPRATROPIUM BROMIDE AND ALBUTEROL SULFATE 3 ML: .5; 3 SOLUTION RESPIRATORY (INHALATION) at 11:26

## 2018-02-07 RX ADMIN — FUROSEMIDE 60 MG: 10 INJECTION, SOLUTION INTRAMUSCULAR; INTRAVENOUS at 20:22

## 2018-02-07 RX ADMIN — FUROSEMIDE 60 MG: 10 INJECTION, SOLUTION INTRAMUSCULAR; INTRAVENOUS at 08:56

## 2018-02-07 RX ADMIN — LEVETIRACETAM 500 MG: 5 INJECTION INTRAVENOUS at 01:50

## 2018-02-07 RX ADMIN — PIPERACILLIN SODIUM AND TAZOBACTAM SODIUM 4.5 G: .5; 4 INJECTION, POWDER, LYOPHILIZED, FOR SOLUTION INTRAVENOUS at 06:35

## 2018-02-07 RX ADMIN — LEVETIRACETAM 500 MG: 5 INJECTION INTRAVENOUS at 11:55

## 2018-02-07 RX ADMIN — POTASSIUM CHLORIDE 20 MEQ: 400 INJECTION, SOLUTION INTRAVENOUS at 10:00

## 2018-02-07 RX ADMIN — Medication 10 ML: at 21:14

## 2018-02-07 RX ADMIN — Medication 10 ML: at 06:23

## 2018-02-07 RX ADMIN — OSELTAMIVIR PHOSPHATE 30 MG: 6 POWDER, FOR SUSPENSION ORAL at 17:19

## 2018-02-07 RX ADMIN — PIPERACILLIN SODIUM AND TAZOBACTAM SODIUM 4.5 G: .5; 4 INJECTION, POWDER, LYOPHILIZED, FOR SOLUTION INTRAVENOUS at 14:43

## 2018-02-07 RX ADMIN — PIPERACILLIN SODIUM AND TAZOBACTAM SODIUM 4.5 G: .5; 4 INJECTION, POWDER, LYOPHILIZED, FOR SOLUTION INTRAVENOUS at 21:14

## 2018-02-07 RX ADMIN — Medication 10 ML: at 14:43

## 2018-02-07 RX ADMIN — POTASSIUM CHLORIDE 20 MEQ: 400 INJECTION, SOLUTION INTRAVENOUS at 08:56

## 2018-02-07 RX ADMIN — MUPIROCIN: 20 OINTMENT TOPICAL at 08:11

## 2018-02-07 RX ADMIN — LEVOTHYROXINE SODIUM 25 MCG: 50 TABLET ORAL at 11:54

## 2018-02-07 RX ADMIN — IPRATROPIUM BROMIDE AND ALBUTEROL SULFATE 3 ML: .5; 3 SOLUTION RESPIRATORY (INHALATION) at 09:43

## 2018-02-07 RX ADMIN — IPRATROPIUM BROMIDE AND ALBUTEROL SULFATE 3 ML: .5; 3 SOLUTION RESPIRATORY (INHALATION) at 20:10

## 2018-02-07 RX ADMIN — MUPIROCIN: 20 OINTMENT TOPICAL at 17:19

## 2018-02-07 RX ADMIN — OSELTAMIVIR PHOSPHATE 30 MG: 6 POWDER, FOR SUSPENSION ORAL at 08:11

## 2018-02-07 RX ADMIN — Medication 10 ML: at 14:44

## 2018-02-07 RX ADMIN — VANCOMYCIN HYDROCHLORIDE 1250 MG: 10 INJECTION, POWDER, LYOPHILIZED, FOR SOLUTION INTRAVENOUS at 12:37

## 2018-02-07 NOTE — PROGRESS NOTES
[facility]  Pulmonary, Critical Care, and Sleep Medicine    Name: Caesar Trinidad MRN: [id number]   : 1945 Hospital: [facility]   Date: 2018        IMPRESSION:   · Acute hypoxic respiratory failure  · Acute influenza  · Can not rule out superinfection  · Diffuse bilateral infiltrates - rapid development argues more for pulmonary edema but can't exclude ARDS  · Encephalopathy with seizure or myoclonus - possibly due to hypoglycemia   · Shock   · Severe hypoglycemia  · GNR UTI  · Lupus   · Mental retardation   · GERD  · DM      PLAN:   · O2   · Diurese again today with careful monitoring of her creatinine   · Empiric antibiotics, Tamiflu  · Pressors  · D10 infusion with glycemic monitoring  · Follow up procalcitonin   · Plaquenil on hold  · Antiepileptics, neuro evaluation ongoing  · DVT prophylaxis  · Remains critically ill with high risk for further decompensation/death  · DNR. Palliative care assistance greatly appreciated     Subjective/Interval History:   I have reviewed the flowsheet and previous days notes.   The patient is unable to give any meaningful history or review of systems because the patient is:  Lethargic - increased WOB this morning      The patient is critically ill on:      Pressors      Review of Systems   Unable to perform ROS: Mental status change     Objective:   Vital Signs:    Visit Vitals    BP 95/45    Pulse 78    Temp 98.6 °F (37 °C)    Resp (!) 39    Ht 5' 1\" (1.549 m)    Wt 76.7 kg (169 lb 1.5 oz)    SpO2 96%    BMI 31.95 kg/m2       O2 Device: Nasal cannula   O2 Flow Rate (L/min): 2 l/min   Temp (24hrs), Av.8 °F (36.6 °C), Min:97.3 °F (36.3 °C), Max:98.6 °F (37 °C)       Intake/Output:   Last shift:         Last 3 shifts: 1901 - 700  In: 3050 [I.V.:]  Out: 7970 [Urine:3380]    Intake/Output Summary (Last 24 hours) at 18 0837  Last data filed at 18 0700   Gross per 24 hour   Intake 1946.14 ml   Output             2445 ml   Net          -498.86 ml     Hemodynamics:   PAP:   CO:     Wedge:   CI:     CVP:    SVR:       PVR:       Ventilator Settings:  Mode Rate Tidal Volume Pressure FiO2 PEEP                    Peak airway pressure:      Minute ventilation:         Physical Exam   Constitutional: She appears lethargic. No distress. HENT:   Head: Normocephalic and atraumatic. Eyes: No scleral icterus. Cardiovascular: Normal rate. An irregular rhythm present. Pulmonary/Chest: She has wheezes. She has rales. Abdominal: Soft. Bowel sounds are normal. She exhibits no distension. There is no tenderness. Musculoskeletal: She exhibits no edema. Neurological: She appears lethargic. Skin: Skin is warm and dry. No rash noted.      Data:     Current Facility-Administered Medications   Medication Dose Route Frequency    Vancomycin - please send level before starting dose due at 1200   1 Each Other ONCE    sodium chloride (NS) flush 10 mL  10 mL InterCATHeter Q24H    sodium chloride (NS) flush 10-40 mL  10-40 mL InterCATHeter Q8H    sodium chloride (NS) flush 20 mL  20 mL InterCATHeter Q24H    Vancomycin- Pharmacy to dose   Other Rx Dosing/Monitoring    piperacillin-tazobactam (ZOSYN) 4.5 g in  ml premix/cpmd  4.5 g IntraVENous Q8H    vancomycin (VANCOCIN) 1250 mg in  ml infusion  1,250 mg IntraVENous Q24H    NOREPINephrine (LEVOPHED) 32 mg in 5% dextrose 250 mL infusion  2-200 mcg/min IntraVENous TITRATE    mupirocin (BACTROBAN) 2 % ointment   Both Nostrils BID    oseltamivir (TAMIFLU) 6 mg/mL oral suspension 30 mg  30 mg Per G Tube BID    levETIRAcetam (KEPPRA) 500 mg in saline (iso-osm) 100 ml IVPB  500 mg IntraVENous Q12H    sodium chloride 0.9 % in dextrose 10% 1,040 mL infusion   IntraVENous CONTINUOUS    sodium chloride (NS) flush 5-10 mL  5-10 mL IntraVENous Q8H                Labs:  Recent Labs      02/07/18   0359  02/06/18   0332  02/05/18   0357   WBC  10.0 13.5*  15.8*   HGB  7.7*  8.2*  8.7*   HCT  24.0*  24.3*  26.1*   PLT  25*  21*  23*     Recent Labs      02/07/18   0359  02/06/18   0332  02/05/18   1708  02/05/18 0357   NA  143  143  144  142   K  3.1*  3.6  3.9  4.9   CL  106  110*  110*  111*   CO2  32  28  29  25   GLU  131*  107*  91  108*   BUN  21*  19  20  19   CREA  1.32*  1.26*  1.20*  1.24*   CA  8.0*  7.8*  7.7*  7.8*   MG  1.8  1.9   --   2.2   PHOS  2.6  2.2*   --   3.2   ALB  1.8*  1.8*   --   1.9*   TBILI  0.8  0.7   --   0.4   SGOT  44*  65*   --   95*   ALT  88*  113*   --   135*     Recent Labs      02/04/18   1533   PH  7.41   PCO2  33*   PO2  70*   HCO3  20*     Imaging:  I have personally reviewed the patients radiographs and have reviewed the reports:  No change        Total critical care time exclusive of procedures: 30 minutes  Marta Jamison MD

## 2018-02-07 NOTE — PROGRESS NOTES
Hospitalist Progress Note    NAME: Corina Escoto   :  1945   MRN:  [id number]       Assessment / Plan:  Acute hypoxic respiratory failure due to acute pulmonary edema:  still with significant acute hypoxia, tachypnea  - CXR today with diffuse airspace disease/edema. No significant change. - con't vanco, zosyn and tamiflu  - appreciate pulmonology assistance with respiratory issues. - Palliative consult appreciated - pt's code status to be changed to DNR  - Pt continues to require Levophed in order to maintain MAP >65mmHg - being weaned as tolerated - currently on 7.5mcg/hr     TIFFANIE  -Pt being diuresed but also has labile renal function making this more difficult  -Continue to monitor daily    Hypokalemia  -K 3.1, repleted, monitor     Acute encephalopathy and septic shock due to UTI and Influenza B, present on admission: +eye deviation, remains nonresponsive with ?myoclonus.  - MRI brain with mild small vessel ischemic changes. No acute abnormality. - CT chest/abd/pelvis with severe scoliosis. Atelectasis in the lower lobes, left greater than right. Anemia. Atherosclerotic abdominal aorta without aneurysm. - UA >696103 pansensitive E Coli, blood cultures no growth.  Con't zosyn as above.   - influenza B positive, con't tamiflu  - IV fluids and levophed prn for BP support  - EEG with cerebral dysfunction with no seizure activity  - appreciate neuro consult, recommending con't keppra for now - AMS is multifactorial in nature     Hypoglycemia:  H/o \"borderline DM,\" not on meds at home.  HgA1c <3.5.  - con't D10 gtt  - serial gl  - will checkucose checks     Transaminitis:  Presumed due to sepsis, improving  Baseline mental retardation  Lupus: Continue holding Plaquenil  Hypothyroidism:  Synthroid dose via NGT  Obesity (Body mass index is 32.45 kg/(m^2)      Code Status: full  Surrogate Decision Maker: sister   DVT Prophylaxis: heparin      Baseline: Mental retardation, lives with sister normally but placed in group home after sister had to undergo recent back surgery     Subjective:     Chief Complaint / Reason for Physician Visit  Encephalopathic. Discussed with RN events overnight. Review of Systems:  Symptom Y/N Comments  Symptom Y/N Comments   Fever/Chills    Chest Pain     Poor Appetite    Edema     Cough    Abdominal Pain     Sputum    Joint Pain     SOB/YOUNG    Pruritis/Rash     Nausea/vomit    Tolerating PT/OT     Diarrhea    Tolerating Diet     Constipation    Other       Could NOT obtain due to: Mental status     Objective:     VITALS:   Last 24hrs VS reviewed since prior progress note.  Most recent are:  Patient Vitals for the past 24 hrs:   Temp Pulse Resp BP SpO2   02/07/18 1550 - - - - 96 %   02/07/18 1410 - 84 21 97/50 95 %   02/07/18 1400 - 84 (!) 40 (!) 86/52 98 %   02/07/18 1300 - 88 (!) 39 91/57 98 %   02/07/18 1200 98.8 °F (37.1 °C) 83 (!) 35 98/63 97 %   02/07/18 1126 - - - - 99 %   02/07/18 1100 - 78 (!) 38 111/62 97 %   02/07/18 1000 - 79 (!) 37 109/66 99 %   02/07/18 0943 - - - - 98 %   02/07/18 0900 - 80 (!) 38 (!) 82/43 95 %   02/07/18 0800 98 °F (36.7 °C) 91 (!) 42 116/68 94 %   02/07/18 0700 - 78 (!) 39 95/45 96 %   02/07/18 0632 - 81 (!) 35 (!) 78/49 93 %   02/07/18 0600 - 82 (!) 41 90/46 95 %   02/07/18 0500 - 75 (!) 37 109/59 96 %   02/07/18 0400 98.6 °F (37 °C) 82 (!) 36 115/40 95 %   02/07/18 0300 - 79 (!) 35 115/41 94 %   02/07/18 0200 - 77 (!) 41 104/65 95 %   02/07/18 0100 - 74 (!) 33 117/54 97 %   02/07/18 0001 97.9 °F (36.6 °C) 67 30 120/46 98 %   02/06/18 2300 - 71 (!) 35 111/65 98 %   02/06/18 2200 - 75 (!) 31 (!) 99/39 94 %   02/06/18 2100 - 77 (!) 32 105/41 96 %   02/06/18 2000 97.5 °F (36.4 °C) 71 28 105/62 97 %   02/06/18 1925 - 74 (!) 34 - 97 %   02/06/18 1900 - 76 (!) 36 108/59 96 %   02/06/18 1800 - 66 (!) 31 102/47 98 %   02/06/18 1714 - 70 (!) 34 - 97 %   02/06/18 1700 - 66 (!) 34 102/66 98 %   02/06/18 1600 97.3 °F (36.3 °C) 63 (!) 31 100/49 98 % Intake/Output Summary (Last 24 hours) at 02/07/18 1558  Last data filed at 02/07/18 1437   Gross per 24 hour   Intake           2078.2 ml   Output             2250 ml   Net           -171.8 ml        PHYSICAL EXAM:  General:                    Altered, retracts to pain    EENT:                       Anicteric sclerae  Resp:                        CTA bilaterally, no wheezing or rales. Frequent shallow respiration  CV:                            Regular  rhythm,  No edema  GI:                              Soft, Non distended, Non tender.  +Bowel sounds  Neurologic:                Alert and oriented x 0   Psych:                       No insight  Skin:                           No rashes. No jaundice    Reviewed most current lab test results and cultures  YES  Reviewed most current radiology test results   YES  Review and summation of old records today    NO  Reviewed patient's current orders and MAR    YES  PMH/SH reviewed - no change compared to H&P  ________________________________________________________________________  Care Plan discussed with:    Comments   Patient x    Family      RN x    Care Manager     Consultant                        Multidiciplinary team rounds were held today with , nursing, pharmacist and clinical coordinator. Patient's plan of care was discussed; medications were reviewed and discharge planning was addressed. ________________________________________________________________________  Total NON critical care TIME:  25   Minutes    Total CRITICAL CARE TIME Spent:   Minutes non procedure based      Comments   >50% of visit spent in counseling and coordination of care     ________________________________________________________________________  Michelle Day MD     Procedures: see electronic medical records for all procedures/Xrays and details which were not copied into this note but were reviewed prior to creation of Plan.       LABS:  I reviewed today's most current labs and imaging studies.   Pertinent labs include:  Recent Labs      02/07/18   0359 02/06/18 0332 02/05/18 0357   WBC  10.0  13.5*  15.8*   HGB  7.7*  8.2*  8.7*   HCT  24.0*  24.3*  26.1*   PLT  25*  21*  23*     Recent Labs      02/07/18   0359 02/06/18 0332 02/05/18   1708 02/05/18 0357   NA  143  143  144  142   K  3.1*  3.6  3.9  4.9   CL  106  110*  110*  111*   CO2  32  28  29  25   GLU  131*  107*  91  108*   BUN  21*  19  20  19   CREA  1.32*  1.26*  1.20*  1.24*   CA  8.0*  7.8*  7.7*  7.8*   MG  1.8  1.9   --   2.2   PHOS  2.6  2.2*   --   3.2   ALB  1.8*  1.8*   --   1.9*   TBILI  0.8  0.7   --   0.4   SGOT  44*  65*   --   95*   ALT  88*  113*   --   135*       Signed: Nestor Castorena MD

## 2018-02-07 NOTE — PROGRESS NOTES
1900 Report received from Messi Baldwin RN.     4517 Uneventful shift, VSS. Levo titrated as able. Pt remains drowsy, nonverbal.     0700 Report given to Messi Baldwin RN.

## 2018-02-07 NOTE — PROGRESS NOTES
Pharmacy Automatic Renal Dosing Protocol - Antimicrobials    Indication for Antimicrobials: UTI; Influenza B; HAP    Current Regimen of Each Antimicrobial:  Oseltamivir 30 mg PO twice daily (Started 18; Day #5 of 5)  Piperacillin-tazobactam 4.5 gm IV every 8 hours (Started 18; Day #4 of 7)  Vancomycin 1250 mg IV every 24 hours (Started 18; Day #3 of 7)    Previous Antimicrobial Therapy:  Zosyn 3.375g IV x 1 in ED (Start Date ; Day # 1)  Vancomycin loading dose 1750mg IV x 1 (start date: , day #1)  Ceftriaxone 1 gm IV every 24 hours (Started 18; Stopped 18)    Goal Vancomycin Trough: 15-20 mcg/mL    Vancomycin Trough (18 at 1044): 22.6 mcg/mL (True trough = 21.7 mcg/mL)    Significant Cultures:   2/3/18 Influenza = Influenza B positive (FINAL)  18 Urine culture = Greater than 100K CFU/mL E coli, pan-sens (FINAL)  18 Blood culture = No growth (FINAL)    Labs:  Recent Labs      18   0359  18   0332  18   1708  18   0357   CREA  1.32*  1.26*  1.20*  1.24*   BUN  21*  19  20  19   WBC  10.0  13.5*   --   15.8*   Temp (24hrs), Av °F (36.7 °C), Min:97.3 °F (36.3 °C), Max:98.8 °F (37.1 °C)    Creatinine Clearance (mL/min) or Dialysis: 29.5 mL/min    No results found for: PCT    Impression/Plan:   - Oseltamivir dosed appropriately based on indication and renal function. Continue current regimen. 5 day duration entered. - Piperacillin-tazobactam dosed appropriately based on indication and renal function. Continue current regimen. 7 day duration entered. - Vancomycin trough is above goal. Vancomycin dose adjusted to 1000 mg IV every 24 hours to achieve goal trough. 7 day duration entered for vancomycin. Pharmacy will follow daily and adjust medications as appropriate for renal function and/or serum levels.     Thank you,  Yony Jennings, PHARMD

## 2018-02-07 NOTE — PROGRESS NOTES
Nutrition:  Chart reviewed, case discussed during CCU rounds. Plan for IVF's to be discontinued. Pt being diuresed. Per discussion with Dr. Val Guerra, will bump up H2O flushes to better meet fluid needs. Pt was previously getting 1404mL free water from TF and IVF's. Will increase H2O flushes to 100mL q 4h to provide 1104mL free water. Will continue to monitor pt and fluid balance closely. Thank you!     [place], [address]   Pager [phone]

## 2018-02-08 LAB
ANION GAP SERPL CALC-SCNC: 5 MMOL/L (ref 5–15)
BASOPHILS # BLD: 0 K/UL (ref 0–0.1)
BASOPHILS NFR BLD: 0 % (ref 0–1)
BUN SERPL-MCNC: 32 MG/DL (ref 6–20)
BUN/CREAT SERPL: 22 (ref 12–20)
CALCIUM SERPL-MCNC: 8.2 MG/DL (ref 8.5–10.1)
CHLORIDE SERPL-SCNC: 104 MMOL/L (ref 97–108)
CO2 SERPL-SCNC: 37 MMOL/L (ref 21–32)
CREAT SERPL-MCNC: 1.47 MG/DL (ref 0.55–1.02)
DIFFERENTIAL METHOD BLD: ABNORMAL
EOSINOPHIL # BLD: 0 K/UL (ref 0–0.4)
EOSINOPHIL NFR BLD: 0 % (ref 0–7)
ERYTHROCYTE [DISTWIDTH] IN BLOOD BY AUTOMATED COUNT: 15.2 % (ref 11.5–14.5)
GLUCOSE BLD STRIP.AUTO-MCNC: 131 MG/DL (ref 65–100)
GLUCOSE BLD STRIP.AUTO-MCNC: 146 MG/DL (ref 65–100)
GLUCOSE BLD STRIP.AUTO-MCNC: 150 MG/DL (ref 65–100)
GLUCOSE BLD STRIP.AUTO-MCNC: 162 MG/DL (ref 65–100)
GLUCOSE SERPL-MCNC: 168 MG/DL (ref 65–100)
HCT VFR BLD AUTO: 24.2 % (ref 35–47)
HGB BLD-MCNC: 7.9 G/DL (ref 11.5–16)
IMM GRANULOCYTES # BLD: 0.1 K/UL (ref 0–0.04)
IMM GRANULOCYTES NFR BLD AUTO: 1 % (ref 0–0.5)
LYMPHOCYTES # BLD: 1 K/UL (ref 0.8–3.5)
LYMPHOCYTES NFR BLD: 8 % (ref 12–49)
MAGNESIUM SERPL-MCNC: 1.6 MG/DL (ref 1.6–2.4)
MCH RBC QN AUTO: 28.4 PG (ref 26–34)
MCHC RBC AUTO-ENTMCNC: 32.6 G/DL (ref 30–36.5)
MCV RBC AUTO: 87.1 FL (ref 80–99)
MONOCYTES # BLD: 0.7 K/UL (ref 0–1)
MONOCYTES NFR BLD: 6 % (ref 5–13)
NEUTS SEG # BLD: 10.1 K/UL (ref 1.8–8)
NEUTS SEG NFR BLD: 85 % (ref 32–75)
NRBC # BLD: 0.03 K/UL (ref 0–0.01)
NRBC BLD-RTO: 0.3 PER 100 WBC
PHOSPHATE SERPL-MCNC: 3.5 MG/DL (ref 2.6–4.7)
PLATELET # BLD AUTO: 39 K/UL (ref 150–400)
PLATELET COMMENTS,PCOM: ABNORMAL
PMV BLD AUTO: ABNORMAL FL (ref 8.9–12.9)
POTASSIUM SERPL-SCNC: 3 MMOL/L (ref 3.5–5.1)
RBC # BLD AUTO: 2.78 M/UL (ref 3.8–5.2)
RBC MORPH BLD: ABNORMAL
RBC MORPH BLD: ABNORMAL
SERVICE CMNT-IMP: ABNORMAL
SODIUM SERPL-SCNC: 146 MMOL/L (ref 136–145)
WBC # BLD AUTO: 11.9 K/UL (ref 3.6–11)

## 2018-02-08 PROCEDURE — 74011250636 HC RX REV CODE- 250/636: Performed by: INTERNAL MEDICINE

## 2018-02-08 PROCEDURE — 77030037878 HC DRSG MEPILEX >48IN BORD MOLN -B

## 2018-02-08 PROCEDURE — 77010033678 HC OXYGEN DAILY

## 2018-02-08 PROCEDURE — 77030018798 HC PMP KT ENTRL FED COVD -A

## 2018-02-08 PROCEDURE — 84100 ASSAY OF PHOSPHORUS: CPT | Performed by: INTERNAL MEDICINE

## 2018-02-08 PROCEDURE — 74011000250 HC RX REV CODE- 250: Performed by: INTERNAL MEDICINE

## 2018-02-08 PROCEDURE — 83735 ASSAY OF MAGNESIUM: CPT | Performed by: INTERNAL MEDICINE

## 2018-02-08 PROCEDURE — 65610000006 HC RM INTENSIVE CARE

## 2018-02-08 PROCEDURE — 74011250637 HC RX REV CODE- 250/637: Performed by: INTERNAL MEDICINE

## 2018-02-08 PROCEDURE — 85025 COMPLETE CBC W/AUTO DIFF WBC: CPT | Performed by: INTERNAL MEDICINE

## 2018-02-08 PROCEDURE — 94640 AIRWAY INHALATION TREATMENT: CPT

## 2018-02-08 PROCEDURE — 74011250636 HC RX REV CODE- 250/636: Performed by: PSYCHIATRY & NEUROLOGY

## 2018-02-08 PROCEDURE — 82962 GLUCOSE BLOOD TEST: CPT

## 2018-02-08 PROCEDURE — 77030011256 HC DRSG MEPILEX <16IN NO BORD MOLN -A

## 2018-02-08 PROCEDURE — 84145 PROCALCITONIN (PCT): CPT | Performed by: INTERNAL MEDICINE

## 2018-02-08 PROCEDURE — 36415 COLL VENOUS BLD VENIPUNCTURE: CPT | Performed by: INTERNAL MEDICINE

## 2018-02-08 PROCEDURE — 80048 BASIC METABOLIC PNL TOTAL CA: CPT | Performed by: INTERNAL MEDICINE

## 2018-02-08 RX ORDER — FUROSEMIDE 10 MG/ML
60 INJECTION INTRAMUSCULAR; INTRAVENOUS EVERY 12 HOURS
Status: COMPLETED | OUTPATIENT
Start: 2018-02-08 | End: 2018-02-08

## 2018-02-08 RX ORDER — POTASSIUM CHLORIDE 29.8 MG/ML
20 INJECTION INTRAVENOUS
Status: COMPLETED | OUTPATIENT
Start: 2018-02-08 | End: 2018-02-10

## 2018-02-08 RX ORDER — MORPHINE SULFATE 4 MG/ML
2 INJECTION, SOLUTION INTRAMUSCULAR; INTRAVENOUS
Status: DISCONTINUED | OUTPATIENT
Start: 2018-02-08 | End: 2018-02-15

## 2018-02-08 RX ORDER — POTASSIUM CHLORIDE 29.8 MG/ML
20 INJECTION INTRAVENOUS
Status: COMPLETED | OUTPATIENT
Start: 2018-02-08 | End: 2018-02-08

## 2018-02-08 RX ADMIN — PIPERACILLIN SODIUM AND TAZOBACTAM SODIUM 4.5 G: .5; 4 INJECTION, POWDER, LYOPHILIZED, FOR SOLUTION INTRAVENOUS at 05:39

## 2018-02-08 RX ADMIN — Medication 10 ML: at 05:24

## 2018-02-08 RX ADMIN — IPRATROPIUM BROMIDE AND ALBUTEROL SULFATE 3 ML: .5; 3 SOLUTION RESPIRATORY (INHALATION) at 11:51

## 2018-02-08 RX ADMIN — IPRATROPIUM BROMIDE AND ALBUTEROL SULFATE 3 ML: .5; 3 SOLUTION RESPIRATORY (INHALATION) at 16:18

## 2018-02-08 RX ADMIN — LEVOTHYROXINE SODIUM 25 MCG: 50 TABLET ORAL at 05:24

## 2018-02-08 RX ADMIN — LEVETIRACETAM 500 MG: 5 INJECTION INTRAVENOUS at 11:49

## 2018-02-08 RX ADMIN — POTASSIUM CHLORIDE 20 MEQ: 400 INJECTION, SOLUTION INTRAVENOUS at 12:43

## 2018-02-08 RX ADMIN — Medication 10 ML: at 14:03

## 2018-02-08 RX ADMIN — FUROSEMIDE 60 MG: 10 INJECTION, SOLUTION INTRAMUSCULAR; INTRAVENOUS at 20:22

## 2018-02-08 RX ADMIN — IPRATROPIUM BROMIDE AND ALBUTEROL SULFATE 3 ML: .5; 3 SOLUTION RESPIRATORY (INHALATION) at 19:36

## 2018-02-08 RX ADMIN — Medication 20 ML: at 14:03

## 2018-02-08 RX ADMIN — MORPHINE SULFATE 2 MG: 4 INJECTION, SOLUTION INTRAMUSCULAR; INTRAVENOUS at 11:37

## 2018-02-08 RX ADMIN — OSELTAMIVIR PHOSPHATE 30 MG: 6 POWDER, FOR SUSPENSION ORAL at 09:40

## 2018-02-08 RX ADMIN — POTASSIUM CHLORIDE 20 MEQ: 400 INJECTION, SOLUTION INTRAVENOUS at 19:40

## 2018-02-08 RX ADMIN — Medication 10 ML: at 21:18

## 2018-02-08 RX ADMIN — POTASSIUM CHLORIDE 20 MEQ: 400 INJECTION, SOLUTION INTRAVENOUS at 14:03

## 2018-02-08 RX ADMIN — LEVETIRACETAM 500 MG: 5 INJECTION INTRAVENOUS at 00:20

## 2018-02-08 RX ADMIN — MORPHINE SULFATE 2 MG: 4 INJECTION, SOLUTION INTRAMUSCULAR; INTRAVENOUS at 21:21

## 2018-02-08 RX ADMIN — FUROSEMIDE 60 MG: 10 INJECTION, SOLUTION INTRAMUSCULAR; INTRAVENOUS at 11:45

## 2018-02-08 RX ADMIN — NOREPINEPHRINE BITARTRATE 10 MCG/MIN: 1 INJECTION INTRAVENOUS at 05:39

## 2018-02-08 RX ADMIN — POTASSIUM CHLORIDE 20 MEQ: 400 INJECTION, SOLUTION INTRAVENOUS at 11:43

## 2018-02-08 RX ADMIN — IPRATROPIUM BROMIDE AND ALBUTEROL SULFATE 3 ML: .5; 3 SOLUTION RESPIRATORY (INHALATION) at 08:30

## 2018-02-08 RX ADMIN — PIPERACILLIN SODIUM AND TAZOBACTAM SODIUM 4.5 G: .5; 4 INJECTION, POWDER, LYOPHILIZED, FOR SOLUTION INTRAVENOUS at 20:22

## 2018-02-08 RX ADMIN — NOREPINEPHRINE BITARTRATE 12 MCG/MIN: 1 INJECTION INTRAVENOUS at 03:00

## 2018-02-08 RX ADMIN — PIPERACILLIN SODIUM AND TAZOBACTAM SODIUM 4.5 G: .5; 4 INJECTION, POWDER, LYOPHILIZED, FOR SOLUTION INTRAVENOUS at 13:22

## 2018-02-08 RX ADMIN — POTASSIUM CHLORIDE 20 MEQ: 400 INJECTION, SOLUTION INTRAVENOUS at 17:20

## 2018-02-08 RX ADMIN — VANCOMYCIN HYDROCHLORIDE 1000 MG: 1 INJECTION, POWDER, LYOPHILIZED, FOR SOLUTION INTRAVENOUS at 14:59

## 2018-02-08 NOTE — PROGRESS NOTES
0715- Bedside and Verbal shift change report given to Riana Aguila (oncoming nurse) by [address] (offgoing nurse). Report included the following information SBAR, Kardex, Intake/Output, Recent Results, Cardiac Rhythm NSR and Alarm Parameters . 0730- Shift assessment completed; see flow sheet for details. Pt minimally responsive; responds only to tactile stimulation and discomfort by opening her opens and moaning; PERRLA present but sluggish; no command following. Pt currently in Sinus Tach; BP stable while on Levo. Breathing is labored; nasal flaring and grunting noted; sats 100% while on NC at 4 LPM; RR 30-36; pt appears to be working hard to breathe. Lungs are coarse and diminished in the bases. HOB elevated for comfort. BS are active; NGT with TF at goal; minimal residuals. Skin is warm and dry. Prevalon boots in place for foot drop. No indication of pain/discomfort at this time. 0745- Medium BM noted; incontinent care provided. Pt positioned for comfort. 0900- AM labs drawn; Dr. Long Tripathi at the bedside; questioned regarding pt's increased work of breathing even while on O2 support; to address issue in rounds. 1045- NP Nila at the bedside; made aware of pt's increased work of breathing; Dr. Long Tripathi present as well; order received for Morphine 2mg PRN. NP Finas Fabry stated she would speak with family regarding end of life care after first speaking to Neurology. 1125- RR=40; labored breathing continues; PRN Morphine given at this time. 1135- Pt appears more rested; RR 25 at this time post PRN Morphine    1215- Reassessment completed; see flow sheet for details. Pt status remains unchanged; RR is better; BP stable while on Levo. Pt bathed and repositioned for comfort. Levo titrated to effect. No indication of pain/discomfort.      [address] with Palliative NP Finas Fabry; Dr. Yong Blanco (neurology) made aware by NP Finas Fabry of concerns regarding pt's mental status; unsure if pt's current neuro status is due to seizure medications acting as a sedative or if this is a permanent change. Dr. Leida Trinidad to review pt's chart and make appropriate changes. 1630- Reassessment completed; see flow sheet for details. Pt a little more alert at this time; opens eyes to stimulus and able to make eye contact for about 10 seconds; still no command following. Pt in a Sinus arrhythmia; with 1st degree AV block; BP stable on Levo; will continue to titrate to effect. No indication of pain/discomfort at this time. Pt positioned for comfort. NC decreased to 2LPM; sats WNL     1810-Pt resting comfortably; family present at the bedside. 1920- Bedside and Verbal shift change report given to [address] [place] [address] (oncoming nurse) by Audelia Tinajero RN (offgoing nurse). Report included the following information SBAR, Kardex, Intake/Output, Cardiac Rhythm Paced and Alarm Parameters .

## 2018-02-08 NOTE — PROGRESS NOTES
Nutrition Assessment:    RECOMMENDATIONS:   Continue TF as ordered    ASSESSMENT:   Chart reviewed, case discussed during CCU rounds. Pt remains unresponsive, no family in the room. Pt on TF at goal rate with minimal residuals (0-15mL), TF meets 100% kcal and protein needs. Levo at 9. BM noted today. Labs reviewed, Na up a bit, likely related to diuresis. K+ 3.0, being repleted. Palliative is following, unsure of long term plan, possibly hospice? Dietitians Intervention(s)/Plan(s): Continue TF via NGT, monitor plan of care  SUBJECTIVE/OBJECTIVE:   Pt unresponsive   Diet Order: NPO, Other (comment) (TF via NGT: TwoCal @ 30mL/h + Pro daily + 100mL H2O flush q 4h (provides 1500kcals/75gPro/1104mL) )  % Eaten:  No data found. TwoCal HN  at 30 mL/hr flush with 100 mL  Q4H  via NG Tube   Residuals: 15 mL    Pertinent Medications:zosyn, vancomycin, lasix, KCl; Drips: levo. Chemistries:  Lab Results   Component Value Date/Time    Sodium 146 (H) 02/08/2018 09:34 AM    Potassium 3.0 (L) 02/08/2018 09:34 AM    Chloride 104 02/08/2018 09:34 AM    CO2 37 (H) 02/08/2018 09:34 AM    Anion gap 5 02/08/2018 09:34 AM    Glucose 168 (H) 02/08/2018 09:34 AM    BUN 32 (H) 02/08/2018 09:34 AM    Creatinine 1.47 (H) 02/08/2018 09:34 AM    BUN/Creatinine ratio 22 (H) 02/08/2018 09:34 AM    GFR est AA 42 (L) 02/08/2018 09:34 AM    GFR est non-AA 35 (L) 02/08/2018 09:34 AM    Calcium 8.2 (L) 02/08/2018 09:34 AM    Albumin 1.8 (L) 02/07/2018 03:59 AM      Anthropometrics: Height: 5' 1\" (154.9 cm) Weight: 73 kg (160 lb 15 oz)   []bed scale    []stated   []unknown     IBW (%IBW):   ( ) UBW (%UBW):   (  %)    BMI: Body mass index is 30.41 kg/(m^2). This BMI is indicative of:  []Underweight   []Normal   []Overweight   [x] Obesity   [] Extreme Obesity (BMI>40)  Estimated Nutrition Needs (Based on): 1463 Kcals/day (MSJ 1219 x 1.2) , 61 g (-77 (0.8-1gPro/kg)) Protein  Carbohydrate:  At Least 130 g/day  Fluids: 1200 mL/day or per MD     Last BM: 2/8   []Active     []Hyperactive  [x]Hypoactive       [] Absent   BS  Skin:    [x] Intact   [] Incision  [] Breakdown   [] DTI   [] Tears/Excoriation/Abrasion  []Edema [] Other: Wt Readings from Last 30 Encounters:   02/07/18 73 kg (160 lb 15 oz)   05/31/17 70.8 kg (156 lb)   05/23/17 70.8 kg (156 lb)   05/08/17 70.8 kg (156 lb)   12/13/16 70.3 kg (155 lb)   06/27/16 61.7 kg (136 lb)   05/02/16 61.7 kg (136 lb)   10/09/15 55.8 kg (123 lb)   07/02/15 53.7 kg (118 lb 6.4 oz)   06/03/15 56.2 kg (124 lb)   04/28/15 52.6 kg (116 lb)   04/28/15 52.6 kg (116 lb)   04/07/15 52.2 kg (115 lb)   03/04/15 49.4 kg (109 lb)   02/28/14 47.1 kg (103 lb 12.8 oz)   08/07/13 47.4 kg (104 lb 8 oz)   03/05/13 44.3 kg (97 lb 9.6 oz)   11/18/12 44.5 kg (98 lb)   09/26/11 59.9 kg (132 lb)   09/12/11 59.9 kg (132 lb)   06/13/11 62.3 kg (137 lb 6.4 oz)   04/07/11 62.6 kg (138 lb)   03/29/11 66.2 kg (146 lb)   02/10/11 66.9 kg (147 lb 8 oz)   01/19/11 66.2 kg (146 lb)   12/27/10 66.2 kg (146 lb)   05/07/10 71.4 kg (157 lb 6.4 oz)   04/30/10 70.4 kg (155 lb 3.2 oz)   04/27/10 71.7 kg (158 lb)      NUTRITION DIAGNOSES:   Problem:  Inadequate protein-energy intake      Etiology: related to pt NPO      Signs/Symptoms: as evidenced by NPO meets 0% kcal and protein needs. Previous dx re: inadequate protein energy intake resolved, TF meets 100% kcal and protein needs. NUTRITION INTERVENTIONS:    Enteral/Parenteral Nutrition: Other (Continue TF as ordered)                GOAL:   Pt will tolerate TF at goal rate with residuals <250mL in 2-4 days.      NUTRITION MONITORING AND EVALUATION   Previous Goal: Pt will tolerate TF initiation with residuals <250mL in 2-3 days   Previous Goal Met: Yes   Previous Recommendations Implemented: Yes   Cultural, Pentecostalism, or Ethnic Dietary Needs: None   LEARNING NEEDS (Diet, Food/Nutrient-Drug Interaction):    [x] None Identified   [] Identified and Education Provided/Documented   [] Identified and Pt declined/was not appropriate      [x] Interdisciplinary Care Plan Reviewed/Documented    [x] Participated in Discharge Planning: Unable to determine    [x] Interdisciplinary Rounds     NUTRITION RISK:    [x] High              [] Moderate           []  Low  []  Minimal/Uncompromised      Luz Aragon RD, [address]   Pager [phone]  Weekend Pager [phone]

## 2018-02-08 NOTE — PROGRESS NOTES
7:40 PM  Report received from [place], [facility].     8:30 PM  Assessment completed. Pt withdrawals from pain, opens eyes spontaneously. NC applied, pt breathing in 30's-40's, been consistent throughout the day per day RN. VSS, Levophed infusing at 10 mcg/min. 12:29 AM  Reassessment completed, no changes noted. Will continue to monitor. 4:30 AM  Reassessment completed, no changes noted. Bedside shift change report given to Riana Aguila (oncoming nurse) by Mabel Hernandez (offgoing nurse). Report included the following information SBAR, Kardex, Procedure Summary, Intake/Output, MAR, Accordion, Recent Results and Med Rec Status.

## 2018-02-08 NOTE — PROGRESS NOTES
Palliative Medicine Consult  Piyush: [phone]    Patient Name: Caesar Trinidad  YOB: 1945    Date of Initial Consult: 2/5/18  Reason for Consult: Overwhelming Symptoms  Requesting Provider: Wolfgang Desai MD  Primary Care Physician: Dionne Escalante MD     SUMMARY:   Caesar Trinidad is a 72 y.o. with a past history of  Lupus, PUD, GERD, HTN, DM, and mental retardation, who was admitted on 2/2/2018 from halfway with a diagnosis of severe sepsis, hypothermia, hypoglycemia, seizures. Current medical issues leading to Palliative Medicine involvement include: family support. MRI of brain 2/3 showed mild small vessel ischemic changes. No acute abnormality, EEG showed cerebral dysfunction with no seizure activity. 2/6: CCU day 4: CXR shows persistent moderate diffuse airspace disease with small bilateral effusions. Remains on levo for hypotension, D10 for hypoglycemia. Remains minimally responsive. 2/8: CCU day 6: WBCs remain elevated at 11.9, still pressor dependent. Now off D10. Still lethargic. Psychosocial: lives with sister, who recently placed pt in [facility] while she had surgery on her back, then once recovered will bring patent back home. normal baseline pt speaks a few words, ambulates with assistance Manhermelindo Gil uses a gait belt) feeds self unless sick. Per aleshia Tracey Rodriguez functions at the level of a 14 y/o. PALLIATIVE DIAGNOSES:   1. Severe sepsis: influenza B pos and UTI  2. Hypothermia  3. Hypoglycemia  4. Seizures  5. Acute hyperkalemia  6. Influenza   7. Hypotension   8. Care decisions     PLAN:   1. Spoke with Zonia Welsh; he agrees that the [address] might be contributing to pt's somnolence and will write orders to discontinue. If pt does not perk up overnight, will call her  sister and discuss Hospice. 2. Provided Palliative contact info. 3. Initial consult note routed to primary continuity provider  4.  Communicated plan of care with: Palliative IDT, RN     GOALS OF CARE / TREATMENT PREFERENCES:     GOALS OF CARE:  Patient/Health Care Proxy Stated Goals: Prolong life     1. Recover  2. Return home      TREATMENT PREFERENCES:   Code Status: Partial Code    Advance Care Planning:  No flowsheet data found. Medical Interventions: Limited additional interventions   Other Instructions: Other:    As far as possible, the palliative care team has discussed with patient / health care proxy about goals of care / treatment preferences for patient. HISTORY:     History obtained from:  Chart, family    CHIEF COMPLAINT: found unresponsive    HPI/SUBJECTIVE:    The patient is:   [] Verbal and participatory  [x] Non-participatory due to:     BIBA after being found unresponsive at the [facility] by caregiver. Per EMS, HR in 30s and BG 27, EMS administered IM glucagon en route, bringing BG up to 200. In ED, pt began seizing, which resolved following 2mg ativan. ED w/u:  CT of chest/abd/pelvis showed severe scoliosis, atelectasis in lower lobes, left greater than right, atherosclerotic abdominal aorta without aneurysm, s/p hysterectomy. Head CT no acute findings, CXR neg. EKG showed afib with slow ventricular responsive and irregular rate of 40. Ebenezer Bryant reports that when pt was ~7 y/o she was very sick, high fevers, her parents were crying, took her to the ED, shortly after she had her tonsils removed and has never been the same. Parents were told to put her in an institution, that she would never learn anything. Ebenezer Bryant was able to help pt walk using a gait belt, however, over the years pt's legs have been getting weaker. At one point, she was in hospice for lupus, but got better. Pt is able to feed self, however, sometimes shakes and loses the food off the spoon before it gets to her mouth. Likes TV, music, flipping pages in a book. She can say a few words, usually delayed response but cannot carry a conversation.      Clinical Pain Assessment (nonverbal scale for severity on nonverbal patients):   Clinical Pain Assessment  Severity: 0     Activity (Movement): Lying quietly, normal position    Duration: for how long has pt been experiencing pain (e.g., 2 days, 1 month, years)  Frequency: how often pain is an issue (e.g., several times per day, once every few days, constant)     FUNCTIONAL ASSESSMENT:     Palliative Performance Scale (PPS):  PPS: 10       PSYCHOSOCIAL/SPIRITUAL SCREENING:     Palliative IDT has assessed this patient for cultural preferences / practices and a referral made as appropriate to needs (Cultural Services, Patient Advocacy, Ethics, etc.)    Advance Care Planning:  No flowsheet data found. Any spiritual / Religion concerns:  [] Yes /  [x] No    Caregiver Burnout:  [] Yes /  [] No /  [x] No Caregiver Present      Anticipatory grief assessment:   [x] Normal  / [] Maladaptive       ESAS Anxiety: Anxiety: 0    ESAS Depression:   : unable to assess due to pt factors       REVIEW OF SYSTEMS:     Positive and pertinent negative findings in ROS are noted above in HPI. The following systems were [x] reviewed / [] unable to be reviewed as noted in HPI  Other findings are noted below. Systems: constitutional, ears/nose/mouth/throat, respiratory, gastrointestinal, genitourinary, musculoskeletal, integumentary, neurologic, psychiatric, endocrine. Positive findings noted below. Modified ESAS Completed by: provider   Fatigue: 10 Drowsiness: 10     Pain: 0   Anxiety: 0       Dyspnea: 2     Constipation: No     Stool Occurrence(s): 1        PHYSICAL EXAM:     From RN flowsheet:  Wt Readings from Last 3 Encounters:   02/08/18 154 lb 12.2 oz (70.2 kg)   05/31/17 156 lb (70.8 kg)   05/23/17 156 lb (70.8 kg)     Blood pressure 113/54, pulse 76, temperature 99.3 °F (37.4 °C), resp. rate 24, height 5' 1\" (1.549 m), weight 154 lb 12.2 oz (70.2 kg), SpO2 99 %.     Pain Scale 1: Adult Nonverbal Pain Scale  Pain Intensity 1: 0                 Last bowel movement, if known: Constitutional: minimally responsive, sitting slumped over this am, however, after a bath and morphine, pt is relaxed in the bed  Eyes: pupils equal, anicteric  ENMT: no nasal discharge, moist mucous membranes  Cardiovascular: regular rhythm, distal pulses intact  Respiratory: breathing not labored, symmetric  Gastrointestinal: soft non-tender, +bowel sounds  Musculoskeletal: no deformity, no tenderness to palpation  Skin: warm, dry  Neurologic: following no commands, not moving all extremities  Psychiatric: unable to assess due to pt factors          HISTORY:     Active Problems:    UTI (urinary tract infection) (2/2/2018)      Mental retardation (2/2/2018)      Sepsis ([address] (2/2/2018)      Hx of systemic lupus erythematosus (SLE) (2/2/2018)      Hx of diabetes mellitus (2/2/2018)      Hx of essential hypertension (2/2/2018)      Past Medical History:   Diagnosis Date    Arthritis     Colon polyp     Diabetes ([facility]     borderline no medications    Environmental allergies 4/28/2010    GERD (gastroesophageal reflux disease)     HTN (hypertension) 4/28/2010    dosent take medication now    Lupus     MR (mental retardation)     Osteoporosis, senile     Other ill-defined conditions     parkinson's disease possibly    Psychiatric disorder     anxious    PUD (peptic ulcer disease)     Scolioses     Sjogren's syndrome ([facility] [address] 3/4/2015      Past Surgical History:   Procedure Laterality Date    HX TONSILLECTOMY      RI COLONOSCOPY FLX DX W/COLLJ SPEC WHEN PFRMD  2/17/2011    due 2013      Family History   Problem Relation Age of Onset    Cancer Mother      pancreas    Heart Disease Father     Heart Attack Father       History reviewed, no pertinent family history.   Social History   Substance Use Topics    Smoking status: Never Smoker    Smokeless tobacco: Never Used    Alcohol use No     Allergies   Allergen Reactions    Adhesive Tape Other (comments)     Redness under that adhesive      Current Facility-Administered Medications   Medication Dose Route Frequency    potassium chloride 20 mEq in 50 ml IVPB  20 mEq IntraVENous Q1H    furosemide (LASIX) injection 60 mg  60 mg IntraVENous Q12H    potassium chloride 20 mEq in 50 ml IVPB  20 mEq IntraVENous Q1H    morphine injection 2 mg  2 mg IntraVENous Q4H PRN    albuterol-ipratropium (DUO-NEB) 2.5 MG-0.5 MG/3 ML  3 mL Nebulization QID RT    levothyroxine (SYNTHROID) tablet 25 mcg  25 mcg Per NG tube 6am    vancomycin (VANCOCIN) 1,000 mg in 0.9% sodium chloride (MBP/ADV) 250 mL  1,000 mg IntraVENous Q24H    sodium chloride (NS) flush 10-30 mL  10-30 mL InterCATHeter PRN    sodium chloride (NS) flush 10 mL  10 mL InterCATHeter Q24H    sodium chloride (NS) flush 10-40 mL  10-40 mL InterCATHeter Q8H    heparin (porcine) pf 300-500 Units  300-500 Units InterCATHeter PRN    sodium chloride (NS) flush 20 mL  20 mL InterCATHeter Q24H    piperacillin-tazobactam (ZOSYN) 4.5 g in  ml premix/cpmd  4.5 g IntraVENous Q8H    heparin (porcine) pf 300 Units  300 Units InterCATHeter PRN    NOREPINephrine (LEVOPHED) 32 mg in 5% dextrose 250 mL infusion  2-200 mcg/min IntraVENous TITRATE    acetaminophen (TYLENOL) suppository 650 mg  650 mg Rectal Q4H PRN    dextrose (D50W) injection syrg 25 g  25 g IntraVENous PRN    sodium chloride 0.9 % in dextrose 10% 1,040 mL infusion   IntraVENous CONTINUOUS    sodium chloride (NS) flush 5-10 mL  5-10 mL IntraVENous Q8H    sodium chloride (NS) flush 5-10 mL  5-10 mL IntraVENous PRN          LAB AND IMAGING FINDINGS:     Lab Results   Component Value Date/Time    WBC 11.9 (H) 02/08/2018 09:34 AM    HGB 7.9 (L) 02/08/2018 09:34 AM    PLATELET 39 (LL) 34/26/2574 09:34 AM     Lab Results   Component Value Date/Time    Sodium 146 (H) 02/08/2018 09:34 AM    Potassium 3.0 (L) 02/08/2018 09:34 AM    Chloride 104 02/08/2018 09:34 AM    CO2 37 (H) 02/08/2018 09:34 AM    BUN 32 (H) 02/08/2018 09:34 AM    Creatinine 1.47 (H) 02/08/2018 09:34 AM    Calcium 8.2 (L) 02/08/2018 09:34 AM    Magnesium 1.6 02/08/2018 09:34 AM    Phosphorus 3.5 02/08/2018 09:34 AM      Lab Results   Component Value Date/Time    AST (SGOT) 44 (H) 02/07/2018 03:59 AM    Alk. phosphatase 172 (H) 02/07/2018 03:59 AM    Protein, total 6.3 (L) 02/07/2018 03:59 AM    Albumin 1.8 (L) 02/07/2018 03:59 AM    Globulin 4.5 (H) 02/07/2018 03:59 AM     Lab Results   Component Value Date/Time    INR 1.1 02/02/2018 03:06 PM    Prothrombin time 10.6 02/02/2018 03:06 PM    aPTT 29.6 02/02/2018 03:06 PM      Lab Results   Component Value Date/Time    Iron 80 12/27/2010 04:18 PM    TIBC 261 12/27/2010 04:18 PM    Iron % saturation 31 12/27/2010 04:18 PM    Ferritin 81 02/17/2010 02:21 PM      Lab Results   Component Value Date/Time    pH 7.41 02/04/2018 03:33 PM    PCO2 33 (L) 02/04/2018 03:33 PM    PO2 70 (L) 02/04/2018 03:33 PM     No components found for: [place]   Lab Results   Component Value Date/Time     02/02/2018 03:06 PM    CK - MB 4.0 (H) 08/05/2013 10:00 AM                Total time: 45  Counseling / coordination time, spent as noted above: 35  > 50% counseling / coordination?: yes    Prolonged service was provided for  []30 min   []75 min in face to face time in the presence of the patient, spent as noted above. Time Start:   Time End:   Note: this can only be billed with 78552 (initial) or 80126 (follow up). If multiple start / stop times, list each separately.

## 2018-02-08 NOTE — INTERDISCIPLINARY ROUNDS
Interdisciplinary team rounds were held 2/8/18 with the following team members:Care Management, Diabetes Treatment Specialist, Nursing, Nutrition, Pharmacy, Physical Therapy, Physician, Respiratory Therapy and Clinical Coordinator. Plan of care discussed. Goal: See MD orders and progress notes for further  interventions and desired outcomes.

## 2018-02-08 NOTE — PROGRESS NOTES
[facility]  Pulmonary, Critical Care, and Sleep Medicine    Name: Michelle Lockwood MRN: [id number]   : 1945 Hospital: [facility]   Date: 2018        IMPRESSION:   · Acute hypoxic respiratory failure  · Acute influenza  · Can not rule out superinfection  · Diffuse bilateral infiltrates - rapid development argues more for pulmonary edema but can't exclude ARDS  · Encephalopathy with seizure or myoclonus - possibly due to hypoglycemia   · Shock   · Severe hypoglycemia  · GNR UTI  · Lupus   · Mental retardation   · GERD  · DM      PLAN:   · O2   · Follow up AM labs  · Diurese again today with careful monitoring of her creatinine   · Empiric antibiotics, Tamiflu  · Pressors  · D10 infusion with glycemic monitoring  · Follow up procalcitonin   · Plaquenil on hold  · Antiepileptics, neuro evaluation ongoing  · DVT prophylaxis  · Remains critically ill with high risk for further decompensation/death  · DNR. Palliative care assistance greatly appreciated     Subjective/Interval History:   I have reviewed the flowsheet and previous days notes.   The patient is unable to give any meaningful history or review of systems because the patient is:  Lethargic - continues with increased WOB     The patient is critically ill on:      Pressors      Review of Systems   Unable to perform ROS: Mental status change     Objective:   Vital Signs:    Visit Vitals    /62    Pulse 99    Temp 99.5 °F (37.5 °C)    Resp (!) 34    Ht 5' 1\" (1.549 m)    Wt 73 kg (160 lb 15 oz)    SpO2 96%    BMI 30.41 kg/m2       O2 Device: Nasal cannula   O2 Flow Rate (L/min): 3 l/min   Temp (24hrs), Av °F (37.2 °C), Min:98.3 °F (36.8 °C), Max:99.8 °F (37.7 °C)       Intake/Output:   Last shift:         Last 3 shifts: 1 -  0700  In: 4200 [I.V.:1697]  Out: 0238 [Urine:3380]    Intake/Output Summary (Last 24 hours) at 18 0912  Last data filed at 18 0700   Gross per 24 hour   Intake          1608.89 ml   Output             2255 ml   Net          -646.11 ml     Hemodynamics:   PAP:   CO:     Wedge:   CI:     CVP:    SVR:       PVR:       Ventilator Settings:  Mode Rate Tidal Volume Pressure FiO2 PEEP                    Peak airway pressure:      Minute ventilation:         Physical Exam   Constitutional: She appears lethargic. No distress. HENT:   Head: Normocephalic and atraumatic. Eyes: No scleral icterus. Cardiovascular: Normal rate. An irregular rhythm present. Pulmonary/Chest: She has wheezes. She has rales. Abdominal: Soft. Bowel sounds are normal. She exhibits no distension. There is no tenderness. Musculoskeletal: She exhibits no edema. Neurological: She appears lethargic. Skin: Skin is warm and dry. No rash noted.      Data:     Current Facility-Administered Medications   Medication Dose Route Frequency    albuterol-ipratropium (DUO-NEB) 2.5 MG-0.5 MG/3 ML  3 mL Nebulization QID RT    levothyroxine (SYNTHROID) tablet 25 mcg  25 mcg Per NG tube 6am    vancomycin (VANCOCIN) 1,000 mg in 0.9% sodium chloride (MBP/ADV) 250 mL  1,000 mg IntraVENous Q24H    sodium chloride (NS) flush 10 mL  10 mL InterCATHeter Q24H    sodium chloride (NS) flush 10-40 mL  10-40 mL InterCATHeter Q8H    sodium chloride (NS) flush 20 mL  20 mL InterCATHeter Q24H    piperacillin-tazobactam (ZOSYN) 4.5 g in  ml premix/cpmd  4.5 g IntraVENous Q8H    NOREPINephrine (LEVOPHED) 32 mg in 5% dextrose 250 mL infusion  2-200 mcg/min IntraVENous TITRATE    oseltamivir (TAMIFLU) 6 mg/mL oral suspension 30 mg  30 mg Per G Tube BID    levETIRAcetam (KEPPRA) 500 mg in saline (iso-osm) 100 ml IVPB  500 mg IntraVENous Q12H    sodium chloride 0.9 % in dextrose 10% 1,040 mL infusion   IntraVENous CONTINUOUS    sodium chloride (NS) flush 5-10 mL  5-10 mL IntraVENous Q8H                Labs:  Recent Labs      02/07/18   0359  02/06/18   0332   WBC  10.0  13.5*   HGB  7.7*  8.2*   HCT 24.0*  24.3*   PLT  25*  21*     Recent Labs      02/07/18   0359  02/06/18   0332  02/05/18   1708   NA  143  143  144   K  3.1*  3.6  3.9   CL  106  110*  110*   CO2  32  28  29   GLU  131*  107*  91   BUN  21*  19  20   CREA  1.32*  1.26*  1.20*   CA  8.0*  7.8*  7.7*   MG  1.8  1.9   --    PHOS  2.6  2.2*   --    ALB  1.8*  1.8*   --    TBILI  0.8  0.7   --    SGOT  44*  65*   --    ALT  88*  113*   --      No results for input(s): PH, PCO2, PO2, HCO3, FIO2 in the last 72 hours.   Imaging:  I have personally reviewed the patients radiographs and have reviewed the reports:  No change        Total critical care time exclusive of procedures: 25 minutes  Mayr Adair MD

## 2018-02-08 NOTE — PROGRESS NOTES
Hospitalist Progress Note    NAME: Tom Rao   :  1945   MRN:  [id number]       Assessment / Plan:  Acute hypoxic respiratory failure due to acute pulmonary edema:  still with significant acute hypoxia, tachypnea  - CXR today with diffuse airspace disease/edema. No significant change. - con't vanco, zosyn and tamiflu  - appreciate pulmonology assistance with respiratory issues. - Palliative consult appreciated - pt's code status to be changed to DNR  - Pt continues to require Levophed in order to maintain MAP >65mmHg - being weaned as tolerated - currently on 10mcg  - Pt's status continues to be tenuous at best - prognosis is grim     TIFFANIE  -Pt being diuresed but also has labile renal function making this more difficult  -Continue to monitor daily    Hypokalemia  -K 3.0, repleted, monitor      Acute encephalopathy and septic shock due to UTI and Influenza B, present on admission: +eye deviation, remains nonresponsive with ?myoclonus.  - MRI brain with mild small vessel ischemic changes. No acute abnormality. - CT chest/abd/pelvis with severe scoliosis. Atelectasis in the lower lobes, left greater than right. Anemia. Atherosclerotic abdominal aorta without aneurysm. - UA >[id number] pansensitive E Coli, blood cultures no growth.  Con't zosyn as above.   - influenza B positive, con't tamiflu  - IV fluids and levophed prn for BP support  - EEG with cerebral dysfunction with no seizure activity  - appreciate neuro consult, recommending con't keppra for now - AMS is multifactorial in nature      Hypoglycemia:  H/o \"borderline DM,\" not on meds at home.  HgA1c <3.5.  - con't D10 gtt  - serial gl  - will checkucose checks      Transaminitis:  Presumed due to sepsis, improving  Baseline mental retardation  Lupus: Continue holding Plaquenil  Hypothyroidism:  Synthroid dose via NGT  Obesity (Body mass index is 32.45 kg/(m^2)      Code Status: full  Surrogate Decision Maker: sister   DVT Prophylaxis: heparin      Baseline: Mental retardation, lives with sister normally but placed in group home after sister had to undergo recent back surgery     Subjective:     Chief Complaint / Reason for Physician Visit  Encephalopathic. Discussed with RN events overnight. Review of Systems:  Symptom Y/N Comments  Symptom Y/N Comments   Fever/Chills    Chest Pain     Poor Appetite    Edema     Cough    Abdominal Pain     Sputum    Joint Pain     SOB/YOUNG    Pruritis/Rash     Nausea/vomit    Tolerating PT/OT     Diarrhea    Tolerating Diet     Constipation    Other       Could NOT obtain due to: Mental status     Objective:     VITALS:   Last 24hrs VS reviewed since prior progress note.  Most recent are:  Patient Vitals for the past 24 hrs:   Temp Pulse Resp BP SpO2   02/08/18 1400 - 85 30 (!) 92/36 92 %   02/08/18 1300 - (!) 102 (!) 40 101/67 94 %   02/08/18 1230 99.3 °F (37.4 °C) 96 27 (!) 110/24 97 %   02/08/18 1200 - 87 28 117/53 100 %   02/08/18 1151 - - - - 97 %   02/08/18 1145 - - - (!) 143/36 -   02/08/18 1100 - 92 (!) 42 123/82 97 %   02/08/18 1000 - (!) 101 (!) 38 97/62 96 %   02/08/18 0900 - (!) 107 (!) 37 - 96 %   02/08/18 0831 - - - - 96 %   02/08/18 0800 - 99 (!) 34 121/62 96 %   02/08/18 0730 99.5 °F (37.5 °C) (!) 101 (!) 38 126/46 95 %   02/08/18 0700 - 100 (!) 45 111/75 96 %   02/08/18 0630 - (!) 102 (!) 50 108/55 94 %   02/08/18 0600 - (!) 101 (!) 38 105/41 97 %   02/08/18 0530 - 100 (!) 36 120/46 99 %   02/08/18 0500 - 100 (!) 35 116/71 99 %   02/08/18 0430 - (!) 103 (!) 40 99/55 95 %   02/08/18 0400 98.7 °F (37.1 °C) 100 (!) 45 113/60 98 %   02/08/18 0330 - (!) 101 (!) 34 99/62 99 %   02/08/18 0300 - 95 29 93/52 99 %   02/08/18 0232 - 100 (!) 38 92/51 98 %   02/08/18 0230 - 99 (!) 41 (!) 83/46 98 %   02/08/18 0200 - 97 (!) 33 (!) 89/53 99 %   02/08/18 0130 - (!) 103 (!) 38 (!) 81/62 94 %   02/08/18 0100 - (!) 107 20 96/58 97 %   02/08/18 0012 99.8 °F (37.7 °C) 87 (!) 48 (!) 87/63 92 %   02/07/18 2333 - 96 (!) 44 131/83 95 %   02/07/18 2300 - 96 (!) 38 117/65 97 %   02/07/18 2230 - 95 (!) 50 (!) 112/11 96 %   02/07/18 2200 - 96 (!) 37 118/72 96 %   02/07/18 2142 - 97 (!) 45 (!) 103/18 91 %   02/07/18 2103 - (!) 103 (!) 42 95/50 93 %   02/07/18 2030 98.8 °F (37.1 °C) 91 (!) 34 (!) 89/27 100 %   02/07/18 2010 - - - - 98 %   02/07/18 2000 - 95 (!) 48 (!) 86/35 97 %   02/07/18 1930 - 95 29 103/56 97 %   02/07/18 1903 - 93 (!) 37 98/70 95 %   02/07/18 1900 - 92 (!) 41 - 94 %   02/07/18 1800 - 90 (!) 41 102/59 96 %   02/07/18 1700 - 92 (!) 55 100/53 97 %   02/07/18 1600 98.3 °F (36.8 °C) 87 (!) 37 129/46 98 %   02/07/18 1550 - - - - 96 %   02/07/18 1503 - 83 (!) 38 102/54 97 %   02/07/18 1500 - 78 (!) 37 (!) 81/44 99 %       Intake/Output Summary (Last 24 hours) at 02/08/18 1418  Last data filed at 02/08/18 1300   Gross per 24 hour   Intake          1397.55 ml   Output             2240 ml   Net          -842. 45 ml        PHYSICAL EXAM:  General:                    Altered  EENT:                       Anicteric sclerae  Resp:                        CTA bilaterally, no wheezing or rales.  Frequent shallow respiration  CV:                            Regular  rhythm,  No edema  GI:                              Soft, Non distended, Non tender.  +Bowel sounds  Neurologic:                Alert and oriented x 0   Psych:                       No insight  Skin:                           No rashes.  No jaundice    Reviewed most current lab test results and cultures  YES  Reviewed most current radiology test results   YES  Review and summation of old records today    NO  Reviewed patient's current orders and MAR    YES  PMH/SH reviewed - no change compared to H&P  ________________________________________________________________________  Care Plan discussed with:    Comments   Patient x    Family      RN x    Care Manager     Consultant                        Multidiciplinary team rounds were held today with , nursing, pharmacist and clinical coordinator. Patient's plan of care was discussed; medications were reviewed and discharge planning was addressed. ________________________________________________________________________  Total NON critical care TIME:  25   Minutes    Total CRITICAL CARE TIME Spent:   Minutes non procedure based      Comments   >50% of visit spent in counseling and coordination of care     ________________________________________________________________________  Cisco Knott MD     Procedures: see electronic medical records for all procedures/Xrays and details which were not copied into this note but were reviewed prior to creation of Plan. LABS:  I reviewed today's most current labs and imaging studies.   Pertinent labs include:  Recent Labs      02/08/18   0934  02/07/18   0359  02/06/18   0332   WBC  11.9*  10.0  13.5*   HGB  7.9*  7.7*  8.2*   HCT  24.2*  24.0*  24.3*   PLT  39*  25*  21*     Recent Labs      02/08/18   0934  02/07/18 0359  02/06/18   0332   NA  146*  143  143   K  3.0*  3.1*  3.6   CL  104  106  110*   CO2  37*  32  28   GLU  168*  131*  107*   BUN  32*  21*  19   CREA  1.47*  1.32*  1.26*   CA  8.2*  8.0*  7.8*   MG  1.6  1.8  1.9   PHOS  3.5  2.6  2.2*   ALB   --   1.8*  1.8*   TBILI   --   0.8  0.7   SGOT   --   44*  65*   ALT   --   88*  113*       Signed: Cisco Knott MD

## 2018-02-09 ENCOUNTER — APPOINTMENT (OUTPATIENT)
Dept: GENERAL RADIOLOGY | Age: 73
DRG: 871 | End: 2018-02-09
Attending: INTERNAL MEDICINE
Payer: MEDICARE

## 2018-02-09 PROBLEM — N17.0 ACUTE RENAL FAILURE WITH TUBULAR NECROSIS (HCC): Status: ACTIVE | Noted: 2018-02-09

## 2018-02-09 PROBLEM — I95.0 IDIOPATHIC HYPOTENSION: Status: ACTIVE | Noted: 2018-02-09

## 2018-02-09 LAB
ALBUMIN SERPL-MCNC: 1.9 G/DL (ref 3.5–5)
ALBUMIN/GLOB SERPL: 0.3 {RATIO} (ref 1.1–2.2)
ALP SERPL-CCNC: 207 U/L (ref 45–117)
ALT SERPL-CCNC: 64 U/L (ref 12–78)
ANION GAP SERPL CALC-SCNC: 0 MMOL/L (ref 5–15)
AST SERPL-CCNC: 44 U/L (ref 15–37)
BASOPHILS # BLD: 0 K/UL (ref 0–0.1)
BASOPHILS NFR BLD: 0 % (ref 0–1)
BILIRUB SERPL-MCNC: 0.8 MG/DL (ref 0.2–1)
BUN SERPL-MCNC: 40 MG/DL (ref 6–20)
BUN/CREAT SERPL: 26 (ref 12–20)
CALCIUM SERPL-MCNC: 8.8 MG/DL (ref 8.5–10.1)
CHLORIDE SERPL-SCNC: 102 MMOL/L (ref 97–108)
CO2 SERPL-SCNC: 42 MMOL/L (ref 21–32)
CREAT SERPL-MCNC: 1.55 MG/DL (ref 0.55–1.02)
DATE LAST DOSE: ABNORMAL
DIFFERENTIAL METHOD BLD: ABNORMAL
EOSINOPHIL # BLD: 0.2 K/UL (ref 0–0.4)
EOSINOPHIL NFR BLD: 2 % (ref 0–7)
ERYTHROCYTE [DISTWIDTH] IN BLOOD BY AUTOMATED COUNT: 15.1 % (ref 11.5–14.5)
GLOBULIN SER CALC-MCNC: 5.6 G/DL (ref 2–4)
GLUCOSE BLD STRIP.AUTO-MCNC: 151 MG/DL (ref 65–100)
GLUCOSE BLD STRIP.AUTO-MCNC: 172 MG/DL (ref 65–100)
GLUCOSE BLD STRIP.AUTO-MCNC: 173 MG/DL (ref 65–100)
GLUCOSE BLD STRIP.AUTO-MCNC: 176 MG/DL (ref 65–100)
GLUCOSE SERPL-MCNC: 165 MG/DL (ref 65–100)
HCT VFR BLD AUTO: 25.3 % (ref 35–47)
HGB BLD-MCNC: 8.3 G/DL (ref 11.5–16)
IMM GRANULOCYTES # BLD: 0.1 K/UL (ref 0–0.04)
IMM GRANULOCYTES NFR BLD AUTO: 2 % (ref 0–0.5)
LYMPHOCYTES # BLD: 0.9 K/UL (ref 0.8–3.5)
LYMPHOCYTES NFR BLD: 11 % (ref 12–49)
MAGNESIUM SERPL-MCNC: 1.6 MG/DL (ref 1.6–2.4)
MCH RBC QN AUTO: 28.6 PG (ref 26–34)
MCHC RBC AUTO-ENTMCNC: 32.8 G/DL (ref 30–36.5)
MCV RBC AUTO: 87.2 FL (ref 80–99)
MONOCYTES # BLD: 0.6 K/UL (ref 0–1)
MONOCYTES NFR BLD: 7 % (ref 5–13)
NEUTS SEG # BLD: 6.3 K/UL (ref 1.8–8)
NEUTS SEG NFR BLD: 79 % (ref 32–75)
NRBC # BLD: 0 K/UL (ref 0–0.01)
NRBC BLD-RTO: 0 PER 100 WBC
PHOSPHATE SERPL-MCNC: 2.9 MG/DL (ref 2.6–4.7)
PLATELET # BLD AUTO: 54 K/UL (ref 150–400)
PMV BLD AUTO: 12.6 FL (ref 8.9–12.9)
POTASSIUM SERPL-SCNC: 3.9 MMOL/L (ref 3.5–5.1)
PROCALCITONIN SERPL-MCNC: 0.78 NG/ML (ref 0–0.08)
PROT SERPL-MCNC: 7.5 G/DL (ref 6.4–8.2)
RBC # BLD AUTO: 2.9 M/UL (ref 3.8–5.2)
REPORTED DOSE,DOSE: ABNORMAL UNITS
REPORTED DOSE/TIME,TMG: ABNORMAL
SERVICE CMNT-IMP: ABNORMAL
SODIUM SERPL-SCNC: 144 MMOL/L (ref 136–145)
VANCOMYCIN TROUGH SERPL-MCNC: 23.1 UG/ML (ref 5–10)
WBC # BLD AUTO: 8.1 K/UL (ref 3.6–11)

## 2018-02-09 PROCEDURE — 94640 AIRWAY INHALATION TREATMENT: CPT

## 2018-02-09 PROCEDURE — 74011250636 HC RX REV CODE- 250/636: Performed by: INTERNAL MEDICINE

## 2018-02-09 PROCEDURE — 77010033678 HC OXYGEN DAILY

## 2018-02-09 PROCEDURE — 74011000250 HC RX REV CODE- 250: Performed by: INTERNAL MEDICINE

## 2018-02-09 PROCEDURE — 85025 COMPLETE CBC W/AUTO DIFF WBC: CPT | Performed by: INTERNAL MEDICINE

## 2018-02-09 PROCEDURE — 80053 COMPREHEN METABOLIC PANEL: CPT | Performed by: INTERNAL MEDICINE

## 2018-02-09 PROCEDURE — 77030018798 HC PMP KT ENTRL FED COVD -A

## 2018-02-09 PROCEDURE — 84100 ASSAY OF PHOSPHORUS: CPT | Performed by: INTERNAL MEDICINE

## 2018-02-09 PROCEDURE — 82962 GLUCOSE BLOOD TEST: CPT

## 2018-02-09 PROCEDURE — 80202 ASSAY OF VANCOMYCIN: CPT | Performed by: GENERAL ACUTE CARE HOSPITAL

## 2018-02-09 PROCEDURE — 36415 COLL VENOUS BLD VENIPUNCTURE: CPT | Performed by: INTERNAL MEDICINE

## 2018-02-09 PROCEDURE — 71045 X-RAY EXAM CHEST 1 VIEW: CPT

## 2018-02-09 PROCEDURE — 65610000006 HC RM INTENSIVE CARE

## 2018-02-09 PROCEDURE — 74011250637 HC RX REV CODE- 250/637: Performed by: INTERNAL MEDICINE

## 2018-02-09 PROCEDURE — 83735 ASSAY OF MAGNESIUM: CPT | Performed by: INTERNAL MEDICINE

## 2018-02-09 RX ORDER — VANCOMYCIN/0.9 % SOD CHLORIDE 1 G/100 ML
1000 PLASTIC BAG, INJECTION (ML) INTRAVENOUS EVERY 24 HOURS
Status: DISCONTINUED | OUTPATIENT
Start: 2018-02-09 | End: 2018-02-10

## 2018-02-09 RX ADMIN — IPRATROPIUM BROMIDE AND ALBUTEROL SULFATE 3 ML: .5; 3 SOLUTION RESPIRATORY (INHALATION) at 11:37

## 2018-02-09 RX ADMIN — IPRATROPIUM BROMIDE AND ALBUTEROL SULFATE 3 ML: .5; 3 SOLUTION RESPIRATORY (INHALATION) at 15:25

## 2018-02-09 RX ADMIN — Medication 10 ML: at 17:47

## 2018-02-09 RX ADMIN — PIPERACILLIN SODIUM AND TAZOBACTAM SODIUM 4.5 G: .5; 4 INJECTION, POWDER, LYOPHILIZED, FOR SOLUTION INTRAVENOUS at 05:16

## 2018-02-09 RX ADMIN — NOREPINEPHRINE BITARTRATE 25 MCG/MIN: 1 INJECTION INTRAVENOUS at 13:00

## 2018-02-09 RX ADMIN — PIPERACILLIN SODIUM AND TAZOBACTAM SODIUM 4.5 G: .5; 4 INJECTION, POWDER, LYOPHILIZED, FOR SOLUTION INTRAVENOUS at 13:28

## 2018-02-09 RX ADMIN — Medication 10 ML: at 17:48

## 2018-02-09 RX ADMIN — MORPHINE SULFATE 2 MG: 4 INJECTION, SOLUTION INTRAMUSCULAR; INTRAVENOUS at 08:27

## 2018-02-09 RX ADMIN — Medication 10 ML: at 05:16

## 2018-02-09 RX ADMIN — LEVOTHYROXINE SODIUM 25 MCG: 50 TABLET ORAL at 05:16

## 2018-02-09 RX ADMIN — Medication 10 ML: at 21:22

## 2018-02-09 RX ADMIN — NOREPINEPHRINE BITARTRATE 45 MCG/MIN: 1 INJECTION INTRAVENOUS at 07:51

## 2018-02-09 RX ADMIN — IPRATROPIUM BROMIDE AND ALBUTEROL SULFATE 3 ML: .5; 3 SOLUTION RESPIRATORY (INHALATION) at 21:46

## 2018-02-09 RX ADMIN — MORPHINE SULFATE 2 MG: 4 INJECTION, SOLUTION INTRAMUSCULAR; INTRAVENOUS at 12:30

## 2018-02-09 RX ADMIN — PIPERACILLIN SODIUM AND TAZOBACTAM SODIUM 4.5 G: .5; 4 INJECTION, POWDER, LYOPHILIZED, FOR SOLUTION INTRAVENOUS at 21:22

## 2018-02-09 RX ADMIN — IPRATROPIUM BROMIDE AND ALBUTEROL SULFATE 3 ML: .5; 3 SOLUTION RESPIRATORY (INHALATION) at 07:55

## 2018-02-09 RX ADMIN — VANCOMYCIN HYDROCHLORIDE 1000 MG: 10 INJECTION, POWDER, LYOPHILIZED, FOR SOLUTION INTRAVENOUS at 17:51

## 2018-02-09 NOTE — PROGRESS NOTES
0730: Report received from RODRIGUEZ caceres.    0800: Pt. withdrawals from pain. Pt. Opens her eyes only to painful stimuli. Pulses palpable. Breath sounds coarse and diminished in the bases. Bowle sounds hypoactive. Tube feedings infusing Through NGT. Mirlande Vivar Pt. Has Levophed infusing, See MAR for titrations. Pt. Had a burst of HTN and levo was turned off. Pt. Then became very hypotensive and Levo was turned back on and titrated up to keep a MAP greater than 65. See MAR and flowsheets for titrations and correlating BP's. Dr. Elayne Halsted made aware. Pt. Is in heel boots. Old scarring to buttock. Pt. Has a [place] for retention, R. PICC line, PIV, and NGT.     0827: PRN morphine given for respiratory distress. Pt. Has a RR in the 30's and 40's. 0900: Pt.'s RR is now 27. Pt. Seems more comfortable. 1200: Reassessment completed. Pt. Has one loose BM. Incontinence care provided. Pt. Still has Levophed infusing to keep a MAP greater than 65.    1230: PRN morphine given for RR in the 40's.     1630: Spoke to Dr. Elayne Halsted regarding Patient's rising CO2 levels on morning labs. He does not want an ABG at this time or any other intervention at this time. 1700: [facility] called with a Critical Vanc of 23.1. Spoke to [place] in pharmacy and was told to give the scheduled vanc dose as dosed. 1900: Report given to RODRIGUEZ Rios.

## 2018-02-09 NOTE — PROGRESS NOTES
Hospitalist Progress Note    NAME: Jose Mejia   :  1945   MRN:  [id number]       Assessment / Plan:  Acute hypoxic respiratory failure due to acute pulmonary edema:  still with significant acute hypoxia, tachypnea  - CXR today with diffuse airspace disease/edema. No significant change. - con't vanco, zosyn and tamiflu  - appreciate pulmonology assistance  - Palliative consult appreciated - pt's code status to be changed to DNR  - Pt continues to require Levophed in order to maintain MAP >65mmHg - being weaned as tolerated - still requiring 10mcg  - Pt's status continues to be tenuous at best - prognosis is grim   -Spoke with Palliative today who have been in touch with pt's family. They plan on coming in this afternoon to determine goals of care and to discuss potential Hospice. Agree that this is likely the reasonable course of action. Pt's RR still remains in the 30's with PRN Morphine helping slightly     TIFFANIE, worsening  -Pt being diuresed but also has labile renal function making this more difficult  -Continue to monitor daily  -Lasix held today - BUN/Cr 40/1.55    Hypokalemia, resolved      Acute encephalopathy and septic shock due to UTI and Influenza B, present on admission: +eye deviation, remains nonresponsive with ?myoclonus.  - MRI brain with mild small vessel ischemic changes. No acute abnormality. - CT chest/abd/pelvis with severe scoliosis. Atelectasis in the lower lobes, left greater than right. Anemia. Atherosclerotic abdominal aorta without aneurysm. - UA >[id number] pansensitive E Coli, blood cultures no growth.  Con't zosyn as above.   - influenza B positive - tamiflu finished   - EEG with cerebral dysfunction with no seizure activity  - appreciate neuro consult, recommending con't keppra for now - AMS is multifactorial in nature      Hypoglycemia:  H/o \"borderline DM,\" not on meds at home.  HgA1c <3.5.  - con't D10 gtt  - serial gl  - will checkucose checks      Transaminitis:  Presumed due to sepsis, improving  Baseline mental retardation  Lupus: Continue holding Plaquenil  Hypothyroidism:  Synthroid dose via NGT  Obesity (Body mass index is 32.45 kg/(m^2)      Code Status: full  Surrogate Decision Maker: sister   DVT Prophylaxis: heparin      Baseline: Mental retardation, lives with sister normally but placed in group home after sister had to undergo recent back surgery     Subjective:     Chief Complaint / Reason for Physician Visit  Encephalopathic. Discussed with RN events overnight. Review of Systems:  Symptom Y/N Comments  Symptom Y/N Comments   Fever/Chills    Chest Pain     Poor Appetite    Edema     Cough    Abdominal Pain     Sputum    Joint Pain     SOB/YOUNG    Pruritis/Rash     Nausea/vomit    Tolerating PT/OT     Diarrhea    Tolerating Diet     Constipation    Other       Could NOT obtain due to: Mental status     Objective:     VITALS:   Last 24hrs VS reviewed since prior progress note.  Most recent are:  Patient Vitals for the past 24 hrs:   Temp Pulse Resp BP SpO2   02/09/18 1137 - - - - 94 %   02/09/18 1100 98 °F (36.7 °C) 92 28 107/50 98 %   02/09/18 1014 - 88 22 104/85 97 %   02/09/18 0900 - 94 29 97/55 95 %   02/09/18 0845 98.6 °F (37 °C) 87 28 (!) 134/92 90 %   02/09/18 0830 - 98 (!) 32 157/63 93 %   02/09/18 0815 - 89 (!) 33 155/79 96 %   02/09/18 0800 - 91 27 192/68 95 %   02/09/18 0756 - - - - 94 %   02/09/18 0730 - 91 (!) 31 (!) 185/147 98 %   02/09/18 0700 - 93 (!) 33 95/49 96 %   02/09/18 0602 - 79 29 92/48 97 %   02/09/18 0530 - 80 (!) 33 98/40 97 %   02/09/18 0500 - 66 23 131/56 99 %   02/09/18 0430 - 78 (!) 35 106/65 92 %   02/09/18 0400 - 76 29 (!) 105/29 95 %   02/09/18 0300 98.6 °F (37 °C) 78 23 102/43 100 %   02/09/18 0230 - 87 29 90/41 91 %   02/09/18 0200 - 82 26 (!) 102/34 99 %   02/09/18 0130 - 76 27 (!) 86/52 96 %   02/09/18 0002 - 84 25 137/48 (!) 87 %   02/08/18 2330 - 77 25 111/61 99 %   02/08/18 2300 98.9 °F (37.2 °C) 77 26 113/42 99 % 02/08/18 2230 - 78 26 103/54 100 %   02/08/18 2200 - 77 27 110/46 99 %   02/08/18 2130 - 83 (!) 34 94/49 96 %   02/08/18 2100 - 88 (!) 38 91/43 95 %   02/08/18 2030 - 81 29 92/46 98 %   02/08/18 2000 - 84 27 (!) 86/34 97 %   02/08/18 1936 - - - - 98 %   02/08/18 1930 98.6 °F (37 °C) 90 30 97/43 98 %   02/08/18 1900 - 67 26 (!) 112/31 98 %   02/08/18 1800 - 77 22 119/55 99 %   02/08/18 1700 - 91 23 - 93 %   02/08/18 1630 98.3 °F (36.8 °C) 64 25 129/47 100 %   02/08/18 1618 - - - - 99 %   02/08/18 1600 - 72 25 128/54 99 %   02/08/18 1530 - 72 25 116/68 99 %   02/08/18 1500 - 76 24 113/54 99 %       Intake/Output Summary (Last 24 hours) at 02/09/18 1405  Last data filed at 02/09/18 1100   Gross per 24 hour   Intake          2406.34 ml   Output             2055 ml   Net           351.34 ml        PHYSICAL EXAM:  General:                    Altered  EENT:                       Anicteric sclerae  Resp:                        CTA bilaterally, no wheezing or rales. Frequent shallow respirations  CV:                            Regular  rhythm,  No edema  GI:                              Soft, Non distended, Non tender.  +Bowel sounds  Neurologic:                Alert and oriented x 0   Psych:                       No insight  Skin:                          No rashes.  No jaundice    Reviewed most current lab test results and cultures  YES  Reviewed most current radiology test results   YES  Review and summation of old records today    NO  Reviewed patient's current orders and MAR    YES  PMH/SH reviewed - no change compared to H&P  ________________________________________________________________________  Care Plan discussed with:    Comments   Patient x    Family      RN x    Care Manager     Consultant  x                      Multidiciplinary team rounds were held today with , nursing, pharmacist and clinical coordinator.   Patient's plan of care was discussed; medications were reviewed and discharge planning was addressed. ________________________________________________________________________  Total NON critical care TIME:  25   Minutes    Total CRITICAL CARE TIME Spent:   Minutes non procedure based      Comments   >50% of visit spent in counseling and coordination of care     ________________________________________________________________________  Fritz Ovalles MD     Procedures: see electronic medical records for all procedures/Xrays and details which were not copied into this note but were reviewed prior to creation of Plan. LABS:  I reviewed today's most current labs and imaging studies.   Pertinent labs include:  Recent Labs      02/09/18 0313 02/08/18 0934  02/07/18   0359   WBC  8.1  11.9*  10.0   HGB  8.3*  7.9*  7.7*   HCT  25.3*  24.2*  24.0*   PLT  54*  39*  25*     Recent Labs      02/09/18 0313 02/08/18 0934  02/07/18   0359   NA  144  146*  143   K  3.9  3.0*  3.1*   CL  102  104  106   CO2  42*  37*  32   GLU  165*  168*  131*   BUN  40*  32*  21*   CREA  1.55*  1.47*  1.32*   CA  8.8  8.2*  8.0*   MG  1.6  1.6  1.8   PHOS  2.9  3.5  2.6   ALB  1.9*   --   1.8*   TBILI  0.8   --   0.8   SGOT  44*   --   44*   ALT  64   --   88*       Signed: Fritz Ovalles MD

## 2018-02-09 NOTE — PROGRESS NOTES
[facility]  Pulmonary, Critical Care, and Sleep Medicine    Name: Corina Escoto MRN: [id number]   : 1945 Hospital: [facility]   Date: 2018        IMPRESSION:   · Acute hypoxic respiratory failure  · Acute influenza  · Can not rule out superinfection  · Diffuse bilateral infiltrates - rapid development argues more for pulmonary edema but can't exclude ARDS  · Encephalopathy with seizure or myoclonus - possibly due to hypoglycemia   · Shock   · Severe hypoglycemia  · GNR UTI  · Lupus   · Mental retardation   · GERD  · DM      PLAN:   · O2   · Hold diuretics today due to increasing creatinine  · Empiric antibiotics, Tamiflu  · Pressors  · D10 infusion with glycemic monitoring  · Follow up repeat procalcitonin   · Plaquenil on hold  · Antiepileptics, neuro evaluation ongoing  · DVT prophylaxis  · Remains critically ill with high risk for further decompensation/death  · DNR. Palliative care assistance greatly appreciated. A transition to comfort measures would be reasonable     Subjective/Interval History:   I have reviewed the flowsheet and previous days notes.   The patient is unable to give any meaningful history or review of systems because the patient is:  Lethargic - continues with increased WOB, episodes of severe HTN and hypotension     The patient is critically ill on:      Pressors      Review of Systems   Unable to perform ROS: Mental status change     Objective:   Vital Signs:    Visit Vitals    BP (!) 134/92 (BP 1 Location: Left arm, BP Patient Position: At rest)    Pulse 87    Temp 98.6 °F (37 °C)    Resp 28    Ht 5' 1\" (1.549 m)    Wt 70.2 kg (154 lb 12.2 oz)    SpO2 90%    BMI 29.24 kg/m2       O2 Device: Nasal cannula   O2 Flow Rate (L/min): 3 l/min   Temp (24hrs), Av.7 °F (37.1 °C), Min:98.3 °F (36.8 °C), Max:99.3 °F (37.4 °C)       Intake/Output:   Last shift:      701 -  1900  In: 141.4 [I.V.:11.4]  Out: 200 [Urine:200]  Last 3 shifts: 02/07 1901 - 02/09 0700  In: 3361.2 [I.V.:1051.2]  Out: 3650 [Urine:3650]    Intake/Output Summary (Last 24 hours) at 02/09/18 0910  Last data filed at 02/09/18 0800   Gross per 24 hour   Intake          2542.87 ml   Output             2410 ml   Net           132.87 ml      Physical Exam   Constitutional: She appears lethargic. No distress. HENT:   Head: Normocephalic and atraumatic. Eyes: No scleral icterus. Cardiovascular: Normal rate. An irregular rhythm present. Pulmonary/Chest: She has wheezes. She has rales. Abdominal: Soft. Bowel sounds are normal. She exhibits no distension. There is no tenderness. Musculoskeletal: She exhibits no edema. Neurological: She appears lethargic. Skin: Skin is warm and dry. No rash noted.      Data:     Current Facility-Administered Medications   Medication Dose Route Frequency    albuterol-ipratropium (DUO-NEB) 2.5 MG-0.5 MG/3 ML  3 mL Nebulization QID RT    levothyroxine (SYNTHROID) tablet 25 mcg  25 mcg Per NG tube 6am    vancomycin (VANCOCIN) 1,000 mg in 0.9% sodium chloride (MBP/ADV) 250 mL  1,000 mg IntraVENous Q24H    sodium chloride (NS) flush 10 mL  10 mL InterCATHeter Q24H    sodium chloride (NS) flush 10-40 mL  10-40 mL InterCATHeter Q8H    sodium chloride (NS) flush 20 mL  20 mL InterCATHeter Q24H    piperacillin-tazobactam (ZOSYN) 4.5 g in  ml premix/cpmd  4.5 g IntraVENous Q8H    NOREPINephrine (LEVOPHED) 32 mg in 5% dextrose 250 mL infusion  2-200 mcg/min IntraVENous TITRATE    sodium chloride 0.9 % in dextrose 10% 1,040 mL infusion   IntraVENous CONTINUOUS    sodium chloride (NS) flush 5-10 mL  5-10 mL IntraVENous Q8H                Labs:  Recent Labs      02/09/18   0313  02/08/18   0934  02/07/18   0359   WBC  8.1  11.9*  10.0   HGB  8.3*  7.9*  7.7*   HCT  25.3*  24.2*  24.0*   PLT  54*  39*  25*     Recent Labs      02/09/18   0313  02/08/18   0934  02/07/18   0359   NA  144  146*  143   K  3.9  3.0* 3.1*   CL  102  104  106   CO2  42*  37*  32   GLU  165*  168*  131*   BUN  40*  32*  21*   CREA  1.55*  1.47*  1.32*   CA  8.8  8.2*  8.0*   MG  1.6  1.6  1.8   PHOS  2.9  3.5  2.6   ALB  1.9*   --   1.8*   TBILI  0.8   --   0.8   SGOT  44*   --   44*   ALT  64   --   88*     No results for input(s): PH, PCO2, PO2, HCO3, FIO2 in the last 72 hours.   Imaging:  I have personally reviewed the patients radiographs and have reviewed the reports:  Very mild improvement in severe bilateral infiltrates        Total critical care time exclusive of procedures: 25 minutes  Melecio Cm MD

## 2018-02-09 NOTE — INTERDISCIPLINARY ROUNDS
Interdisciplinary team rounds were held 2/9/18 with the following team members:Care Management, Diabetes Treatment Specialist, Nursing, Nutrition, Occupational Therapy, Pharmacy, Physical Therapy, Physician, Respiratory Therapy and Clinical Coordinator. Plan of care discussed. Goal: See MD orders and progress notes for further  interventions and desired outcomes.

## 2018-02-09 NOTE — PROGRESS NOTES
Palliative Medicine Consult  Piyush: [phone])    Patient Name: Sandrine Wells  YOB: 1945    Date of Initial Consult: 2/5/18  Reason for Consult: Overwhelming Symptoms  Requesting Provider: Maximo Lara MD  Primary Care Physician: Roxanna Garcia MD     SUMMARY:   Sandrine Wells is a 72 y.o. with a past history of  Lupus, PUD, GERD, HTN, DM, and mental retardation, who was admitted on 2/2/2018 from [facility] with a diagnosis of severe sepsis, hypothermia, hypoglycemia, seizures. Current medical issues leading to Palliative Medicine involvement include: family support. MRI of brain 2/3 showed mild small vessel ischemic changes. No acute abnormality, EEG showed cerebral dysfunction with no seizure activity. 2/6: CCU day 4: CXR shows persistent moderate diffuse airspace disease with small bilateral effusions. Remains on levo for hypotension, D10 for hypoglycemia. Remains minimally responsive. 2/8: CCU day 6: WBCs remain elevated at 11.9, still pressor dependent. Now off D10. Still lethargic. 2/9: CCU day 7: WBCs down to 8.1, still on pressors, keppra discontinued yesterday, no change in neuro status. Creatinine is creeping up, today 1.55. Psychosocial: lives with sister, who recently placed pt in [facility] while she had surgery on her back, then once recovered will bring patent back home. normal baseline pt speaks a few words, ambulates with assistance Jacklynsheri Reyes uses a gait belt) feeds self unless sick. Per niece Jessica Daniel functions at the level of a 14 y/o. PALLIATIVE DIAGNOSES:   1. Severe sepsis: influenza B pos and UTI  2. Hypothermia  3. Hypoglycemia  4. Hypotension  5. Acute renal failure in setting of vasopressor  6. Seizures  7. Acute hyperkalemia  8. Influenza   9. Hypotension   10. Care decisions     PLAN:   1.  Spoke with sister Roma Weiss on the phone, explained that pt's neurological status is not better and she is still relying on vasopressors, both of which indicate pt most likely is not going to recover from this illness and it is time to start thinking about Hospice. Dyan Mendoza was under the impression that things were better because she was told her heart rate was good, etc. She is not able to come until late this evening and was not sure she could make any decisions for another few days. I assured her that we are not pressuring her to make any quick decisions, that she could speak with the physicians over the weekend, and I will be back on Monday. 2. Continue morphine prn tachypnea. 3. Provided Palliative contact info, encouraged Dyan Mendoza to call with questions. 4. Initial consult note routed to primary continuity provider  5. Communicated plan of care with: Palliative IDT, RN, CCU IDT     GOALS OF CARE / TREATMENT PREFERENCES:     GOALS OF CARE:  Patient/Health Care Proxy Stated Goals: Prolong life     1. Recover  2. Return home      TREATMENT PREFERENCES:   Code Status: Partial Code    Advance Care Planning:  Advance Care Planning 2/9/2018   Patient's Healthcare Decision Maker is: Legal Next of Kin   Confirm Advance Directive None       Medical Interventions: Limited additional interventions   Other Instructions: Other:    As far as possible, the palliative care team has discussed with patient / health care proxy about goals of care / treatment preferences for patient. HISTORY:     History obtained from:  Chart, family    CHIEF COMPLAINT: found unresponsive    HPI/SUBJECTIVE:    The patient is:   [] Verbal and participatory  [x] Non-participatory due to: pt factors    BIBA after being found unresponsive at the [facility] by caregiver. Per EMS, HR in 30s and BG 27, EMS administered IM glucagon en route, bringing BG up to 200. In ED, pt began seizing, which resolved following 2mg ativan.    ED w/u:  CT of chest/abd/pelvis showed severe scoliosis, atelectasis in lower lobes, left greater than right, atherosclerotic abdominal aorta without aneurysm, s/p hysterectomy. Head CT no acute findings, CXR neg. EKG showed afib with slow ventricular responsive and irregular rate of 40. Dandre Lew reports that when pt was ~7 y/o she was very sick, high fevers, her parents were crying, took her to the ED, shortly after she had her tonsils removed and has never been the same. Parents were told to put her in an institution, that she would never learn anything. Dandre Lew was able to help pt walk using a gait belt, however, over the years pt's legs have been getting weaker. At one point, she was in hospice for lupus, but got better. Pt is able to feed self, however, sometimes shakes and loses the food off the spoon before it gets to her mouth. Likes TV, music, flipping pages in a book. She can say a few words, usually delayed response but cannot carry a conversation.      Clinical Pain Assessment (nonverbal scale for severity on nonverbal patients):   Clinical Pain Assessment  Severity: 0     Activity (Movement): Lying quietly, normal position    Duration: for how long has pt been experiencing pain (e.g., 2 days, 1 month, years)  Frequency: how often pain is an issue (e.g., several times per day, once every few days, constant)     FUNCTIONAL ASSESSMENT:     Palliative Performance Scale (PPS):  PPS: 20       PSYCHOSOCIAL/SPIRITUAL SCREENING:     Palliative IDT has assessed this patient for cultural preferences / practices and a referral made as appropriate to needs (Cultural Services, Patient Advocacy, Ethics, etc.)    Advance Care Planning:  Advance Care Planning 2/9/2018   Patient's Healthcare Decision Maker is: Legal Next of Kin   Confirm Advance Directive None       Any spiritual / Adventist concerns:  [] Yes /  [x] No    Caregiver Burnout:  [] Yes /  [] No /  [x] No Caregiver Present      Anticipatory grief assessment:   [x] Normal  / [] Maladaptive       ESAS Anxiety: Anxiety: 0    ESAS Depression:   : unable to assess due to pt factors       REVIEW OF SYSTEMS:     Positive and pertinent negative findings in ROS are noted above in HPI. The following systems were [x] reviewed / [] unable to be reviewed as noted in HPI  Other findings are noted below. Systems: constitutional, ears/nose/mouth/throat, respiratory, gastrointestinal, genitourinary, musculoskeletal, integumentary, neurologic, psychiatric, endocrine. Positive findings noted below. Modified ESAS Completed by: provider   Fatigue: 10 Drowsiness: 8     Pain: 0   Anxiety: 0       Dyspnea: 2     Constipation: No     Stool Occurrence(s): 1        PHYSICAL EXAM:     From RN flowsheet:  Wt Readings from Last 3 Encounters:   02/08/18 154 lb 12.2 oz (70.2 kg)   05/31/17 156 lb (70.8 kg)   05/23/17 156 lb (70.8 kg)     Blood pressure 104/85, pulse 88, temperature 98.6 °F (37 °C), resp. rate 22, height 5' 1\" (1.549 m), weight 154 lb 12.2 oz (70.2 kg), SpO2 97 %.     Pain Scale 1: Adult Nonverbal Pain Scale  Pain Intensity 1: 0  Pain Onset 1: Acute  Pain Location 1: Generalized        Pain Intervention(s) 1: Medication (see MAR)  Last bowel movement, if known:     Constitutional: minimally responsive, sitting slumped over this am   Eyes: pupils equal, anicteric  ENMT: no nasal discharge, moist mucous membranes  Cardiovascular: regular rhythm, distal pulses intact  Respiratory: breathing not labored, symmetric  Gastrointestinal: soft non-tender, +bowel sounds  Musculoskeletal: no deformity, no tenderness to palpation  Skin: warm, dry  Neurologic: following no commands, not moving all extremities  Psychiatric: unable to assess due to pt factors          HISTORY:     Active Problems:    UTI (urinary tract infection) (2/2/2018)      Mental retardation (2/2/2018)      Sepsis ([facility] [address] (2/2/2018)      Hx of systemic lupus erythematosus (SLE) (2/2/2018)      Hx of diabetes mellitus (2/2/2018)      Hx of essential hypertension (2/2/2018)      Past Medical History:   Diagnosis Date    Arthritis     Colon polyp     Diabetes (HCC)     borderline no medications    Environmental allergies 4/28/2010    GERD (gastroesophageal reflux disease)     HTN (hypertension) 4/28/2010    dosent take medication now    Lupus     MR (mental retardation)     Osteoporosis, senile     Other ill-defined conditions     parkinson's disease possibly    Psychiatric disorder     anxious    PUD (peptic ulcer disease)     Scolioses     Sjogren's syndrome ([facility] [address] 3/4/2015      Past Surgical History:   Procedure Laterality Date    HX TONSILLECTOMY      DC COLONOSCOPY FLX DX W/COLLJ SPEC WHEN PFRMD  2/17/2011    due 2013      Family History   Problem Relation Age of Onset    Cancer Mother      pancreas    Heart Disease Father     Heart Attack Father       History reviewed, no pertinent family history.   Social History   Substance Use Topics    Smoking status: Never Smoker    Smokeless tobacco: Never Used    Alcohol use No     Allergies   Allergen Reactions    Adhesive Tape Other (comments)     Redness under that adhesive      Current Facility-Administered Medications   Medication Dose Route Frequency    morphine injection 2 mg  2 mg IntraVENous Q4H PRN    albuterol-ipratropium (DUO-NEB) 2.5 MG-0.5 MG/3 ML  3 mL Nebulization QID RT    levothyroxine (SYNTHROID) tablet 25 mcg  25 mcg Per NG tube 6am    vancomycin (VANCOCIN) 1,000 mg in 0.9% sodium chloride (MBP/ADV) 250 mL  1,000 mg IntraVENous Q24H    sodium chloride (NS) flush 10-30 mL  10-30 mL InterCATHeter PRN    sodium chloride (NS) flush 10 mL  10 mL InterCATHeter Q24H    sodium chloride (NS) flush 10-40 mL  10-40 mL InterCATHeter Q8H    heparin (porcine) pf 300-500 Units  300-500 Units InterCATHeter PRN    sodium chloride (NS) flush 20 mL  20 mL InterCATHeter Q24H    piperacillin-tazobactam (ZOSYN) 4.5 g in  ml premix/cpmd  4.5 g IntraVENous Q8H    heparin (porcine) pf 300 Units  300 Units InterCATHeter PRN    NOREPINephrine (LEVOPHED) 32 mg in 5% dextrose 250 mL infusion  2-200 mcg/min IntraVENous TITRATE    acetaminophen (TYLENOL) suppository 650 mg  650 mg Rectal Q4H PRN    dextrose (D50W) injection syrg 25 g  25 g IntraVENous PRN    sodium chloride (NS) flush 5-10 mL  5-10 mL IntraVENous Q8H    sodium chloride (NS) flush 5-10 mL  5-10 mL IntraVENous PRN          LAB AND IMAGING FINDINGS:     Lab Results   Component Value Date/Time    WBC 8.1 02/09/2018 03:13 AM    HGB 8.3 (L) 02/09/2018 03:13 AM    PLATELET 54 (L) 54/86/5478 03:13 AM     Lab Results   Component Value Date/Time    Sodium 144 02/09/2018 03:13 AM    Potassium 3.9 02/09/2018 03:13 AM    Chloride 102 02/09/2018 03:13 AM    CO2 42 (HH) 02/09/2018 03:13 AM    BUN 40 (H) 02/09/2018 03:13 AM    Creatinine 1.55 (H) 02/09/2018 03:13 AM    Calcium 8.8 02/09/2018 03:13 AM    Magnesium 1.6 02/09/2018 03:13 AM    Phosphorus 2.9 02/09/2018 03:13 AM      Lab Results   Component Value Date/Time    AST (SGOT) 44 (H) 02/09/2018 03:13 AM    Alk.  phosphatase 207 (H) 02/09/2018 03:13 AM    Protein, total 7.5 02/09/2018 03:13 AM    Albumin 1.9 (L) 02/09/2018 03:13 AM    Globulin 5.6 (H) 02/09/2018 03:13 AM     Lab Results   Component Value Date/Time    INR 1.1 02/02/2018 03:06 PM    Prothrombin time 10.6 02/02/2018 03:06 PM    aPTT 29.6 02/02/2018 03:06 PM      Lab Results   Component Value Date/Time    Iron 80 12/27/2010 04:18 PM    TIBC 261 12/27/2010 04:18 PM    Iron % saturation 31 12/27/2010 04:18 PM    Ferritin 81 02/17/2010 02:21 PM      Lab Results   Component Value Date/Time    pH 7.41 02/04/2018 03:33 PM    PCO2 33 (L) 02/04/2018 03:33 PM    PO2 70 (L) 02/04/2018 03:33 PM     No components found for: [place]   Lab Results   Component Value Date/Time     02/02/2018 03:06 PM    CK - MB 4.0 (H) 08/05/2013 10:00 AM                Total time: 40  Counseling / coordination time, spent as noted above: 35  > 50% counseling / coordination?: yes    Prolonged service was provided for  []30 min   []75 min in face to face time in the presence of the patient, spent as noted above. Time Start:   Time End:   Note: this can only be billed with 59072 (initial) or 05869 (follow up). If multiple start / stop times, list each separately.

## 2018-02-09 NOTE — PROGRESS NOTES
Pharmacy Automatic Renal Dosing Protocol - Antimicrobials    Indication for Antimicrobials: UTI; Influenza B; HAP    Current Regimen of Each Antimicrobial:  Piperacillin-tazobactam 4.5 gm IV every 8 hours (Started 18; Day #4 of 7)  Vancomycin 1000 mg IV every 24 hours (Started 18; Day #4 of 7)    Previous Antimicrobial Therapy:  Oseltamivir 30 mg PO twice daily (Started 18; Day #5 of 5)  Zosyn 3.375g IV x 1 in ED (Start Date ; Day # 1)  Vancomycin loading dose 1750mg IV x 1 (start date: , day #1)  Ceftriaxone 1 gm IV every 24 hours (Started 18;  Stopped 18)    Goal Vancomycin Trough: 15-20 mcg/mL    Vancomycin Trough   18 at 1044: 22.6 mcg/mL (True trough = 21.7 mcg/mL)  18 at 13:37 on 1000 mg Q24H 23.1 mg/ml (True Trough 19 mcg/ml)    Significant Cultures:   2/3/18 Influenza = Influenza B positive (FINAL)  18 Urine culture = Greater than 100K CFU/mL E coli, pan-sens (FINAL)  18 Blood culture = No growth (FINAL)    Labs:  Recent Labs      18   0313  18   0934  18   0359   CREA  1.55*  1.47*  1.32*   BUN  40*  32*  21*   WBC  8.1  11.9*  10.0   Temp (24hrs), Av.6 °F (37 °C), Min:98 °F (36.7 °C), Max:98.9 °F (37.2 °C)    Creatinine Clearance (mL/min) or Dialysis: 26.5 mL/min    Procalcitonin   Date/Time Value Ref Range Status   2018 09:34 AM 0.78 (H) 0.00 - 0.08 ng/mL Final     Comment:     (NOTE)  The change of PCT concentration over time provides  prognostic information about the risk of mortality within  28 days for patients diagnosed with severe sepsis or septic  shock coming from the emergency department, ICU, other  medical wards, or directly from outside the hospital. Data  supports the use of PCT determinations from the day severe  sepsis or septic shock is first diagnosed (Day 0) or the day  thereafter (Day 1) and the fourth day after diagnosis  (Day 4) for the classification of patients into higher and  lower risk for mortality within 28 days according to the  workflow below:               PCT Day 0(or Day 1) - PCT Day 4  delta PCT  = ________________________________  X 100%                    PCT Day 0 (or Day 1)  A decrease of PCT levels below or equal to 80% defines a  positive delta PCT test result representing a higher  risk for 28-day all-cause mortality of patients diagnosed  with severe sepsis or septic shock. A decreased of PCT levels of more than 80% defines a  negative delta PCT result representing a lower risk for  28-day all-cause mortality of patients diagnosed with  severe sepsis or septic shock. To determine delta PCT results from the absolute PCT  concentrations of a patient obtained on the day severe  sepsis or septic shock was first diagnosed (or 24 hours  later) and on Day 4, go to www. [email or web address] Skip [email or web address]-[email or web address]. Performed At: [address] [id number]  Babak Barrios MD TD:[id number]         Impression/Plan:   - Serum creatinine slowly increasing now 1.55 mg/dl. - Vancomycin extrapolated trough of 19 mcg/ml on 1000 mg Q24H, will continue same dose and frequency. - continue Same Zosyn and Vanco dose; stop dates placed. - Procalcitonin resulted today at 1.57. Repeat procalcitonin has been ordered 2/8 and is pending. Pharmacy will follow daily and adjust medications as appropriate for renal function and/or serum levels.     Thank you,  Jakob Srinivasan, [facility]

## 2018-02-09 NOTE — PROGRESS NOTES
Pharmacy Automatic Renal Dosing Protocol - Antimicrobials    Indication for Antimicrobials: UTI; Influenza B; HAP    Current Regimen of Each Antimicrobial:  Piperacillin-tazobactam 4.5 gm IV every 8 hours (Started 18; Day #4 of 7)  Vancomycin 1000 mg IV every 24 hours (Started 18; Day #4 of 7)    Previous Antimicrobial Therapy:  Oseltamivir 30 mg PO twice daily (Started 18; Day #5 of 5)  Zosyn 3.375g IV x 1 in ED (Start Date ; Day # 1)  Vancomycin loading dose 1750mg IV x 1 (start date: , day #1)  Ceftriaxone 1 gm IV every 24 hours (Started 18;  Stopped 18)    Goal Vancomycin Trough: 15-20 mcg/mL    Vancomycin Trough   18 at 1044: 22.6 mcg/mL (True trough = 21.7 mcg/mL)  18 at 13:37 on 1000 mg Q24H 23.1 mg/ml (True Trough 21.7 mcg/ml)    Significant Cultures:   2/3/18 Influenza = Influenza B positive (FINAL)  18 Urine culture = Greater than 100K CFU/mL E coli, pan-sens (FINAL)  18 Blood culture = No growth (FINAL)    Labs:  Recent Labs      18   0313  18   0934  18   0359   CREA  1.55*  1.47*  1.32*   BUN  40*  32*  21*   WBC  8.1  11.9*  10.0   Temp (24hrs), Av.6 °F (37 °C), Min:98 °F (36.7 °C), Max:98.9 °F (37.2 °C)    Creatinine Clearance (mL/min) or Dialysis: 26.5 mL/min    Procalcitonin   Date/Time Value Ref Range Status   2018 09:34 AM 0.78 (H) 0.00 - 0.08 ng/mL Final     Comment:     (NOTE)  The change of PCT concentration over time provides  prognostic information about the risk of mortality within  28 days for patients diagnosed with severe sepsis or septic  shock coming from the emergency department, ICU, other  medical wards, or directly from outside the hospital. Data  supports the use of PCT determinations from the day severe  sepsis or septic shock is first diagnosed (Day 0) or the day  thereafter (Day 1) and the fourth day after diagnosis  (Day 4) for the classification of patients into higher and  lower risk for mortality within 28 days according to the  workflow below:               PCT Day 0(or Day 1) - PCT Day 4  delta PCT  = ________________________________  X 100%                    PCT Day 0 (or Day 1)  A decrease of PCT levels below or equal to 80% defines a  positive delta PCT test result representing a higher  risk for 28-day all-cause mortality of patients diagnosed  with severe sepsis or septic shock. A decreased of PCT levels of more than 80% defines a  negative delta PCT result representing a lower risk for  28-day all-cause mortality of patients diagnosed with  severe sepsis or septic shock. To determine delta PCT results from the absolute PCT  concentrations of a patient obtained on the day severe  sepsis or septic shock was first diagnosed (or 24 hours  later) and on Day 4, go to www. [email or web address]-[email or web address]. Performed At: [address] [id number]  Hortencia Sullivan MD AD:[id number]         Impression/Plan:   - Serum creatinine slowly increasing now 1.55 mg/dl. - Vancomycin extrapolated trough of 21.7 mcg/ml on 1000 mg Q24H, will continue same dose and frequency. - continue Same Zosyn and Vanco dose; stop dates placed. - Procalcitonin resulted today at 1.57. Repeat procalcitonin has been ordered 2/8 and is pending. Pharmacy will follow daily and adjust medications as appropriate for renal function and/or serum levels.     Thank you,  Johnny Bansal, [facility] [place]

## 2018-02-09 NOTE — PROGRESS NOTES
7:00 PM  Report received from RODRIGUEZ BEE.    7:30 PM  Assessment completed. Pt opens eyes to voice, does not follow commands. 2 L NC in place, pt lungs are coarse and diminished. VSS, Levo infusing at 12 mcg/min. Will continue to monitor. 9:23 PM  Pt resp rate 30's-40's. PRN morphine given. 11:15 PM  Reassessment completed, no changes noted. Will continue to monitor. 3:32 AM  Reassessment completed, no changes noted. Will continue to monitor. Bedside shift change report given to [address] [place] [address] [address] (oncoming nurse) by Archana Hurtado (offgoing nurse). Report included the following information SBAR, Kardex, ED Summary, Intake/Output, MAR, Accordion, Recent Results and Med Rec Status.

## 2018-02-10 LAB
GLUCOSE BLD STRIP.AUTO-MCNC: 174 MG/DL (ref 65–100)
GLUCOSE BLD STRIP.AUTO-MCNC: 181 MG/DL (ref 65–100)
GLUCOSE BLD STRIP.AUTO-MCNC: 189 MG/DL (ref 65–100)
GLUCOSE BLD STRIP.AUTO-MCNC: 197 MG/DL (ref 65–100)
GLUCOSE BLD STRIP.AUTO-MCNC: 203 MG/DL (ref 65–100)
SERVICE CMNT-IMP: ABNORMAL

## 2018-02-10 PROCEDURE — 74011636637 HC RX REV CODE- 636/637: Performed by: PHYSICIAN ASSISTANT

## 2018-02-10 PROCEDURE — 77030029684 HC NEB SM VOL KT MONA -A

## 2018-02-10 PROCEDURE — 82962 GLUCOSE BLOOD TEST: CPT

## 2018-02-10 PROCEDURE — 74011250637 HC RX REV CODE- 250/637: Performed by: INTERNAL MEDICINE

## 2018-02-10 PROCEDURE — 65610000006 HC RM INTENSIVE CARE

## 2018-02-10 PROCEDURE — 74011250636 HC RX REV CODE- 250/636: Performed by: INTERNAL MEDICINE

## 2018-02-10 PROCEDURE — 74011000250 HC RX REV CODE- 250: Performed by: INTERNAL MEDICINE

## 2018-02-10 PROCEDURE — 94640 AIRWAY INHALATION TREATMENT: CPT

## 2018-02-10 PROCEDURE — 74011250636 HC RX REV CODE- 250/636: Performed by: GENERAL ACUTE CARE HOSPITAL

## 2018-02-10 RX ORDER — DEXTROSE 50 % IN WATER (D50W) INTRAVENOUS SYRINGE
12.5-25 AS NEEDED
Status: DISCONTINUED | OUTPATIENT
Start: 2018-02-10 | End: 2018-03-12 | Stop reason: HOSPADM

## 2018-02-10 RX ORDER — IPRATROPIUM BROMIDE AND ALBUTEROL SULFATE 2.5; .5 MG/3ML; MG/3ML
3 SOLUTION RESPIRATORY (INHALATION)
Status: DISCONTINUED | OUTPATIENT
Start: 2018-02-10 | End: 2018-02-15

## 2018-02-10 RX ORDER — INSULIN LISPRO 100 [IU]/ML
INJECTION, SOLUTION INTRAVENOUS; SUBCUTANEOUS ONCE
Status: DISCONTINUED | OUTPATIENT
Start: 2018-02-10 | End: 2018-02-10

## 2018-02-10 RX ORDER — MAGNESIUM SULFATE 100 %
4 CRYSTALS MISCELLANEOUS AS NEEDED
Status: DISCONTINUED | OUTPATIENT
Start: 2018-02-10 | End: 2018-03-12 | Stop reason: HOSPADM

## 2018-02-10 RX ORDER — INSULIN LISPRO 100 [IU]/ML
INJECTION, SOLUTION INTRAVENOUS; SUBCUTANEOUS EVERY 6 HOURS
Status: DISCONTINUED | OUTPATIENT
Start: 2018-02-10 | End: 2018-02-18

## 2018-02-10 RX ADMIN — Medication 10 ML: at 21:48

## 2018-02-10 RX ADMIN — Medication 10 ML: at 14:18

## 2018-02-10 RX ADMIN — IPRATROPIUM BROMIDE AND ALBUTEROL SULFATE 3 ML: .5; 3 SOLUTION RESPIRATORY (INHALATION) at 07:25

## 2018-02-10 RX ADMIN — IPRATROPIUM BROMIDE AND ALBUTEROL SULFATE 3 ML: .5; 3 SOLUTION RESPIRATORY (INHALATION) at 20:09

## 2018-02-10 RX ADMIN — Medication 10 ML: at 05:20

## 2018-02-10 RX ADMIN — PIPERACILLIN SODIUM AND TAZOBACTAM SODIUM 4.5 G: .5; 4 INJECTION, POWDER, LYOPHILIZED, FOR SOLUTION INTRAVENOUS at 21:48

## 2018-02-10 RX ADMIN — NOREPINEPHRINE BITARTRATE 26 MCG/MIN: 1 INJECTION INTRAVENOUS at 05:17

## 2018-02-10 RX ADMIN — IPRATROPIUM BROMIDE AND ALBUTEROL SULFATE 3 ML: .5; 3 SOLUTION RESPIRATORY (INHALATION) at 11:24

## 2018-02-10 RX ADMIN — SODIUM CHLORIDE 750 MG: 900 INJECTION, SOLUTION INTRAVENOUS at 17:50

## 2018-02-10 RX ADMIN — Medication 10 ML: at 13:44

## 2018-02-10 RX ADMIN — INSULIN LISPRO 2 UNITS: 100 INJECTION, SOLUTION INTRAVENOUS; SUBCUTANEOUS at 17:54

## 2018-02-10 RX ADMIN — PIPERACILLIN SODIUM AND TAZOBACTAM SODIUM 4.5 G: .5; 4 INJECTION, POWDER, LYOPHILIZED, FOR SOLUTION INTRAVENOUS at 13:43

## 2018-02-10 RX ADMIN — Medication 20 ML: at 14:18

## 2018-02-10 RX ADMIN — MORPHINE SULFATE 2 MG: 4 INJECTION, SOLUTION INTRAMUSCULAR; INTRAVENOUS at 01:29

## 2018-02-10 RX ADMIN — LEVOTHYROXINE SODIUM 25 MCG: 50 TABLET ORAL at 05:18

## 2018-02-10 RX ADMIN — PIPERACILLIN SODIUM AND TAZOBACTAM SODIUM 4.5 G: .5; 4 INJECTION, POWDER, LYOPHILIZED, FOR SOLUTION INTRAVENOUS at 05:17

## 2018-02-10 NOTE — PROGRESS NOTES
Spoke to Patient's sister, Tab Giles, who is Patient's MPOA. She is hoping to transition patient to Hospice care on Monday. She will be here Monday late morning.

## 2018-02-10 NOTE — PROGRESS NOTES
0700 Bedside and Verbal shift change report received from [facility] (offgoing nurse). Report included the following information SBAR, Kardex, Intake/Output, MAR, Accordion and Recent Results.

## 2018-02-10 NOTE — PROGRESS NOTES
Hospitalist Progress Note    NAME: Merna Person   :  1945   MRN:  [id number]       Assessment / Plan:  Acute hypoxic respiratory failure due to acute pulmonary edema - not improving  - CXR today with diffuse airspace disease/edema. No significant change. - con't vanco, zosyn and tamiflu  - appreciate pulmonology assistance  - Palliative consult appreciated - pt's code status to be changed to DNR  - Pt continues to require Levophed in order to maintain MAP >65mmHg - being weaned as tolerated - still requiring 10mcg  - Pt's status continues to be tenuous at best - prognosis is grim   -Spoke with Palliative on  who have been in touch with pt's family. They plan on coming in this afternoon to determine goals of care and to discuss potential Hospice. Agree that this is likely the reasonable course of action. Pt's RR still remains in the 30's with PRN Morphine helping slightly    On 2/10  -Pt likely for Hospice on Monday. Family wishes to wait till Monday because they have relatives coming in to visit the pt and the earliest they can come is Monday. Therefore titrate Levophed to try to keep MAP> 65mmHg. Would stop with blood draws. TIFFANIE, worsening  -Pt being diuresed but also has labile renal function making this more difficult  -Continue to monitor daily  -Lasix held today - BUN/Cr 40/1.55    Hypokalemia, resolved      Acute encephalopathy and septic shock due to UTI and Influenza B, present on admission: +eye deviation, remains nonresponsive with ?myoclonus.  - MRI brain with mild small vessel ischemic changes. No acute abnormality. - CT chest/abd/pelvis with severe scoliosis. Atelectasis in the lower lobes, left greater than right. Anemia. Atherosclerotic abdominal aorta without aneurysm. - UA >[id number] pansensitive E Coli, blood cultures no growth.  Con't zosyn as above.   - influenza B positive - tamiflu finished   - EEG with cerebral dysfunction with no seizure activity  - appreciate neuro consult, recommending con't keppra for now - AMS is multifactorial in nature      Hypoglycemia:  H/o \"borderline DM,\" not on meds at home.  HgA1c <3.5.  - con't D10 gtt  - serial gl  - will checkucose checks      Transaminitis:  Presumed due to sepsis, improving  Baseline mental retardation  Lupus: Continue holding Plaquenil  Hypothyroidism:  Synthroid dose via NGT  Obesity (Body mass index is 32.45 kg/(m^2)      Code Status: full  Surrogate Decision Maker: sister   DVT Prophylaxis: heparin      Baseline: Mental retardation, lives with sister normally but placed in group home after sister had to undergo recent back surgery     Subjective:     Chief Complaint / Reason for Physician Visit  Encephalopathic . Discussed with RN events overnight. Review of Systems:  Symptom Y/N Comments  Symptom Y/N Comments   Fever/Chills    Chest Pain     Poor Appetite    Edema     Cough    Abdominal Pain     Sputum    Joint Pain     SOB/YOUNG    Pruritis/Rash     Nausea/vomit    Tolerating PT/OT     Diarrhea    Tolerating Diet     Constipation    Other       Could NOT obtain due to: Mental status     Objective:     VITALS:   Last 24hrs VS reviewed since prior progress note.  Most recent are:  Patient Vitals for the past 24 hrs:   Temp Pulse Resp BP SpO2   02/10/18 0900 - 90 25 129/59 96 %   02/10/18 0830 - 95 23 121/43 100 %   02/10/18 0800 99.3 °F (37.4 °C) 85 20 136/67 100 %   02/10/18 0730 - 91 22 124/66 95 %   02/10/18 0726 - - - - 95 %   02/10/18 0700 - 87 19 138/56 100 %   02/10/18 0600 - (!) 104 30 (!) 83/53 90 %   02/10/18 0500 - (!) 102 26 - 94 %   02/10/18 0430 - 96 24 113/61 96 %   02/10/18 0400 - (!) 103 30 119/66 98 %   02/10/18 0330 96.6 °F (35.9 °C) 88 17 110/53 100 %   02/10/18 0300 - 94 21 124/42 100 %   02/10/18 0246 - 86 21 97/45 97 %   02/10/18 0230 - - - 93/47 94 %   02/10/18 0200 - 88 (!) 31 101/79 98 %   02/10/18 0130 - (!) 103 (!) 34 129/48 96 %   02/10/18 0100 - 96 (!) 34 107/43 93 %   02/10/18 0030 - 93 30 118/55 95 %   02/10/18 0000 97.6 °F (36.4 °C) 95 26 118/55 96 %   02/09/18 2330 - 95 26 110/44 96 %   02/09/18 2300 - 95 24 112/59 95 %   02/09/18 2230 - 97 25 129/57 98 %   02/09/18 2200 - 96 26 130/52 100 %   02/09/18 2130 - (!) 110 (!) 33 121/61 96 %   02/09/18 2100 - (!) 103 25 115/53 98 %   02/09/18 2030 - (!) 104 26 141/61 99 %   02/09/18 2000 97.9 °F (36.6 °C) 96 21 146/59 99 %   02/09/18 1930 - 98 24 140/42 98 %   02/09/18 1900 - 99 23 132/51 98 %   02/09/18 1800 - (!) 108 21 105/45 94 %   02/09/18 1700 - (!) 108 19 100/41 99 %   02/09/18 1600 98.9 °F (37.2 °C) (!) 110 20 93/49 98 %   02/09/18 1525 - - - - 96 %   02/09/18 1500 - (!) 106 23 115/61 97 %   02/09/18 1400 - (!) 105 21 103/45 96 %   02/09/18 1300 - (!) 106 23 97/66 97 %   02/09/18 1200 - (!) 101 30 98/48 97 %   02/09/18 1137 - - - - 94 %   02/09/18 1100 98 °F (36.7 °C) 92 28 107/50 98 %       Intake/Output Summary (Last 24 hours) at 02/10/18 1023  Last data filed at 02/10/18 1000   Gross per 24 hour   Intake          2295.81 ml   Output              970 ml   Net          1325.81 ml        PHYSICAL EXAM:  General:                    Altered  EENT:                       Anicteric sclerae  Resp:                        CTA bilaterally, no wheezing or rales.  Frequent shallow respirations  CV:                            Regular  rhythm,  No edema  GI:                              Soft, Non distended, Non tender.  +Bowel sounds  Neurologic:                Alert and oriented x 0   Psych:                       No insight  Skin:                          No rashes.  No jaundice    Reviewed most current lab test results and cultures  YES  Reviewed most current radiology test results   YES  Review and summation of old records today    NO  Reviewed patient's current orders and MAR    YES  PMH/ reviewed - no change compared to H&P  ________________________________________________________________________  Care Plan discussed with:    Comments   Patient x Family      RN x    Care Manager     Consultant                        Multidiciplinary team rounds were held today with , nursing, pharmacist and clinical coordinator. Patient's plan of care was discussed; medications were reviewed and discharge planning was addressed. ________________________________________________________________________  Total NON critical care TIME:  20   Minutes    Total CRITICAL CARE TIME Spent:   Minutes non procedure based      Comments   >50% of visit spent in counseling and coordination of care     ________________________________________________________________________  Mirella Thorpe MD     Procedures: see electronic medical records for all procedures/Xrays and details which were not copied into this note but were reviewed prior to creation of Plan. LABS:  I reviewed today's most current labs and imaging studies.   Pertinent labs include:  Recent Labs      02/09/18 0313 02/08/18   0934   WBC  8.1  11.9*   HGB  8.3*  7.9*   HCT  25.3*  24.2*   PLT  54*  39*     Recent Labs      02/09/18 0313 02/08/18 0934   NA  144  146*   K  3.9  3.0*   CL  102  104   CO2  42*  37*   GLU  165*  168*   BUN  40*  32*   CREA  1.55*  1.47*   CA  8.8  8.2*   MG  1.6  1.6   PHOS  2.9  3.5   ALB  1.9*   --    TBILI  0.8   --    SGOT  44*   --    ALT  64   --        Signed: Mirella Thorpe MD

## 2018-02-10 NOTE — PROGRESS NOTES
1915  Bedside shift change report given to Kenisha Barnes (oncoming nurse) by Devan Wren (offgoing nurse). Report included the following information SBAR, Intake/Output, Recent Results, Med Rec Status and Cardiac Rhythm SA ist degree heart block pvc pac.   2200 lethargic aroused to pain. Attempt to wean slowly levophed to keep map greater than 65. Aspiration precaution. Tolerating tube feed. Mouth care. 12 am no changes with full assessment. 3 am full bath rendered. incontinent o f stool x 1.   5 am full assessment unchanged. More alert. Open eyes on verbal command. Does not tract. 6 am loose stool x 1. After cleaning pox 86 on 6 liter. Placed on NRM and pox 100. No sx of pain. 7 am Bedside shift change report given to [facility]  (oncoming nurse) by Kenisha Barnes (offgoing nurse). Report included the following information SBAR, MAR and Cardiac Rhythm SA with pac. pvc.

## 2018-02-10 NOTE — PROGRESS NOTES
[facility] PROGRESS NOTE  Pulmonary, Critical Care, and Sleep Medicine    Name: Andrew Torres MRN: [id number]   : 1945 Hospital: [facility]   Date: 2/10/2018  Admission Date: 2018     Chart and notes reviewed. Data reviewed. I review the patient's current medications in the medical record at each encounter. I have evaluated and examined the patient. Overnight events reviewed:  Afebrile  Sats 100% on 4L  Levophed at 15  Glucose elevated    ROS: Unable to obtain from patient. Awake, but not responsive or following commands    12 point ROS reviewed and negative except as noted above.       Current Facility-Administered Medications   Medication Dose Route Frequency    vancomycin (VANCOCIN) 750 mg in 0.9% sodium chloride (MBP/ADV) 250 mL  750 mg IntraVENous Q24H    morphine injection 2 mg  2 mg IntraVENous Q4H PRN    albuterol-ipratropium (DUO-NEB) 2.5 MG-0.5 MG/3 ML  3 mL Nebulization QID RT    levothyroxine (SYNTHROID) tablet 25 mcg  25 mcg Per NG tube 6am    sodium chloride (NS) flush 10-30 mL  10-30 mL InterCATHeter PRN    sodium chloride (NS) flush 10 mL  10 mL InterCATHeter Q24H    sodium chloride (NS) flush 10-40 mL  10-40 mL InterCATHeter Q8H    heparin (porcine) pf 300-500 Units  300-500 Units InterCATHeter PRN    sodium chloride (NS) flush 20 mL  20 mL InterCATHeter Q24H    piperacillin-tazobactam (ZOSYN) 4.5 g in  ml premix/cpmd  4.5 g IntraVENous Q8H    heparin (porcine) pf 300 Units  300 Units InterCATHeter PRN    NOREPINephrine (LEVOPHED) 32 mg in 5% dextrose 250 mL infusion  2-200 mcg/min IntraVENous TITRATE    acetaminophen (TYLENOL) suppository 650 mg  650 mg Rectal Q4H PRN    dextrose (D50W) injection syrg 25 g  25 g IntraVENous PRN    sodium chloride (NS) flush 5-10 mL  5-10 mL IntraVENous Q8H    sodium chloride (NS) flush 5-10 mL  5-10 mL IntraVENous PRN         Vital Signs:  Visit Vitals    BP 98/63    Pulse 85    Temp 97.7 °F (36.5 °C)    Resp 22    Ht 5' 1\" (1.549 m)    Wt 68.1 kg (150 lb 2.1 oz)    SpO2 100%    BMI 28.37 kg/m2       O2 Device: Nasal cannula   O2 Flow Rate (L/min): 4 l/min   Temp (24hrs), Av °F (36.7 °C), Min:96.6 °F (35.9 °C), Max:99.3 °F (37.4 °C)       Intake/Output:   Last shift:      02/10 0701 - 02/10 1900  In: 513.7 [I.V.:53.7]  Out: 320 [Urine:320]  Last 3 shifts: 1901 - 02/10 0700  In: 3491.7 [I.V.:1211.7]  Out: 2190 [Urine:2190]    Intake/Output Summary (Last 24 hours) at 02/10/18 1323  Last data filed at 02/10/18 1200   Gross per 24 hour   Intake          2283.09 ml   Output              985 ml   Net          1298.09 ml          Physical Exam:   General:  Lethargic, increased WOB. Head:  Normocephalic, without obvious abnormality, atraumatic. Eyes:  Conjunctivae/corneas clear. Nose: Nares normal. Septum midline. Mucosa normal.   Throat: Lips, mucosa, and tongue normal.    Neck: Supple, symmetrical, trachea midline, no adenopathy. Lungs:   Coarse with wheeze and rales bilaterally. Chest wall:  No tenderness or deformity. Heart:  Irregular, no murmur, click, rub or gallop. Abdomen:   Soft, non-tender. Bowel sounds normal. No masses,  No organomegaly. Extremities: Extremities normal, atraumatic, no cyanosis or edema. Pulses: 2+ and symmetric all extremities.    Skin: Skin color, texture, turgor normal. No rashes or lesions   Lymph nodes: Cervical, supraclavicular nodes normal.   Neurologic: MR at baseline, decreased responsiveness, unable to evaluate     DATA:  MAR reviewed and pertinent medications noted or modified as needed    Labs:  Recent Labs      18   0313  18   0934   WBC  8.1  11.9*   HGB  8.3*  7.9*   HCT  25.3*  24.2*   PLT  54*  39*     Recent Labs      18   0313  18   0934   NA  144  146*   K  3.9  3.0*   CL  102  104   CO2  42*  37*   GLU  165*  168*   BUN  40*  32*   CREA  1.55*  1.47*   CA  8.8  8.2*   MG  1.6  1.6   PHOS  2.9 3. 5   ALB  1.9*   --    TBILI  0.8   --    SGOT  44*   --    ALT  64   --        Imaging:  No new images this morning      IMPRESSION:   · Acute hypoxic respiratory failure  · Acute influenza  · Can not rule out superinfection  · Diffuse bilateral infiltrates - rapid development argues more for pulmonary edema but can't exclude ARDS  · Encephalopathy with seizure or myoclonus - possibly due to hypoglycemia   · Shock/hypotension   · Severe hypoglycemia  · GNR UTI  · Lupus   · Mental retardation   · GERD  · DM      PLAN:   · Continue O2 and wean as able to keep sats > 90%  · Holding diuretics today due to increasing creatinine  · Continue empiric antibiotics, Tamiflu  · Continue pressors and titrate for MAP > 65  · SSI; glycemic monitoring  · Plaquenil on hold  · Antiepileptics, neuro signed off  · DVT prophylaxis: SCDs  · Remains critically ill with high risk for further decompensation/death  · Chemical code only. Palliative care assistance greatly appreciated. A transition to comfort measures would be reasonable. Sister considering transition to hospice on Monday.          Sybil Frankel

## 2018-02-10 NOTE — PROGRESS NOTES
0700 Bedside and Verbal shift change report received from [facility], [facility] (offgoing nurse). Report included the following information SBAR, Kardex, Intake/Output, MAR, Accordion and Recent Results. 0800 Assessment complete. See flowsheet for details. 0930 BP readings high, cuff adjusted, readings deemed inaccurate. Levophed continued. 1200 Reassessment complete. See flowsheet for details. 1330 Pulmonary PA at bedside. 1600 Reassessment unchanged. Will continue to monitor. 1900 Bedside and Verbal shift change report given to Gloria Chi RN (oncoming nurse). Report included the following information SBAR, Kardex, Intake/Output, MAR, Accordion and Recent Results.

## 2018-02-11 LAB
GLUCOSE BLD STRIP.AUTO-MCNC: 104 MG/DL (ref 65–100)
GLUCOSE BLD STRIP.AUTO-MCNC: 117 MG/DL (ref 65–100)
GLUCOSE BLD STRIP.AUTO-MCNC: 146 MG/DL (ref 65–100)
GLUCOSE BLD STRIP.AUTO-MCNC: 198 MG/DL (ref 65–100)
SERVICE CMNT-IMP: ABNORMAL

## 2018-02-11 PROCEDURE — 74011636637 HC RX REV CODE- 636/637: Performed by: PHYSICIAN ASSISTANT

## 2018-02-11 PROCEDURE — 74011000250 HC RX REV CODE- 250: Performed by: INTERNAL MEDICINE

## 2018-02-11 PROCEDURE — 74011250636 HC RX REV CODE- 250/636: Performed by: INTERNAL MEDICINE

## 2018-02-11 PROCEDURE — 74011250637 HC RX REV CODE- 250/637: Performed by: INTERNAL MEDICINE

## 2018-02-11 PROCEDURE — 94640 AIRWAY INHALATION TREATMENT: CPT

## 2018-02-11 PROCEDURE — 74011250636 HC RX REV CODE- 250/636: Performed by: GENERAL ACUTE CARE HOSPITAL

## 2018-02-11 PROCEDURE — 77030019563 HC DEV ATTCH FEED HOLL -A

## 2018-02-11 PROCEDURE — 65610000006 HC RM INTENSIVE CARE

## 2018-02-11 PROCEDURE — 82962 GLUCOSE BLOOD TEST: CPT

## 2018-02-11 RX ADMIN — PIPERACILLIN SODIUM AND TAZOBACTAM SODIUM 4.5 G: .5; 4 INJECTION, POWDER, LYOPHILIZED, FOR SOLUTION INTRAVENOUS at 05:12

## 2018-02-11 RX ADMIN — MORPHINE SULFATE 2 MG: 4 INJECTION, SOLUTION INTRAMUSCULAR; INTRAVENOUS at 08:53

## 2018-02-11 RX ADMIN — Medication 10 ML: at 21:16

## 2018-02-11 RX ADMIN — Medication 10 ML: at 05:12

## 2018-02-11 RX ADMIN — PIPERACILLIN SODIUM AND TAZOBACTAM SODIUM 4.5 G: .5; 4 INJECTION, POWDER, LYOPHILIZED, FOR SOLUTION INTRAVENOUS at 13:19

## 2018-02-11 RX ADMIN — LEVOTHYROXINE SODIUM 25 MCG: 50 TABLET ORAL at 05:13

## 2018-02-11 RX ADMIN — INSULIN LISPRO 2 UNITS: 100 INJECTION, SOLUTION INTRAVENOUS; SUBCUTANEOUS at 00:21

## 2018-02-11 RX ADMIN — INSULIN LISPRO 2 UNITS: 100 INJECTION, SOLUTION INTRAVENOUS; SUBCUTANEOUS at 13:19

## 2018-02-11 RX ADMIN — Medication 10 ML: at 13:20

## 2018-02-11 RX ADMIN — NOREPINEPHRINE BITARTRATE 10 MCG/MIN: 1 INJECTION INTRAVENOUS at 20:11

## 2018-02-11 RX ADMIN — SODIUM CHLORIDE 750 MG: 900 INJECTION, SOLUTION INTRAVENOUS at 18:14

## 2018-02-11 RX ADMIN — MORPHINE SULFATE 2 MG: 4 INJECTION, SOLUTION INTRAMUSCULAR; INTRAVENOUS at 20:02

## 2018-02-11 RX ADMIN — IPRATROPIUM BROMIDE AND ALBUTEROL SULFATE 3 ML: .5; 3 SOLUTION RESPIRATORY (INHALATION) at 07:08

## 2018-02-11 RX ADMIN — IPRATROPIUM BROMIDE AND ALBUTEROL SULFATE 3 ML: .5; 3 SOLUTION RESPIRATORY (INHALATION) at 20:37

## 2018-02-11 RX ADMIN — PIPERACILLIN SODIUM AND TAZOBACTAM SODIUM 4.5 G: .5; 4 INJECTION, POWDER, LYOPHILIZED, FOR SOLUTION INTRAVENOUS at 20:02

## 2018-02-11 RX ADMIN — Medication 20 ML: at 13:20

## 2018-02-11 RX ADMIN — INSULIN LISPRO 2 UNITS: 100 INJECTION, SOLUTION INTRAVENOUS; SUBCUTANEOUS at 05:13

## 2018-02-11 NOTE — PROGRESS NOTES
[facility] PROGRESS NOTE  Pulmonary, Critical Care, and Sleep Medicine    Name: Merna Person MRN: [id number]   : 1945 Hospital: [facility]   Date: 2018  Admission Date: 2018     Chart and notes reviewed. Data reviewed. I review the patient's current medications in the medical record at each encounter. I have evaluated and examined the patient. Overnight events reviewed:  Afebrile  Sats 90% on RA  Levophed at 10  Glucose elevated  Tachypnea and restless earlier per nursing; morphine given  Brown secretions suctioned from patient and tube feeds stopped this am due to concerns of aspiration    ROS: Unable to obtain from patient. Open eyes to voice, but not responsive or following commands    12 point ROS reviewed and negative except as noted above.       Current Facility-Administered Medications   Medication Dose Route Frequency    vancomycin (VANCOCIN) 750 mg in 0.9% sodium chloride (MBP/ADV) 250 mL  750 mg IntraVENous Q24H    glucose chewable tablet 16 g  4 Tab Oral PRN    dextrose (D50W) injection syrg 12.5-25 g  12.5-25 g IntraVENous PRN    glucagon (GLUCAGEN) injection 1 mg  1 mg IntraMUSCular PRN    insulin lispro (HUMALOG) injection   SubCUTAneous Q6H    albuterol-ipratropium (DUO-NEB) 2.5 MG-0.5 MG/3 ML  3 mL Nebulization BID RT    morphine injection 2 mg  2 mg IntraVENous Q4H PRN    levothyroxine (SYNTHROID) tablet 25 mcg  25 mcg Per NG tube 6am    sodium chloride (NS) flush 10-30 mL  10-30 mL InterCATHeter PRN    sodium chloride (NS) flush 10 mL  10 mL InterCATHeter Q24H    sodium chloride (NS) flush 10-40 mL  10-40 mL InterCATHeter Q8H    heparin (porcine) pf 300-500 Units  300-500 Units InterCATHeter PRN    sodium chloride (NS) flush 20 mL  20 mL InterCATHeter Q24H    piperacillin-tazobactam (ZOSYN) 4.5 g in  ml premix/cpmd  4.5 g IntraVENous Q8H    heparin (porcine) pf 300 Units  300 Units InterCATHeter PRN    NOREPINephrine (LEVOPHED) 32 mg in 5% dextrose 250 mL infusion  2-200 mcg/min IntraVENous TITRATE    acetaminophen (TYLENOL) suppository 650 mg  650 mg Rectal Q4H PRN    dextrose (D50W) injection syrg 25 g  25 g IntraVENous PRN    sodium chloride (NS) flush 5-10 mL  5-10 mL IntraVENous Q8H    sodium chloride (NS) flush 5-10 mL  5-10 mL IntraVENous PRN         Vital Signs:  Visit Vitals    /49    Pulse 100    Temp 99.3 °F (37.4 °C)    Resp 26    Ht 5' 1\" (1.549 m)    Wt 68.1 kg (150 lb 2.1 oz)    SpO2 90%    BMI 28.37 kg/m2       O2 Device: Room air   O2 Flow Rate (L/min): 2 l/min   Temp (24hrs), Av °F (37.2 °C), Min:98.6 °F (37 °C), Max:99.9 °F (37.7 °C)       Intake/Output:   Last shift:       07 -  1900  In: 248.9 [I.V.:118.9]  Out: 260 [Urine:260]  Last 3 shifts:  1901 -  0700  In: 3569.4 [I.V.:1239.4]  Out: 1400 [Urine:1400]    Intake/Output Summary (Last 24 hours) at 18 1234  Last data filed at 18 1200   Gross per 24 hour   Intake          1977.87 ml   Output              865 ml   Net          1112.87 ml          Physical Exam:   General:  Lethargic, increased WOB. Head:  Normocephalic, without obvious abnormality, atraumatic. Eyes:  Conjunctivae/corneas clear. Nose: Nares normal. Septum midline. Mucosa normal.   Throat: Lips, mucosa, and tongue normal.    Neck: Supple, symmetrical, trachea midline, no adenopathy. Lungs:   Coarse with wheeze and rales bilaterally. Chest wall:  No tenderness or deformity. Heart:  Irregular, no murmur, click, rub or gallop. Abdomen:   Soft, non-tender. Bowel sounds normal. No masses,  No organomegaly. Extremities: Extremities normal, atraumatic, no cyanosis or edema. Pulses: 2+ and symmetric all extremities.    Skin: Skin color, texture, turgor normal. No rashes or lesions   Lymph nodes: Cervical, supraclavicular nodes normal.   Neurologic: MR at baseline, decreased responsiveness, unable to evaluate, not following commands. DATA:  MAR reviewed and pertinent medications noted or modified as needed    Labs:  Recent Labs      02/09/18 0313   WBC  8.1   HGB  8.3*   HCT  25.3*   PLT  54*     Recent Labs      02/09/18 0313   NA  144   K  3.9   CL  102   CO2  42*   GLU  165*   BUN  40*   CREA  1.55*   CA  8.8   MG  1.6   PHOS  2.9   ALB  1.9*   TBILI  0.8   SGOT  44*   ALT  64       Imaging:  No new images this morning      IMPRESSION:   · Acute hypoxic respiratory failure  · Acute influenza  · Can not rule out superinfection  · Diffuse bilateral infiltrates - rapid development argues more for pulmonary edema but can't exclude ARDS  · Encephalopathy with seizure or myoclonus - possibly due to hypoglycemia   · Shock/hypotension   · Severe hypoglycemia  · GNR UTI  · Lupus   · Mental retardation   · GERD  · DM      PLAN:   · Continue O2 and wean as able to keep sats > 90%  · CXR tomorrow am  · Holding diuretics worsening creatinine  · Continue empiric antibiotics, Tamiflu course completed  · Continue pressors and titrate for MAP > 65  · SSI; glycemic monitoring  · Plaquenil on hold  · Antiepileptics, neuro signed off  · DVT prophylaxis: SCDs  · Remains critically ill with high risk for further decompensation/death due to hypotension, sepsis, and likely ongoing aspiration  · Chemical code only. Palliative care assistance greatly appreciated. A transition to comfort measures would be reasonable. Sister considering transition to hospice on Monday.          Trinidad Shipman, [id number]

## 2018-02-11 NOTE — PROGRESS NOTES
7:00 PM  Report received from [facility].    8:30 PM  Assessment completed. Pt alert, opens eyes to voice, non-verbal. Pt currently in sinus arrhythmia, SBP is stable with Levophed infusing. VSS, will continue to monitor. 12:25 AM  Reassessment completed, no changes noted. Will continue to monitor. 4:00 AM  Reassessment completed, no changes noted. Will continue to monitor. Bedside shift change report given to RODRIGUEZ Lee (oncoming nurse) by Moriah Brown (offgoing nurse). Report included the following information SBAR, Kardex, ED Summary, Intake/Output, MAR, Accordion, Recent Results and Med Rec Status.

## 2018-02-11 NOTE — PROGRESS NOTES
Hospitalist Progress Note    NAME: Gricelda Chneg   :  1945   MRN:  [id number]       Assessment / Plan:  Acute hypoxic respiratory failure due to acute pulmonary edema - not improving  - CXR today with diffuse airspace disease/edema. No significant change. - con't vanco, zosyn and tamiflu  - appreciate pulmonology assistance  - Palliative consult appreciated - pt's code status to be changed to DNR  - Pt continues to require Levophed in order to maintain MAP >65mmHg - being weaned as tolerated - still requiring 10mcg  - Pt's status continues to be tenuous at best - prognosis is grim   -Spoke with Palliative on  who have been in touch with pt's family. They plan on coming in this afternoon to determine goals of care and to discuss potential Hospice. Agree that this is likely the reasonable course of action. Pt's RR still remains in the 30's with PRN Morphine helping slightly    On 2/10  -Pt likely for Hospice on Monday. Family wishes to wait till Monday because they have relatives coming in to visit the pt and the earliest they can come is Monday. Therefore titrate Levophed to try to keep MAP> 65mmHg. Would stop with blood draws. On   -Pt likely for Hospice tomorrow. No other changes. TIFFANIE, worsening  -Pt being diuresed but also has labile renal function making this more difficult  -Continue to monitor daily  -Lasix held today - BUN/Cr 40/1.55    Hypokalemia, resolved      Acute encephalopathy and septic shock due to UTI and Influenza B, present on admission: +eye deviation, remains nonresponsive with ?myoclonus.  - MRI brain with mild small vessel ischemic changes. No acute abnormality. - CT chest/abd/pelvis with severe scoliosis. Atelectasis in the lower lobes, left greater than right. Anemia. Atherosclerotic abdominal aorta without aneurysm. - UA >280425 pansensitive E Coli, blood cultures no growth.  Con't zosyn as above.   - influenza B positive - tamiflu finished   - EEG with cerebral dysfunction with no seizure activity  - appreciate neuro consult, recommending con't keppra for now - AMS is multifactorial in nature      Hypoglycemia:  H/o \"borderline DM,\" not on meds at home.  HgA1c <3.5.  - con't D10 gtt  - serial gl  - will checkucose checks      Transaminitis:  Presumed due to sepsis, improving  Baseline mental retardation  Lupus: Continue holding Plaquenil  Hypothyroidism:  Synthroid dose via NGT  Obesity (Body mass index is 32.45 kg/(m^2)      Code Status: full  Surrogate Decision Maker: sister   DVT Prophylaxis: heparin      Baseline: Mental retardation, lives with sister normally but placed in group home after sister had to undergo recent back surgery     Subjective:     Chief Complaint / Reason for Physician Visit  \"AMS\". Discussed with RN events overnight. Review of Systems:  Symptom Y/N Comments  Symptom Y/N Comments   Fever/Chills    Chest Pain     Poor Appetite    Edema     Cough    Abdominal Pain     Sputum    Joint Pain     SOB/YOUNG    Pruritis/Rash     Nausea/vomit    Tolerating PT/OT     Diarrhea    Tolerating Diet     Constipation    Other       Could NOT obtain due to: Mental status     Objective:     VITALS:   Last 24hrs VS reviewed since prior progress note.  Most recent are:  Patient Vitals for the past 24 hrs:   Temp Pulse Resp BP SpO2   02/11/18 1100 - 93 24 107/56 100 %   02/11/18 1000 - 95 26 98/50 97 %   02/11/18 0900 - 87 28 102/50 94 %   02/11/18 0800 98.6 °F (37 °C) 94 26 109/55 94 %   02/11/18 0712 - - - - 100 %   02/11/18 0700 - 90 24 135/60 100 %   02/11/18 0615 - 91 26 98/51 100 %   02/11/18 0600 - 94 28 105/52 99 %   02/11/18 0545 - 97 (!) 34 (!) 89/69 98 %   02/11/18 0530 - (!) 104 30 117/55 99 %   02/11/18 0515 - (!) 104 (!) 32 121/50 99 %   02/11/18 0500 - (!) 105 27 109/51 99 %   02/11/18 0400 - 89 25 117/66 99 %   02/11/18 0345 - 95 29 107/59 97 %   02/11/18 0300 - 97 (!) 31 118/62 100 %   02/11/18 0230 - 97 24 110/59 100 %   02/11/18 0215 - 91 30 118/61 100 %   02/11/18 0205 - 92 (!) 35 115/58 100 %   02/11/18 0100 - 99 25 (!) 116/37 100 %   02/11/18 0015 98.7 °F (37.1 °C) 88 17 (!) 105/25 100 %   02/10/18 2345 - (!) 103 25 (!) 105/27 96 %   02/10/18 2330 - 100 25 106/51 99 %   02/10/18 2315 - 89 21 100/44 100 %   02/10/18 2302 - 90 27 96/48 98 %   02/10/18 2300 - (!) 103 27 (!) 88/33 98 %   02/10/18 2230 - 85 25 120/65 100 %   02/10/18 2200 - 83 23 (!) 98/39 99 %   02/10/18 2115 - 94 30 105/67 98 %   02/10/18 2100 - 90 28 104/48 100 %   02/10/18 2030 99.9 °F (37.7 °C) 97 (!) 31 180/60 100 %   02/10/18 2015 - 93 (!) 41 164/57 100 %   02/10/18 2009 - - - - 97 %   02/10/18 1945 - 96 (!) 32 112/64 97 %   02/10/18 1930 - 99 (!) 33 144/69 97 %   02/10/18 1900 - 97 28 118/86 98 %   02/10/18 1830 - (!) 104 29 100/44 94 %   02/10/18 1800 - 100 24 108/59 95 %   02/10/18 1730 - 99 27 109/52 96 %   02/10/18 1700 - 95 29 105/52 97 %   02/10/18 1630 - 94 28 105/52 95 %   02/10/18 1600 98.7 °F (37.1 °C) 91 26 112/51 99 %   02/10/18 1500 - 90 24 116/66 100 %   02/10/18 1400 - 87 25 107/65 100 %   02/10/18 1300 - 100 26 (!) 113/34 100 %   02/10/18 1230 - 85 22 98/63 100 %   02/10/18 1200 97.7 °F (36.5 °C) 85 23 110/53 100 %       Intake/Output Summary (Last 24 hours) at 02/11/18 1154  Last data filed at 02/11/18 0900   Gross per 24 hour   Intake          2225.15 ml   Output              780 ml   Net          1445.15 ml      PHYSICAL EXAM:  General:                    Altered  EENT:                       Anicteric sclerae  Resp:                        Coarse.  Frequent shallow respirations  CV:                            Regular  rhythm,  No edema  GI:                              Soft, Non distended, Non tender.  +Bowel sounds  Neurologic:                Alert and oriented x 0, does not follow any commands  Psych:                       No insight  Skin:                          No rashes.  No jaundice    Reviewed most current lab test results and cultures  YES  Reviewed most current radiology test results   YES  Review and summation of old records today    NO  Reviewed patient's current orders and MAR    YES  PMH/SH reviewed - no change compared to H&P  ________________________________________________________________________  Care Plan discussed with:    Comments   Patient x    Family      RN x    Care Manager     Consultant                        Multidiciplinary team rounds were held today with , nursing, pharmacist and clinical coordinator. Patient's plan of care was discussed; medications were reviewed and discharge planning was addressed. ________________________________________________________________________  Total NON critical care TIME:  20   Minutes    Total CRITICAL CARE TIME Spent:   Minutes non procedure based      Comments   >50% of visit spent in counseling and coordination of care     ________________________________________________________________________  Alena Morales MD     Procedures: see electronic medical records for all procedures/Xrays and details which were not copied into this note but were reviewed prior to creation of Plan. LABS:  I reviewed today's most current labs and imaging studies.   Pertinent labs include:  Recent Labs      02/09/18 0313   WBC  8.1   HGB  8.3*   HCT  25.3*   PLT  54*     Recent Labs      02/09/18   0313   NA  144   K  3.9   CL  102   CO2  42*   GLU  165*   BUN  40*   CREA  1.55*   CA  8.8   MG  1.6   PHOS  2.9   ALB  1.9*   TBILI  0.8   SGOT  44*   ALT  64       Signed: Alena Morales MD

## 2018-02-11 NOTE — PROGRESS NOTES
Problem: Falls - Risk of  Goal: *Absence of Falls  Document Cole Fall Risk and appropriate interventions in the flowsheet.    Outcome: Progressing Towards Goal  Fall Risk Interventions:       Mentation Interventions: Adequate sleep, hydration, pain control, Bed/chair exit alarm, Door open when patient unattended, Evaluate medications/consider consulting pharmacy, Eyeglasses and hearing aids, Familiar objects from home, Family/sitter at bedside, Gait belt with transfers/ambulation, HELP ([address]) if available, More frequent rounding, Reorient patient, Room close to nurse's station, Toileting rounds, Update white board    Medication Interventions: Bed/chair exit alarm, Evaluate medications/consider consulting pharmacy    Elimination Interventions: Bed/chair exit alarm, Call light in reach, Patient to call for help with toileting needs, Toileting schedule/hourly rounds    History of Falls Interventions: Bed/chair exit alarm, Consult care management for discharge planning, Door open when patient unattended, Evaluate medications/consider consulting pharmacy, Investigate reason for fall, Room close to nurse's station, Utilize gait belt for transfer/ambulation

## 2018-02-11 NOTE — PROGRESS NOTES
0700 Bedside and Verbal shift change report received from 1200 Steven Herndon, [address] [facility] (offgoing nurse). Report included the following information SBAR, Kardex, Intake/Output, MAR, Accordion and Recent Results. 0800 Assessment complete. See flowsheet for details. 9639 Patient becoming increasingly tachypneic and restless. Morphine given; patient now resting comfortably. Will monitor. 0930 Spoke with Dr. Amalia Michaels regarding concern with patient aspirating; brown secretions suctioned from back of patient's mouth and throat. OK with MD to stop tube feeds. 1200 Reassessment unchanged. Will continue to monitor closely. 1730 Patient much more alert; awake. Family at bedside, tearful about making decisions to possibly transition to hospice tomorrow. Sister, Jaynie Buerger, stating \"I don't know if I can put her on hospice if she's looking at me and holding my hand like this. I feel so much pressure to make a decision. \" RN offering support as able. 2000 Reassessment complete. See flowsheet. Morphine given for tachypnea. 2300 Bedside and Verbal shift change report given to 1200 Steven Herndon, RN (oncoming nurse). Report included the following information SBAR, Kardex, Intake/Output, MAR, Accordion and Recent Results.

## 2018-02-12 ENCOUNTER — APPOINTMENT (OUTPATIENT)
Dept: GENERAL RADIOLOGY | Age: 73
DRG: 871 | End: 2018-02-12
Payer: MEDICARE

## 2018-02-12 PROBLEM — T17.908A ASPIRATION INTO RESPIRATORY TRACT: Status: ACTIVE | Noted: 2018-02-12

## 2018-02-12 PROBLEM — Z71.89 COUNSELING REGARDING GOALS OF CARE: Status: ACTIVE | Noted: 2018-02-12

## 2018-02-12 LAB
ANION GAP SERPL CALC-SCNC: 4 MMOL/L (ref 5–15)
BUN SERPL-MCNC: 41 MG/DL (ref 6–20)
BUN/CREAT SERPL: 29 (ref 12–20)
CALCIUM SERPL-MCNC: 9 MG/DL (ref 8.5–10.1)
CHLORIDE SERPL-SCNC: 114 MMOL/L (ref 97–108)
CO2 SERPL-SCNC: 36 MMOL/L (ref 21–32)
CREAT SERPL-MCNC: 1.42 MG/DL (ref 0.55–1.02)
GLUCOSE BLD STRIP.AUTO-MCNC: 100 MG/DL (ref 65–100)
GLUCOSE BLD STRIP.AUTO-MCNC: 122 MG/DL (ref 65–100)
GLUCOSE BLD STRIP.AUTO-MCNC: 142 MG/DL (ref 65–100)
GLUCOSE BLD STRIP.AUTO-MCNC: 90 MG/DL (ref 65–100)
GLUCOSE SERPL-MCNC: 114 MG/DL (ref 65–100)
POTASSIUM SERPL-SCNC: 4.1 MMOL/L (ref 3.5–5.1)
SERVICE CMNT-IMP: ABNORMAL
SERVICE CMNT-IMP: ABNORMAL
SERVICE CMNT-IMP: NORMAL
SERVICE CMNT-IMP: NORMAL
SODIUM SERPL-SCNC: 154 MMOL/L (ref 136–145)

## 2018-02-12 PROCEDURE — 82962 GLUCOSE BLOOD TEST: CPT

## 2018-02-12 PROCEDURE — 74011636637 HC RX REV CODE- 636/637: Performed by: PHYSICIAN ASSISTANT

## 2018-02-12 PROCEDURE — 36415 COLL VENOUS BLD VENIPUNCTURE: CPT | Performed by: GENERAL ACUTE CARE HOSPITAL

## 2018-02-12 PROCEDURE — 74011250636 HC RX REV CODE- 250/636: Performed by: INTERNAL MEDICINE

## 2018-02-12 PROCEDURE — 71045 X-RAY EXAM CHEST 1 VIEW: CPT

## 2018-02-12 PROCEDURE — 77030018719 HC DRSG PTCH ANTIMIC J&J -A

## 2018-02-12 PROCEDURE — 80048 BASIC METABOLIC PNL TOTAL CA: CPT | Performed by: GENERAL ACUTE CARE HOSPITAL

## 2018-02-12 PROCEDURE — 94640 AIRWAY INHALATION TREATMENT: CPT

## 2018-02-12 PROCEDURE — 74011250636 HC RX REV CODE- 250/636: Performed by: GENERAL ACUTE CARE HOSPITAL

## 2018-02-12 PROCEDURE — 77030020847 HC STATLOK BARD -A

## 2018-02-12 PROCEDURE — 65610000006 HC RM INTENSIVE CARE

## 2018-02-12 PROCEDURE — 74011000250 HC RX REV CODE- 250: Performed by: INTERNAL MEDICINE

## 2018-02-12 PROCEDURE — 74011250637 HC RX REV CODE- 250/637: Performed by: INTERNAL MEDICINE

## 2018-02-12 RX ORDER — MIDODRINE HYDROCHLORIDE 5 MG/1
5 TABLET ORAL 3 TIMES DAILY
Status: DISCONTINUED | OUTPATIENT
Start: 2018-02-12 | End: 2018-02-13

## 2018-02-12 RX ORDER — SODIUM CHLORIDE 0.9 % (FLUSH) 0.9 %
SYRINGE (ML) INJECTION
Status: COMPLETED
Start: 2018-02-12 | End: 2018-02-13

## 2018-02-12 RX ADMIN — INSULIN LISPRO 2 UNITS: 100 INJECTION, SOLUTION INTRAVENOUS; SUBCUTANEOUS at 11:30

## 2018-02-12 RX ADMIN — Medication 10 ML: at 22:00

## 2018-02-12 RX ADMIN — IPRATROPIUM BROMIDE AND ALBUTEROL SULFATE 3 ML: .5; 3 SOLUTION RESPIRATORY (INHALATION) at 07:23

## 2018-02-12 RX ADMIN — Medication 10 ML: at 13:43

## 2018-02-12 RX ADMIN — MORPHINE SULFATE 2 MG: 4 INJECTION, SOLUTION INTRAMUSCULAR; INTRAVENOUS at 17:55

## 2018-02-12 RX ADMIN — MIDODRINE HYDROCHLORIDE 5 MG: 5 TABLET ORAL at 22:00

## 2018-02-12 RX ADMIN — PIPERACILLIN SODIUM AND TAZOBACTAM SODIUM 4.5 G: .5; 4 INJECTION, POWDER, LYOPHILIZED, FOR SOLUTION INTRAVENOUS at 05:12

## 2018-02-12 RX ADMIN — Medication 10 ML: at 15:36

## 2018-02-12 RX ADMIN — SODIUM CHLORIDE 750 MG: 900 INJECTION, SOLUTION INTRAVENOUS at 17:06

## 2018-02-12 RX ADMIN — IPRATROPIUM BROMIDE AND ALBUTEROL SULFATE 3 ML: .5; 3 SOLUTION RESPIRATORY (INHALATION) at 19:36

## 2018-02-12 RX ADMIN — MIDODRINE HYDROCHLORIDE 5 MG: 5 TABLET ORAL at 11:30

## 2018-02-12 RX ADMIN — MIDODRINE HYDROCHLORIDE 5 MG: 5 TABLET ORAL at 17:05

## 2018-02-12 RX ADMIN — Medication 10 ML: at 05:15

## 2018-02-12 RX ADMIN — Medication 20 ML: at 11:17

## 2018-02-12 NOTE — PROGRESS NOTES
0730[facility]    0800: Pt. Is alert and able to track with her eyes. Pt. Does not follow commands. Pt. Is non verbal at baseline. Breath sounds coarse. Bowel sounds hypoactive. Tube feedings on hold for aspiration. R. PICC, raines, and NGT in place. Levophed infusing to keep a MAP greater than 65. See MAR for Titrations. Pulses palpable. Pt. Withdraws from pain in all extremities and has non purposeful movement with her hands. Pt. Has scoliosis and is hunched over at baseline. Pt. Is requiring 2 liters of oxygen via NC.    1200: Reassessment completed. Pt.'s family is at the bedside. No changes at this time. 1600: Pt. Reassessed. Pt. Is now on only 2 mcgs of Levophed. May be able to put Levophed on standby. 1605: Stopping Levophed now. 1800: PRN morphine given for a RR above 30.     1820: Levophed restarted for a BP of 83/46. Will titrate to keep a SBP greater than 90 per [facility] order.  See MAR.     1900: Report given to RODRIGUEZ Anne>

## 2018-02-12 NOTE — PROGRESS NOTES
[facility] PROGRESS NOTE  Pulmonary, Critical Care, and Sleep Medicine    Name: Vishnu Harris MRN: [id number]   : 1945 Hospital: [facility]    Date: 2018  Admission Date: 2018     Chart and notes reviewed. Data reviewed. I reviewed the patient's current medications in the medical record at each encounter. I have evaluated and examined the patient. IMPRESSION:   · Acute hypoxic respiratory failure still requiring nasal o2  · Severe sepsis and shock- unable to wean off IV levophed  · Hypernatremia- at risk for encephalopathy, hyperosmolar state after hypoglycemia was treated  · Acute influenza on tamiflu and isolation  · Diffuse bilateral infiltrates -right now > left; ARDS; now still on IV abx  · Dysphagia- thought to have aspirated- was being fed at home by sister and then at group home  · Severe kyphoscoliosis- poor posture and respiratory mechanics  · Encephalopathy with seizure or myoclonus - possibly due to hypoglycemia    · Severe hypoglycemia  · GNR UTI  · Lupus   · Mental retardation   · GERD  · DM  · Critically ill and at high risk of life threatening multi organ failure, dysfucntion      PLAN:   · Will add free water to NGT  · Add midodrine and then try to wean IV levophed as long as SBP > 90  · Hold TF for now to avoid risk of aspiration,   · Add reglan  · order BMP, sodium in AM and adjsut fluids accordingly  · Continue O2 and wean as able to keep sats > 90%  · Holding diuretics worsening creatinine  · Continue empiric antibiotics, Tamiflu course completed  · Continue pressors and titrate for SBP > 90  · SSI; glycemic monitoring  · Plaquenil on hold  · Antiepileptics, neuro signed off  · DVT prophylaxis: SCDs  · Chemical code only. discussed with Palliative care whose assistance greatly appreciated. Sister please with recovery thus far and may be near baseline alertness? ?   · If able, speech therapy consult soon; consider PEG? Overnight and weekend events reviewed: Libia Sings secretions suctioned from patient and tube feeds stopped this am due to concerns of aspiration; Afebrile; Sats 90% on RA; Levophed; Na climbing; Glucose elevated  Tachypnea and restless earlier per nursing; morphine given    ROS: Unable to obtain from patient. Open eyes to voice, but not responsive or following commands    12 point ROS reviewed and negative except as noted above.       Current Facility-Administered Medications   Medication Dose Route Frequency    NOREPINephrine (LEVOPHED) 32 mg in 5% dextrose 250 mL infusion  2-200 mcg/min IntraVENous TITRATE    midodrine (PROAMITINE) tablet 5 mg  5 mg Oral TID    sodium chloride (NS) 0.9 % flush        vancomycin (VANCOCIN) 750 mg in 0.9% sodium chloride (MBP/ADV) 250 mL  750 mg IntraVENous Q24H    glucose chewable tablet 16 g  4 Tab Oral PRN    dextrose (D50W) injection syrg 12.5-25 g  12.5-25 g IntraVENous PRN    glucagon (GLUCAGEN) injection 1 mg  1 mg IntraMUSCular PRN    insulin lispro (HUMALOG) injection   SubCUTAneous Q6H    albuterol-ipratropium (DUO-NEB) 2.5 MG-0.5 MG/3 ML  3 mL Nebulization BID RT    morphine injection 2 mg  2 mg IntraVENous Q4H PRN    levothyroxine (SYNTHROID) tablet 25 mcg  25 mcg Per NG tube 6am    sodium chloride (NS) flush 10-30 mL  10-30 mL InterCATHeter PRN    sodium chloride (NS) flush 10 mL  10 mL InterCATHeter Q24H    sodium chloride (NS) flush 10-40 mL  10-40 mL InterCATHeter Q8H    heparin (porcine) pf 300-500 Units  300-500 Units InterCATHeter PRN    sodium chloride (NS) flush 20 mL  20 mL InterCATHeter Q24H    heparin (porcine) pf 300 Units  300 Units InterCATHeter PRN    acetaminophen (TYLENOL) suppository 650 mg  650 mg Rectal Q4H PRN    sodium chloride (NS) flush 5-10 mL  5-10 mL IntraVENous Q8H    sodium chloride (NS) flush 5-10 mL  5-10 mL IntraVENous PRN         Vital Signs:  Visit Vitals    /60    Pulse 89    Temp 99.1 °F (37.3 °C)    Resp 26    Ht 5' 1\" (1.549 m)    Wt 68.1 kg (150 lb 2.1 oz)    SpO2 100%    BMI 28.37 kg/m2       O2 Device: Nasal cannula   O2 Flow Rate (L/min): 2 l/min   Temp (24hrs), Av.9 °F (37.2 °C), Min:98.3 °F (36.8 °C), Max:99.3 °F (37.4 °C)       Intake/Output:   Last shift:       07 - 1900  In: 123.8 [I.V.:123.8]  Out: 240 [Urine:240]  Last 3 shifts: 02/10 1901 -  0700  In: 1819.2 [I.V.:819.2]  Out: 1280 [Urine:1280]    Intake/Output Summary (Last 24 hours) at 18 1400  Last data filed at 18 1200   Gross per 24 hour   Intake           547.11 ml   Output              830 ml   Net          -282.89 ml          Physical Exam:   General:  Lethargic petite AAF, tachypneic. Head:  Normocephalic, without obvious abnormality, atraumatic.leans to left   Eyes:  Conjunctivae/corneas clear. Nose: NGT, nares normal. Septum midline. Mucosa normal.   Throat: Lips, mucosa, and tongue normal.    Neck: Supple, symmetrical, trachea midline, no adenopathy. Lungs:   Coarse with wheeze and rales bilaterally. Chest wall:  No tenderness or deformity. Heart:  Irregular, no murmur, click, rub or gallop. Abdomen:   Soft, non-tender. Bowel sounds normal. No masses,  No organomegaly. Extremities: Extremities normal, atraumatic, no cyanosis or edema. Pulses: 2+ and symmetric all extremities. Skin: Skin color, texture, turgor normal. No rashes or lesions   Lymph nodes: Cervical, supraclavicular nodes normal.   Neurologic: MR at baseline, seems awake but poor responsiveness, unable to evaluate, not following commands. DATA:  MAR reviewed and pertinent medications noted or modified as needed    Labs:  No results for input(s): WBC, HGB, HCT, PLT, HGBEXT, HCTEXT, PLTEXT, HGBEXT, HCTEXT, PLTEXT in the last 72 hours.   Recent Labs      18   0308   NA  154*   K  4.1   CL  114*   CO2  36*   GLU  114*   BUN  41*   CREA  1.42*   CA  9.0       Imaging:  No new images this morning          Andre HERZOG Angie Ma MD

## 2018-02-12 NOTE — PROGRESS NOTES
Care Management:    Patient remains critical in ICU, prognosis poor. Palliative involved and there is a family meeting today at 6 am.     Delaney Jim has been patients caregiver for many years. Up until recently patient has been home with Lashell Osei. Lashell Osei had back surgery so now patient has been in a group home while Lashell Osei recovers. Anticipate hospice and we will cont to follow . Steven Tamayo [facility] [address]    Addendum: I spoke with sister Lashell Osei and she says patient does not qualify for Medicaid. She is interested in patient going to SNF short term after hospital to recover and also give Lashell Osei and chance to recover form her own surgery. Since this is straight Medicare I do not think this will be a level to ( there is no Medicaid involved ) Lashell Osei says patient has been to [facility] in the past.     Patient had been staying at a private home prior to admission while Lashell Osei was recovering but the private home is very expensive so Lashell Osei may choose SNF post hospital so Medicare will cover.

## 2018-02-12 NOTE — PROGRESS NOTES
Palliative Medicine Consult  Piyush: [phone] ([phone])    Patient Name: Suzette Cam  YOB: 1945    Date of Initial Consult: 2/5/18  Reason for Consult: Overwhelming Symptoms  Requesting Provider: Familia Good MD  Primary Care Physician: Abraham Byrne MD     SUMMARY:   Suzette Cam is a 72 y.o. with a past history of  Lupus, PUD, GERD, HTN, DM, and mental retardation, who was admitted on 2/2/2018 from snf with a diagnosis of severe sepsis, hypothermia, hypoglycemia, seizures. Current medical issues leading to Palliative Medicine involvement include: family support. MRI of brain 2/3 showed mild small vessel ischemic changes. No acute abnormality, EEG showed cerebral dysfunction with no seizure activity. 2/6: CCU day 4: CXR shows persistent moderate diffuse airspace disease with small bilateral effusions. Remains on levo for hypotension, D10 for hypoglycemia. Remains minimally responsive. 2/8: CCU day 6: WBCs remain elevated at 11.9, still pressor dependent. Now off D10. Still lethargic. 2/9: CCU day 7: WBCs down to 8.1, still on pressors, keppra discontinued yesterday, no change in neuro status. Creatinine is creeping up, today 1.55.    2/12: NGT feedings on hold for aspiration. CXR shows partial improvement of bilateral airspace dz. Na+ 154. Still on pressor, awake, alert, tracks but does not respond to command. Psychosocial: lives with sister, who recently placed pt in snf while she had surgery on her back, then once recovered will bring patent back home. normal baseline pt speaks a few words, ambulates with assistance Denice Jacques uses a gait belt) feeds self unless sick. Per niece Michael Carolina functions at the level of a 12 y/o. PALLIATIVE DIAGNOSES:   1. Severe sepsis: influenza B pos and UTI  2. Hypothermia  3. Hypoglycemia  4. Hypotension  5. Acute renal failure in setting of vasopressor  6. Seizures  7. Acute hyperkalemia  8. Influenza   9. Hypotension   10.  Care decisions     PLAN:   1. Met with pt's sister Marshall Perez, and Giovana's daughter Latia:  We discussed pt's overall poor condition and the fact that she is still dependent on vasopressors, and now there is the issue of aspiration of NGT feedings despite rate of 30ml/hr. Pt was able to eat orally prior to this admission, and has not been evaluated by speech, however, even if she can eat by mouth there is still the issue of aspiration, most likely due to her stooped posture 2/2 kyphoscoliosis. She would most likely need a J-PEG. Family feels that because pt is now awake and alert, \"we must give her a chance. \"  If pt declines despite continued aggressive measures, then they would consider comfort care, however, over the weekend pt has made progress in that she went from lethargic and barely responsive to awake and alert, it gives them hope. 1. Continue aggressive measures: goals are to get pt off pressors and determine safe feeding options, then discharge to LTC until Marshall Perez is able to resume providing care. Consider Reglan. 2. Pt has DNR: she is partial code for pressors for treatment of hypotension, if her heart stops beating and she stops breathing, do not resuscitate. 3. Continue morphine prn tachypnea. Counseled family about rationale, they are ok with it as they do not want pt to suffer. 2. Provided Palliative contact info, encouraged Mignon Haney to call with questions. 3. Initial consult note routed to primary continuity provider  4. Communicated plan of care with: Palliative IDT, RN, CCU IDT     GOALS OF CARE / TREATMENT PREFERENCES:     GOALS OF CARE:  Patient/Health Care Proxy Stated Goals: Prolong life     1. Recover  2.  Return home      TREATMENT PREFERENCES:   Code Status: Partial Code    Advance Care Planning:  Advance Care Planning 2/12/2018   Patient's Healthcare Decision Maker is: Named in scanned ACP document   Primary Decision Maker Name Grey Vargas   Primary Decision Maker Phone Number [phone] H/ [phone] C   Primary Decision Maker Relationship to Patient -   Confirm Advance Directive None   Patient Would Like to Complete Advance Directive Unable   Does the patient have other document types Guardianship       Medical Interventions: Limited additional interventions   Other Instructions:   Artificially Administered Nutrition: Feeding tube long-term, if indicated     Other:    As far as possible, the palliative care team has discussed with patient / health care proxy about goals of care / treatment preferences for patient. HISTORY:     History obtained from:  Chart, family    CHIEF COMPLAINT: found unresponsive    HPI/SUBJECTIVE:    The patient is:   [] Verbal and participatory  [x] Non-participatory due to: pt factors    BIBA after being found unresponsive at the [facility] by caregiver. Per EMS, HR in 30s and BG 27, EMS administered IM glucagon en route, bringing BG up to 200. In ED, pt began seizing, which resolved following 2mg ativan. ED w/u:  CT of chest/abd/pelvis showed severe scoliosis, atelectasis in lower lobes, left greater than right, atherosclerotic abdominal aorta without aneurysm, s/p hysterectomy. Head CT no acute findings, CXR neg. EKG showed afib with slow ventricular responsive and irregular rate of 40. Edwin Kruse reports that when pt was ~7 y/o she was very sick, high fevers, her parents were crying, took her to the ED, shortly after she had her tonsils removed and has never been the same. Parents were told to put her in an institution, that she would never learn anything. Edwin Kruse was able to help pt walk using a gait belt, however, over the years pt's legs have been getting weaker. At one point, she was in hospice for lupus, but got better. Pt is able to feed self, however, sometimes shakes and loses the food off the spoon before it gets to her mouth. Likes TV, music, flipping pages in a book. She can say a few words, usually delayed response but cannot carry a conversation. 2/12: pt awake, alert, unable to respond to command or questions. Clinical Pain Assessment (nonverbal scale for severity on nonverbal patients):   Clinical Pain Assessment  Severity: 0     Activity (Movement): Lying quietly, normal position    Duration: for how long has pt been experiencing pain (e.g., 2 days, 1 month, years)  Frequency: how often pain is an issue (e.g., several times per day, once every few days, constant)     FUNCTIONAL ASSESSMENT:     Palliative Performance Scale (PPS):  PPS: 20       PSYCHOSOCIAL/SPIRITUAL SCREENING:     Palliative IDT has assessed this patient for cultural preferences / practices and a referral made as appropriate to needs (Cultural Services, Patient Advocacy, Ethics, etc.)    Advance Care Planning:  Advance Care Planning 2/12/2018   Patient's Healthcare Decision Maker is: Named in scanned ACP document   Primary Decision Maker Name Jeanne Smyth   Primary Decision Maker Phone Number [phone] H/ [phone] C   Primary Decision Maker Relationship to Patient -   Confirm Advance Directive None   Patient Would Like to Complete Advance Directive Unable   Does the patient have other document types Guardianship       Any spiritual / Mandaeism concerns:  [] Yes /  [x] No    Caregiver Burnout:  [] Yes /  [] No /  [x] No Caregiver Present      Anticipatory grief assessment:   [x] Normal  / [] Maladaptive       ESAS Anxiety: Anxiety: 0    ESAS Depression:   : unable to assess due to pt factors       REVIEW OF SYSTEMS:     Positive and pertinent negative findings in ROS are noted above in HPI. The following systems were [x] reviewed / [] unable to be reviewed as noted in HPI  Other findings are noted below. Systems: constitutional, ears/nose/mouth/throat, respiratory, gastrointestinal, genitourinary, musculoskeletal, integumentary, neurologic, psychiatric, endocrine. Positive findings noted below.   Modified ESAS Completed by: provider   Fatigue: 8 Drowsiness: 0     Pain: 0 Anxiety: 0       Dyspnea: 2     Constipation: No     Stool Occurrence(s): 1        PHYSICAL EXAM:     From RN flowsheet:  Wt Readings from Last 3 Encounters:   02/11/18 150 lb 2.1 oz (68.1 kg)   05/31/17 156 lb (70.8 kg)   05/23/17 156 lb (70.8 kg)     Blood pressure 107/54, pulse 86, temperature 99.1 °F (37.3 °C), resp. rate 24, height 5' 1\" (1.549 m), weight 150 lb 2.1 oz (68.1 kg), SpO2 100 %.     Pain Scale 1: Adult Nonverbal Pain Scale  Pain Intensity 1: 0  Pain Onset 1: Acute  Pain Location 1: Generalized        Pain Intervention(s) 1: Medication (see MAR)  Last bowel movement, if known:     Constitutional:awake, alert, tracking with eyes, sitting slumped over this am   Eyes: pupils equal, anicteric  ENMT: no nasal discharge, moist mucous membranes  Cardiovascular: regular rhythm, distal pulses intact  Respiratory: breathing not labored, symmetric  Gastrointestinal: soft non-tender, +bowel sounds  Musculoskeletal: kyphoscoliosis, Skin: warm, dry  Neurologic: following no commands, not moving all extremities  Psychiatric: unable to assess due to pt factors          HISTORY:     Active Problems:    UTI (urinary tract infection) (2/2/2018)      Mental retardation (2/2/2018)      Sepsis ([address] (2/2/2018)      Hx of systemic lupus erythematosus (SLE) (2/2/2018)      Hx of diabetes mellitus (2/2/2018)      Hx of essential hypertension (2/2/2018)      Idiopathic hypotension (2/9/2018)      Acute renal failure with tubular necrosis ([address] (2/9/2018)      Past Medical History:   Diagnosis Date    Arthritis     Colon polyp     Diabetes ([address]     borderline no medications    Environmental allergies 4/28/2010    GERD (gastroesophageal reflux disease)     HTN (hypertension) 4/28/2010    dosent take medication now    Lupus     MR (mental retardation)     Osteoporosis, senile     Other ill-defined conditions     parkinson's disease possibly    Psychiatric disorder     anxious    PUD (peptic ulcer disease)     Scolioses     Sjogren's syndrome ([facility] 3/4/2015      Past Surgical History:   Procedure Laterality Date    HX TONSILLECTOMY      [place] COLONOSCOPY FLX DX W/COLLJ SPEC WHEN PFRMD  2/17/2011    due 2013      Family History   Problem Relation Age of Onset    Cancer Mother      pancreas    Heart Disease Father     Heart Attack Father       History reviewed, no pertinent family history.   Social History   Substance Use Topics    Smoking status: Never Smoker    Smokeless tobacco: Never Used    Alcohol use No     Allergies   Allergen Reactions    Adhesive Tape Other (comments)     Redness under that adhesive      Current Facility-Administered Medications   Medication Dose Route Frequency    NOREPINephrine (LEVOPHED) 32 mg in 5% dextrose 250 mL infusion  2-200 mcg/min IntraVENous TITRATE    midodrine (PROAMITINE) tablet 5 mg  5 mg Oral TID    sodium chloride (NS) 0.9 % flush        vancomycin (VANCOCIN) 750 mg in 0.9% sodium chloride (MBP/ADV) 250 mL  750 mg IntraVENous Q24H    glucose chewable tablet 16 g  4 Tab Oral PRN    dextrose (D50W) injection syrg 12.5-25 g  12.5-25 g IntraVENous PRN    glucagon (GLUCAGEN) injection 1 mg  1 mg IntraMUSCular PRN    insulin lispro (HUMALOG) injection   SubCUTAneous Q6H    albuterol-ipratropium (DUO-NEB) 2.5 MG-0.5 MG/3 ML  3 mL Nebulization BID RT    morphine injection 2 mg  2 mg IntraVENous Q4H PRN    levothyroxine (SYNTHROID) tablet 25 mcg  25 mcg Per NG tube 6am    sodium chloride (NS) flush 10-30 mL  10-30 mL InterCATHeter PRN    sodium chloride (NS) flush 10 mL  10 mL InterCATHeter Q24H    sodium chloride (NS) flush 10-40 mL  10-40 mL InterCATHeter Q8H    heparin (porcine) pf 300-500 Units  300-500 Units InterCATHeter PRN    sodium chloride (NS) flush 20 mL  20 mL InterCATHeter Q24H    heparin (porcine) pf 300 Units  300 Units InterCATHeter PRN    acetaminophen (TYLENOL) suppository 650 mg  650 mg Rectal Q4H PRN    sodium chloride (NS) flush 5-10 mL  5-10 mL IntraVENous Q8H    sodium chloride (NS) flush 5-10 mL  5-10 mL IntraVENous PRN          LAB AND IMAGING FINDINGS:     Lab Results   Component Value Date/Time    WBC 8.1 02/09/2018 03:13 AM    HGB 8.3 (L) 02/09/2018 03:13 AM    PLATELET 54 (L) 94/92/9121 03:13 AM     Lab Results   Component Value Date/Time    Sodium 154 (H) 02/12/2018 03:08 AM    Potassium 4.1 02/12/2018 03:08 AM    Chloride 114 (H) 02/12/2018 03:08 AM    CO2 36 (H) 02/12/2018 03:08 AM    BUN 41 (H) 02/12/2018 03:08 AM    Creatinine 1.42 (H) 02/12/2018 03:08 AM    Calcium 9.0 02/12/2018 03:08 AM    Magnesium 1.6 02/09/2018 03:13 AM    Phosphorus 2.9 02/09/2018 03:13 AM      Lab Results   Component Value Date/Time    AST (SGOT) 44 (H) 02/09/2018 03:13 AM    Alk. phosphatase 207 (H) 02/09/2018 03:13 AM    Protein, total 7.5 02/09/2018 03:13 AM    Albumin 1.9 (L) 02/09/2018 03:13 AM    Globulin 5.6 (H) 02/09/2018 03:13 AM     Lab Results   Component Value Date/Time    INR 1.1 02/02/2018 03:06 PM    Prothrombin time 10.6 02/02/2018 03:06 PM    aPTT 29.6 02/02/2018 03:06 PM      Lab Results   Component Value Date/Time    Iron 80 12/27/2010 04:18 PM    TIBC 261 12/27/2010 04:18 PM    Iron % saturation 31 12/27/2010 04:18 PM    Ferritin 81 02/17/2010 02:21 PM      Lab Results   Component Value Date/Time    pH 7.41 02/04/2018 03:33 PM    PCO2 33 (L) 02/04/2018 03:33 PM    PO2 70 (L) 02/04/2018 03:33 PM     No components found for: [place]   Lab Results   Component Value Date/Time     02/02/2018 03:06 PM    CK - MB 4.0 (H) 08/05/2013 10:00 AM                Total time: 75  Counseling / coordination time, spent as noted above: 65  > 50% counseling / coordination?: yes    Prolonged service was provided for  [x]30 min   []75 min in face to face time in the presence of the patient, spent as noted above. Time Start: 11:00am  Time End:   12:30pm  Note: this can only be billed with  (initial) or 21 [phone] (follow up).   If multiple start / stop times, list each separately.

## 2018-02-12 NOTE — PROGRESS NOTES
Hospitalist Progress Note    NAME: Tin Seymour   :  1945   MRN:  [id number]       Assessment / Plan:  Acute hypoxic respiratory failure due to acute pulmonary edema - not improving  - CXR today with diffuse airspace disease/edema. No significant change. - con't vanco, zosyn and tamiflu  - appreciate pulmonology assistance  - Palliative consult appreciated - pt's code status to be changed to DNR  - Pt continues to require Levophed in order to maintain MAP >65mmHg - being weaned as tolerated - still requiring 10mcg  - Pt's status continues to be tenuous at best - prognosis is grim   -Spoke with Palliative on  who have been in touch with pt's family. They plan on coming in this afternoon to determine goals of care and to discuss potential Hospice. Agree that this is likely the reasonable course of action. Pt's RR still remains in the 30's with PRN Morphine helping slightly    On 2/10  -Pt likely for Hospice on Monday. Family wishes to wait till Monday because they have relatives coming in to visit the pt and the earliest they can come is Monday. Therefore titrate Levophed to try to keep MAP> 65mmHg. Would stop with blood draws. On   -Pt likely for Hospice tomorrow. No other changes. On :  -Family meeting done at 11am. It appears plan for Hospice has been shelved. Family believes there to have been some improvement in the pt over the weekend. I would be hesitant to agree. -Regardless continue with ICU care. Pt still requiring Levophed, titrate as possible  -Repeat labs tmr AM    TIFFANIE, worsening  -Pt being diuresed but also has labile renal function making this more difficult  -Continue to monitor daily    Acute encephalopathy and septic shock due to UTI and Influenza B, present on admission: +eye deviation, remains nonresponsive with ?myoclonus.  - MRI brain with mild small vessel ischemic changes. No acute abnormality. - CT chest/abd/pelvis with severe scoliosis.  Atelectasis in the lower lobes, left greater than right. Anemia. Atherosclerotic abdominal aorta without aneurysm. - UA >962168 pansensitive E Coli, blood cultures no growth.  Con't zosyn as above. - influenza B positive - tamiflu finished   - EEG with cerebral dysfunction with no seizure activity  - appreciate neuro consult, recommending con't keppra for now - AMS is multifactorial in nature      Hypoglycemia:  H/o \"borderline DM,\" not on meds at home.  HgA1c <3.5.  - con't D10 gtt  - serial gl  - will checkucose checks      Transaminitis:  Presumed due to sepsis, improving  Baseline mental retardation  Lupus: Continue holding Plaquenil  Hypothyroidism:  Synthroid dose via NGT  Obesity (Body mass index is 32.45 kg/(m^2)      Code Status: full  Surrogate Decision Maker: sister   DVT Prophylaxis: heparin      Baseline: Mental retardation, lives with sister normally but placed in group home after sister had to undergo recent back surgery     Subjective:     Chief Complaint / Reason for Physician Visit  Altered. Discussed with RN events overnight. Review of Systems:  Symptom Y/N Comments  Symptom Y/N Comments   Fever/Chills    Chest Pain     Poor Appetite    Edema     Cough    Abdominal Pain     Sputum    Joint Pain     SOB/YOUNG    Pruritis/Rash     Nausea/vomit    Tolerating PT/OT     Diarrhea    Tolerating Diet     Constipation    Other       Could NOT obtain due to: Mental status     Objective:     VITALS:   Last 24hrs VS reviewed since prior progress note.  Most recent are:  Patient Vitals for the past 24 hrs:   Temp Pulse Resp BP SpO2   02/12/18 1300 - 89 26 106/60 100 %   02/12/18 1200 99.1 °F (37.3 °C) 88 24 103/58 99 %   02/12/18 1100 - 93 28 109/53 99 %   02/12/18 1000 - 95 (!) 31 116/68 98 %   02/12/18 0900 - 89 25 118/66 99 %   02/12/18 0800 98.3 °F (36.8 °C) 92 25 127/48 99 %   02/12/18 0724 - - - - 100 %   02/12/18 0600 - 88 25 118/45 100 %   02/12/18 0500 - 90 27 115/57 99 %   02/12/18 0400 - 80 21 108/64 100 % 02/12/18 0300 98.8 °F (37.1 °C) 93 25 105/53 99 %   02/12/18 0200 - 86 27 114/71 100 %   02/12/18 0130 - 88 19 129/63 100 %   02/12/18 0100 - 84 26 126/63 100 %   02/12/18 0030 - 88 21 133/55 100 %   02/12/18 0001 - 84 25 117/60 100 %   02/11/18 2330 - 85 27 122/54 100 %   02/11/18 2300 99.2 °F (37.3 °C) 91 21 123/59 100 %   02/11/18 2200 - 84 21 126/50 99 %   02/11/18 2100 - 86 20 115/56 100 %   02/11/18 2036 - - - - 100 %   02/11/18 2000 99.3 °F (37.4 °C) 90 26 124/56 90 %   02/11/18 1900 - 93 28 116/57 92 %   02/11/18 1800 - 95 27 112/40 91 %   02/11/18 1700 - (!) 104 30 122/53 90 %   02/11/18 1600 98.7 °F (37.1 °C) 79 25 101/49 92 %   02/11/18 1500 - 80 25 123/55 100 %       Intake/Output Summary (Last 24 hours) at 02/12/18 1407  Last data filed at 02/12/18 1200   Gross per 24 hour   Intake           547.11 ml   Output              830 ml   Net          -282.89 ml        PHYSICAL EXAM:  General:                    Altered  EENT:                       Anicteric sclerae  Resp:                        Coarse. Frequent shallow respirations  CV:                            Regular  rhythm,  No edema  GI:                              Soft, Non distended, Non tender.  +Bowel sounds  Neurologic:                Alert and oriented x 0, does not follow any commands  Psych:                       No insight  Skin:                          No rashes.  No jaundice    Reviewed most current lab test results and cultures  YES  Reviewed most current radiology test results   YES  Review and summation of old records today    NO  Reviewed patient's current orders and MAR    YES  PMH/SH reviewed - no change compared to H&P  ________________________________________________________________________  Care Plan discussed with:    Comments   Patient x    Family      RN x    Care Manager     Consultant                        Multidiciplinary team rounds were held today with , nursing, pharmacist and clinical coordinator. Patient's plan of care was discussed; medications were reviewed and discharge planning was addressed. ________________________________________________________________________  Total NON critical care TIME:  25   Minutes    Total CRITICAL CARE TIME Spent:   Minutes non procedure based      Comments   >50% of visit spent in counseling and coordination of care     ________________________________________________________________________  Merlin Brody MD     Procedures: see electronic medical records for all procedures/Xrays and details which were not copied into this note but were reviewed prior to creation of Plan. LABS:  I reviewed today's most current labs and imaging studies. Pertinent labs include:  No results for input(s): WBC, HGB, HCT, PLT, HGBEXT, HCTEXT, PLTEXT in the last 72 hours.   Recent Labs      02/12/18   0308   NA  154*   K  4.1   CL  114*   CO2  36*   GLU  114*   BUN  41*   CREA  1.42*   CA  9.0       Signed: Merlin Brody MD

## 2018-02-12 NOTE — PROGRESS NOTES
Palliative Medicine  [place]: [phone])  [facility]: [phone])      Resuscitation Status: Partial Code   Durable DNR addressed? [] Yes   [x] No    [] Not Applicable    Advance Care Planning 2/12/2018   Patient's Healthcare Decision Maker is: Named in scanned ACP document   Primary Decision Maker Name Prashanth Smith   Primary Decision Maker Phone Number [phone] H/ [phone] C   Primary Decision Maker Relationship to Patient Sibling   Confirm Advance Directive Yes, on file           Patient / Family Encounter Documentation    Participants (names): Prashanth Smith - sister, Walsh Carlos - niece, Christa Li, BRANDONW   (met after family meeting with Nery Chen NP). Narrative: \"I am not going to give up on her yet, I think she is still fighting\". Patient is more alert today, opening eyes at times, looking towards family members, gave a quizzical look towards this writer (who she hasn't met previously). Akiko Silvestre shared that she and her daughter Magnus Werner have been the caregivers for patient for about 30 years. Madina Deluna formally obtained guardianship for patient in 1989). They are very caring and loving towards patient, they noted that they know patient is going to be slowly weaned off certain medical interventions, but \"we want to see how she does\". She is still responding well today, so we don't want to make any quick decisions. Discussed hospice and goal of comfort at some point in patient's care, family shared that patient did have hospice in the past. They may return to hospice services at some point, but at present they want treatment regimen to continue. If they feel that patient is not recovering in a meaningful way (to them, they know her best), then family may choose the comfort route. Psychosocial Issues Identified: Long time guardian/ sister, Akiko Silvestre, not ready to stop everything and have comfort only.  If patient seems to be declining or becomes unresponsive, then Akiko Silvestre may choose comfort care/hospice. Very loving, caring family. Advocates for patient as she has MR and is unable to speak for herself. Family familiar with hospice services as patient had hospice in the past.     Goals of Care / Plan: Prolong life (unless patient becomes less responsive ).

## 2018-02-12 NOTE — PROGRESS NOTES
Critical care interdisciplinary rounds held on 02/12/2018. Following members present, Pharmacy, Diabetes Treatment, Case Management, Respiratory Therapy, Physical Therapy,Clinical Care Lead and Nutrition. Led by NAYA Davis RN and Dr. Virginia Chowdhury. Plan of care discussed. See clinical pathway for plan of care and interventions and desired outcomes.

## 2018-02-12 NOTE — PROGRESS NOTES
11:00 PM  Report received from Rosa, [facility].    11:25 PM  Assessment completed. Pt alert, tracks with eyes, does not follow commands which,per family, is baseline. MAP still stable with Levophed gtt infusing at 10 mcg/min. Pt cleaned of medium sized BM, turned for comfort. Will continue to monitor. 3:15 AM  Reassessment completed, no changes noted. Bedside shift change report given to Soila Whitaker RN (oncoming nurse) by Philipp Rios (offgoing nurse). Report included the following information SBAR, Kardex, ED Summary, MAR, Accordion, Recent Results, Med Rec Status and Cardiac Rhythm sinus arrhythmia.

## 2018-02-12 NOTE — PROGRESS NOTES
Nutrition Assessment:    RECOMMENDATIONS:   Hold TF for now until Plan of care is determined from Palliative meeting    Start 200mL free H2O flushes 5 times daily via NGT (provides 1000mL)    ASSESSMENT:   Chart reviewed, case discussed during CCU rounds. Pt more responsive today compared to last week. TF on hold for concern of aspiration per RN. Na up to 154, per discussion with Dr. Crispin Pickett, will start free water flushes to bring this down. Family is leaning towards comfort care/hospice, so will hold off on plan for nutrition support today. Levo at 8. BM noted yesterday. Dietitians Intervention(s)/Plan(s): free water flushes, monitor plan of care  SUBJECTIVE/OBJECTIVE:   Pt nonverbal, no family in the room   Diet Order: NPO  % Eaten:  No data found. TF on hold 2' concern for aspiration  via NG Tube     Pertinent Medications:humalog, vancomycin; Drips: levo. Chemistries:  Lab Results   Component Value Date/Time    Sodium 154 (H) 02/12/2018 03:08 AM    Potassium 4.1 02/12/2018 03:08 AM    Chloride 114 (H) 02/12/2018 03:08 AM    CO2 36 (H) 02/12/2018 03:08 AM    Anion gap 4 (L) 02/12/2018 03:08 AM    Glucose 114 (H) 02/12/2018 03:08 AM    BUN 41 (H) 02/12/2018 03:08 AM    Creatinine 1.42 (H) 02/12/2018 03:08 AM    BUN/Creatinine ratio 29 (H) 02/12/2018 03:08 AM    GFR est AA 44 (L) 02/12/2018 03:08 AM    GFR est non-AA 36 (L) 02/12/2018 03:08 AM    Calcium 9.0 02/12/2018 03:08 AM    Albumin 1.9 (L) 02/09/2018 03:13 AM      Anthropometrics: Height: 5' 1\" (154.9 cm) Weight: 68.1 kg (150 lb 2.1 oz)   []bed scale    []stated   [x]unknown(2/11)     IBW (%IBW):   ( ) UBW (%UBW):   (  %)    BMI: Body mass index is 28.37 kg/(m^2). This BMI is indicative of:  []Underweight   []Normal   [x]Overweight   [] Obesity   [] Extreme Obesity (BMI>40)  Estimated Nutrition Needs (Based on): 1463 Kcals/day (MSJ 1219 x 1.2) , 61 g (-77 (0.8-1gPro/kg)) Protein  Carbohydrate:  At Least 130 g/day  Fluids: 1000 mL/day per MD Last BM: 2/11   []Active     []Hyperactive  [x]Hypoactive       [] Absent   BS  Skin:    [x] Intact   [] Incision  [] Breakdown   [] DTI   [] Tears/Excoriation/Abrasion  [x]Edema(+1-BLE) [] Other: Wt Readings from Last 30 Encounters:   02/11/18 68.1 kg (150 lb 2.1 oz)   05/31/17 70.8 kg (156 lb)   05/23/17 70.8 kg (156 lb)   05/08/17 70.8 kg (156 lb)   12/13/16 70.3 kg (155 lb)   06/27/16 61.7 kg (136 lb)   05/02/16 61.7 kg (136 lb)   10/09/15 55.8 kg (123 lb)   07/02/15 53.7 kg (118 lb 6.4 oz)   06/03/15 56.2 kg (124 lb)   04/28/15 52.6 kg (116 lb)   04/28/15 52.6 kg (116 lb)   04/07/15 52.2 kg (115 lb)   03/04/15 49.4 kg (109 lb)   02/28/14 47.1 kg (103 lb 12.8 oz)   08/07/13 47.4 kg (104 lb 8 oz)   03/05/13 44.3 kg (97 lb 9.6 oz)   11/18/12 44.5 kg (98 lb)   09/26/11 59.9 kg (132 lb)   09/12/11 59.9 kg (132 lb)   06/13/11 62.3 kg (137 lb 6.4 oz)   04/07/11 62.6 kg (138 lb)   03/29/11 66.2 kg (146 lb)   02/10/11 66.9 kg (147 lb 8 oz)   01/19/11 66.2 kg (146 lb)   12/27/10 66.2 kg (146 lb)   05/07/10 71.4 kg (157 lb 6.4 oz)   04/30/10 70.4 kg (155 lb 3.2 oz)   04/27/10 71.7 kg (158 lb)      NUTRITION DIAGNOSES:   Problem:  Inadequate protein-energy intake      Etiology: related to pt NPO      Signs/Symptoms: as evidenced by NPO meets 0% kcal and protein needs. Previous dx re: inadequate protein energy intake continues, TF on hold. NUTRITION INTERVENTIONS:    Enteral/Parenteral Nutrition: Modify rate, concentration, composition, and schedule                GOAL:   Plan of care will be determined re: nutrition support vs hospice in 2-4 days.      NUTRITION MONITORING AND EVALUATION   Previous Goal: Pt will tolerate TF at goal rate with residuals <250mL in 2-4 days   Previous Goal Met: N/A (TF on hold)   Previous Recommendations Implemented: Yes   Cultural, Mormonism, or Ethnic Dietary Needs: None   LEARNING NEEDS (Diet, Food/Nutrient-Drug Interaction):    [x] None Identified   [] Identified and Education Provided/Documented   [] Identified and Pt declined/was not appropriate      [x] Interdisciplinary Care Plan Reviewed/Documented    [x] Participated in Discharge Planning: Unable to determine    [x] Interdisciplinary Rounds     NUTRITION RISK:    [x] High              [] Moderate           []  Low  []  Minimal/Uncompromised      Tushar Mojica RD, [address]   Pager [phone]  Weekend Pager [phone]

## 2018-02-12 NOTE — ACP (ADVANCE CARE PLANNING)
According to the hospital scanned document, patient's sister, Corinna Lozano was given guardianship of patient in 48. Patient lives with her sister and niece and has been with them for 30 years.

## 2018-02-13 LAB
ALBUMIN SERPL-MCNC: 1.8 G/DL (ref 3.5–5)
ALBUMIN/GLOB SERPL: 0.3 {RATIO} (ref 1.1–2.2)
ALP SERPL-CCNC: 172 U/L (ref 45–117)
ALT SERPL-CCNC: 36 U/L (ref 12–78)
ANION GAP SERPL CALC-SCNC: 5 MMOL/L (ref 5–15)
AST SERPL-CCNC: 47 U/L (ref 15–37)
BASOPHILS # BLD: 0 K/UL (ref 0–0.1)
BASOPHILS NFR BLD: 0 % (ref 0–1)
BILIRUB SERPL-MCNC: 0.9 MG/DL (ref 0.2–1)
BUN SERPL-MCNC: 45 MG/DL (ref 6–20)
BUN/CREAT SERPL: 32 (ref 12–20)
CALCIUM SERPL-MCNC: 8.6 MG/DL (ref 8.5–10.1)
CHLORIDE SERPL-SCNC: 116 MMOL/L (ref 97–108)
CO2 SERPL-SCNC: 35 MMOL/L (ref 21–32)
CREAT SERPL-MCNC: 1.41 MG/DL (ref 0.55–1.02)
DIFFERENTIAL METHOD BLD: ABNORMAL
EOSINOPHIL # BLD: 0.3 K/UL (ref 0–0.4)
EOSINOPHIL NFR BLD: 3 % (ref 0–7)
ERYTHROCYTE [DISTWIDTH] IN BLOOD BY AUTOMATED COUNT: 15.6 % (ref 11.5–14.5)
GLOBULIN SER CALC-MCNC: 5.3 G/DL (ref 2–4)
GLUCOSE BLD STRIP.AUTO-MCNC: 109 MG/DL (ref 65–100)
GLUCOSE BLD STRIP.AUTO-MCNC: 110 MG/DL (ref 65–100)
GLUCOSE BLD STRIP.AUTO-MCNC: 115 MG/DL (ref 65–100)
GLUCOSE BLD STRIP.AUTO-MCNC: 98 MG/DL (ref 65–100)
GLUCOSE SERPL-MCNC: 100 MG/DL (ref 65–100)
HCT VFR BLD AUTO: 23.3 % (ref 35–47)
HGB BLD-MCNC: 7 G/DL (ref 11.5–16)
IMM GRANULOCYTES # BLD: 0.1 K/UL (ref 0–0.04)
IMM GRANULOCYTES NFR BLD AUTO: 1 % (ref 0–0.5)
LYMPHOCYTES # BLD: 1.4 K/UL (ref 0.8–3.5)
LYMPHOCYTES NFR BLD: 13 % (ref 12–49)
MAGNESIUM SERPL-MCNC: 2.6 MG/DL (ref 1.6–2.4)
MCH RBC QN AUTO: 28.7 PG (ref 26–34)
MCHC RBC AUTO-ENTMCNC: 30 G/DL (ref 30–36.5)
MCV RBC AUTO: 95.5 FL (ref 80–99)
MONOCYTES # BLD: 0.6 K/UL (ref 0–1)
MONOCYTES NFR BLD: 6 % (ref 5–13)
NEUTS SEG # BLD: 8.2 K/UL (ref 1.8–8)
NEUTS SEG NFR BLD: 77 % (ref 32–75)
NRBC # BLD: 0.07 K/UL (ref 0–0.01)
NRBC BLD-RTO: 0.7 PER 100 WBC
PHOSPHATE SERPL-MCNC: 3.2 MG/DL (ref 2.6–4.7)
PLATELET # BLD AUTO: 231 K/UL (ref 150–400)
PMV BLD AUTO: 12.1 FL (ref 8.9–12.9)
POTASSIUM SERPL-SCNC: 3.8 MMOL/L (ref 3.5–5.1)
PROT SERPL-MCNC: 7.1 G/DL (ref 6.4–8.2)
RBC # BLD AUTO: 2.44 M/UL (ref 3.8–5.2)
SERVICE CMNT-IMP: ABNORMAL
SERVICE CMNT-IMP: NORMAL
SODIUM SERPL-SCNC: 156 MMOL/L (ref 136–145)
WBC # BLD AUTO: 10.6 K/UL (ref 3.6–11)

## 2018-02-13 PROCEDURE — 77030005518 HC CATH URETH FOL 2W BARD -B

## 2018-02-13 PROCEDURE — 74011250637 HC RX REV CODE- 250/637: Performed by: INTERNAL MEDICINE

## 2018-02-13 PROCEDURE — 77030018798 HC PMP KT ENTRL FED COVD -A

## 2018-02-13 PROCEDURE — 65610000006 HC RM INTENSIVE CARE

## 2018-02-13 PROCEDURE — 83735 ASSAY OF MAGNESIUM: CPT | Performed by: INTERNAL MEDICINE

## 2018-02-13 PROCEDURE — 74011000258 HC RX REV CODE- 258: Performed by: INTERNAL MEDICINE

## 2018-02-13 PROCEDURE — 82962 GLUCOSE BLOOD TEST: CPT

## 2018-02-13 PROCEDURE — 84100 ASSAY OF PHOSPHORUS: CPT | Performed by: INTERNAL MEDICINE

## 2018-02-13 PROCEDURE — 74011000250 HC RX REV CODE- 250: Performed by: INTERNAL MEDICINE

## 2018-02-13 PROCEDURE — 36415 COLL VENOUS BLD VENIPUNCTURE: CPT | Performed by: INTERNAL MEDICINE

## 2018-02-13 PROCEDURE — 94640 AIRWAY INHALATION TREATMENT: CPT

## 2018-02-13 PROCEDURE — 77010033678 HC OXYGEN DAILY

## 2018-02-13 PROCEDURE — 74011250636 HC RX REV CODE- 250/636: Performed by: INTERNAL MEDICINE

## 2018-02-13 PROCEDURE — 94761 N-INVAS EAR/PLS OXIMETRY MLT: CPT

## 2018-02-13 PROCEDURE — 80053 COMPREHEN METABOLIC PANEL: CPT | Performed by: INTERNAL MEDICINE

## 2018-02-13 PROCEDURE — 85025 COMPLETE CBC W/AUTO DIFF WBC: CPT | Performed by: INTERNAL MEDICINE

## 2018-02-13 RX ORDER — SODIUM CHLORIDE 450 MG/100ML
100 INJECTION, SOLUTION INTRAVENOUS CONTINUOUS
Status: DISCONTINUED | OUTPATIENT
Start: 2018-02-13 | End: 2018-02-15

## 2018-02-13 RX ORDER — METOCLOPRAMIDE HYDROCHLORIDE 5 MG/ML
5 INJECTION INTRAMUSCULAR; INTRAVENOUS EVERY 6 HOURS
Status: DISCONTINUED | OUTPATIENT
Start: 2018-02-13 | End: 2018-02-17

## 2018-02-13 RX ORDER — MIDODRINE HYDROCHLORIDE 5 MG/1
10 TABLET ORAL 3 TIMES DAILY
Status: DISCONTINUED | OUTPATIENT
Start: 2018-02-13 | End: 2018-03-12 | Stop reason: HOSPADM

## 2018-02-13 RX ADMIN — MIDODRINE HYDROCHLORIDE 5 MG: 5 TABLET ORAL at 09:07

## 2018-02-13 RX ADMIN — SODIUM CHLORIDE 100 ML/HR: 450 INJECTION, SOLUTION INTRAVENOUS at 08:22

## 2018-02-13 RX ADMIN — Medication 10 ML: at 17:27

## 2018-02-13 RX ADMIN — MIDODRINE HYDROCHLORIDE 10 MG: 5 TABLET ORAL at 21:07

## 2018-02-13 RX ADMIN — SODIUM CHLORIDE 100 ML/HR: 450 INJECTION, SOLUTION INTRAVENOUS at 17:43

## 2018-02-13 RX ADMIN — IPRATROPIUM BROMIDE AND ALBUTEROL SULFATE 3 ML: .5; 3 SOLUTION RESPIRATORY (INHALATION) at 20:39

## 2018-02-13 RX ADMIN — IPRATROPIUM BROMIDE AND ALBUTEROL SULFATE 3 ML: .5; 3 SOLUTION RESPIRATORY (INHALATION) at 08:09

## 2018-02-13 RX ADMIN — Medication 10 ML: at 21:07

## 2018-02-13 RX ADMIN — METOCLOPRAMIDE 5 MG: 5 INJECTION, SOLUTION INTRAMUSCULAR; INTRAVENOUS at 17:41

## 2018-02-13 RX ADMIN — Medication 20 ML: at 13:37

## 2018-02-13 RX ADMIN — METOCLOPRAMIDE 5 MG: 5 INJECTION, SOLUTION INTRAMUSCULAR; INTRAVENOUS at 23:19

## 2018-02-13 RX ADMIN — LEVOTHYROXINE SODIUM 25 MCG: 50 TABLET ORAL at 05:24

## 2018-02-13 RX ADMIN — MIDODRINE HYDROCHLORIDE 10 MG: 5 TABLET ORAL at 17:36

## 2018-02-13 RX ADMIN — Medication 10 ML: at 05:24

## 2018-02-13 RX ADMIN — METOCLOPRAMIDE 5 MG: 5 INJECTION, SOLUTION INTRAMUSCULAR; INTRAVENOUS at 13:36

## 2018-02-13 RX ADMIN — Medication 10 ML: at 17:36

## 2018-02-13 NOTE — PROGRESS NOTES
Care Management:    I met with sister Ashwin Mcdonnell today and she would like me to contact [address] [place] [address],[address] to see if they would be able to accept patient short term post hospital. [facility] placed on chart and I sent referral through [facility]. Patient is MR but does not have Medicaid. Ashwin Mcdonnell says patient has property and does not qualify for Medicaid because she has resources. Patient has lived with Ashwin Mcdonnell for 27 years and Ashwin Mcdonnell has been her caregiver. Giovana's goal is to continue to be her caregiver but she needs time to recover from her own surgery.     Sandy Torres crm acm [id number]

## 2018-02-13 NOTE — PROGRESS NOTES
7:00 PM  Report received from [facility], [address] [place] [address].     7:15 PM  Assessment completed. Pt opens and tracks with eyes, lung sounds are diminished, 2 L NC applied. NGT is clamped, VSS with Levophed gtt infusing. Will continue to monitor. 11:47 PM  Reassessment completed, no changes noted. Will continue to monitor. 3:49 AM  Reassessment completed, no changes noted. Will continue to monitor. Bedside shift change report given to Mu Lawrence RN (oncoming nurse) by Sam Prasad (offgoing nurse). Report included the following information SBAR, Kardex, Procedure Summary, Intake/Output, MAR, Accordion, Recent Results and Med Rec Status.

## 2018-02-13 NOTE — PROGRESS NOTES
Bedside shift change report given to [facility] RN (oncoming nurse) by Ramone Taylor (offgoing nurse). Report included the following information Kardex, Intake/Output, MAR and Recent Results. 0800: Patient assessment completed. Patient awake and tracking with eyes. Lungs are coarse and diminished. Patient is on 2liters nasal cannula. Ng tube is clamped, placement verified. Levo gtt decreased to 2 mcg/min per md order to keep systolic greater than 90. Mouth care performed and patient repositioned for comfort. Dr. Owen Falls in to see patient and order received for 1/2 Ns at 100mls/hr. 0900: bp 87/47, Levo gtt increased to 2.5mcg/min. 1200: Patient assessment completed. No changes noted. Patient started on tube feeds at 10/hr.    1600: Patient assessment completed. No changes noted. Levo has been weaned down to 1.5 mcg/min. And patient is tolerating well. Family at bedside.

## 2018-02-13 NOTE — PROGRESS NOTES
Hospitalist Progress Note    NAME: Brooke Fabian   :  1945   MRN:  [id number]       Assessment / Plan:  Acute hypoxic respiratory failure due to acute pulmonary edema  Acute on Chronic Diastolic CHF  -last CXR on  reviewed  -18 TTE: \"Left ventricle: Ejection fraction was estimated in the range of 60 %. No obvious wall motion abnormalities identified in the views obtained. There was mild concentric hypertrophy. Features were consistent with a pseudonormal left ventricular filling pattern, with concomitant abnormal relaxation and increased filling pressure (grade 2 diastolic dysfunction). \"  -no longer on antibiotics/tamiflu (completed treatment)  -appreciate pulmonology assistance; case discussed with Dr. Minda Zamora today  -Palliative consult appreciated - pt's code status to be changed to Partial Code  -Pt continues to require Levophed - being weaned as tolerated - midodrine up-titrated today and IVF's added back by Pulm since hypernatremia worsening    TIFFANIE, stable  Hypernatremia  -IVF's per pulm (on  NS at 100 mL/hr)    Acute encephalopathy and septic shock due to Pan-susecptible E. Coli UTI and Influenza B, present on admission:   - MRI brain with mild small vessel ischemic changes. No acute abnormality. - CT chest/abd/pelvis with severe scoliosis. Atelectasis in the lower lobes, left greater than right. Anemia. Atherosclerotic abdominal aorta without aneurysm. - UA >635060 pansensitive E Coli, blood cultures no growth.  Con't zosyn as above. - influenza B positive - tamiflu finished   - EEG with cerebral dysfunction with no seizure activity  - appreciate neuro consult, recommending con't keppra for now - AMS is multifactorial in nature      Hypoglycemia:  H/o \"borderline DM,\" not on meds at home.  HgA1c <3.5.  Improved  - continue to monitor POC glucose      Elevated Transaminases:  Presumed due to sepsis, improving  Baseline mental retardation  Lupus: Continue holding Plaquenil  Hypothyroidism:  Synthroid dose via NGT  Obesity (Body mass index is 32.45 kg/(m^2)      Code Status: full  Surrogate Decision Maker: sister   DVT Prophylaxis: sq heparin      Baseline: Mental retardation, lives with sister normally but placed in group home after sister had to undergo recent back surgery     Subjective:     Chief Complaint / Reason for Physician Visit: follow-up hypotension  Patient seen for follow-up   Case discussed with RN and Dr. Daniela Pinzon being weaned, midodrine dose increased  IVF's added back since hypernatremia worsening  TF's tolerated via NG    Review of Systems:  Symptom Y/N Comments  Symptom Y/N Comments   Fever/Chills    Chest Pain     Poor Appetite    Edema     Cough    Abdominal Pain     Sputum    Joint Pain     SOB/YOUNG    Pruritis/Rash     Nausea/vomit    Tolerating PT/OT     Diarrhea    Tolerating Diet     Constipation    Other       Could NOT obtain due to: MR     Objective:     VITALS:   Last 24hrs VS reviewed since prior progress note.  Most recent are:  Patient Vitals for the past 24 hrs:   Temp Pulse Resp BP SpO2   02/13/18 1800 - 83 20 108/42 100 %   02/13/18 1700 - 76 16 101/46 100 %   02/13/18 1600 97.6 °F (36.4 °C) 73 15 115/52 100 %   02/13/18 1500 - 87 27 (!) 93/37 100 %   02/13/18 1400 - 73 21 107/40 100 %   02/13/18 1330 - 67 17 114/52 100 %   02/13/18 1300 - 65 17 101/43 100 %   02/13/18 1200 97.4 °F (36.3 °C) 74 19 99/48 100 %   02/13/18 1100 - 73 18 104/49 100 %   02/13/18 1000 - 75 21 115/51 100 %   02/13/18 0915 - 83 19 93/48 100 %   02/13/18 0900 - 80 17 (!) 87/47 100 %   02/13/18 0807 - - - - 100 %   02/13/18 0800 96.7 °F (35.9 °C) - - (!) 131/39 -   02/13/18 0745 - 74 19 109/65 100 %   02/13/18 0700 - 70 17 100/50 100 %   02/13/18 0615 - 85 17 123/54 100 %   02/13/18 0600 - 76 17 114/48 100 %   02/13/18 0500 - 78 17 119/59 100 %   02/13/18 0400 - 75 16 110/47 100 %   02/13/18 0345 - 73 16 103/51 100 %   02/13/18 0300 97.5 °F (36.4 °C) 77 17 109/54 100 %   02/13/18 0200 - 81 20 106/53 100 %   02/13/18 0115 - 73 18 95/46 100 %   02/13/18 0100 - 83 20 111/53 100 %   02/13/18 0015 - 75 21 106/54 100 %   02/12/18 2345 - 78 22 105/47 100 %   02/12/18 2300 97.7 °F (36.5 °C) 78 21 128/86 100 %   02/12/18 2245 - 84 22 124/42 100 %   02/12/18 2230 - 80 21 124/86 100 %   02/12/18 2200 - 79 19 94/51 100 %   02/12/18 2115 - 85 20 (!) 87/49 100 %   02/12/18 2100 - 87 19 105/54 100 %   02/12/18 2045 - 89 20 102/55 100 %   02/12/18 2015 - 95 22 97/57 100 %   02/12/18 2000 - 96 24 (!) 99/30 100 %   02/12/18 1945 - 84 17 98/59 100 %   02/12/18 1936 - - - - 100 %   02/12/18 1930 - 93 24 130/60 100 %   02/12/18 1915 97.9 °F (36.6 °C) 94 25 118/44 100 %   02/12/18 1900 - 91 21 125/70 100 %   02/12/18 1830 - 86 22 121/54 100 %       Intake/Output Summary (Last 24 hours) at 02/13/18 1819  Last data filed at 02/13/18 1800   Gross per 24 hour   Intake          1892.63 ml   Output              620 ml   Net          1272.63 ml        PHYSICAL EXAM:  General:                    Altered; severe kyphoscolosis  EENT:                       Anicteric sclerae, PERRL  Resp:                        No Wheezing.  Frequent shallow respirations  CV:                            Regular  rhythm,  No edema  GI:                              Soft, Non distended, Non tender.  +Bowel sounds  Neurologic:                Alert but does not speak, does not follow any commands  Psych:                       No insight  Skin:                          No rashes.  No jaundice    Reviewed most current lab test results and cultures  YES  Reviewed most current radiology test results   YES  Review and summation of old records today    NO  Reviewed patient's current orders and MAR    YES  PMH/SH reviewed - no change compared to H&P  ________________________________________________________________________  Care Plan discussed with:    Comments   Patient x    Family      RN x    Care Manager     Consultant  x Dr. Krystyna Fields Multidiciplinary team rounds were held today with , nursing, pharmacist and clinical coordinator. Patient's plan of care was discussed; medications were reviewed and discharge planning was addressed. ________________________________________________________________________  Total NON critical care TIME:  25   Minutes    Total CRITICAL CARE TIME Spent:   Minutes non procedure based      Comments   >50% of visit spent in counseling and coordination of care x    ________________________________________________________________________  Gagandeep Estrada MD     Procedures: see electronic medical records for all procedures/Xrays and details which were not copied into this note but were reviewed prior to creation of Plan. LABS:  I reviewed today's most current labs and imaging studies.   Pertinent labs include:  Recent Labs      02/13/18   0326   WBC  10.6   HGB  7.0*   HCT  23.3*   PLT  231     Recent Labs      02/13/18   0326  02/12/18   0308   NA  156*  154*   K  3.8  4.1   CL  116*  114*   CO2  35*  36*   GLU  100  114*   BUN  45*  41*   CREA  1.41*  1.42*   CA  8.6  9.0   MG  2.6*   --    PHOS  3.2   --    ALB  1.8*   --    TBILI  0.9   --    SGOT  47*   --    ALT  36   --        Signed: Gagandeep Estrada MD

## 2018-02-13 NOTE — PROGRESS NOTES
[facility] PROGRESS NOTE  Pulmonary, Critical Care, and Sleep Medicine    Name: Kenji Richard MRN: [id number]   : 1945 Hospital: [facility]    Date: 2018  Admission Date: 2018     Chart and notes reviewed. Data reviewed. I reviewed the patient's current medications in the medical record at each encounter. I have evaluated and examined the patient. IMPRESSION:   · Acute hypoxic respiratory failure still requiring nasal o2  · Severe sepsis and shock- unable to wean off IV levophed  · Hypernatremia- no better despite free water via NGT; at risk for encephalopathy, hyperosmolar state after hypoglycemia was treated  · Acute influenza on tamiflu and isolation  · Anemia Hgb dropping as we are hydrating  · Diffuse bilateral infiltrates -right now > left; ARDS; now still on IV abx  · Dysphagia- thought to have aspirated- was being fed at home by sister and then at group home  · Severe kyphoscoliosis- poor posture and respiratory mechanics  · Encephalopathy with seizure or myoclonus - possibly due to hypoglycemia    · Severe hypoglycemia  · GNR UTI  · Lupus   · Mental retardation   · GERD  · DM  · Critically ill and at high risk of life threatening multi organ failure, dysfucntion      PLAN:   · Will add hypotonic IVF  · continue free water to NGT  · May need to transfuse , check CBC in AM  · increase midodrine and then try to wean IV levophed as long as SBP > 90  · Resume some TF at low dose;   · Add reglan  · order BMP, sodium in AM and adjsut fluids accordingly  · Continue O2 and wean as able to keep sats > 90%  · Continue empiric antibiotics, Tamiflu course completed  · Continue pressors and titrate for SBP > 90  · SSI; glycemic monitoring  · Plaquenil on hold  · Antiepileptics, neuro signed off  · DVT prophylaxis: SCDs  · Chemical code only. discussed with Palliative care whose assistance greatly appreciated.  Sister please with recovery thus far and may be near baseline alertness? ?   · If able, speech therapy consult in AM if Na low enough, can remove NGT and maintain IVF to correct hypernatremia     2/12 Overnight and weekend events reviewed: Kulwinder Cruz secretions suctioned from patient and tube feeds stopped this am due to concerns of aspiration; Afebrile; Sats 90% on RA; Levophed; Na climbing; Glucose elevated. Tachypnea and restless earlier per nursing; morphine given. 2/13 alert. No SOB. Tolerated freee water in NGT. No congestion or vomiting. ROS: Unable to obtain from patient.   Open eyes to voice, but not responsive or following commands      Current Facility-Administered Medications   Medication Dose Route Frequency    0.45% sodium chloride infusion  100 mL/hr IntraVENous CONTINUOUS    NOREPINephrine (LEVOPHED) 32 mg in 5% dextrose 250 mL infusion  2-200 mcg/min IntraVENous TITRATE    midodrine (PROAMITINE) tablet 5 mg  5 mg Oral TID    glucose chewable tablet 16 g  4 Tab Oral PRN    dextrose (D50W) injection syrg 12.5-25 g  12.5-25 g IntraVENous PRN    glucagon (GLUCAGEN) injection 1 mg  1 mg IntraMUSCular PRN    insulin lispro (HUMALOG) injection   SubCUTAneous Q6H    albuterol-ipratropium (DUO-NEB) 2.5 MG-0.5 MG/3 ML  3 mL Nebulization BID RT    morphine injection 2 mg  2 mg IntraVENous Q4H PRN    levothyroxine (SYNTHROID) tablet 25 mcg  25 mcg Per NG tube 6am    sodium chloride (NS) flush 10-30 mL  10-30 mL InterCATHeter PRN    sodium chloride (NS) flush 10 mL  10 mL InterCATHeter Q24H    sodium chloride (NS) flush 10-40 mL  10-40 mL InterCATHeter Q8H    heparin (porcine) pf 300-500 Units  300-500 Units InterCATHeter PRN    sodium chloride (NS) flush 20 mL  20 mL InterCATHeter Q24H    heparin (porcine) pf 300 Units  300 Units InterCATHeter PRN    acetaminophen (TYLENOL) suppository 650 mg  650 mg Rectal Q4H PRN    sodium chloride (NS) flush 5-10 mL  5-10 mL IntraVENous PRN         Vital Signs:  Visit Vitals    BP 93/48    Pulse 83  Temp 96.7 °F (35.9 °C)    Resp 19    Ht 5' 1\" (1.549 m)    Wt 71.6 kg (157 lb 13.6 oz)    SpO2 100%    BMI 29.83 kg/m2       O2 Device: Nasal cannula   O2 Flow Rate (L/min): 2 l/min   Temp (24hrs), Av °F (36.7 °C), Min:96.7 °F (35.9 °C), Max:99.1 °F (37.3 °C)       Intake/Output:   Last shift:      701 - 1900  In: 472.8 [I.V.:72.8]  Out: 65 [Urine:65]  Last 3 shifts: 1901 -  07  In: 959.4 [I.V.:559.4]  Out: 1120 [Urine:1120]    Intake/Output Summary (Last 24 hours) at 18 09  Last data filed at 18 0900   Gross per 24 hour   Intake          1173.93 ml   Output              645 ml   Net           528.93 ml          Physical Exam:   General:  Lethargic petite AAF, tachypneic. Head:  Normocephalic, without obvious abnormality, atraumatic.leans to left   Eyes:  Conjunctivae/corneas clear. Nose: NGT, nares normal. Septum midline. Mucosa normal.   Throat: Lips, mucosa, and tongue normal.    Neck: Supple, symmetrical, trachea midline, no adenopathy. Lungs:   Coarse with wheeze and rales bilaterally. Chest wall:  No tenderness or deformity. Heart:  Irregular, no murmur, click, rub or gallop. Abdomen:   Soft, non-tender. Bowel sounds normal. No masses,  No organomegaly. Extremities: Extremities normal, atraumatic, no cyanosis or edema. Pulses: 2+ and symmetric all extremities. Skin: Skin color, texture, turgor normal. No rashes or lesions   Lymph nodes: Cervical, supraclavicular nodes normal.   Neurologic: MR at baseline, seems awake but poor responsiveness, unable to evaluate, not following commands.      DATA:  MAR reviewed and pertinent medications noted or modified as needed    Labs:  Recent Labs      18   0326   WBC  10.6   HGB  7.0*   HCT  23.3*   PLT  231     Recent Labs      18   0326  18   0308   NA  156*  154*   K  3.8  4.1   CL  116*  114*   CO2  35*  36*   GLU  100  114*   BUN  45*  41*   CREA  1.41*  1.42*   CA  8.6  9.0   MG 2.6*   --    PHOS  3.2   --    ALB  1.8*   --    TBILI  0.9   --    SGOT  47*   --    ALT  36   --        Imaging:  No new images this morning          Nathan Estrada MD

## 2018-02-13 NOTE — PROGRESS NOTES
Nutrition:  Chart reviewed, case discussed during CCU rounds. Pt more awake, alert. Per palliative notes, family wants more aggressive measures. Per discussion with Dr. Elliott Orta, okay to restart TF via NGT, and aim to get Na WNL. Plan will eventually be to remove NGT and get SLP evaluation in hopes of diet advancement. Start TwoCal HN @ 10mL/h, advance rate 10mL q 8h as tolerated to Goal Rate of TwoCal HN @ 30mL/h + 1 packet Prosource daily + 200mL H2O flush 5 times daily (provides 1500kcals/75gPro/1504mL)     Will continue to monitor plan of care closely. Thank you!     [place], [address]   Pager [phone]

## 2018-02-14 LAB
ALBUMIN SERPL-MCNC: 1.6 G/DL (ref 3.5–5)
ALBUMIN/GLOB SERPL: 0.3 {RATIO} (ref 1.1–2.2)
ALP SERPL-CCNC: 164 U/L (ref 45–117)
ALT SERPL-CCNC: 40 U/L (ref 12–78)
ANION GAP SERPL CALC-SCNC: 4 MMOL/L (ref 5–15)
AST SERPL-CCNC: 66 U/L (ref 15–37)
BILIRUB SERPL-MCNC: 0.5 MG/DL (ref 0.2–1)
BUN SERPL-MCNC: 36 MG/DL (ref 6–20)
BUN/CREAT SERPL: 29 (ref 12–20)
CALCIUM SERPL-MCNC: 8 MG/DL (ref 8.5–10.1)
CHLORIDE SERPL-SCNC: 110 MMOL/L (ref 97–108)
CO2 SERPL-SCNC: 32 MMOL/L (ref 21–32)
CREAT SERPL-MCNC: 1.26 MG/DL (ref 0.55–1.02)
ERYTHROCYTE [DISTWIDTH] IN BLOOD BY AUTOMATED COUNT: 15.5 % (ref 11.5–14.5)
GLOBULIN SER CALC-MCNC: 4.7 G/DL (ref 2–4)
GLUCOSE BLD STRIP.AUTO-MCNC: 107 MG/DL (ref 65–100)
GLUCOSE BLD STRIP.AUTO-MCNC: 111 MG/DL (ref 65–100)
GLUCOSE BLD STRIP.AUTO-MCNC: 139 MG/DL (ref 65–100)
GLUCOSE BLD STRIP.AUTO-MCNC: 175 MG/DL (ref 65–100)
GLUCOSE SERPL-MCNC: 107 MG/DL (ref 65–100)
HCT VFR BLD AUTO: 20.3 % (ref 35–47)
HGB BLD-MCNC: 6.1 G/DL (ref 11.5–16)
MAGNESIUM SERPL-MCNC: 2.4 MG/DL (ref 1.6–2.4)
MCH RBC QN AUTO: 28.2 PG (ref 26–34)
MCHC RBC AUTO-ENTMCNC: 30 G/DL (ref 30–36.5)
MCV RBC AUTO: 94 FL (ref 80–99)
NRBC # BLD: 0.13 K/UL (ref 0–0.01)
NRBC BLD-RTO: 1.5 PER 100 WBC
PHOSPHATE SERPL-MCNC: 2.5 MG/DL (ref 2.6–4.7)
PLATELET # BLD AUTO: 226 K/UL (ref 150–400)
PMV BLD AUTO: 11.8 FL (ref 8.9–12.9)
POTASSIUM SERPL-SCNC: 3.4 MMOL/L (ref 3.5–5.1)
PROT SERPL-MCNC: 6.3 G/DL (ref 6.4–8.2)
RBC # BLD AUTO: 2.16 M/UL (ref 3.8–5.2)
SERVICE CMNT-IMP: ABNORMAL
SODIUM SERPL-SCNC: 146 MMOL/L (ref 136–145)
WBC # BLD AUTO: 8.6 K/UL (ref 3.6–11)

## 2018-02-14 PROCEDURE — 74011250637 HC RX REV CODE- 250/637: Performed by: INTERNAL MEDICINE

## 2018-02-14 PROCEDURE — 30233N1 TRANSFUSION OF NONAUTOLOGOUS RED BLOOD CELLS INTO PERIPHERAL VEIN, PERCUTANEOUS APPROACH: ICD-10-PCS | Performed by: HOSPITALIST

## 2018-02-14 PROCEDURE — 86922 COMPATIBILITY TEST ANTIGLOB: CPT | Performed by: INTERNAL MEDICINE

## 2018-02-14 PROCEDURE — 82962 GLUCOSE BLOOD TEST: CPT

## 2018-02-14 PROCEDURE — 86870 RBC ANTIBODY IDENTIFICATION: CPT | Performed by: INTERNAL MEDICINE

## 2018-02-14 PROCEDURE — 86900 BLOOD TYPING SEROLOGIC ABO: CPT | Performed by: INTERNAL MEDICINE

## 2018-02-14 PROCEDURE — 85027 COMPLETE CBC AUTOMATED: CPT | Performed by: INTERNAL MEDICINE

## 2018-02-14 PROCEDURE — 84100 ASSAY OF PHOSPHORUS: CPT | Performed by: INTERNAL MEDICINE

## 2018-02-14 PROCEDURE — 80053 COMPREHEN METABOLIC PANEL: CPT | Performed by: INTERNAL MEDICINE

## 2018-02-14 PROCEDURE — 77010033678 HC OXYGEN DAILY

## 2018-02-14 PROCEDURE — 74011000258 HC RX REV CODE- 258: Performed by: INTERNAL MEDICINE

## 2018-02-14 PROCEDURE — 74011000250 HC RX REV CODE- 250: Performed by: INTERNAL MEDICINE

## 2018-02-14 PROCEDURE — 92610 EVALUATE SWALLOWING FUNCTION: CPT

## 2018-02-14 PROCEDURE — 36415 COLL VENOUS BLD VENIPUNCTURE: CPT | Performed by: INTERNAL MEDICINE

## 2018-02-14 PROCEDURE — 74011636637 HC RX REV CODE- 636/637: Performed by: PHYSICIAN ASSISTANT

## 2018-02-14 PROCEDURE — 65610000006 HC RM INTENSIVE CARE

## 2018-02-14 PROCEDURE — 77030005518 HC CATH URETH FOL 2W BARD -B

## 2018-02-14 PROCEDURE — 94640 AIRWAY INHALATION TREATMENT: CPT

## 2018-02-14 PROCEDURE — P9016 RBC LEUKOCYTES REDUCED: HCPCS | Performed by: INTERNAL MEDICINE

## 2018-02-14 PROCEDURE — 86880 COOMBS TEST DIRECT: CPT | Performed by: INTERNAL MEDICINE

## 2018-02-14 PROCEDURE — 74011250636 HC RX REV CODE- 250/636: Performed by: INTERNAL MEDICINE

## 2018-02-14 PROCEDURE — 83735 ASSAY OF MAGNESIUM: CPT | Performed by: INTERNAL MEDICINE

## 2018-02-14 PROCEDURE — 36430 TRANSFUSION BLD/BLD COMPNT: CPT

## 2018-02-14 PROCEDURE — 86920 COMPATIBILITY TEST SPIN: CPT | Performed by: INTERNAL MEDICINE

## 2018-02-14 PROCEDURE — 86921 COMPATIBILITY TEST INCUBATE: CPT | Performed by: INTERNAL MEDICINE

## 2018-02-14 RX ORDER — SODIUM CHLORIDE 9 MG/ML
250 INJECTION, SOLUTION INTRAVENOUS AS NEEDED
Status: DISCONTINUED | OUTPATIENT
Start: 2018-02-14 | End: 2018-02-26

## 2018-02-14 RX ORDER — PANTOPRAZOLE SODIUM 40 MG/1
40 GRANULE, DELAYED RELEASE ORAL
Status: DISCONTINUED | OUTPATIENT
Start: 2018-02-14 | End: 2018-02-15

## 2018-02-14 RX ADMIN — IPRATROPIUM BROMIDE AND ALBUTEROL SULFATE 3 ML: .5; 3 SOLUTION RESPIRATORY (INHALATION) at 20:01

## 2018-02-14 RX ADMIN — MIDODRINE HYDROCHLORIDE 10 MG: 5 TABLET ORAL at 15:29

## 2018-02-14 RX ADMIN — SODIUM CHLORIDE 100 ML/HR: 450 INJECTION, SOLUTION INTRAVENOUS at 22:19

## 2018-02-14 RX ADMIN — METOCLOPRAMIDE 5 MG: 5 INJECTION, SOLUTION INTRAMUSCULAR; INTRAVENOUS at 23:46

## 2018-02-14 RX ADMIN — Medication 10 ML: at 05:05

## 2018-02-14 RX ADMIN — SODIUM CHLORIDE 100 ML/HR: 450 INJECTION, SOLUTION INTRAVENOUS at 03:02

## 2018-02-14 RX ADMIN — Medication 10 ML: at 15:05

## 2018-02-14 RX ADMIN — Medication 30 ML: at 21:41

## 2018-02-14 RX ADMIN — METOCLOPRAMIDE 5 MG: 5 INJECTION, SOLUTION INTRAMUSCULAR; INTRAVENOUS at 05:05

## 2018-02-14 RX ADMIN — MIDODRINE HYDROCHLORIDE 10 MG: 5 TABLET ORAL at 21:40

## 2018-02-14 RX ADMIN — Medication 20 ML: at 15:06

## 2018-02-14 RX ADMIN — METOCLOPRAMIDE 5 MG: 5 INJECTION, SOLUTION INTRAMUSCULAR; INTRAVENOUS at 12:04

## 2018-02-14 RX ADMIN — PANTOPRAZOLE SODIUM 40 MG: 40 GRANULE, DELAYED RELEASE ORAL at 19:34

## 2018-02-14 RX ADMIN — METOCLOPRAMIDE 5 MG: 5 INJECTION, SOLUTION INTRAMUSCULAR; INTRAVENOUS at 17:58

## 2018-02-14 RX ADMIN — LEVOTHYROXINE SODIUM 25 MCG: 50 TABLET ORAL at 05:05

## 2018-02-14 RX ADMIN — MIDODRINE HYDROCHLORIDE 10 MG: 5 TABLET ORAL at 08:47

## 2018-02-14 RX ADMIN — IPRATROPIUM BROMIDE AND ALBUTEROL SULFATE 3 ML: .5; 3 SOLUTION RESPIRATORY (INHALATION) at 08:33

## 2018-02-14 RX ADMIN — INSULIN LISPRO 2 UNITS: 100 INJECTION, SOLUTION INTRAVENOUS; SUBCUTANEOUS at 12:02

## 2018-02-14 RX ADMIN — Medication 10 ML: at 15:06

## 2018-02-14 NOTE — PROGRESS NOTES
0700 Bedside and verbal report received from RODRIGUEZ Gabriel, pt in bed, NGT in place, raines in place and draining, Nasal cannula 2 liters, large liquid BM during assessment, mouth care done, vital signs stable, will continue to monitor. 0900 200cc water flush given per orders, Dr. Chano Garay at bedside     1200 No changes in assessment, pt remains stable, is not on puree diet with thin liquids     1600 NGT removed, pt now eating diet, takes liquids from a straw, pills crushed in applesauce.   No change in assessment    1800 Levophed turned off, vital signs stable at this time     1900 Bedside and verbal report given to Gayle Wilkins RN

## 2018-02-14 NOTE — PROGRESS NOTES
Hospitalist Progress Note    NAME: Liliya Dyer   :  1945   MRN:  [id number]       Assessment / Plan:  Anemia: Acute on Chronic  -Hgb has down-trended from approximately 8.3 on  to 6.1 today, some element must be dilutional as patient receiving hydration and not clinically bleeding  -transfuse pRBC's (ordered very early this am but patient with antibodies thus awaiting compatible blood from [address] [place] [address]) and monitor     Acute hypoxic respiratory failure due to acute pulmonary edema  Acute on Chronic Diastolic CHF  -last CXR on  reviewed  -18 TTE: \"Left ventricle: Ejection fraction was estimated in the range of 60 %. No obvious wall motion abnormalities identified in the views obtained. There was mild concentric hypertrophy. Features were consistent with a pseudonormal left ventricular filling pattern, with concomitant abnormal relaxation and increased filling pressure (grade 2 diastolic dysfunction). \"  -no longer on antibiotics/tamiflu (completed treatment)  -appreciate pulmonology assistance  -Palliative consult appreciated - pt's code status to be changed to Partial Code  -Pt continues to require Levophed - being weaned as tolerated - hopefully will be able to further wean after transfusion of pRBC's    TIFFANIE, improving  Hypernatremia; improving  -continue hypotonic IVF's    Acute encephalopathy and septic shock due to Pan-susecptible E. Coli UTI and Influenza B, present on admission:   - MRI brain with mild small vessel ischemic changes. No acute abnormality. - CT chest/abd/pelvis with severe scoliosis. Atelectasis in the lower lobes, left greater than right. Anemia. Atherosclerotic abdominal aorta without aneurysm. - UA >[id number] pansensitive E Coli, blood cultures no growth.   zosyn as above.   - influenza B positive - tamiflu finished   - EEG with cerebral dysfunction with no seizure activity  - appreciate neuro consult, recommending con't keppra for now - AMS is multifactorial in nature  -NG to be removed since Speech Tx cleared for Pureed    Hypoglycemia:  H/o \"borderline DM,\" not on meds at home.  HgA1c <3.5. Improved  - continue to monitor POC glucose; gluconse trends adequate at this time      Elevated Transaminases:  Presumed due to sepsis, improving  Baseline mental retardation  Lupus: Continue holding Plaquenil  Hypothyroidism:  continue ynthroid   Obesity (Body mass index is 32.45 kg/(m^2)      Code Status: full  Surrogate Decision Maker: sister   DVT Prophylaxis: sq heparin      Baseline: Mental retardation, lives with sister normally but placed in group home after sister had to undergo recent back surgery     Subjective:     Chief Complaint / Reason for Physician Visit: follow-up hypotension  Patient seen for follow-up   Case discussed with RN  Still awaiting pRBC's    Review of Systems:  Symptom Y/N Comments  Symptom Y/N Comments   Fever/Chills    Chest Pain     Poor Appetite    Edema     Cough    Abdominal Pain     Sputum    Joint Pain     SOB/YOUNG    Pruritis/Rash     Nausea/vomit    Tolerating PT/OT     Diarrhea    Tolerating Diet     Constipation    Other       Could NOT obtain due to: MR     Objective:     VITALS:   Last 24hrs VS reviewed since prior progress note.  Most recent are:  Patient Vitals for the past 24 hrs:   Temp Pulse Resp BP SpO2   02/14/18 1700 - (!) 58 20 137/51 100 %   02/14/18 1609 97.6 °F (36.4 °C) 65 25 129/64 99 %   02/14/18 1545 97.8 °F (36.6 °C) 66 21 151/42 94 %   02/14/18 1530 98.1 °F (36.7 °C) 65 19 121/52 90 %   02/14/18 1515 98 °F (36.7 °C) 68 22 103/51 100 %   02/14/18 1500 - 69 21 129/41 100 %   02/14/18 1459 97.2 °F (36.2 °C) 73 25 129/41 100 %   02/14/18 1400 - 71 24 - 95 %   02/14/18 1300 - 81 19 99/60 100 %   02/14/18 1200 97.5 °F (36.4 °C) 62 25 116/53 100 %   02/14/18 1100 - 64 22 103/43 100 %   02/14/18 1000 - (!) 55 22 (!) 124/39 100 %   02/14/18 0900 - 74 23 (!) 98/36 100 %   02/14/18 0834 - - - - 100 %   02/14/18 0800 97.5 °F (36.4 °C) 60 20 112/44 100 %   02/14/18 0700 - 73 22 97/46 100 %   02/14/18 0627 - 73 24 (!) 91/34 100 %   02/14/18 0600 - 67 20 (!) 85/36 100 %   02/14/18 0500 - (!) 54 19 105/42 100 %   02/14/18 0400 98 °F (36.7 °C) (!) 56 19 104/43 100 %   02/14/18 0305 - 76 17 (!) 84/36 100 %   02/14/18 0200 - 67 22 99/44 100 %   02/14/18 0100 - 71 23 (!) 98/28 100 %   02/14/18 0001 98 °F (36.7 °C) 65 20 (!) 90/36 100 %   02/13/18 2300 - 94 22 104/62 100 %   02/13/18 2200 - 94 21 125/52 100 %   02/13/18 2100 - 97 23 116/53 100 %   02/13/18 2039 - - - - 100 %   02/13/18 2000 - 99 25 97/62 100 %   02/13/18 1900 - 90 19 113/41 100 %       Intake/Output Summary (Last 24 hours) at 02/14/18 1824  Last data filed at 02/14/18 1715   Gross per 24 hour   Intake          3754.11 ml   Output              860 ml   Net          2894.11 ml        PHYSICAL EXAM:  General:                    Altered; severe kyphoscolosis  EENT:                       Anicteric sclerae, PERRL  Resp:                        No Wheezing. Frequent shallow respirations  CV:                            Regular  rhythm,  No edema  GI:                              Soft, Non distended, Non tender.  +Bowel sounds  Neurologic:                Alert but does not speak, does not follow any commands  Psych:                       No insight  Skin:                          No rashes.  No jaundice    Reviewed most current lab test results and cultures  YES  Reviewed most current radiology test results   YES  Review and summation of old records today    NO  Reviewed patient's current orders and MAR    YES  PMH/SH reviewed - no change compared to H&P  ________________________________________________________________________  Care Plan discussed with:    Comments   Patient x    Family      RN x    Care Manager     Consultant                        Multidiciplinary team rounds were held today with , nursing, pharmacist and clinical coordinator.   Patient's plan of care was discussed; medications were reviewed and discharge planning was addressed. ________________________________________________________________________  Total NON critical care TIME:  25   Minutes    Total CRITICAL CARE TIME Spent:   Minutes non procedure based      Comments   >50% of visit spent in counseling and coordination of care     ________________________________________________________________________  Justina Cruz MD     Procedures: see electronic medical records for all procedures/Xrays and details which were not copied into this note but were reviewed prior to creation of Plan. LABS:  I reviewed today's most current labs and imaging studies.   Pertinent labs include:  Recent Labs      02/14/18 0300 02/13/18   0326   WBC  8.6  10.6   HGB  6.1*  7.0*   HCT  20.3*  23.3*   PLT  226  231     Recent Labs      02/14/18 0300 02/13/18 0326 02/12/18   0308   NA  146*  156*  154*   K  3.4*  3.8  4.1   CL  110*  116*  114*   CO2  32  35*  36*   GLU  107*  100  114*   BUN  36*  45*  41*   CREA  1.26*  1.41*  1.42*   CA  8.0*  8.6  9.0   MG  2.4  2.6*   --    PHOS  2.5*  3.2   --    ALB  1.6*  1.8*   --    TBILI  0.5  0.9   --    SGOT  66*  47*   --    ALT  40  36   --        Signed: Justina Cruz MD

## 2018-02-14 NOTE — PROGRESS NOTES
1900: Bedside and verbal report received from Poornima Stone RN and Sherry Yen RN.    2000: Assessment completed. Patient alert eyes open spontaneously. Lungs diminished/coarse. Sinus arrthymia   NGT infusing with Two [place] HN at 10 ml/hr. [place] draining yellow urine. Sister at bedside. 2100: Tube feed increased to 20 ml/hr. 0000: Reassessment completed. 0400: Reassessment completed. 0600: Tube feed increased to 30 ml/hr. 0700: Bedside and verbal report given to Nira Runner, RN.

## 2018-02-14 NOTE — PROGRESS NOTES
[facility] PROGRESS NOTE  Pulmonary, Critical Care, and Sleep Medicine    Name: Allyson Bernard MRN: [id number]   : 1945 Hospital: [facility]    Date: 2018  Admission Date: 2018     Chart and notes reviewed. Data reviewed. I reviewed the patient's current medications in the medical record at each encounter. I have evaluated and examined the patient. IMPRESSION:   · Acute hypoxic respiratory failure still requiring nasal o2  · Severe sepsis and shock- unable to wean off IV levophed despite midodrine  · Hypernatremia- finally trending down  · Acute encephalopathy with chronic MR  · Acute influenza on tamiflu and isolation  · Anemia Hgb dropping as we are hydrating  · Diffuse bilateral infiltrates -right now > left; ARDS; now still on IV abx  · Dysphagia- thought to have aspirated- was being fed at home by sister and then at group home  · Severe kyphoscoliosis- poor posture and respiratory mechanics  · GNR UTI on abx  · Lupus   · Mental retardation   · GERD  · DM      PLAN:   · hypotonic IVF  · Remove NG  · Speech therapy- hope to advance PO intake for PO meds  · May need to transfuse , check CBC in AM  · continue midodrine and then try to wean IV levophed as long as SBP > 90;   · Add reglan  · order BMP, sodium in AM and adjsut fluids accordingly  · Continue O2 and wean as able to keep sats > 90%  · Continue empiric antibiotics, Tamiflu course completed  · SSI; glycemic monitoring  · Plaquenil on hold  · Antiepileptics, neuro signed off  · DVT prophylaxis: SCDs  · Chemical code only. discussed with Palliative care whose assistance greatly appreciated. Sister please with recovery thus far and may be near baseline alertness? ?       Overnight and weekend events reviewed: Brianna Bernard secretions suctioned from patient and tube feeds stopped this am due to concerns of aspiration; Afebrile; Sats 90% on RA; Levophed; Na climbing; Glucose elevated.  Tachypnea and restless earlier per nursing; morphine given. 2/13 alert. No SOB. Tolerated freee water in NGT. No congestion or vomiting. 2/14 Tolerating TF, water. Na down!! Still on IV levophed :(    ROS: Unable to obtain from patient.   Open eyes to voice, but not responsive or following commands      Current Facility-Administered Medications   Medication Dose Route Frequency    0.9% sodium chloride infusion 250 mL  250 mL IntraVENous PRN    NOREPINephrine (LEVOPHED) 32 mg in 5% dextrose 250 mL infusion  2-16 mcg/min IntraVENous TITRATE    0.45% sodium chloride infusion  100 mL/hr IntraVENous CONTINUOUS    midodrine (PROAMITINE) tablet 10 mg  10 mg Oral TID    metoclopramide HCl (REGLAN) injection 5 mg  5 mg IntraVENous Q6H    glucose chewable tablet 16 g  4 Tab Oral PRN    dextrose (D50W) injection syrg 12.5-25 g  12.5-25 g IntraVENous PRN    glucagon (GLUCAGEN) injection 1 mg  1 mg IntraMUSCular PRN    insulin lispro (HUMALOG) injection   SubCUTAneous Q6H    albuterol-ipratropium (DUO-NEB) 2.5 MG-0.5 MG/3 ML  3 mL Nebulization BID RT    morphine injection 2 mg  2 mg IntraVENous Q4H PRN    levothyroxine (SYNTHROID) tablet 25 mcg  25 mcg Per NG tube 6am    sodium chloride (NS) flush 10-30 mL  10-30 mL InterCATHeter PRN    sodium chloride (NS) flush 10 mL  10 mL InterCATHeter Q24H    sodium chloride (NS) flush 10-40 mL  10-40 mL InterCATHeter Q8H    heparin (porcine) pf 300-500 Units  300-500 Units InterCATHeter PRN    sodium chloride (NS) flush 20 mL  20 mL InterCATHeter Q24H    heparin (porcine) pf 300 Units  300 Units InterCATHeter PRN    acetaminophen (TYLENOL) suppository 650 mg  650 mg Rectal Q4H PRN    sodium chloride (NS) flush 5-10 mL  5-10 mL IntraVENous PRN         Vital Signs:  Visit Vitals    /53    Pulse 62    Temp 97.5 °F (36.4 °C)    Resp 25    Ht 5' 1\" (1.549 m)    Wt 72.1 kg (158 lb 15.2 oz)    SpO2 100%    BMI 30.03 kg/m2       O2 Device: Nasal cannula   O2 Flow Rate (L/min): 2 l/min   Temp (24hrs), Av.7 °F (36.5 °C), Min:97.5 °F (36.4 °C), Max:98 °F (36.7 °C)       Intake/Output:   Last shift:      701 - 1900  In: 595.3 [I.V.:305.3]  Out: 85 [Urine:85]  Last 3 shifts: 1901 -  0700  In: 4019.6 [I.V.:2349.6]  Out: 990 [Urine:990]    Intake/Output Summary (Last 24 hours) at 18 1318  Last data filed at 18 1000   Gross per 24 hour   Intake          3482.97 ml   Output              575 ml   Net          2907.97 ml          Physical Exam:   General:  Alert, petite AAF, . Head:  Normocephalic, without obvious abnormality, atraumatic.leans to left   Eyes:  Conjunctivae/corneas clear. Nose: NGT, nares normal. Septum midline. Mucosa normal.   Throat: Lips, mucosa, and tongue normal. Lower lip protrudes, moist    Neck: Supple, symmetrical, trachea midline, no adenopathy. Lungs:   Coarse with wheeze and rales bilaterally. Chest wall:  No tenderness or deformity. Heart:  Irregular, no murmur, click, rub or gallop. Abdomen:   Soft, non-tender. Bowel sounds normal. No masses,  No organomegaly. Extremities: Extremities normal, atraumatic, no cyanosis or edema. Pulses: 2+ and symmetric all extremities. Skin: Skin color, texture, turgor normal. No rashes or lesions   Lymph nodes: Cervical, supraclavicular nodes normal.   Neurologic: MR at baseline, seems awake but nonverbal, unable to evaluate, not following commands.      DATA:  MAR reviewed and pertinent medications noted or modified as needed    Labs:  Recent Labs      18   0300  18   0326   WBC  8.6  10.6   HGB  6.1*  7.0*   HCT  20.3*  23.3*   PLT  226  231     Recent Labs      18   0300  18   0326  18   0308   NA  146*  156*  154*   K  3.4*  3.8  4.1   CL  110*  116*  114*   CO2  32  35*  36*   GLU  107*  100  114*   BUN  36*  45*  41*   CREA  1.26*  1.41*  1.42*   CA  8.0*  8.6  9.0   MG  2.4  2.6*   --    PHOS  2.5*  3.2   --    ALB  1.6*  1.8*   -- TBILI  0.5  0.9   --    SGOT  66*  47*   --    ALT  40  36   --        Imaging:  No new images this morning          Yen Min MD

## 2018-02-14 NOTE — PROGRESS NOTES
Problem: Dysphagia (Adult)  Goal: *Acute Goals and Plan of Care (Insert Text)  2/14/2018  Speech path goals:  1. Pt will tolerate purees/thins with no overt s/s of aspiration. Speech LAnguage Pathology bedside swallow evaluation  Patient: Ruthie Alatorre (77 y.o. female)  Date: 2/14/2018  Primary Diagnosis: Sepsis ([address]  UTI (urinary tract infection)  Hx of systemic lupus erythematosus (SLE)  Hx of diabetes mellitus  Hx of essential hypertension  Mental retardation        Precautions:        ASSESSMENT :  Based on the objective data described below, the patient presents with kyphosis and her head is significantly flexed forward due to that. She has MR and did not verbalize. One vocalization was uttered. Drooling was noted on the left. Her sats were 100% with RR in the teens to low 20s on [facility]. An NG tube is in place. Dentition is reduced. She accepted purees via spoon and drank via straw. She lost some of the liquid out at midline after the swallow. Good oral clearance of purees noted. No coughing noted. Difficulty extracting liquids from the straw was noted. She may do better with a sip cup. Patient will benefit from skilled intervention to address the above impairments. Patients rehabilitation potential is considered to be Fair  Factors which may influence rehabilitation potential include:   []            None noted  [x]            Mental ability/status  [x]            Medical condition  [x]            Home/family situation and support systems  []            Safety awareness  []            Pain tolerance/management  []            Other:      PLAN :  Recommendations and Planned Interventions:  dys 1/purees and thins   Frequency/Duration: Patient will be followed by speech-language pathology 3 times a week to address goals. Discharge Recommendations: To Be Determined     SUBJECTIVE:   Patient vocalized but was nonverbal. Weak smile noted once.      OBJECTIVE:     Past Medical History:   Diagnosis Date    Arthritis     Colon polyp     Diabetes ([facility]     borderline no medications    Environmental allergies 4/28/2010    GERD (gastroesophageal reflux disease)     HTN (hypertension) 4/28/2010    dosent take medication now    Lupus     MR (mental retardation)     Osteoporosis, senile     Other ill-defined conditions     parkinson's disease possibly    Psychiatric disorder     anxious    PUD (peptic ulcer disease)     Scolioses     Sjogren's syndrome ([facility] [address] 3/4/2015     Past Surgical History:   Procedure Laterality Date    HX TONSILLECTOMY      SC COLONOSCOPY FLX DX W/COLLJ SPEC WHEN PFRMD  2/17/2011    due 2013     Prior Level of Function/Home Situation:   Home Situation  Home Environment: Private residence  One/Two Story Residence: One story  Living Alone: No  Support Systems: Family member(s)  Diet prior to admission: purees and thins  Current Diet:   NPO with TF  Cognitive and Communication Status:  Neurologic State: Alert  Orientation Level: Unable to verbalize  Cognition: Decreased command following  Perception: Appears intact  Perseveration: No perseveration noted     Oral Assessment:  Oral Assessment  Labial: Right droop  Dentition: Extractions; Natural  Lingual: Other (comment)  Velum: Unable to visualize  Mandible: No impairment  P.O. Trials:  Patient Position: upright in bed  Vocal quality prior to P.O.: Other (comment)  Consistency Presented: Thin liquid;Puree  How Presented: Straw;Spoon;SLP-fed/presented     Bolus Acceptance: No impairment  Bolus Formation/Control: Impaired  Type of Impairment: Delayed  Propulsion: Delayed (# of seconds)  Oral Residue: None  Initiation of Swallow: Delayed (# of seconds)  Laryngeal Elevation: Decreased  Aspiration Signs/Symptoms: None                Oral Phase Severity: Moderate  Pharyngeal Phase Severity : Moderate    NOMS:   The NOMS functional outcome measure was used to quantify this patient's level of swallowing impairment.   Based on the NOMS, the patient was determined to be at level 3 for swallow function     G Codes: In compliance with CMSs Claims Based Outcome Reporting, the following G-code set was chosen for this patient based the use of the NOMS functional outcome to quantify this patient's level of swallowing impairment. Using the NOMS, the patient was determined to be at level 3 for swallow function which correlates with the CL= 60-79% level of severity. Based on the objective assessment provided within this note, the current, goal, and discharge g-codes are as follows:    Swallow  Swallowing:   Swallow Current Status CL= 60-79%   Swallow Goal Status CL= 60-79%      NOMS Swallowing Levels:  Level 1 (CN): NPO  Level 2 (CM): NPO but takes consistency in therapy  Level 3 (CL): Takes less than 50% of nutrition p.o. and continues with nonoral feedings; and/or safe with mod cues; and/or max diet restriction  Level 4 (CK): Safe swallow but needs mod cues; and/or mod diet restriction; and/or still requires some nonoral feeding/supplements  Level 5 (CJ): Safe swallow with min diet restriction; and/or needs min cues  Level 6 (CI): Independent with p.o.; rare cues; usually self cues; may need to avoid some foods or needs extra time  Level 7 ([address] [place] [address]): Independent for all p.o.  JOÃO. (2003). National Outcomes Measurement System (NOMS): Adult Speech-Language Pathology User's Guide. Pain:  Pain Scale 1: Adult Nonverbal Pain Scale        After treatment:   []            Patient left in no apparent distress sitting up in chair  [x]            Patient left in no apparent distress in bed  [x]            Call bell left within reach  [x]            Nursing notified  []            Caregiver present  []            Bed alarm activated    COMMUNICATION/EDUCATION:   The patients plan of care including recommendations, planned interventions, and recommended diet changes were discussed with: Registered Nurse.     Patient was educated regarding Her deficit(s) of dysphagia as this relates to Her diagnosis of AHRF and sepsis. []            Posted safety precautions in patient's room. []            Patient/family have participated as able in goal setting and plan of care. []            Patient/family agree to work toward stated goals and plan of care. []            Patient understands intent and goals of therapy, but is neutral about his/her participation. [x]            Patient is unable to participate in goal setting and plan of care.     Thank you for this referral.  Hansa Wilson SLP  Time Calculation: 15 mins

## 2018-02-14 NOTE — PROGRESS NOTES
Care Management:    [facility] cannot except. I called and spoke with Siomara Platt on the phone and she has given me permission to contact [facility]. to see if they can except. I spoke with Joel Silva at [facility] and sent referral through [facility]. FOC placed on chart. I gave Joel Silva at [facility] sisters contact information.     Media Men crm acm [phone]

## 2018-02-14 NOTE — PROGRESS NOTES
Interdisciplinary team rounds were held  2/14/2018  with the following team members:Care Management, Diabetes Treatment Specialist, Nursing, Nutrition, Pharmacy, Physician, Respiratory Therapy and Clinical Coordinator. Plan of care discussed. Goal: Adjust medications, transfer 1 unit PRBC's.  continue to monitor and support. See MD orders and progress notes for further  interventions and desired outcomes.

## 2018-02-15 ENCOUNTER — APPOINTMENT (OUTPATIENT)
Dept: GENERAL RADIOLOGY | Age: 73
DRG: 871 | End: 2018-02-15
Attending: INTERNAL MEDICINE
Payer: MEDICARE

## 2018-02-15 LAB
ANION GAP SERPL CALC-SCNC: 6 MMOL/L (ref 5–15)
BUN SERPL-MCNC: 22 MG/DL (ref 6–20)
BUN/CREAT SERPL: 20 (ref 12–20)
CALCIUM SERPL-MCNC: 7.9 MG/DL (ref 8.5–10.1)
CHLORIDE SERPL-SCNC: 109 MMOL/L (ref 97–108)
CO2 SERPL-SCNC: 28 MMOL/L (ref 21–32)
CREAT SERPL-MCNC: 1.08 MG/DL (ref 0.55–1.02)
ERYTHROCYTE [DISTWIDTH] IN BLOOD BY AUTOMATED COUNT: 15.3 % (ref 11.5–14.5)
GLUCOSE BLD STRIP.AUTO-MCNC: 121 MG/DL (ref 65–100)
GLUCOSE BLD STRIP.AUTO-MCNC: 121 MG/DL (ref 65–100)
GLUCOSE BLD STRIP.AUTO-MCNC: 86 MG/DL (ref 65–100)
GLUCOSE SERPL-MCNC: 73 MG/DL (ref 65–100)
HCT VFR BLD AUTO: 23.6 % (ref 35–47)
HGB BLD-MCNC: 7.5 G/DL (ref 11.5–16)
MCH RBC QN AUTO: 29.2 PG (ref 26–34)
MCHC RBC AUTO-ENTMCNC: 31.8 G/DL (ref 30–36.5)
MCV RBC AUTO: 91.8 FL (ref 80–99)
NRBC # BLD: 0.05 K/UL (ref 0–0.01)
NRBC BLD-RTO: 0.7 PER 100 WBC
PLATELET # BLD AUTO: 239 K/UL (ref 150–400)
PMV BLD AUTO: 11.7 FL (ref 8.9–12.9)
POTASSIUM SERPL-SCNC: 3.5 MMOL/L (ref 3.5–5.1)
RBC # BLD AUTO: 2.57 M/UL (ref 3.8–5.2)
SERVICE CMNT-IMP: ABNORMAL
SERVICE CMNT-IMP: ABNORMAL
SERVICE CMNT-IMP: NORMAL
SODIUM SERPL-SCNC: 143 MMOL/L (ref 136–145)
WBC # BLD AUTO: 7 K/UL (ref 3.6–11)

## 2018-02-15 PROCEDURE — 94640 AIRWAY INHALATION TREATMENT: CPT

## 2018-02-15 PROCEDURE — 65270000015 HC RM PRIVATE ONCOLOGY

## 2018-02-15 PROCEDURE — 74011250637 HC RX REV CODE- 250/637: Performed by: INTERNAL MEDICINE

## 2018-02-15 PROCEDURE — 92526 ORAL FUNCTION THERAPY: CPT

## 2018-02-15 PROCEDURE — 74011000250 HC RX REV CODE- 250: Performed by: INTERNAL MEDICINE

## 2018-02-15 PROCEDURE — 71045 X-RAY EXAM CHEST 1 VIEW: CPT

## 2018-02-15 PROCEDURE — 36415 COLL VENOUS BLD VENIPUNCTURE: CPT | Performed by: INTERNAL MEDICINE

## 2018-02-15 PROCEDURE — 74011250636 HC RX REV CODE- 250/636: Performed by: INTERNAL MEDICINE

## 2018-02-15 PROCEDURE — 77010033678 HC OXYGEN DAILY

## 2018-02-15 PROCEDURE — 80048 BASIC METABOLIC PNL TOTAL CA: CPT | Performed by: INTERNAL MEDICINE

## 2018-02-15 PROCEDURE — 82962 GLUCOSE BLOOD TEST: CPT

## 2018-02-15 PROCEDURE — 85027 COMPLETE CBC AUTOMATED: CPT | Performed by: INTERNAL MEDICINE

## 2018-02-15 RX ORDER — MORPHINE SULFATE 4 MG/ML
2 INJECTION INTRAVENOUS
Status: DISCONTINUED | OUTPATIENT
Start: 2018-02-15 | End: 2018-03-12

## 2018-02-15 RX ORDER — PANTOPRAZOLE SODIUM 40 MG/1
40 TABLET, DELAYED RELEASE ORAL
Status: DISCONTINUED | OUTPATIENT
Start: 2018-02-16 | End: 2018-03-12 | Stop reason: HOSPADM

## 2018-02-15 RX ADMIN — Medication 10 ML: at 14:50

## 2018-02-15 RX ADMIN — IPRATROPIUM BROMIDE AND ALBUTEROL SULFATE 3 ML: .5; 3 SOLUTION RESPIRATORY (INHALATION) at 07:29

## 2018-02-15 RX ADMIN — MIDODRINE HYDROCHLORIDE 10 MG: 5 TABLET ORAL at 16:53

## 2018-02-15 RX ADMIN — MIDODRINE HYDROCHLORIDE 10 MG: 5 TABLET ORAL at 08:08

## 2018-02-15 RX ADMIN — METOCLOPRAMIDE 5 MG: 5 INJECTION, SOLUTION INTRAMUSCULAR; INTRAVENOUS at 18:45

## 2018-02-15 RX ADMIN — Medication 20 ML: at 14:50

## 2018-02-15 RX ADMIN — LEVOTHYROXINE SODIUM 25 MCG: 50 TABLET ORAL at 05:51

## 2018-02-15 RX ADMIN — Medication 10 ML: at 05:52

## 2018-02-15 RX ADMIN — METOCLOPRAMIDE 5 MG: 5 INJECTION, SOLUTION INTRAMUSCULAR; INTRAVENOUS at 12:48

## 2018-02-15 RX ADMIN — PANTOPRAZOLE SODIUM 40 MG: 40 GRANULE, DELAYED RELEASE ORAL at 07:33

## 2018-02-15 RX ADMIN — METOCLOPRAMIDE 5 MG: 5 INJECTION, SOLUTION INTRAMUSCULAR; INTRAVENOUS at 05:52

## 2018-02-15 RX ADMIN — Medication 10 ML: at 14:49

## 2018-02-15 NOTE — PROGRESS NOTES
[facility] PROGRESS NOTE  Pulmonary, Critical Care, and Sleep Medicine    Name: Charly Zacarias MRN: [id number]   : 1945 Hospital: [facility]    Date: 2/15/2018  Admission Date: 2018     Chart and notes reviewed. Data reviewed. I reviewed the patient's current medications in the medical record at each encounter. I have evaluated and examined the patient. IMPRESSION:   · Acute hypoxic respiratory failure   · Severe sepsis and shock- now on midodrine  · Hypernatremia- finally trending down  · Acute encephalopathy with chronic MR  · Acute influenza on tamiflu and isolation  · Anemia Hgb dropped but should stabilize with nutrition  · Diffuse bilateral infiltrates -right now > left; ARDS;  · Dysphagia- thought to have aspirated- was being fed at home by sister and then at group home  · Severe kyphoscoliosis- poor posture and respiratory mechanics  · GNR UTI on abx  · Lupus   · Mental retardation   · GERD  · DM      PLAN:   · May transfer to floor  · reduce IVF  · BMP in AM to follow sodium  · asapiration precautions  · midodrine   · reglan and make sure pt does not get constipated. Must maintain hydration status  · finish antibiotics, Tamiflu course completed  · SSI; glycemic monitoring  · Resume Plaquenil   · Antiepileptics, neuro signed off  · DVT prophylaxis: SCDs  · May transfer to floor  · D/c planning  · Chemical code only. discussed with Palliative care whose assistance greatly appreciated. Sister pleased with recovery thus far and at baseline      Overnight and weekend events reviewed: Fabiano Kearney secretions suctioned from patient and tube feeds stopped this am due to concerns of aspiration; Afebrile; Sats 90% on RA; Levophed; Na climbing; Glucose elevated. Tachypnea and restless earlier per nursing; morphine given.  alert. No SOB. Tolerated freee water in NGT. No congestion or vomiting.  Tolerating TF, water.  Na down!! Still on IV levophed :(    2/15: off IV levophed. Able to eat with assitance. BM noted. No fever. No seizures    ROS: Unable to obtain from patient.   Open eyes to voice, but not responsive or following commands      Current Facility-Administered Medications   Medication Dose Route Frequency    [START ON 2018] pantoprazole (PROTONIX) tablet 40 mg  40 mg Oral ACB    0.9% sodium chloride infusion 250 mL  250 mL IntraVENous PRN    midodrine (PROAMITINE) tablet 10 mg  10 mg Oral TID    metoclopramide HCl (REGLAN) injection 5 mg  5 mg IntraVENous Q6H    glucose chewable tablet 16 g  4 Tab Oral PRN    dextrose (D50W) injection syrg 12.5-25 g  12.5-25 g IntraVENous PRN    glucagon (GLUCAGEN) injection 1 mg  1 mg IntraMUSCular PRN    insulin lispro (HUMALOG) injection   SubCUTAneous Q6H    morphine injection 2 mg  2 mg IntraVENous Q4H PRN    levothyroxine (SYNTHROID) tablet 25 mcg  25 mcg Per NG tube 6am    sodium chloride (NS) flush 10-30 mL  10-30 mL InterCATHeter PRN    sodium chloride (NS) flush 10 mL  10 mL InterCATHeter Q24H    sodium chloride (NS) flush 10-40 mL  10-40 mL InterCATHeter Q8H    heparin (porcine) pf 300-500 Units  300-500 Units InterCATHeter PRN    sodium chloride (NS) flush 20 mL  20 mL InterCATHeter Q24H    heparin (porcine) pf 300 Units  300 Units InterCATHeter PRN    acetaminophen (TYLENOL) suppository 650 mg  650 mg Rectal Q4H PRN    sodium chloride (NS) flush 5-10 mL  5-10 mL IntraVENous PRN         Vital Signs:  Visit Vitals    /61    Pulse 78    Temp 98.7 °F (37.1 °C)    Resp 25    Ht 5' 1\" (1.549 m)    Wt 76.5 kg (168 lb 10.4 oz)    SpO2 98%    BMI 31.87 kg/m2       O2 Device: Room air   O2 Flow Rate (L/min): 2 l/min   Temp (24hrs), Av.8 °F (36.6 °C), Min:97.2 °F (36.2 °C), Max:98.7 °F (37.1 °C)       Intake/Output:   Last shift:      02/15 07 - 02/15 1900  In: 240 [P.O.:240]  Out: 295 [Urine:295]  Last 3 shifts: 1901 - 02/15 0700  In: 5054.8 [I.V.:3619.8]  Out: 1525 [Urine:1525]    Intake/Output Summary (Last 24 hours) at 02/15/18 1440  Last data filed at 02/15/18 1200   Gross per 24 hour   Intake          2168.67 ml   Output             1040 ml   Net          1128.67 ml          Physical Exam:   General:  Alert, petite AAF, . Head:  Normocephalic, without obvious abnormality, atraumatic.leans to left   Eyes:  Conjunctivae/corneas clear. Nose: NGT, nares normal. Septum midline. Mucosa normal.   Throat: Lips, mucosa, and tongue normal. Lower lip protrudes, moist    Neck: Supple, symmetrical, trachea midline, no adenopathy. Lungs:   Coarse with wheeze and rales bilaterally. Chest wall:  No tenderness or deformity. Heart:  Irregular, no murmur, click, rub or gallop. Abdomen:   Soft, non-tender. Bowel sounds normal. No masses,  No organomegaly. Extremities: Extremities normal, atraumatic, no cyanosis or edema. Pulses: 2+ and symmetric all extremities. Skin: Skin color, texture, turgor normal. No rashes or lesions   Lymph nodes: Cervical, supraclavicular nodes normal.   Neurologic: MR at baseline, seems awake but nonverbal, unable to evaluate, not following commands.      DATA:  MAR reviewed and pertinent medications noted or modified as needed    Labs:  Recent Labs      02/15/18   0411  02/14/18   0300  02/13/18   0326   WBC  7.0  8.6  10.6   HGB  7.5*  6.1*  7.0*   HCT  23.6*  20.3*  23.3*   PLT  239  226  231     Recent Labs      02/15/18   0411  02/14/18   0300  02/13/18   0326   NA  143  146*  156*   K  3.5  3.4*  3.8   CL  109*  110*  116*   CO2  28  32  35*   GLU  73  107*  100   BUN  22*  36*  45*   CREA  1.08*  1.26*  1.41*   CA  7.9*  8.0*  8.6   MG   --   2.4  2.6*   PHOS   --   2.5*  3.2   ALB   --   1.6*  1.8*   TBILI   --   0.5  0.9   SGOT   --   66*  47*   ALT   --   40  36       Imaging:  No new images this morning          Kim Dow MD

## 2018-02-15 NOTE — PROGRESS NOTES
Problem: Dysphagia (Adult)  Goal: *Acute Goals and Plan of Care (Insert Text)  2/14/2018  Speech path goals:  1. Pt will tolerate purees/thins with no overt s/s of aspiration. MET 2/15/2018   2. Added 2/15/2018 Patient will tolerate dysphagia 2 mechanically altered diet/thin liquids free of s/s aspiration within 7 days. 3. Added 2/15/2018 Patient will demonstrate timely and complete mastication of solids in order to advance diet to mechanical soft consistency (baseline) free of s/s aspiration within 7 days. Speech language pathology dysphagia treatment  Patient: Carolyn Wallace (89 y.o. female)  Date: 2/15/2018  Diagnosis: Sepsis ([address]  UTI (urinary tract infection)  Hx of systemic lupus erythematosus (SLE)  Hx of diabetes mellitus  Hx of essential hypertension  Mental retardation <principal problem not specified>       Precautions:       ASSESSMENT:  Patient presents with moderate oral dysphagia and mild pharyngeal dysphagia. Oral phase characterized by decreased labial seal with noted drooling and anterior spillage on the left side, which sister reports is her baseline. Prolonged mastication of solids, however, complete oral clearance achieved with liquid wash. Pharyngeal phase characterized by suspected delayed swallow initiation and decreased hyolaryngeal elevation/excursion via palpation. No overt s/s aspiration with successive straw sips of thin, puree or solid. Spoke with patient's sister (primary caretaker) on the phone and she reported patient tolerates a softer solid diet/thin liquids at home. At this time, recommend slight advancement to dysphagia 2 mechanically altered diet/thin liquids; however,  eventual goal is to get patient back to baseline diet of softer solids.      Progression toward goals:  []         Improving appropriately and progressing toward goals  [x]         Improving slowly and progressing toward goals  []         Not making progress toward goals and plan of care will be adjusted     PLAN:  Recommendations and Planned Interventions:  -dysphagia 2 mechanically altered diet/thin liquids, straws ok  - meds crushed in applesauce  - strict upright positioning with all PO  - Alternate bites of solids and sips of liquids  1:1 feeding assistance with all meals   - SLP to follow for possible diet upgrade    Patient continues to benefit from skilled intervention to address the above impairments. Continue treatment per established plan of care. Discharge Recommendations: To Be Determined     SUBJECTIVE:   SLP spoke with patient's sister over the phone in order to obtain baseline diet. Patient had previously undergone a MBS in 2011 [facility])  and seen bedside during 2013 hospitalization. Patient alert, pleasant. OBJECTIVE:   Cognitive and Communication Status:  Neurologic State: Alert  Orientation Level: Unable to verbalize  Cognition: Decreased command following  Perception: Appears intact  Perseveration: No perseveration noted     Dysphagia Treatment:  Oral Assessment:  Oral Assessment  Labial: Left droop; Decreased seal  Dentition: Extractions; Natural  Oral Hygiene:  (clean, moist)  Lingual:  (unable to assess)  Velum: Unable to visualize  Mandible: No impairment  P.O. Trials:  Patient Position:  (upright in bed)  Vocal quality prior to P.O.: Other (comment) (unable to verbalize)  Consistency Presented: Thin liquid;Puree; Solid  How Presented: SLP-fed/presented;Spoon;Straw;Successive swallows     Bolus Acceptance: No impairment  Bolus Formation/Control: Impaired  Type of Impairment: Delayed; Anterior;Spillage;Mastication;Drooling  Propulsion: Delayed (# of seconds)  Oral Residue: None  Initiation of Swallow: Delayed (# of seconds)  Laryngeal Elevation: Decreased  Aspiration Signs/Symptoms: None  Pharyngeal Phase Characteristics:  (decreased elevation/excursion, suspect delayed initiation)             Oral Phase Severity: Moderate  Pharyngeal Phase Severity : Mild  Pain:  Pain Scale 1: Adult Nonverbal Pain Scale  Pain Intensity 1: 0     After treatment:   []              Patient left in no apparent distress sitting up in chair  [x]              Patient left in no apparent distress in bed  [x]              Call bell left within reach  [x]              Nursing notified  []              Caregiver present  []              Bed alarm activated    COMMUNICATION/EDUCATION:     The patients plan of care including recommendations, planned interventions, and recommended diet changes were discussed with: Registered Nurse. Ar Leblanc  Time Calculation: 17 mins        Regarding student involvement in patient care:  A student participated in this treatment session. Per CMS Medicare statements and APTA guidelines I certify that the following was true:  1. I was present and directly observed the entire session. 2. I made all skilled judgments and clinical decisions regarding care. 3. I am the practitioner responsible for assessment, treatment, and documentation.

## 2018-02-15 NOTE — PROGRESS NOTES
Palliative Medicine Consult  Piyush: [phone] ([phone])    Patient Name: Devorah Hastings  YOB: 1945    Date of Initial Consult: 2/5/18  Reason for Consult: Overwhelming Symptoms  Requesting Provider: Jazmin Alexis MD  Primary Care Physician: Hermelinda Talamantes MD     SUMMARY:   Devorah Hastings is a 72 y.o. with a past history of  Lupus, PUD, GERD, HTN, DM, and mental retardation, who was admitted on 2/2/2018 from intermediate with a diagnosis of severe sepsis, hypothermia, hypoglycemia, seizures. Current medical issues leading to Palliative Medicine involvement include: family support. MRI of brain 2/3 showed mild small vessel ischemic changes. No acute abnormality, EEG showed cerebral dysfunction with no seizure activity. 2/6: CCU day 4: CXR shows persistent moderate diffuse airspace disease with small bilateral effusions. Remains on levo for hypotension, D10 for hypoglycemia. Remains minimally responsive. 2/8: CCU day 6: WBCs remain elevated at 11.9, still pressor dependent. Now off D10. Still lethargic. 2/9: CCU day 7: WBCs down to 8.1, still on pressors, keppra discontinued yesterday, no change in neuro status. Creatinine is creeping up, today 1.55.    2/12: NGT feedings on hold for aspiration. CXR shows partial improvement of bilateral airspace dz. Na+ 154. Still on pressor, awake, alert, tracks but does not respond to command. Met with pt's sister Ebenezer Bryant and Giovana's dtr Latia, pt's mental status is near baseline, she is responsive to family and staff, so they feel they cannot give up. Only issue is feeding/refluxing. Speech to evaluate. 2/14: tube feedings restarted, later in the day NGT removed, pt eating pureed diet with thin liquids. Pressors off. 1 unit PRBCs for hgb 6.1.      2/15: Na+ 143 today. Psychosocial: lives with sister, who recently placed pt in intermediate while she had surgery on her back, then once recovered will bring patent back home.  normal baseline pt speaks a few words, ambulates with assistance Madina Deluna uses a gait belt) feeds self unless sick. Taye Villalobos Gains functions at the level of a 14 y/o. PALLIATIVE DIAGNOSES:   1. Severe sepsis: influenza B pos and UTI  2. Hypothermia  3. Hypoglycemia  4. Hypotension  5. Acute renal failure in setting of vasopressor  6. Seizures: 2/2 hypoglycemia, Keppra d/emy  7. Acute hyperkalemia  8. Influenza   9. Hypotension   10. Care decisions     PLAN:   1. No family at bedside; goals are clear, continue to treat acute illness, DNR. [address] [facility] [facility] has accepted pt when ready for discharge. 2. Will check in and continue to provide support to pt and family until discharge. 3. Initial consult note routed to primary continuity provider  4. Communicated plan of care with: Palliative IDT, RN, CCU IDT     GOALS OF CARE / TREATMENT PREFERENCES:     GOALS OF CARE:  Patient/Health Care Proxy Stated Goals: Prolong life     1. dispo: [facility] until sister has healed from surgery, then return home      TREATMENT PREFERENCES:   Code Status: Partial Code    Advance Care Planning:  Advance Care Planning 2/12/2018   Patient's Healthcare Decision Maker is: Named in scanned ACP document   Primary Decision Maker Name Prashanth Smith   Primary Decision Maker Phone Number [phone] H/ [phone] C   Primary Decision Maker Relationship to Patient -   Confirm Advance Directive None   Patient Would Like to Complete Advance Directive Unable   Does the patient have other document types Guardianship       Medical Interventions: Limited additional interventions   Other Instructions:   Artificially Administered Nutrition: Feeding tube long-term, if indicated     Other:    As far as possible, the palliative care team has discussed with patient / health care proxy about goals of care / treatment preferences for patient.            HISTORY:     History obtained from:  Chart, family    CHIEF COMPLAINT: found unresponsive    HPI/SUBJECTIVE:    The patient is: [] Verbal and participatory  [x] Non-participatory due to: pt factors    [facility]. Per EMS, HR in 30s and BG 27, EMS administered IM glucagon en route, bringing BG up to 200. In ED, pt began seizing, which resolved following 2mg ativan. ED w/u:  CT of chest/abd/pelvis showed severe scoliosis, atelectasis in lower lobes, left greater than right, atherosclerotic abdominal aorta without aneurysm, s/p hysterectomy. Head CT no acute findings, CXR neg. EKG showed afib with slow ventricular responsive and irregular rate of 40. Kallie Galeazzi reports that when pt was ~7 y/o she was very sick, high fevers, her parents were crying, took her to the ED, shortly after she had her tonsils removed and has never been the same. Parents were told to put her in an institution, that she would never learn anything. Kallie Galeazzi was able to help pt walk using a gait belt, however, over the years pt's legs have been getting weaker. At one point, she was in hospice for lupus, but got better. Pt is able to feed self, however, sometimes shakes and loses the food off the spoon before it gets to her mouth. Likes TV, music, flipping pages in a book. She can say a few words, usually delayed response but cannot carry a conversation. 2/12: pt awake, alert, unable to respond to command or questions. 2/15: awake, alert, looking out doorway at activity in the hallway.      Clinical Pain Assessment (nonverbal scale for severity on nonverbal patients):   Clinical Pain Assessment  Severity: 0     Activity (Movement): Lying quietly, normal position    Duration: for how long has pt been experiencing pain (e.g., 2 days, 1 month, years)  Frequency: how often pain is an issue (e.g., several times per day, once every few days, constant)     FUNCTIONAL ASSESSMENT:     Palliative Performance Scale (PPS):  PPS: 20       PSYCHOSOCIAL/SPIRITUAL SCREENING:     Palliative IDT has assessed this patient for cultural preferences / practices and a referral made as appropriate to needs (Cultural Services, Patient Advocacy, Ethics, etc.)    Advance Care Planning:  Advance Care Planning 2/12/2018   Patient's Healthcare Decision Maker is: Named in scanned ACP document   Primary Decision Maker Name Ford Manuel   Primary Decision Maker Phone Number [phone] H/ [phone] C   Primary Decision Maker Relationship to Patient -   Confirm Advance Directive None   Patient Would Like to Complete Advance Directive Unable   Does the patient have other document types Guardianship       Any spiritual / Roman Catholic concerns:  [] Yes /  [x] No    Caregiver Burnout:  [] Yes /  [] No /  [x] No Caregiver Present      Anticipatory grief assessment:   [x] Normal  / [] Maladaptive       ESAS Anxiety: Anxiety: 0    ESAS Depression:   : unable to assess due to pt factors       REVIEW OF SYSTEMS:     Positive and pertinent negative findings in ROS are noted above in HPI. The following systems were [x] reviewed / [] unable to be reviewed as noted in HPI  Other findings are noted below. Systems: constitutional, ears/nose/mouth/throat, respiratory, gastrointestinal, genitourinary, musculoskeletal, integumentary, neurologic, psychiatric, endocrine. Positive findings noted below. Modified ESAS Completed by: provider   Fatigue: 4 Drowsiness: 0     Pain: 0   Anxiety: 0       Dyspnea: 0     Constipation: No     Stool Occurrence(s): 1        PHYSICAL EXAM:     From RN flowsheet:  Wt Readings from Last 3 Encounters:   02/14/18 158 lb 15.2 oz (72.1 kg)   05/31/17 156 lb (70.8 kg)   05/23/17 156 lb (70.8 kg)     Blood pressure 111/46, pulse 78, temperature 98.2 °F (36.8 °C), resp. rate 22, height 5' 1\" (1.549 m), weight 158 lb 15.2 oz (72.1 kg), SpO2 99 %.     Pain Scale 1: Adult Nonverbal Pain Scale  Pain Intensity 1: 0  Pain Onset 1: Acute  Pain Location 1: Generalized        Pain Intervention(s) 1: Medication (see MAR)  Last bowel movement, if known: Constitutional:awake, alert, tracking with eyes, reclined against pillows  (RN reports occasional word spoken)  Eyes: pupils equal, anicteric  ENMT: no nasal discharge, moist mucous membranes  Cardiovascular: regular rhythm, distal pulses intact  Respiratory: breathing not labored, symmetric  Gastrointestinal: soft non-tender, +bowel sounds  Musculoskeletal: kyphoscoliosis, Skin: warm, dry  Neurologic: following no commands, not moving all extremities  Psychiatric: unable to assess due to pt factors          HISTORY:     Active Problems:    UTI (urinary tract infection) (2/2/2018)      Mental retardation (2/2/2018)      Sepsis ([address] (2/2/2018)      Hx of systemic lupus erythematosus (SLE) (2/2/2018)      Hx of diabetes mellitus (2/2/2018)      Hx of essential hypertension (2/2/2018)      Idiopathic hypotension (2/9/2018)      Acute renal failure with tubular necrosis (HCC) (2/9/2018)      Counseling regarding goals of care (2/12/2018)      Aspiration into respiratory tract (2/12/2018)      Past Medical History:   Diagnosis Date    Arthritis     Colon polyp     Diabetes ([address]     borderline no medications    Environmental allergies 4/28/2010    GERD (gastroesophageal reflux disease)     HTN (hypertension) 4/28/2010    dosent take medication now    Lupus     MR (mental retardation)     Osteoporosis, senile     Other ill-defined conditions     parkinson's disease possibly    Psychiatric disorder     anxious    PUD (peptic ulcer disease)     Scolioses     Sjogren's syndrome ([address] 3/4/2015      Past Surgical History:   Procedure Laterality Date    HX TONSILLECTOMY      GA COLONOSCOPY FLX DX W/COLLJ SPEC WHEN PFRMD  2/17/2011    due 2013      Family History   Problem Relation Age of Onset    Cancer Mother      pancreas    Heart Disease Father     Heart Attack Father       History reviewed, no pertinent family history.   Social History   Substance Use Topics    Smoking status: Never Smoker    Smokeless tobacco: Never Used    Alcohol use No     Allergies   Allergen Reactions    Adhesive Tape Other (comments)     Redness under that adhesive      Current Facility-Administered Medications   Medication Dose Route Frequency    [START ON 2/16/2018] pantoprazole (PROTONIX) tablet 40 mg  40 mg Oral ACB    0.9% sodium chloride infusion 250 mL  250 mL IntraVENous PRN    midodrine (PROAMITINE) tablet 10 mg  10 mg Oral TID    metoclopramide HCl (REGLAN) injection 5 mg  5 mg IntraVENous Q6H    glucose chewable tablet 16 g  4 Tab Oral PRN    dextrose (D50W) injection syrg 12.5-25 g  12.5-25 g IntraVENous PRN    glucagon (GLUCAGEN) injection 1 mg  1 mg IntraMUSCular PRN    insulin lispro (HUMALOG) injection   SubCUTAneous Q6H    morphine injection 2 mg  2 mg IntraVENous Q4H PRN    levothyroxine (SYNTHROID) tablet 25 mcg  25 mcg Per NG tube 6am    sodium chloride (NS) flush 10-30 mL  10-30 mL InterCATHeter PRN    sodium chloride (NS) flush 10 mL  10 mL InterCATHeter Q24H    sodium chloride (NS) flush 10-40 mL  10-40 mL InterCATHeter Q8H    heparin (porcine) pf 300-500 Units  300-500 Units InterCATHeter PRN    sodium chloride (NS) flush 20 mL  20 mL InterCATHeter Q24H    heparin (porcine) pf 300 Units  300 Units InterCATHeter PRN    acetaminophen (TYLENOL) suppository 650 mg  650 mg Rectal Q4H PRN    sodium chloride (NS) flush 5-10 mL  5-10 mL IntraVENous PRN          LAB AND IMAGING FINDINGS:     Lab Results   Component Value Date/Time    WBC 7.0 02/15/2018 04:11 AM    HGB 7.5 (L) 02/15/2018 04:11 AM    PLATELET 525 69/43/8753 04:11 AM     Lab Results   Component Value Date/Time    Sodium 143 02/15/2018 04:11 AM    Potassium 3.5 02/15/2018 04:11 AM    Chloride 109 (H) 02/15/2018 04:11 AM    CO2 28 02/15/2018 04:11 AM    BUN 22 (H) 02/15/2018 04:11 AM    Creatinine 1.08 (H) 02/15/2018 04:11 AM    Calcium 7.9 (L) 02/15/2018 04:11 AM    Magnesium 2.4 02/14/2018 03:00 AM    Phosphorus 2.5 (L) 02/14/2018 03:00 AM      Lab Results   Component Value Date/Time    AST (SGOT) 66 (H) 02/14/2018 03:00 AM    Alk. phosphatase 164 (H) 02/14/2018 03:00 AM    Protein, total 6.3 (L) 02/14/2018 03:00 AM    Albumin 1.6 (L) 02/14/2018 03:00 AM    Globulin 4.7 (H) 02/14/2018 03:00 AM     Lab Results   Component Value Date/Time    INR 1.1 02/02/2018 03:06 PM    Prothrombin time 10.6 02/02/2018 03:06 PM    aPTT 29.6 02/02/2018 03:06 PM      Lab Results   Component Value Date/Time    Iron 80 12/27/2010 04:18 PM    TIBC 261 12/27/2010 04:18 PM    Iron % saturation 31 12/27/2010 04:18 PM    Ferritin 81 02/17/2010 02:21 PM      Lab Results   Component Value Date/Time    pH 7.41 02/04/2018 03:33 PM    PCO2 33 (L) 02/04/2018 03:33 PM    PO2 70 (L) 02/04/2018 03:33 PM     No components found for: [place]   Lab Results   Component Value Date/Time     02/02/2018 03:06 PM    CK - MB 4.0 (H) 08/05/2013 10:00 AM                Total time: 25  Counseling / coordination time, spent as noted above: 20  > 50% counseling / coordination?: yes    Prolonged service was provided for  []30 min   []75 min in face to face time in the presence of the patient, spent as noted above. Time Start:  Time End:     Note: this can only be billed with 66070 (initial) or 90145 (follow up). If multiple start / stop times, list each separately.

## 2018-02-15 NOTE — PROGRESS NOTES
1900: Bedside and Verbal shift change report given to [id number] [address] [facility] (oncoming nurse) by Tabatha Treadwell RN (offgoing nurse). Report included the following information SBAR, Kardex, ED Summary, Procedure Summary, Intake/Output, MAR, Recent Results, Med Rec Status and Cardiac Rhythm Sinus Arrhythmia. 2000:  Assessment completed, VSS, Sinus arrhythmia, afebrile, 2Ls nasal canula. Patient alert, decreased command following, moans with turning. Lungs coarse. Bowel sounds active. Patient voiding via raines with yellow clear urine. Skin intact, excoriation noted in upper thigh area. PICC infusing 0.45% NaCl at 100 ml/hr. Bed alarm on, call bell with in reach. Will continue to monitor. 2005: Incontinent of large amount of watery stool. Patient cleaned, linens changed. Will continue to monitor. 0000: Reassessment completed, no changes noted. Will continue to monitor. 0400: Reassessment completed, no changes noted. Will continue to monitor. 4873:  Patient incontinent of small amount of loose stool, patient cleaned, linens changed.

## 2018-02-15 NOTE — PROGRESS NOTES
Following pt's progress. Pt was transferred to [address] [place] [address] for routine progression of care. Handoff to [place] [id number]. [facility] [place]. has apparently accepted pt per CC link but this CM is unable to read ref notes. Left VM message for Brenden Garrett at [facility] to confirm acceptance and check on status of insur. auth. Sister Sadie Urias called and was very pleased that [facility] may have accepted. CM explained that length of time at SNF will be determined by [facility]. Suggested checking with [facility] while pt is at SNF to see if they might qualify for short-term aide when pt discharges from SNF. Sister is 71 yo, just had surgery and may not be up to the physical demands of care for pt when she discharges. Per sister, pt is not eligible for Medicaid until some property is sold - sister plans to do this eventually. Pt will require ambo when discharged to SNF.     Eusebio Bernard, MSW

## 2018-02-15 NOTE — PROGRESS NOTES
TRANSFER - IN REPORT:    Verbal report received from Onelia(name) on Kavya Aguilar  being received from CCU(unit) for routine progression of care. Report consisted of patients Situation, Background, Assessment and   Recommendations(SBAR). Information from the following report(s) SBAR, Kardex, Procedure Summary, Intake/Output, MAR, Accordion and Recent Results was reviewed with the receiving nurse. Opportunity for questions and clarification was provided. Assessment completed upon patients arrival to unit and care assumed.

## 2018-02-15 NOTE — PROGRESS NOTES
TRANSFER - OUT REPORT:    Verbal report given to Walt Wayne RN (name) on Robin Harvey  being transferred to Oncology (unit) for routine progression of care       Report consisted of patients Situation, Background, Assessment and   Recommendations(SBAR). Information from the following report(s) SBAR, Kardex, Procedure Summary, Intake/Output, Recent Results and Cardiac Rhythm NSR was reviewed with the receiving nurse. Lines:   PICC Triple Lumen 02/05/18 Right;Brachial (Active)   Central Line Being Utilized Yes 2/15/2018 12:00 PM   Criteria for Appropriate Use Limited/no vessel suitable for conventional peripheral access 2/15/2018 12:00 PM   Site Assessment Clean, dry, & intact 2/15/2018 12:00 PM   Phlebitis Assessment 0 2/15/2018 12:00 PM   Infiltration Assessment 0 2/15/2018 12:00 PM   Arm Circumference (cm) 0 cm 2/6/2018 12:00 AM   Date of Last Dressing Change 02/12/18 2/15/2018 12:00 PM   Dressing Status Clean, dry, & intact 2/15/2018 12:00 PM   External Catheter Length (cm) 0 centimeters 2/13/2018  4:00 PM   Dressing Type Disk with Chlorhexadine gluconate (CHG); Transparent 2/15/2018 12:00 PM   Action Taken Open ports on tubing capped 2/15/2018 12:00 PM   Hub Color/Line Status White 2/15/2018 12:00 PM   Positive Blood Return (Site #1) Yes 2/15/2018 12:00 PM   Hub Color/Line Status Juan Petite 2/15/2018 12:00 PM   Positive Blood Return (Site #2) Yes 2/15/2018 12:00 PM   Hub Color/Line Status Red 2/15/2018 12:00 PM   Positive Blood Return (Site #3) Yes 2/15/2018 12:00 PM   Alcohol Cap Used Yes 2/15/2018 12:00 PM        Opportunity for questions and clarification was provided.       Patient transported with:  [employer]

## 2018-02-15 NOTE — PROGRESS NOTES
Nutrition Assessment:    RECOMMENDATIONS:   Continue diet per SLP   RD to add Mighty Shake, Magic Cup and Ensure Pudding daily     ASSESSMENT:   Chart reviewed, case discussed during CCU rounds. Pt lying in bed awake and alert, nonverbal.  No family in the room. Pt upgraded to a mechanical soft diet today per SLP. NGT removed. BM noted yesterday, loose stools. Labs reviewed, WNL. She is off of levo. Will add PO supplements to ensure pt is meeting her kcal and protein needs. Dietitians Intervention(s)/Plan(s): Continue diet per SLP, add PO supplements, monitor appetite. SUBJECTIVE/OBJECTIVE:   Pt nonverbal   Diet Order: Mechanical soft  % Eaten:  No data found. Pertinent Medications:humalog, protonix, reglan. Chemistries:  Lab Results   Component Value Date/Time    Sodium 143 02/15/2018 04:11 AM    Potassium 3.5 02/15/2018 04:11 AM    Chloride 109 (H) 02/15/2018 04:11 AM    CO2 28 02/15/2018 04:11 AM    Anion gap 6 02/15/2018 04:11 AM    Glucose 73 02/15/2018 04:11 AM    BUN 22 (H) 02/15/2018 04:11 AM    Creatinine 1.08 (H) 02/15/2018 04:11 AM    BUN/Creatinine ratio 20 02/15/2018 04:11 AM    GFR est AA >60 02/15/2018 04:11 AM    GFR est non-AA 50 (L) 02/15/2018 04:11 AM    Calcium 7.9 (L) 02/15/2018 04:11 AM    Albumin 1.6 (L) 02/14/2018 03:00 AM      Anthropometrics: Height: 5' 1\" (154.9 cm) Weight: 72.1 kg (158 lb 15.2 oz)   []bed scale    []stated   []unknown     IBW (%IBW):   ( ) UBW (%UBW):   (  %)    BMI: Body mass index is 30.03 kg/(m^2). This BMI is indicative of:  []Underweight   []Normal   []Overweight   [x] Obesity   [] Extreme Obesity (BMI>40)  Estimated Nutrition Needs (Based on): 1463 Kcals/day (MSJ 1219 x 1.2) , 61 g (-77 (0.8-1gPro/kg)) Protein  Carbohydrate:  At Least 130 g/day  Fluids: 1500 mL/day    Last BM: 2/14-diarrhea   [x]Active     []Hyperactive  []Hypoactive       [] Absent   BS  Skin:    [x] Intact   [] Incision  [] Breakdown   [] DTI   [x] Tears/Excoriation/Abrasion [x]Edema(+1-BLE) [] Other: Wt Readings from Last 30 Encounters:   02/14/18 72.1 kg (158 lb 15.2 oz)   05/31/17 70.8 kg (156 lb)   05/23/17 70.8 kg (156 lb)   05/08/17 70.8 kg (156 lb)   12/13/16 70.3 kg (155 lb)   06/27/16 61.7 kg (136 lb)   05/02/16 61.7 kg (136 lb)   10/09/15 55.8 kg (123 lb)   07/02/15 53.7 kg (118 lb 6.4 oz)   06/03/15 56.2 kg (124 lb)   04/28/15 52.6 kg (116 lb)   04/28/15 52.6 kg (116 lb)   04/07/15 52.2 kg (115 lb)   03/04/15 49.4 kg (109 lb)   02/28/14 47.1 kg (103 lb 12.8 oz)   08/07/13 47.4 kg (104 lb 8 oz)   03/05/13 44.3 kg (97 lb 9.6 oz)   11/18/12 44.5 kg (98 lb)   09/26/11 59.9 kg (132 lb)   09/12/11 59.9 kg (132 lb)   06/13/11 62.3 kg (137 lb 6.4 oz)   04/07/11 62.6 kg (138 lb)   03/29/11 66.2 kg (146 lb)   02/10/11 66.9 kg (147 lb 8 oz)   01/19/11 66.2 kg (146 lb)   12/27/10 66.2 kg (146 lb)   05/07/10 71.4 kg (157 lb 6.4 oz)   04/30/10 70.4 kg (155 lb 3.2 oz)   04/27/10 71.7 kg (158 lb)      NUTRITION DIAGNOSES:   Problem:  Inadequate protein-energy intake      Etiology: related to pt NPO      Signs/Symptoms: as evidenced by NPO meets 0% kcal and protein needs. Previous dx re: inadequate protein energy intake resolving, diet advanced.      NUTRITION INTERVENTIONS:  Meals/Snacks: General/healthful diet  Supplements: Commercial supplement              GOAL:   Pt will consume >50% of meals/supplements in 2-4 days    NUTRITION MONITORING AND EVALUATION   Previous Goal: Plan of care will be determined re: nutrition support vs hospice in 2-4 days   Previous Goal Met: Yes   Previous Recommendations Implemented: Yes   Cultural, Episcopalian, or Ethnic Dietary Needs: None   LEARNING NEEDS (Diet, Food/Nutrient-Drug Interaction):    [x] None Identified   [] Identified and Education Provided/Documented   [] Identified and Pt declined/was not appropriate      [x] Interdisciplinary Care Plan Reviewed/Documented    [x] Participated in Discharge Planning: diet per SLP    [x] Interdisciplinary Rounds     NUTRITION RISK:    [x] High              [] Moderate           []  Low  []  Minimal/Uncompromised      Lana Campos, [address], [address] [place] [address] Dr  Pager [phone]  Weekend Pager [phone]

## 2018-02-16 LAB
ANION GAP SERPL CALC-SCNC: 5 MMOL/L (ref 5–15)
BASOPHILS # BLD: 0 K/UL (ref 0–0.1)
BASOPHILS NFR BLD: 0 % (ref 0–1)
BUN SERPL-MCNC: 17 MG/DL (ref 6–20)
BUN/CREAT SERPL: 16 (ref 12–20)
CALCIUM SERPL-MCNC: 8.4 MG/DL (ref 8.5–10.1)
CHLORIDE SERPL-SCNC: 110 MMOL/L (ref 97–108)
CO2 SERPL-SCNC: 29 MMOL/L (ref 21–32)
CREAT SERPL-MCNC: 1.06 MG/DL (ref 0.55–1.02)
DIFFERENTIAL METHOD BLD: ABNORMAL
EOSINOPHIL # BLD: 0.2 K/UL (ref 0–0.4)
EOSINOPHIL NFR BLD: 3 % (ref 0–7)
ERYTHROCYTE [DISTWIDTH] IN BLOOD BY AUTOMATED COUNT: 15.9 % (ref 11.5–14.5)
GLUCOSE BLD STRIP.AUTO-MCNC: 103 MG/DL (ref 65–100)
GLUCOSE BLD STRIP.AUTO-MCNC: 142 MG/DL (ref 65–100)
GLUCOSE BLD STRIP.AUTO-MCNC: 85 MG/DL (ref 65–100)
GLUCOSE BLD STRIP.AUTO-MCNC: 87 MG/DL (ref 65–100)
GLUCOSE BLD STRIP.AUTO-MCNC: 88 MG/DL (ref 65–100)
GLUCOSE SERPL-MCNC: 75 MG/DL (ref 65–100)
HCT VFR BLD AUTO: 25.4 % (ref 35–47)
HGB BLD-MCNC: 8 G/DL (ref 11.5–16)
IMM GRANULOCYTES # BLD: 0.1 K/UL (ref 0–0.04)
IMM GRANULOCYTES NFR BLD AUTO: 1 % (ref 0–0.5)
LYMPHOCYTES # BLD: 1.1 K/UL (ref 0.8–3.5)
LYMPHOCYTES NFR BLD: 18 % (ref 12–49)
MCH RBC QN AUTO: 29.1 PG (ref 26–34)
MCHC RBC AUTO-ENTMCNC: 31.5 G/DL (ref 30–36.5)
MCV RBC AUTO: 92.4 FL (ref 80–99)
MONOCYTES # BLD: 0.4 K/UL (ref 0–1)
MONOCYTES NFR BLD: 7 % (ref 5–13)
NEUTS SEG # BLD: 4.4 K/UL (ref 1.8–8)
NEUTS SEG NFR BLD: 71 % (ref 32–75)
NRBC # BLD: 0.03 K/UL (ref 0–0.01)
NRBC BLD-RTO: 0.5 PER 100 WBC
PLATELET # BLD AUTO: 279 K/UL (ref 150–400)
PMV BLD AUTO: 11.3 FL (ref 8.9–12.9)
POTASSIUM SERPL-SCNC: 3.5 MMOL/L (ref 3.5–5.1)
RBC # BLD AUTO: 2.75 M/UL (ref 3.8–5.2)
RBC MORPH BLD: ABNORMAL
RBC MORPH BLD: ABNORMAL
SERVICE CMNT-IMP: ABNORMAL
SERVICE CMNT-IMP: ABNORMAL
SERVICE CMNT-IMP: NORMAL
SODIUM SERPL-SCNC: 144 MMOL/L (ref 136–145)
WBC # BLD AUTO: 6.2 K/UL (ref 3.6–11)

## 2018-02-16 PROCEDURE — 74011250636 HC RX REV CODE- 250/636: Performed by: INTERNAL MEDICINE

## 2018-02-16 PROCEDURE — 65270000015 HC RM PRIVATE ONCOLOGY

## 2018-02-16 PROCEDURE — 74011636637 HC RX REV CODE- 636/637: Performed by: PHYSICIAN ASSISTANT

## 2018-02-16 PROCEDURE — 82962 GLUCOSE BLOOD TEST: CPT

## 2018-02-16 PROCEDURE — 74011250637 HC RX REV CODE- 250/637: Performed by: INTERNAL MEDICINE

## 2018-02-16 PROCEDURE — 36415 COLL VENOUS BLD VENIPUNCTURE: CPT | Performed by: INTERNAL MEDICINE

## 2018-02-16 PROCEDURE — 85025 COMPLETE CBC W/AUTO DIFF WBC: CPT | Performed by: INTERNAL MEDICINE

## 2018-02-16 PROCEDURE — 92526 ORAL FUNCTION THERAPY: CPT

## 2018-02-16 PROCEDURE — 80048 BASIC METABOLIC PNL TOTAL CA: CPT | Performed by: INTERNAL MEDICINE

## 2018-02-16 RX ADMIN — MIDODRINE HYDROCHLORIDE 10 MG: 5 TABLET ORAL at 23:48

## 2018-02-16 RX ADMIN — Medication 10 ML: at 16:57

## 2018-02-16 RX ADMIN — Medication 20 ML: at 16:57

## 2018-02-16 RX ADMIN — METOCLOPRAMIDE 5 MG: 5 INJECTION, SOLUTION INTRAMUSCULAR; INTRAVENOUS at 00:33

## 2018-02-16 RX ADMIN — LEVOTHYROXINE SODIUM 25 MCG: 50 TABLET ORAL at 05:13

## 2018-02-16 RX ADMIN — METOCLOPRAMIDE 5 MG: 5 INJECTION, SOLUTION INTRAMUSCULAR; INTRAVENOUS at 05:13

## 2018-02-16 RX ADMIN — INSULIN LISPRO 2 UNITS: 100 INJECTION, SOLUTION INTRAVENOUS; SUBCUTANEOUS at 13:52

## 2018-02-16 RX ADMIN — PANTOPRAZOLE SODIUM 40 MG: 40 TABLET, DELAYED RELEASE ORAL at 09:23

## 2018-02-16 RX ADMIN — METOCLOPRAMIDE 5 MG: 5 INJECTION, SOLUTION INTRAMUSCULAR; INTRAVENOUS at 13:53

## 2018-02-16 RX ADMIN — METOCLOPRAMIDE 5 MG: 5 INJECTION, SOLUTION INTRAMUSCULAR; INTRAVENOUS at 23:49

## 2018-02-16 RX ADMIN — Medication 30 ML: at 03:30

## 2018-02-16 RX ADMIN — Medication 10 ML: at 00:33

## 2018-02-16 RX ADMIN — Medication 10 ML: at 23:48

## 2018-02-16 RX ADMIN — Medication 10 ML: at 13:55

## 2018-02-16 RX ADMIN — MIDODRINE HYDROCHLORIDE 10 MG: 5 TABLET ORAL at 00:33

## 2018-02-16 RX ADMIN — MIDODRINE HYDROCHLORIDE 10 MG: 5 TABLET ORAL at 16:57

## 2018-02-16 RX ADMIN — METOCLOPRAMIDE 5 MG: 5 INJECTION, SOLUTION INTRAMUSCULAR; INTRAVENOUS at 18:42

## 2018-02-16 NOTE — PROGRESS NOTES
Hospitalist Progress Note    NAME: Brian Carreon   :  1945   MRN:  [id number]       Assessment / Plan:  Anemia: Acute on Chronic  -Hgb stable s/p 1 unit pRBC's on   -continue to monitor    Acute hypoxic respiratory failure due to acute pulmonary edema  Acute on Chronic Diastolic CHF  -72 TTE: \"Left ventricle: Ejection fraction was estimated in the range of 60 %. No obvious wall motion abnormalities identified in the views obtained. There was mild concentric hypertrophy. Features were consistent with a pseudonormal left ventricular filling pattern, with concomitant abnormal relaxation and increased filling pressure (grade 2 diastolic dysfunction). \"  -no longer on antibiotics/tamiflu (completed treatment)  -appreciate pulmonology assistance  -Levophed weaned to off around 6 pm on   -patient has been weaned off of O2    TIFFANIE, resolved  Hypernatremia; resolved with hypotonic IVF's  -IVF's discontinued on 2/15    Acute encephalopathy and septic shock due to Pan-susecptible E. Coli UTI and Influenza B, present on admission:   - MRI brain with mild small vessel ischemic changes. No acute abnormality. - CT chest/abd/pelvis with severe scoliosis. Atelectasis in the lower lobes, left greater than right. Anemia. Atherosclerotic abdominal aorta without aneurysm. - UA >954050 pansensitive E Coli, blood cultures no growth. Completed course of Zosyn. - influenza B positive - tamiflu finished   - EEG with cerebral dysfunction with no seizure activity  - appreciate neuro consult, recommending con't keppra for now  - tolerating Pureed with thins    Hypoglycemia:  H/o \"borderline DM,\" not on meds at home.  HgA1c <3.5.  Improved  - continue to monitor POC glucose; glucose trends adequate at this time      Elevated Transaminases:  Presumed due to sepsis, improving  Baseline mental retardation  Lupus: Continue holding Plaquenil  Hypothyroidism:  continue synthroid   Obesity (Body mass index is 32.45 kg/(m^2)      Code Status: partial -Palliative consult appreciated  Surrogate Decision Maker: sister   DVT Prophylaxis: sq heparin      Baseline: Mental retardation, lives with sister normally but placed in group home after sister had to undergo recent back surgery     Subjective:     Chief Complaint / Reason for Physician Visit: follow-up hypotension  Patient seen on Oncology  No interval change    Review of Systems:  Symptom Y/N Comments  Symptom Y/N Comments   Fever/Chills    Chest Pain     Poor Appetite    Edema     Cough    Abdominal Pain     Sputum    Joint Pain     SOB/YOUNG    Pruritis/Rash     Nausea/vomit    Tolerating PT/OT     Diarrhea    Tolerating Diet     Constipation    Other       Could NOT obtain due to: MR, non-verbal     Objective:     VITALS:   Last 24hrs VS reviewed since prior progress note. Most recent are:  Patient Vitals for the past 24 hrs:   Temp Pulse Resp BP SpO2   02/16/18 0900 - 64 18 (!) 124/99 100 %   02/15/18 2320 97.4 °F (36.3 °C) 61 18 130/58 -   02/15/18 1913 97.5 °F (36.4 °C) 63 18 125/65 100 %   02/15/18 1640 97.1 °F (36.2 °C) 69 18 105/62 99 %   02/15/18 1515 98.4 °F (36.9 °C) 74 18 100/50 100 %   02/15/18 1400 - 78 25 107/61 98 %   02/15/18 1300 - 69 23 (!) 83/66 99 %   02/15/18 1200 98.7 °F (37.1 °C) 71 25 102/51 100 %   02/15/18 1100 - 76 23 109/87 99 %       Intake/Output Summary (Last 24 hours) at 02/16/18 1015  Last data filed at 02/15/18 1400   Gross per 24 hour   Intake                0 ml   Output              350 ml   Net             -350 ml        PHYSICAL EXAM:  General:                    Altered; severe kyphoscolosis  EENT:                       Anicteric sclerae, PERRL  Resp:                        No Wheezing.  Frequent shallow respirations  CV:                            Regular  rhythm,  No edema  GI:                              Soft, Non distended, Non tender.  +Bowel sounds  Neurologic:                Alert but does not speak, does not follow any commands  Psych:                       No insight  Skin:                          No rashes.  No jaundice    Reviewed most current lab test results and cultures  YES  Reviewed most current radiology test results   YES  Review and summation of old records today    NO  Reviewed patient's current orders and MAR    YES  PMH/SH reviewed - no change compared to H&P  ________________________________________________________________________  Care Plan discussed with:    Comments   Patient x    Family      RN x    Care Manager x    Consultant                        Multidiciplinary team rounds were held today with , nursing, pharmacist and clinical coordinator. Patient's plan of care was discussed; medications were reviewed and discharge planning was addressed. ________________________________________________________________________  Total NON critical care TIME:  25   Minutes    Total CRITICAL CARE TIME Spent:   Minutes non procedure based      Comments   >50% of visit spent in counseling and coordination of care x Discussion about dispo barriers   ________________________________________________________________________  Jamaica Coker MD     Procedures: see electronic medical records for all procedures/Xrays and details which were not copied into this note but were reviewed prior to creation of Plan. LABS:  I reviewed today's most current labs and imaging studies.   Pertinent labs include:  Recent Labs      02/16/18   0331  02/15/18   0411  02/14/18   0300   WBC  6.2  7.0  8.6   HGB  8.0*  7.5*  6.1*   HCT  25.4*  23.6*  20.3*   PLT  279  239  226     Recent Labs      02/16/18   0331  02/15/18   0411  02/14/18   0300   NA  144  143  146*   K  3.5  3.5  3.4*   CL  110*  109*  110*   CO2  29  28  32   GLU  75  73  107*   BUN  17  22*  36*   CREA  1.06*  1.08*  1.26*   CA  8.4*  7.9*  8.0*   MG   --    --   2.4   PHOS   --    --   2.5*   ALB   --    --   1.6*   TBILI   --    --   0.5   SGOT   --    -- 66*   ALT   --    --   40       Signed: Ivy Stafford MD

## 2018-02-16 NOTE — PROGRESS NOTES
Problem: Dysphagia (Adult)  Goal: *Acute Goals and Plan of Care (Insert Text)  2/14/2018  Speech path goals:  1. Pt will tolerate purees/thins with no overt s/s of aspiration. MET 2/15/2018   2. Added 2/15/2018 Patient will tolerate dysphagia 2 mechanically altered diet/thin liquids free of s/s aspiration within 7 days. dys 2 discontinued and dys 1 reinitiated  3. Added 2/15/2018 Patient will demonstrate timely and complete mastication of solids in order to advance diet to mechanical soft consistency (baseline) free of s/s aspiration within 7 days. Speech language pathology dysphagia treatment  Patient: Allyson Bernard (71 y.o. female)  Date: 2/16/2018  Diagnosis: Sepsis ([address]  UTI (urinary tract infection)  Hx of systemic lupus erythematosus (SLE)  Hx of diabetes mellitus  Hx of essential hypertension  Mental retardation <principal problem not specified>       Precautions:       ASSESSMENT:  Pt had the flu and has been treated; chest xray is much better since admission. She was masticating minced food for over 1 minute which is too slow to allow for sufficient calorie intake. There is anterior loss of liquids at midline. Pharyngeal phase is characterized by mild swallow delay and reduced hyolaryngeal excursion. There was coughing noted just once after liquid swallow and the pt had solids in her mouth. Attempting to use liquid wash to clear mouth but it was not effective. Pt had mild pocketing of solids on the L tongue and sulcus. Oral care is needed and this was related to nsg. Progression toward goals:  []         Improving appropriately and progressing toward goals  []         Improving slowly and progressing toward goals  [x]         Not making progress toward goals and plan of care will be adjusted     PLAN:  Recommendations and Planned Interventions:  Recommend downgrade to dys 1 due to very slow, inefficient oral phase with solids.    Patient continues to benefit from skilled intervention to address the above impairments. Continue treatment per established plan of care. Discharge Recommendations: To Be Determined     SUBJECTIVE:   Patient vocalized but nothing intelligible was produced. OBJECTIVE:   Cognitive and Communication Status:  Neurologic State: Alert  Orientation Level:  (vocalizes)  Cognition: Unable to assess (comment)  Perception: Appears intact  Perseveration: No perseveration noted     Dysphagia Treatment:  Oral Assessment:     P.O. Trials:  Patient Position: upright in bed   Vocal quality prior to P.O.: Low volume  Consistency Presented: Thin liquid;Puree  How Presented: SLP-fed/presented;Straw;Spoon; Successive swallows     Bolus Acceptance: No impairment  Bolus Formation/Control: Impaired  Type of Impairment: Delayed; Incomplete;Mastication;Drooling  Propulsion: Delayed (# of seconds)  Oral Residue: Left;Pocketing;Lingual  Initiation of Swallow: Delayed (# of seconds)  Laryngeal Elevation: Decreased  Aspiration Signs/Symptoms: None                Oral Phase Severity: Moderate  Pharyngeal Phase Severity : Mild                     Pain:  Pain Scale 1: Adult Nonverbal Pain Scale  Pain Intensity 1: 0     After treatment:   []              Patient left in no apparent distress sitting up in chair  [x]              Patient left in no apparent distress in bed  [x]              Call bell left within reach  [x]              Nursing notified  []              Caregiver present  []              Bed alarm activated    COMMUNICATION/EDUCATION:       The patients plan of care including recommendations, planned interventions, and recommended diet changes were discussed with: Registered Nurse. []              Posted safety precautions in patient's room.     JOEY Mixon  Time Calculation: 30 mins

## 2018-02-16 NOTE — PROGRESS NOTES
CM spoke to patient's sister - JOSE D Hoffman - [phone] - who states patient was in a private adult home for care while she was undergoing back surgery. Patient got sick after 2 weeks and was brought to the hospital for care. Patient's sister states she would like to have patient go to rehab for at least a couple of weeks prior to going back to adult home.  has offered [facility] and choice received via telephone from sister. Referrals sent to [address] [place] [address]. [facility] [facility] declined. Still awaiting answer from [employer]. Patient has [employer] and will need authorization for SNF. Patient's sister is working with group home for payment options. She states she paid for 4 weeks of care and patient only received 2 weeks, so home is willing to work with her about payment and willing to take patient back to their facility. Sister did not provide direct contact information for home on [address] in [facility]. Patient is not eligible for Medicaid as she is partial owner of a home/property that has not been sold. Patient has been with her sister for past 30 years since parents passed away.     Fátima Girard RN, BSN, ACM   - Medical Oncology  [phone]

## 2018-02-16 NOTE — PROGRESS NOTES
Primary Nurse Suha Schmitz and Fabian Montoya, RN performed a dual skin assessment on this patient Impairment noted- excoriation to gluteal cleft and between upper thighs, barrier cream applied  Adolph score is 14

## 2018-02-16 NOTE — PROGRESS NOTES
Bedside shift report given to MOIRA LEVINE [facility] report to include SBAR, Kardex, and recent results.

## 2018-02-16 NOTE — INTERDISCIPLINARY ROUNDS
Oncology Interdisciplinary rounds were held today to discuss patient plan of care and outcomes. The following members were present: Nursing, Case Management, Pharmacy and Dietary.     Actual Length of Stay: 14    DRG GLOS: 4.9    Expected Length of Stay: 4d 21h                Plan for Day        Mobility        Plan for Stay      Plan for Way   Droplet precautions   Safety   Tamiflu Bedrest Continue current [place] Return to [facility]

## 2018-02-17 LAB
ALBUMIN SERPL-MCNC: 1.6 G/DL (ref 3.5–5)
ALBUMIN/GLOB SERPL: 0.4 {RATIO} (ref 1.1–2.2)
ALP SERPL-CCNC: 141 U/L (ref 45–117)
ALT SERPL-CCNC: 29 U/L (ref 12–78)
ANION GAP SERPL CALC-SCNC: 6 MMOL/L (ref 5–15)
AST SERPL-CCNC: 29 U/L (ref 15–37)
BASOPHILS # BLD: 0 K/UL (ref 0–0.1)
BASOPHILS NFR BLD: 0 % (ref 0–1)
BILIRUB SERPL-MCNC: 0.3 MG/DL (ref 0.2–1)
BUN SERPL-MCNC: 18 MG/DL (ref 6–20)
BUN/CREAT SERPL: 19 (ref 12–20)
CALCIUM SERPL-MCNC: 8.1 MG/DL (ref 8.5–10.1)
CHLORIDE SERPL-SCNC: 110 MMOL/L (ref 97–108)
CO2 SERPL-SCNC: 28 MMOL/L (ref 21–32)
CREAT SERPL-MCNC: 0.97 MG/DL (ref 0.55–1.02)
DIFFERENTIAL METHOD BLD: ABNORMAL
EOSINOPHIL # BLD: 0.1 K/UL (ref 0–0.4)
EOSINOPHIL NFR BLD: 3 % (ref 0–7)
ERYTHROCYTE [DISTWIDTH] IN BLOOD BY AUTOMATED COUNT: 15.9 % (ref 11.5–14.5)
GLOBULIN SER CALC-MCNC: 4.3 G/DL (ref 2–4)
GLUCOSE BLD STRIP.AUTO-MCNC: 153 MG/DL (ref 65–100)
GLUCOSE BLD STRIP.AUTO-MCNC: 159 MG/DL (ref 65–100)
GLUCOSE BLD STRIP.AUTO-MCNC: 70 MG/DL (ref 65–100)
GLUCOSE BLD STRIP.AUTO-MCNC: 79 MG/DL (ref 65–100)
GLUCOSE BLD STRIP.AUTO-MCNC: 85 MG/DL (ref 65–100)
GLUCOSE SERPL-MCNC: 66 MG/DL (ref 65–100)
HCT VFR BLD AUTO: 22.8 % (ref 35–47)
HGB BLD-MCNC: 7.2 G/DL (ref 11.5–16)
IMM GRANULOCYTES # BLD: 0.1 K/UL (ref 0–0.04)
IMM GRANULOCYTES NFR BLD AUTO: 2 % (ref 0–0.5)
LYMPHOCYTES # BLD: 0.8 K/UL (ref 0.8–3.5)
LYMPHOCYTES NFR BLD: 18 % (ref 12–49)
MCH RBC QN AUTO: 29.1 PG (ref 26–34)
MCHC RBC AUTO-ENTMCNC: 31.6 G/DL (ref 30–36.5)
MCV RBC AUTO: 92.3 FL (ref 80–99)
MONOCYTES # BLD: 0.4 K/UL (ref 0–1)
MONOCYTES NFR BLD: 8 % (ref 5–13)
NEUTS SEG # BLD: 3.3 K/UL (ref 1.8–8)
NEUTS SEG NFR BLD: 69 % (ref 32–75)
NRBC # BLD: 0 K/UL (ref 0–0.01)
NRBC BLD-RTO: 0 PER 100 WBC
PLATELET # BLD AUTO: 258 K/UL (ref 150–400)
PMV BLD AUTO: 11.2 FL (ref 8.9–12.9)
POTASSIUM SERPL-SCNC: 3.2 MMOL/L (ref 3.5–5.1)
PROT SERPL-MCNC: 5.9 G/DL (ref 6.4–8.2)
RBC # BLD AUTO: 2.47 M/UL (ref 3.8–5.2)
SERVICE CMNT-IMP: ABNORMAL
SERVICE CMNT-IMP: ABNORMAL
SERVICE CMNT-IMP: NORMAL
SODIUM SERPL-SCNC: 144 MMOL/L (ref 136–145)
WBC # BLD AUTO: 4.8 K/UL (ref 3.6–11)

## 2018-02-17 PROCEDURE — 65270000015 HC RM PRIVATE ONCOLOGY

## 2018-02-17 PROCEDURE — 80053 COMPREHEN METABOLIC PANEL: CPT | Performed by: INTERNAL MEDICINE

## 2018-02-17 PROCEDURE — 74011250637 HC RX REV CODE- 250/637: Performed by: INTERNAL MEDICINE

## 2018-02-17 PROCEDURE — 74011000250 HC RX REV CODE- 250: Performed by: PHYSICIAN ASSISTANT

## 2018-02-17 PROCEDURE — 36415 COLL VENOUS BLD VENIPUNCTURE: CPT | Performed by: INTERNAL MEDICINE

## 2018-02-17 PROCEDURE — 85025 COMPLETE CBC W/AUTO DIFF WBC: CPT | Performed by: INTERNAL MEDICINE

## 2018-02-17 PROCEDURE — 74011250636 HC RX REV CODE- 250/636: Performed by: INTERNAL MEDICINE

## 2018-02-17 PROCEDURE — 82962 GLUCOSE BLOOD TEST: CPT

## 2018-02-17 PROCEDURE — 74011636637 HC RX REV CODE- 636/637: Performed by: PHYSICIAN ASSISTANT

## 2018-02-17 RX ORDER — MAGNESIUM SULFATE 100 %
16 CRYSTALS MISCELLANEOUS
Status: DISCONTINUED | OUTPATIENT
Start: 2018-02-17 | End: 2018-02-18

## 2018-02-17 RX ORDER — MAGNESIUM SULFATE 100 %
16 CRYSTALS MISCELLANEOUS EVERY 24 HOURS
Status: DISCONTINUED | OUTPATIENT
Start: 2018-02-18 | End: 2018-02-18

## 2018-02-17 RX ORDER — POTASSIUM CHLORIDE 750 MG/1
30 TABLET, FILM COATED, EXTENDED RELEASE ORAL
Status: COMPLETED | OUTPATIENT
Start: 2018-02-17 | End: 2018-02-17

## 2018-02-17 RX ORDER — METOCLOPRAMIDE 10 MG/1
5 TABLET ORAL
Status: DISCONTINUED | OUTPATIENT
Start: 2018-02-17 | End: 2018-03-12 | Stop reason: HOSPADM

## 2018-02-17 RX ADMIN — METOCLOPRAMIDE 5 MG: 5 INJECTION, SOLUTION INTRAMUSCULAR; INTRAVENOUS at 12:31

## 2018-02-17 RX ADMIN — MIDODRINE HYDROCHLORIDE 10 MG: 5 TABLET ORAL at 21:34

## 2018-02-17 RX ADMIN — METOCLOPRAMIDE 5 MG: 10 TABLET ORAL at 21:33

## 2018-02-17 RX ADMIN — Medication 16 G: at 22:00

## 2018-02-17 RX ADMIN — Medication 30 ML: at 21:36

## 2018-02-17 RX ADMIN — LEVOTHYROXINE SODIUM 25 MCG: 50 TABLET ORAL at 05:16

## 2018-02-17 RX ADMIN — MIDODRINE HYDROCHLORIDE 10 MG: 5 TABLET ORAL at 10:33

## 2018-02-17 RX ADMIN — POTASSIUM CHLORIDE 30 MEQ: 750 TABLET, FILM COATED, EXTENDED RELEASE ORAL at 10:35

## 2018-02-17 RX ADMIN — METOCLOPRAMIDE 5 MG: 5 INJECTION, SOLUTION INTRAMUSCULAR; INTRAVENOUS at 05:17

## 2018-02-17 RX ADMIN — Medication 10 ML: at 05:16

## 2018-02-17 RX ADMIN — Medication 10 ML: at 18:15

## 2018-02-17 RX ADMIN — MIDODRINE HYDROCHLORIDE 10 MG: 5 TABLET ORAL at 18:09

## 2018-02-17 RX ADMIN — METOCLOPRAMIDE 5 MG: 10 TABLET ORAL at 18:08

## 2018-02-17 RX ADMIN — INSULIN LISPRO 2 UNITS: 100 INJECTION, SOLUTION INTRAVENOUS; SUBCUTANEOUS at 12:33

## 2018-02-17 RX ADMIN — Medication 20 ML: at 18:16

## 2018-02-17 RX ADMIN — DEXTROSE MONOHYDRATE 12.5 G: 25 INJECTION, SOLUTION INTRAVENOUS at 05:25

## 2018-02-17 RX ADMIN — PANTOPRAZOLE SODIUM 40 MG: 40 TABLET, DELAYED RELEASE ORAL at 10:34

## 2018-02-17 NOTE — PROGRESS NOTES
Hospitalist Progress Note    NAME: Jose Mejia   :  1945   MRN:  [id number]       Assessment / Plan:  Anemia: Acute on Chronic  -Hgb seems to be again down-trending, continue to monitor; did receive 1 unit pRBC's on     Acute hypoxic respiratory failure due to acute pulmonary edema  Acute on Chronic Diastolic CHF  -62 TTE: \"Left ventricle: Ejection fraction was estimated in the range of 60 %. No obvious wall motion abnormalities identified in the views obtained. There was mild concentric hypertrophy. Features were consistent with a pseudonormal left ventricular filling pattern, with concomitant abnormal relaxation and increased filling pressure (grade 2 diastolic dysfunction). \"  -no longer on antibiotics/tamiflu (completed treatment)  -appreciate pulmonology assistance  -Levophed weaned to off around 6 pm on   -patient has been weaned off of O2    TIFFANIE, resolved  Hypernatremia; resolved with hypotonic IVF's  -IVF's discontinued on 2/15    Hypokalemia:  -replete and monitor    Acute encephalopathy and septic shock due to Pan-susecptible E. Coli UTI and Influenza B, present on admission:   - MRI brain with mild small vessel ischemic changes. No acute abnormality. - CT chest/abd/pelvis with severe scoliosis. Atelectasis in the lower lobes, left greater than right. Anemia. Atherosclerotic abdominal aorta without aneurysm. - UA >233099 pansensitive E Coli, blood cultures no growth. Completed course of Zosyn. - influenza B positive - tamiflu finished   - EEG with cerebral dysfunction with no seizure activity  - appreciate neuro consult, recommending to continue keppra  - tolerating Pureed with thins    Hypoglycemia:  H/o \"borderline DM,\" not on meds at home.  HgA1c <3.5.  Improved  - continue to monitor POC glucose; mild hypoglycemia this am to 66-->will start glucose tab qns and qam      Elevated Transaminases:  Presumed due to sepsis, improving  Baseline mental retardation  Lupus: Continue holding Plaquenil  Hypothyroidism:  continue synthroid   Obesity (Body mass index is 32.45 kg/(m^2)      Code Status: partial -Palliative consult appreciated  Surrogate Decision Maker: sister   DVT Prophylaxis: sq heparin      Baseline: Mental retardation, lives with sister normally but placed in group home after sister had to undergo recent back surgery     Subjective:     Chief Complaint / Reason for Physician Visit: follow-up hypotension  Patient seen on Oncology  No interval change  Case discussed with RN  Patient tolerating diet  Low glucose this am improved after dextrose given    Review of Systems:  Symptom Y/N Comments  Symptom Y/N Comments   Fever/Chills    Chest Pain     Poor Appetite    Edema     Cough    Abdominal Pain     Sputum    Joint Pain     SOB/YOUNG    Pruritis/Rash     Nausea/vomit    Tolerating PT/OT     Diarrhea    Tolerating Diet     Constipation    Other       Could NOT obtain due to: MR, non-verbal     Objective:     VITALS:   Last 24hrs VS reviewed since prior progress note. Most recent are:  Patient Vitals for the past 24 hrs:   Temp Pulse Resp BP SpO2   02/17/18 1033 - (!) 106 - 97/41 -   02/17/18 0907 98 °F (36.7 °C) 75 16 118/59 100 %   02/16/18 1946 96.4 °F (35.8 °C) 74 18 133/77 97 %   02/16/18 1645 - 76 18 116/85 98 %     No intake or output data in the 24 hours ending 02/17/18 1307     PHYSICAL EXAM:  General:                    Altered; severe kyphoscolosis  EENT:                       Anicteric sclerae, PERRL  Resp:                        No Wheezing.  Frequent shallow respirations  CV:                            Regular rhythm with normal rate during ascultation,  No edema  GI:                              Soft, Non distended, Non tender.  +Bowel sounds  Neurologic:                Alert but does not speak, does not follow any commands  Psych:                       No insight  Skin:                          No rashes.  No jaundice    Reviewed most current lab test results and cultures  YES  Reviewed most current radiology test results   YES  Review and summation of old records today    NO  Reviewed patient's current orders and MAR    YES  PMH/SH reviewed - no change compared to H&P  ________________________________________________________________________  Care Plan discussed with:    Comments   Patient x    Family      RN x    Care Manager     Consultant                        Multidiciplinary team rounds were held today with , nursing, pharmacist and clinical coordinator. Patient's plan of care was discussed; medications were reviewed and discharge planning was addressed. ________________________________________________________________________  Total NON critical care TIME:  25   Minutes    Total CRITICAL CARE TIME Spent:   Minutes non procedure based      Comments   >50% of visit spent in counseling and coordination of care     ________________________________________________________________________  Desi Knox MD     Procedures: see electronic medical records for all procedures/Xrays and details which were not copied into this note but were reviewed prior to creation of Plan. LABS:  I reviewed today's most current labs and imaging studies.   Pertinent labs include:  Recent Labs      02/17/18   0518  02/16/18   0331  02/15/18   0411   WBC  4.8  6.2  7.0   HGB  7.2*  8.0*  7.5*   HCT  22.8*  25.4*  23.6*   PLT  258  279  239     Recent Labs      02/17/18   0518  02/16/18   0331  02/15/18   0411   NA  144  144  143   K  3.2*  3.5  3.5   CL  110*  110*  109*   CO2  28  29  28   GLU  66  75  73   BUN  18  17  22*   CREA  0.97  1.06*  1.08*   CA  8.1*  8.4*  7.9*   ALB  1.6*   --    --    TBILI  0.3   --    --    SGOT  29   --    --    ALT  29   --    --        Signed: Desi Knox MD

## 2018-02-17 NOTE — PROGRESS NOTES
Oncology Nursing Communication Tool  7:59 PM  2/16/2018     Bedside and Verbal shift change report given to Jose Ding RN (incoming nurse) by Mirna Marrufo (outgoing nurse) on [facility]. Report included the following information SBAR, Kardex, Procedure Summary, Intake/Output, MAR, Accordion and Recent Results. Shift Summary: pt rested comfortably      Issues for physician to address: Oncology Shift Note   Admission Date 2/2/2018   Admission Diagnosis Sepsis ([address]  UTI (urinary tract infection)  Hx of systemic lupus erythematosus (SLE)  Hx of diabetes mellitus  Hx of essential hypertension  Mental retardation   Code Status Partial Code   Consults IP CONSULT TO NEUROLOGY  IP CONSULT TO PALLIATIVE CARE - PROVIDER  IP CONSULT TO CARDIOLOGY      Cardiac Monitoring [] Yes [] No      Purposeful Hourly Rounding [] Yes    Cole Score Total Score: 4   Cole score 3 or > [] Bed Alarm [] Avasys [] 1:1 sitter [] Patient refused (Place signed refusal form in chart)      Pain Managed [] Yes [] No    Key Pain Meds             acetaminophen (TYLENOL ARTHRITIS PAIN) 650 mg TbER  (Taking) Take 650 mg by mouth every eight (8) hours as needed for Pain. Influenza Vaccine Received Flu Vaccine for Current Season (usually Sept-March): Yes           Oxygen needs?  [] Room air Oxygen @  []1L    []2L    []3L   []4L    []5L   []6L     Use home O2? [] Yes [] No  Perform O2 challenge test using  smartphrase (.oxygenchallenge)      Last bowel movement Last Bowel Movement Date: 02/15/18  bowel movement      Urinary Catheter [REMOVED] Urinary Catheter 02/02/18 [facility] - Temperature-Criteria for Appropriate Use: Medically/surgically unstable  [REMOVED] Urinary Catheter 02/15/18 [facility]-Criteria for Appropriate Use: Obstruction/retention     [REMOVED] Urinary Catheter 02/02/18 [facility] - Temperature-Urine Output (mL): 10 ml  [REMOVED] External Female Catheter 02/03/18-Urine Output (mL): 0 ml  [REMOVED] Urinary Catheter 02/15/18 [facility]-Urine Output (mL): 150 ml     LDAs         PICC Triple Lumen 02/05/18 Right;Brachial (Active)   Central Line Being Utilized Yes 2/16/2018  7:07 PM   Criteria for Appropriate Use Limited/no vessel suitable for conventional peripheral access 2/16/2018  8:00 AM   Site Assessment Clean, dry, & intact 2/16/2018  8:00 AM   Phlebitis Assessment 0 2/16/2018  8:00 AM   Infiltration Assessment 0 2/16/2018  8:00 AM   Arm Circumference (cm) 0 cm 2/6/2018 12:00 AM   Date of Last Dressing Change 02/12/18 2/16/2018  8:00 AM   Dressing Status Clean, dry, & intact 2/16/2018  8:00 AM   External Catheter Length (cm) 0 centimeters 2/13/2018  4:00 PM   Dressing Type Disk with Chlorhexadine gluconate (CHG) 2/16/2018  8:00 AM   Action Taken Open ports on tubing capped 2/15/2018 12:00 PM   Hub Color/Line Status White;Capped;Flushed;Patent 2/16/2018  8:00 AM   Positive Blood Return (Site #1) Yes 2/16/2018  8:00 AM   Hub Color/Line Status Gray;Flushed;Patent 2/16/2018  8:00 AM   Positive Blood Return (Site #2) Yes 2/16/2018  8:00 AM   Hub Color/Line Status Red;Flushed;Patent 2/16/2018  8:00 AM   Positive Blood Return (Site #3) Yes 2/16/2018  8:00 AM   Alcohol Cap Used Yes 2/16/2018  8:00 AM                External Female Catheter 02/15/18 (Active)   Site Assessment Clean, dry, & intact 2/16/2018  7:07 PM   Repositioned No 2/16/2018  8:00 AM   Perineal Care No 2/16/2018  8:00 AM   Wick Changed No 2/16/2018  8:00 AM   Suction Canister/Tubing Changed No 2/16/2018  8:00 AM                   Readmission Risk Assessment Tool Score Low Risk            12       Total Score        3 Patient Length of Stay (>5 days = 3)    5 Pt. Coverage (Medicare=5 , Medicaid, or Self-Pay=4)    4 Charlson Comorbidity Score (Age + Comorbid Conditions)        Criteria that do not apply:    Has Seen PCP in Last 6 Months (Yes=3, No=0)    . Living with Significant Other. Assisted Living. LTAC. SNF.  or   Rehab    IP Visits Last 12 Months (1-3=4, 4=9, >4=11)       Expected Length of Stay 4d 21h   Actual Length of Stay [facility]

## 2018-02-18 LAB
ABO + RH BLD: NORMAL
ALBUMIN SERPL-MCNC: 1.9 G/DL (ref 3.5–5)
ALBUMIN/GLOB SERPL: 0.4 {RATIO} (ref 1.1–2.2)
ALP SERPL-CCNC: 175 U/L (ref 45–117)
ALT SERPL-CCNC: 34 U/L (ref 12–78)
ANION GAP SERPL CALC-SCNC: 8 MMOL/L (ref 5–15)
AST SERPL-CCNC: 35 U/L (ref 15–37)
BASOPHILS # BLD: 0 K/UL (ref 0–0.1)
BASOPHILS NFR BLD: 0 % (ref 0–1)
BILIRUB SERPL-MCNC: 0.4 MG/DL (ref 0.2–1)
BLD PROD TYP BPU: NORMAL
BLOOD GROUP ANTIBODIES SERPL: NORMAL
BLOOD GROUP ANTIBODIES SERPL: NORMAL
BPU ID: NORMAL
BUN SERPL-MCNC: 18 MG/DL (ref 6–20)
BUN/CREAT SERPL: 19 (ref 12–20)
CALCIUM SERPL-MCNC: 8.5 MG/DL (ref 8.5–10.1)
CHLORIDE SERPL-SCNC: 109 MMOL/L (ref 97–108)
CO2 SERPL-SCNC: 27 MMOL/L (ref 21–32)
CREAT SERPL-MCNC: 0.97 MG/DL (ref 0.55–1.02)
CROSSMATCH RESULT,%XM: NORMAL
DAT POLY-SP REAG RBC QL: NORMAL
DIFFERENTIAL METHOD BLD: ABNORMAL
EOSINOPHIL # BLD: 0.2 K/UL (ref 0–0.4)
EOSINOPHIL NFR BLD: 3 % (ref 0–7)
ERYTHROCYTE [DISTWIDTH] IN BLOOD BY AUTOMATED COUNT: 16.7 % (ref 11.5–14.5)
GLOBULIN SER CALC-MCNC: 5.4 G/DL (ref 2–4)
GLUCOSE BLD STRIP.AUTO-MCNC: 91 MG/DL (ref 65–100)
GLUCOSE BLD STRIP.AUTO-MCNC: 93 MG/DL (ref 65–100)
GLUCOSE BLD STRIP.AUTO-MCNC: 99 MG/DL (ref 65–100)
GLUCOSE SERPL-MCNC: 85 MG/DL (ref 65–100)
HCT VFR BLD AUTO: 27.2 % (ref 35–47)
HGB BLD-MCNC: 8.6 G/DL (ref 11.5–16)
IMM GRANULOCYTES # BLD: 0.1 K/UL (ref 0–0.04)
IMM GRANULOCYTES NFR BLD AUTO: 1 % (ref 0–0.5)
LYMPHOCYTES # BLD: 1 K/UL (ref 0.8–3.5)
LYMPHOCYTES NFR BLD: 19 % (ref 12–49)
MCH RBC QN AUTO: 29.1 PG (ref 26–34)
MCHC RBC AUTO-ENTMCNC: 31.6 G/DL (ref 30–36.5)
MCV RBC AUTO: 91.9 FL (ref 80–99)
MONOCYTES # BLD: 0.4 K/UL (ref 0–1)
MONOCYTES NFR BLD: 7 % (ref 5–13)
NEUTS SEG # BLD: 3.8 K/UL (ref 1.8–8)
NEUTS SEG NFR BLD: 69 % (ref 32–75)
NRBC # BLD: 0 K/UL (ref 0–0.01)
NRBC BLD-RTO: 0 PER 100 WBC
PLATELET # BLD AUTO: 320 K/UL (ref 150–400)
PMV BLD AUTO: 11.4 FL (ref 8.9–12.9)
POTASSIUM SERPL-SCNC: 3.8 MMOL/L (ref 3.5–5.1)
PROT SERPL-MCNC: 7.3 G/DL (ref 6.4–8.2)
RBC # BLD AUTO: 2.96 M/UL (ref 3.8–5.2)
SERVICE CMNT-IMP: NORMAL
SODIUM SERPL-SCNC: 144 MMOL/L (ref 136–145)
SPECIMEN EXP DATE BLD: NORMAL
STATUS OF UNIT,%ST: NORMAL
UNIT DIVISION, %UDIV: 0
WBC # BLD AUTO: 5.5 K/UL (ref 3.6–11)

## 2018-02-18 PROCEDURE — 85025 COMPLETE CBC W/AUTO DIFF WBC: CPT | Performed by: INTERNAL MEDICINE

## 2018-02-18 PROCEDURE — 80053 COMPREHEN METABOLIC PANEL: CPT | Performed by: INTERNAL MEDICINE

## 2018-02-18 PROCEDURE — 74011250637 HC RX REV CODE- 250/637: Performed by: INTERNAL MEDICINE

## 2018-02-18 PROCEDURE — 77030038269 HC DRN EXT URIN PURWCK BARD -A

## 2018-02-18 PROCEDURE — 36415 COLL VENOUS BLD VENIPUNCTURE: CPT | Performed by: INTERNAL MEDICINE

## 2018-02-18 PROCEDURE — 65270000015 HC RM PRIVATE ONCOLOGY

## 2018-02-18 PROCEDURE — 82962 GLUCOSE BLOOD TEST: CPT

## 2018-02-18 RX ADMIN — LEVOTHYROXINE SODIUM 25 MCG: 50 TABLET ORAL at 06:58

## 2018-02-18 RX ADMIN — Medication 16 G: at 03:58

## 2018-02-18 RX ADMIN — METOCLOPRAMIDE 5 MG: 10 TABLET ORAL at 22:20

## 2018-02-18 RX ADMIN — Medication 30 ML: at 06:58

## 2018-02-18 RX ADMIN — Medication 10 ML: at 15:39

## 2018-02-18 RX ADMIN — METOCLOPRAMIDE 5 MG: 10 TABLET ORAL at 08:45

## 2018-02-18 RX ADMIN — METOCLOPRAMIDE 5 MG: 10 TABLET ORAL at 12:53

## 2018-02-18 RX ADMIN — MIDODRINE HYDROCHLORIDE 10 MG: 5 TABLET ORAL at 08:44

## 2018-02-18 RX ADMIN — METOCLOPRAMIDE 5 MG: 10 TABLET ORAL at 15:45

## 2018-02-18 RX ADMIN — Medication 10 ML: at 15:38

## 2018-02-18 RX ADMIN — PANTOPRAZOLE SODIUM 40 MG: 40 TABLET, DELAYED RELEASE ORAL at 08:45

## 2018-02-18 RX ADMIN — Medication 20 ML: at 15:39

## 2018-02-18 RX ADMIN — Medication 30 ML: at 22:20

## 2018-02-18 NOTE — PROGRESS NOTES
Oncology Nursing Communication Tool  7:02 PM  2/17/2018     Bedside shift change report given to Chavez Augustine RN (incoming nurse) by Casey Branham RN (outgoing nurse) on [facility]. Report included the following information SBAR and Kardex. Shift Summary: no new events      Issues for physician to address: none         Oncology Shift Note   Admission Date 2/2/2018   Admission Diagnosis Sepsis ([address]  UTI (urinary tract infection)  Hx of systemic lupus erythematosus (SLE)  Hx of diabetes mellitus  Hx of essential hypertension  Mental retardation   Code Status Partial Code   Consults IP CONSULT TO NEUROLOGY  IP CONSULT TO PALLIATIVE CARE - PROVIDER  IP CONSULT TO CARDIOLOGY      Cardiac Monitoring [] Yes [] No      Purposeful Hourly Rounding [] Yes    Cole Score Total Score: 4   Cole score 3 or > [] Bed Alarm [] Avasys [] 1:1 sitter [] Patient refused (Place signed refusal form in chart)      Pain Managed [] Yes [] No    Key Pain Meds             acetaminophen (TYLENOL ARTHRITIS PAIN) 650 mg TbER  (Taking) Take 650 mg by mouth every eight (8) hours as needed for Pain. Influenza Vaccine Received Flu Vaccine for Current Season (usually Sept-March): Yes           Oxygen needs?  [] Room air Oxygen @  []1L    []2L    []3L   []4L    []5L   []6L     Use home O2? [] Yes [] No  Perform O2 challenge test using  smartphrase (.oxygenchallenge)      Last bowel movement Last Bowel Movement Date: 02/16/18  bowel movement      Urinary Catheter [REMOVED] Urinary Catheter 02/02/18 [facility] - Temperature-Criteria for Appropriate Use: Medically/surgically unstable  [REMOVED] Urinary Catheter 02/15/18 [facility]-Criteria for Appropriate Use: Obstruction/retention     [REMOVED] Urinary Catheter 02/02/18 [facility] - Temperature-Urine Output (mL): 10 ml  [REMOVED] External Female Catheter 02/03/18-Urine Output (mL): 0 ml  [REMOVED] Urinary Catheter 02/15/18 [facility]-Urine Output (mL): 150 ml     LDAs         PICC Triple Lumen 02/05/18 Right;Brachial (Active)   Central Line Being Utilized Yes 2/17/2018  7:30 AM   Criteria for Appropriate Use Limited/no vessel suitable for conventional peripheral access 2/17/2018  7:30 AM   Site Assessment Clean, dry, & intact 2/17/2018  7:30 AM   Phlebitis Assessment 0 2/17/2018  7:30 AM   Infiltration Assessment 0 2/17/2018  7:30 AM   Arm Circumference (cm) 0 cm 2/6/2018 12:00 AM   Date of Last Dressing Change 02/12/18 2/17/2018  3:45 AM   Dressing Status Clean, dry, & intact 2/17/2018  7:30 AM   External Catheter Length (cm) 0 centimeters 2/13/2018  4:00 PM   Dressing Type Disk with Chlorhexadine gluconate (CHG); Transparent 2/17/2018  7:30 AM   Action Taken Open ports on tubing capped 2/15/2018 12:00 PM   Hub Color/Line Status White;Capped 2/17/2018  7:30 AM   Positive Blood Return (Site #1) Yes 2/17/2018  7:30 AM   Hub Color/Line Status Gray;Capped 2/17/2018  7:30 AM   Positive Blood Return (Site #2) Yes 2/17/2018  7:30 AM   Hub Color/Line Status Red;Capped 2/17/2018  7:30 AM   Positive Blood Return (Site #3) Yes 2/17/2018  7:30 AM   Alcohol Cap Used Yes 2/17/2018  7:30 AM                External Female Catheter 02/15/18 (Active)   Site Assessment Clean, dry, & intact 2/17/2018  7:30 AM   Repositioned No 2/17/2018  7:30 AM   Perineal Care Yes 2/17/2018  7:30 AM   Wick Changed No 2/17/2018  7:30 AM   Suction Canister/Tubing Changed No 2/17/2018  7:30 AM                   Readmission Risk Assessment Tool Score Low Risk            12       Total Score        3 Patient Length of Stay (>5 days = 3)    5 Pt. Coverage (Medicare=5 , Medicaid, or Self-Pay=4)    4 Charlson Comorbidity Score (Age + Comorbid Conditions)        Criteria that do not apply:    Has Seen PCP in Last 6 Months (Yes=3, No=0)    . Living with Significant Other. Assisted Living. LTAC. SNF.  or   Rehab    IP Visits Last 12 Months (1-3=4, 4=9, >4=11)       Expected Length of Stay 4d 21h   Actual Length of Stay [address], RN

## 2018-02-18 NOTE — PROGRESS NOTES
Palliative Medicine  [place]: [phone]  [facility]: [phone])      Resuscitation Status: Partial Code   Durable DNR addressed? [] Yes   [] No    [] Not Applicable    Advance Care Planning 2/12/2018   Patient's Healthcare Decision Maker is: Named in scanned ACP document   Primary Decision Maker Name Autsin Marcus   Primary Decision Maker Phone Number [phone] H/ [phone] C   Primary Decision Maker Relationship to Patient -   Confirm Advance Directive None   Patient Would Like to Complete Advance Directive Unable   Does the patient have other document types Guardianship       Patient seen briefly. No family present. Patient in bed, a bit slumped over with head in her hands. Eyes were open, LCSW spoke to patient briefly, patient did not respond much. She did open her eyes and looked at LCSW. Asked patient if she wanted something to drink, patient shook her head, \"no\". Offered her the soda on her table with straw but patient did not accept it. She appeared comfortable. Palliative care will follow peripherally. Family wanting continuing treatment for patient at this time. They know her best and noted, when patient was in CCU, \"we are not ready to give up on her yet\". Patient has a very loving and caring family.

## 2018-02-18 NOTE — ROUTINE PROCESS
Oncology Nursing Communication Tool  10:14 AM  2/18/2018     Bedside shift change report given to Jazmin Kaiser RN (incoming nurse) by Adra Severin, RN (outgoing nurse) on [facility]. Report included the following information SBAR, Kardex, Intake/Output, MAR and Recent Results. Shift Summary: uneventful night. meds as ordered. Offered po fluids frequently. BS WNL. Labs done. Issues for physician to address: none         Oncology Shift Note   Admission Date 2/2/2018   Admission Diagnosis S[address]is [address] [address]  UTI (urinary tract infection)  Hx of systemic lupus erythematosus (SLE)  Hx of diabetes mellitus  Hx of essential hypertension  Mental retardation   Code Status Partial Code   Consults IP CONSULT TO NEUROLOGY  IP CONSULT TO PALLIATIVE CARE - PROVIDER  IP CONSULT TO CARDIOLOGY      Cardiac Monitoring [] Yes [] No      Purposeful Hourly Rounding [] Yes    Cole Score Total Score: 4   Cole score 3 or > [x] Bed Alarm [] Avasys [] 1:1 sitter [] Patient refused (Place signed refusal form in chart)      Pain Managed [x] Yes [] No    Key Pain Meds             acetaminophen (TYLENOL ARTHRITIS PAIN) 650 mg TbER  (Taking) Take 650 mg by mouth every eight (8) hours as needed for Pain. Influenza Vaccine Received Flu Vaccine for Current Season (usually Sept-March): Yes           Oxygen needs?  [x] Room air Oxygen @  []1L    []2L    []3L   []4L    []5L   []6L     Use home O2? [] Yes [x] No  Perform O2 challenge test using  smartphrase (.oxygenchallenge)      Last bowel movement Last Bowel Movement Date: 02/17/18  bowel movement      Urinary Catheter [REMOVED] Urinary Catheter 02/02/18 [facility] - Temperature-Criteria for Appropriate Use: Medically/surgically unstable  [REMOVED] Urinary Catheter 02/15/18 [facility]-Criteria for Appropriate Use: Obstruction/retention     External Female Catheter 02/15/18-Urine Output (mL): 400 ml  [REMOVED] Urinary Catheter 02/02/18 [facility] - Temperature-Urine Output (mL): 10 ml  [REMOVED] External Female Catheter 02/03/18-Urine Output (mL): 0 ml  [REMOVED] Urinary Catheter 02/15/18 [facility]-Urine Output (mL): 150 ml     LDAs         PICC Triple Lumen 02/05/18 Right;Brachial (Active)   Central Line Being Utilized Yes 2/18/2018  3:57 AM   Criteria for Appropriate Use Limited/no vessel suitable for conventional peripheral access 2/18/2018  3:57 AM   Site Assessment Clean, dry, & intact 2/18/2018  3:57 AM   Phlebitis Assessment 0 2/18/2018  3:57 AM   Infiltration Assessment 0 2/18/2018  3:57 AM   Arm Circumference (cm) 0 cm 2/6/2018 12:00 AM   Date of Last Dressing Change 02/12/18 2/17/2018  3:45 AM   Dressing Status Clean, dry, & intact 2/18/2018  3:57 AM   External Catheter Length (cm) 0 centimeters 2/13/2018  4:00 PM   Dressing Type Tape;Transparent 2/18/2018  3:57 AM   Action Taken Blood drawn 2/18/2018  3:57 AM   Hub Color/Line Status White;Capped;Flushed 2/18/2018  3:57 AM   Positive Blood Return (Site #1) Yes 2/18/2018  3:57 AM   Hub Color/Line Status Gray;Capped;Flushed 2/18/2018  3:57 AM   Positive Blood Return (Site #2) Yes 2/18/2018  3:57 AM   Hub Color/Line Status Red;Capped;Flushed 2/18/2018  3:57 AM   Positive Blood Return (Site #3) Yes 2/18/2018  3:57 AM   Alcohol Cap Used Yes 2/18/2018  3:57 AM                External Female Catheter 02/15/18 (Active)   Site Assessment Clean, dry, & intact 2/18/2018  7:01 AM   Repositioned Yes 2/18/2018  7:01 AM   Perineal Care Yes 2/18/2018  7:01 AM   Wick Changed Yes 2/18/2018  7:01 AM   Suction Canister/Tubing Changed No 2/18/2018  7:01 AM   Urine Output (mL) 400 ml 2/17/2018 11:40 PM                   Readmission Risk Assessment Tool Score Low Risk            12       Total Score        3 Patient Length of Stay (>5 days = 3)    5 Pt. Coverage (Medicare=5 , Medicaid, or Self-Pay=4)    4 Charlson Comorbidity Score (Age + Comorbid Conditions)        Criteria that do not apply:    Has Seen PCP in Last 6 Months (Yes=3, No=0)    .  Living with Significant Other. Assisted Living. LTAC. SNF.  or   Rehab    IP Visits Last 12 Months (1-3=4, 4=9, >4=11)       Expected Length of Stay 4d 21h   Actual Length of Stay [address] [place], RN

## 2018-02-18 NOTE — PROGRESS NOTES
Hospitalist Progress Note    NAME: Mikey Suarez   :  1945   MRN:  [id number]       Assessment / Plan:  Anemia: Acute on Chronic  -Hgb stable, will monitor    Acute hypoxic respiratory failure due to acute pulmonary edema  Acute on Chronic Diastolic CHF  -85 TTE: \"Left ventricle: Ejection fraction was estimated in the range of 60 %. No obvious wall motion abnormalities identified in the views obtained. There was mild concentric hypertrophy. Features were consistent with a pseudonormal left ventricular filling pattern, with concomitant abnormal relaxation and increased filling pressure (grade 2 diastolic dysfunction). \"  -no longer on antibiotics/tamiflu (completed treatment)  -appreciate pulmonology assistance  -Levophed weaned to off around 6 pm on   -patient has been weaned off of O2    TIFFANIE, resolved  Hypernatremia; resolved with hypotonic IVF's  -IVF's discontinued on 2/15, doing wee without    Hypokalemia:  -wnl after repletion on     Acute encephalopathy and septic shock due to Pan-susecptible E. Coli UTI and Influenza B, present on admission:   -MRI brain with mild small vessel ischemic changes. No acute abnormality. -CT chest/abd/pelvis with severe scoliosis. Atelectasis in the lower lobes, left greater than right. Anemia. Atherosclerotic abdominal aorta without aneurysm.  -UA >[id number] pansensitive E Coli, blood cultures no growth. Completed course of Zosyn. -influenza B positive - tamiflu finished   -EEG with cerebral dysfunction with no seizure activity  -appreciate neuro consult, recommending to continue keppra  -tolerating Pureed with thins  -discontinue respiratory isolation    Hypoglycemia:  H/o \"borderline DM,\" not on meds at home.  HgA1c <3.5.  Improved  -discontinue POC glucose monitoring; add bedtime snack and glucose tabs discontinued      Elevated Transaminases:  Presumed due to sepsis, improving    Baseline mental retardation: Question how well patient can work with PT/OT but consult ordered as patient's sister reports that she was able to feed herself and only needed assistance with mobilizing to bathroom prior to this admission. Lupus: Continue holding Plaquenil  Hypothyroidism:  continue synthroid   Obesity (Body mass index is 32.45 kg/(m^2)      Code Status: partial -Palliative consult appreciated  Surrogate Decision Maker: sister   DVT Prophylaxis: sq heparin      Baseline: Mental retardation, lives with sister normally but placed in group home after sister had to undergo recent back surgery     Subjective:     Chief Complaint / Reason for Physician Visit: follow-up hypotension  Case discussed with RN  No significant interval change    Review of Systems:  Symptom Y/N Comments  Symptom Y/N Comments   Fever/Chills    Chest Pain     Poor Appetite    Edema     Cough    Abdominal Pain     Sputum    Joint Pain     SOB/YOUNG    Pruritis/Rash     Nausea/vomit    Tolerating PT/OT     Diarrhea    Tolerating Diet     Constipation    Other       Could NOT obtain due to: MR, non-verbal     Objective:     VITALS:   Last 24hrs VS reviewed since prior progress note. Most recent are:  Patient Vitals for the past 24 hrs:   Temp Pulse Resp BP SpO2   02/18/18 0830 97 °F (36.1 °C) 75 20 109/59 100 %   02/17/18 2340 96.3 °F (35.7 °C) 62 16 141/82 100 %   02/17/18 1920 97 °F (36.1 °C) 78 16 (!) 132/96 98 %   02/17/18 1809 - 71 - 110/66 -   02/17/18 1545 97.7 °F (36.5 °C) 71 16 130/71 100 %       Intake/Output Summary (Last 24 hours) at 02/18/18 1323  Last data filed at 02/18/18 1110   Gross per 24 hour   Intake              520 ml   Output              500 ml   Net               20 ml        PHYSICAL EXAM:  General:                    Altered; severe kyphoscolosis  EENT:                       Anicteric sclerae, PERRL  Resp:                        No Wheezing.  Frequent shallow respirations  CV:                            Regular rhythm with normal rate during ascultation,  No edema  GI:                              Soft, Non distended, Non tender.  +Bowel sounds  Neurologic:                Alert but does not speak, does not follow any commands  Psych:                       No insight  Skin:                          No rashes.  No jaundice    Reviewed most current lab test results and cultures  YES  Reviewed most current radiology test results   YES  Review and summation of old records today    NO  Reviewed patient's current orders and MAR    YES  PMH/SH reviewed - no change compared to H&P  ________________________________________________________________________  Care Plan discussed with:    Comments   Patient x    Family  x Prolonged discussion with daughter   RN x    Care Manager     Consultant                        Multidiciplinary team rounds were held today with , nursing, pharmacist and clinical coordinator. Patient's plan of care was discussed; medications were reviewed and discharge planning was addressed. ________________________________________________________________________  Total NON critical care TIME:  25   Minutes    Total CRITICAL CARE TIME Spent:   Minutes non procedure based      Comments   >50% of visit spent in counseling and coordination of care x Prolonged discussion with daughter   ________________________________________________________________________  Stepan Valadez MD     Procedures: see electronic medical records for all procedures/Xrays and details which were not copied into this note but were reviewed prior to creation of Plan. LABS:  I reviewed today's most current labs and imaging studies.   Pertinent labs include:  Recent Labs      02/18/18   0352  02/17/18   0518  02/16/18   0331   WBC  5.5  4.8  6.2   HGB  8.6*  7.2*  8.0*   HCT  27.2*  22.8*  25.4*   PLT  320  258  279     Recent Labs      02/18/18   0352  02/17/18   0518  02/16/18   0331   NA  144  144  144   K  3.8  3.2*  3.5   CL  109*  110*  110*   CO2  27  28  29   GLU  85 66  75   BUN  18  18  17   CREA  0.97  0.97  1.06*   CA  8.5  8.1*  8.4*   ALB  1.9*  1.6*   --    TBILI  0.4  0.3   --    SGOT  35  29   --    ALT  34  29   --        Signed: Justina Cruz MD

## 2018-02-19 LAB
ALBUMIN SERPL-MCNC: 1.8 G/DL (ref 3.5–5)
ALBUMIN/GLOB SERPL: 0.4 {RATIO} (ref 1.1–2.2)
ALP SERPL-CCNC: 161 U/L (ref 45–117)
ALT SERPL-CCNC: 30 U/L (ref 12–78)
ANION GAP SERPL CALC-SCNC: 7 MMOL/L (ref 5–15)
AST SERPL-CCNC: 34 U/L (ref 15–37)
BASOPHILS # BLD: 0 K/UL (ref 0–0.1)
BASOPHILS NFR BLD: 1 % (ref 0–1)
BILIRUB SERPL-MCNC: 0.4 MG/DL (ref 0.2–1)
BUN SERPL-MCNC: 16 MG/DL (ref 6–20)
BUN/CREAT SERPL: 16 (ref 12–20)
CALCIUM SERPL-MCNC: 8.2 MG/DL (ref 8.5–10.1)
CHLORIDE SERPL-SCNC: 111 MMOL/L (ref 97–108)
CO2 SERPL-SCNC: 28 MMOL/L (ref 21–32)
CREAT SERPL-MCNC: 0.98 MG/DL (ref 0.55–1.02)
DIFFERENTIAL METHOD BLD: ABNORMAL
EOSINOPHIL # BLD: 0.1 K/UL (ref 0–0.4)
EOSINOPHIL NFR BLD: 3 % (ref 0–7)
ERYTHROCYTE [DISTWIDTH] IN BLOOD BY AUTOMATED COUNT: 16.8 % (ref 11.5–14.5)
GLOBULIN SER CALC-MCNC: 4.8 G/DL (ref 2–4)
GLUCOSE SERPL-MCNC: 80 MG/DL (ref 65–100)
HCT VFR BLD AUTO: 25.2 % (ref 35–47)
HGB BLD-MCNC: 7.9 G/DL (ref 11.5–16)
IMM GRANULOCYTES # BLD: 0 K/UL (ref 0–0.04)
IMM GRANULOCYTES NFR BLD AUTO: 1 % (ref 0–0.5)
LYMPHOCYTES # BLD: 0.7 K/UL (ref 0.8–3.5)
LYMPHOCYTES NFR BLD: 16 % (ref 12–49)
MCH RBC QN AUTO: 28.9 PG (ref 26–34)
MCHC RBC AUTO-ENTMCNC: 31.3 G/DL (ref 30–36.5)
MCV RBC AUTO: 92.3 FL (ref 80–99)
MONOCYTES # BLD: 0.4 K/UL (ref 0–1)
MONOCYTES NFR BLD: 10 % (ref 5–13)
NEUTS SEG # BLD: 3.1 K/UL (ref 1.8–8)
NEUTS SEG NFR BLD: 69 % (ref 32–75)
NRBC # BLD: 0 K/UL (ref 0–0.01)
NRBC BLD-RTO: 0 PER 100 WBC
PLATELET # BLD AUTO: 269 K/UL (ref 150–400)
PMV BLD AUTO: 11.2 FL (ref 8.9–12.9)
POTASSIUM SERPL-SCNC: 3.7 MMOL/L (ref 3.5–5.1)
PROT SERPL-MCNC: 6.6 G/DL (ref 6.4–8.2)
RBC # BLD AUTO: 2.73 M/UL (ref 3.8–5.2)
RBC MORPH BLD: ABNORMAL
SODIUM SERPL-SCNC: 146 MMOL/L (ref 136–145)
WBC # BLD AUTO: 4.3 K/UL (ref 3.6–11)

## 2018-02-19 PROCEDURE — 74011250637 HC RX REV CODE- 250/637: Performed by: INTERNAL MEDICINE

## 2018-02-19 PROCEDURE — G8988 SELF CARE GOAL STATUS: HCPCS | Performed by: OCCUPATIONAL THERAPIST

## 2018-02-19 PROCEDURE — 97530 THERAPEUTIC ACTIVITIES: CPT | Performed by: OCCUPATIONAL THERAPIST

## 2018-02-19 PROCEDURE — 65270000015 HC RM PRIVATE ONCOLOGY

## 2018-02-19 PROCEDURE — 97165 OT EVAL LOW COMPLEX 30 MIN: CPT | Performed by: OCCUPATIONAL THERAPIST

## 2018-02-19 PROCEDURE — 36415 COLL VENOUS BLD VENIPUNCTURE: CPT | Performed by: INTERNAL MEDICINE

## 2018-02-19 PROCEDURE — 97530 THERAPEUTIC ACTIVITIES: CPT

## 2018-02-19 PROCEDURE — 85025 COMPLETE CBC W/AUTO DIFF WBC: CPT | Performed by: INTERNAL MEDICINE

## 2018-02-19 PROCEDURE — G8987 SELF CARE CURRENT STATUS: HCPCS | Performed by: OCCUPATIONAL THERAPIST

## 2018-02-19 PROCEDURE — 97161 PT EVAL LOW COMPLEX 20 MIN: CPT

## 2018-02-19 PROCEDURE — 80053 COMPREHEN METABOLIC PANEL: CPT | Performed by: INTERNAL MEDICINE

## 2018-02-19 RX ADMIN — Medication 10 ML: at 13:45

## 2018-02-19 RX ADMIN — PANTOPRAZOLE SODIUM 40 MG: 40 TABLET, DELAYED RELEASE ORAL at 09:49

## 2018-02-19 RX ADMIN — LEVOTHYROXINE SODIUM 25 MCG: 50 TABLET ORAL at 06:04

## 2018-02-19 RX ADMIN — METOCLOPRAMIDE 5 MG: 10 TABLET ORAL at 13:44

## 2018-02-19 RX ADMIN — METOCLOPRAMIDE 5 MG: 10 TABLET ORAL at 09:49

## 2018-02-19 RX ADMIN — Medication 40 ML: at 05:06

## 2018-02-19 RX ADMIN — METOCLOPRAMIDE 5 MG: 10 TABLET ORAL at 18:42

## 2018-02-19 NOTE — PROGRESS NOTES
Hospitalist Progress Note    NAME: Carmen Mckeon   :  1945   MRN:  [id number]       Assessment / Plan:  Anemia: Acute on Chronic  -Hgb stable, will monitor    Acute hypoxic respiratory failure due to acute pulmonary edema  Acute on Chronic Diastolic CHF  -84 TTE: \"Left ventricle: Ejection fraction was estimated in the range of 60 %. No obvious wall motion abnormalities identified in the views obtained. There was mild concentric hypertrophy. Features were consistent with a pseudonormal left ventricular filling pattern, with concomitant abnormal relaxation and increased filling pressure (grade 2 diastolic dysfunction). \"  -no longer on antibiotics/tamiflu (completed treatment)  -appreciate pulmonology assistance  -Levophed weaned to off around 6 pm on   -patient has been weaned off of O2    TIFFANIE, resolved  Hypernatremia; resolved with hypotonic IVF's and now recurring  -IVF's discontinued on 2/15, doing wee without  -encourage PO fluids and monitor sodium    Hypokalemia:  -wnl after repletion on     Acute encephalopathy and septic shock due to Pan-susecptible E. Coli UTI and Influenza B, present on admission:   -MRI brain with mild small vessel ischemic changes. No acute abnormality. -CT chest/abd/pelvis with severe scoliosis. Atelectasis in the lower lobes, left greater than right. Anemia. Atherosclerotic abdominal aorta without aneurysm.  -UA >[id number] pansensitive E Coli, blood cultures no growth. Completed course of Zosyn. -influenza B positive - tamiflu finished   -EEG with cerebral dysfunction with no seizure activity  -appreciate neuro consult, recommending to continue keppra  -tolerating Pureed with thins  -discontinued respiratory isolation on     Hypoglycemia:  H/o \"borderline DM,\" not on meds at home.  HgA1c <3.5.  Improved  -discontinue POC glucose monitoring; continue bedtime snack       Elevated Transaminases:  Presumed due to sepsis, improving    Baseline mental retardation: Question how well patient can work with PT/OT but consult ordered as patient's sister reports that she was able to feed herself and only needed assistance with mobilizing to bathroom prior to this admission. Lupus: Continue holding Plaquenil  Hypothyroidism:  continue synthroid   Obesity (Body mass index is 32.45 kg/(m^2)      Code Status: partial -Palliative consult appreciated  Surrogate Decision Maker: sister   DVT Prophylaxis: sq heparin      Baseline: Mental retardation, lives with sister normally but placed in group home after sister had to undergo recent back surgery     Subjective:     Chief Complaint / Reason for Physician Visit: follow-up hypotension  Case discussed with RN  No significant interval change    Review of Systems:  Symptom Y/N Comments  Symptom Y/N Comments   Fever/Chills    Chest Pain     Poor Appetite    Edema     Cough    Abdominal Pain     Sputum    Joint Pain     SOB/YOUNG    Pruritis/Rash     Nausea/vomit    Tolerating PT/OT     Diarrhea    Tolerating Diet     Constipation    Other       Could NOT obtain due to: MR, non-verbal     Objective:     VITALS:   Last 24hrs VS reviewed since prior progress note. Most recent are:  Patient Vitals for the past 24 hrs:   Temp Pulse Resp BP SpO2   02/19/18 0711 97.4 °F (36.3 °C) 80 20 136/61 100 %   02/18/18 2220 - 75 - 112/43 -   02/18/18 1923 97.2 °F (36.2 °C) 89 17 (!) 126/101 100 %   02/18/18 1526 97.1 °F (36.2 °C) 71 20 122/59 100 %   02/18/18 0830 97 °F (36.1 °C) 75 20 109/59 100 %       Intake/Output Summary (Last 24 hours) at 02/19/18 0723  Last data filed at 02/18/18 2220   Gross per 24 hour   Intake              120 ml   Output              350 ml   Net             -230 ml        PHYSICAL EXAM:  General:                    Altered; severe kyphoscolosis  EENT:                       Anicteric sclerae, PERRL  Resp:                        No Wheezing.  Frequent shallow respirations  CV:                            Regular rhythm with normal rate during ascultation,  No edema  GI:                              Soft, Non distended, Non tender.  +Bowel sounds  Neurologic:                Alert but does not speak, does not follow any commands  Psych:                       No insight  Skin:                          No rashes.  No jaundice    Reviewed most current lab test results and cultures  YES  Reviewed most current radiology test results   YES  Review and summation of old records today    NO  Reviewed patient's current orders and MAR    YES  PMH/SH reviewed - no change compared to H&P  ________________________________________________________________________  Care Plan discussed with:    Comments   Patient x    Family      RN x    Care Manager x    Consultant                        Multidiciplinary team rounds were held today with , nursing, pharmacist and clinical coordinator. Patient's plan of care was discussed; medications were reviewed and discharge planning was addressed. ________________________________________________________________________  Total NON critical care TIME:  25   Minutes    Total CRITICAL CARE TIME Spent:   Minutes non procedure based      Comments   >50% of visit spent in counseling and coordination of care x dispo planning   ________________________________________________________________________  Freida Burdick MD     Procedures: see electronic medical records for all procedures/Xrays and details which were not copied into this note but were reviewed prior to creation of Plan. LABS:  I reviewed today's most current labs and imaging studies.   Pertinent labs include:  Recent Labs      02/19/18   0503  02/18/18   0352  02/17/18   0518   WBC  4.3  5.5  4.8   HGB  7.9*  8.6*  7.2*   HCT  25.2*  27.2*  22.8*   PLT  269  320  258     Recent Labs      02/19/18   0503  02/18/18   0352  02/17/18   0518   NA  146*  144  144   K  3.7  3.8  3.2*   CL  111*  109*  110*   CO2  28  27  28   GLU  80  85  66   BUN  16  18  18 CREA  0.98  0.97  0.97   CA  8.2*  8.5  8.1*   ALB  1.8*  1.9*  1.6*   TBILI  0.4  0.4  0.3   SGOT  34  35  29   ALT  30  34  29       Signed: Stepan Valadez MD

## 2018-02-19 NOTE — PROGRESS NOTES
OT and PT notes completed today. Patient's sister not available at hospital due to lack of transportation. FOC offered for SNF referrals and sister states she has already spoken to several faciltiies and her [address]. She would like  to send requests to [employer], [facility] and [facility], which were sent via [address]. FOC via telephone - completed and placed on chart. Patient has [employer] and will need SNF authorization prior to transfer.     Guillermo Bermudez RN, BSN, ACM   - Medical Oncology  [phone]

## 2018-02-19 NOTE — ROUTINE PROCESS
Oncology Nursing Communication Tool  8:43 AM  2/19/2018     Bedside shift change report given to Clark Ward RN (incoming nurse) by Sam Gomez [facility] (outgoing [facility] Report included the following information SBAR, Kardex, Intake/Output, MAR and Recent Results. Shift Summary: pt had 1 mod- large loose BM overnight. Offered po fluids & ensure pudding. Issues for physician to address: none         Oncology Shift Note   Admission Date 2/2/2018   Admission Diagnosis Sepsis ([facility] [address]  UTI (urinary tract infection)  Hx of systemic lupus erythematosus (SLE)  Hx of diabetes mellitus  Hx of essential hypertension  Mental retardation   Code Status Partial Code   Consults IP CONSULT TO NEUROLOGY  IP CONSULT TO PALLIATIVE CARE - PROVIDER  IP CONSULT TO CARDIOLOGY      Cardiac Monitoring [] Yes [x] No      Purposeful Hourly Rounding [x] Yes    Cole Score Total Score: 5   Cole score 3 or > [x] Bed Alarm [] [employer]asys [] 1:1 sitter [] Patient refused (Place signed refusal form in chart)      Pain Managed [x] Yes [] No    Key Pain Meds             acetaminophen (TYLENOL ARTHRITIS PAIN) 650 mg TbER  (Taking) Take 650 mg by mouth every eight (8) hours as needed for Pain. Influenza Vaccine Received Flu Vaccine for Current Season (usually Sept-March): Yes           Oxygen needs?  [x] Room air Oxygen @  []1L    []2L    []3L   []4L    []5L   []6L     Use home O2? [] Yes [x] No  Perform O2 challenge test using  smartphrase (.oxygenchallenge)      Last bowel movement Last Bowel Movement Date: 02/18/18  bowel movement      Urinary Catheter [REMOVED] Urinary Catheter 02/02/18 [place] - Temperature-Criteria for Appropriate Use: Medically/surgically unstable  [REMOVED] Urinary Catheter 02/15/18 [place]-Criteria for Appropriate Use: Obstruction/retention     External Female Catheter 02/15/18-Urine Output (mL): 150 ml  [REMOVED] Urinary Catheter 02/02/18 [place] - Temperature-Urine Output (mL): 10 ml  [REMOVED] External Female Catheter 02/03/18-Urine Output (mL): 0 ml  [REMOVED] Urinary Catheter 02/15/18 [place]-Urine Output (mL): 150 ml     LDAs         PICC Triple Lumen 02/05/18 Right;Brachial (Active)   Central Line Being Utilized Yes 2/19/2018  5:06 AM   Criteria for Appropriate Use Limited/no vessel suitable for conventional peripheral access 2/19/2018  5:06 AM   Site Assessment Clean, dry, & intact 2/19/2018  5:06 AM   Phlebitis Assessment 0 2/19/2018  5:06 AM   Infiltration Assessment 0 2/19/2018  5:06 AM   Arm Circumference (cm) 0 cm 2/6/2018 12:00 AM   Date of Last Dressing Change 02/12/18 2/19/2018  5:06 AM   Dressing Status Clean, dry, & intact 2/19/2018  5:06 AM   External Catheter Length (cm) 0 centimeters 2/13/2018  4:00 PM   Dressing Type Disk with Chlorhexadine gluconate (CHG); Transparent 2/19/2018  5:06 AM   Action Taken Blood drawn 2/19/2018  5:06 AM   Hub Color/Line Status White;Capped;Flushed 2/19/2018  5:06 AM   Positive Blood Return (Site #1) Yes 2/19/2018  5:06 AM   Hub Color/Line Status Gray;Capped;Flushed 2/19/2018  5:06 AM   Positive Blood Return (Site #2) Yes 2/19/2018  5:06 AM   Hub Color/Line Status Red;Capped;Flushed 2/19/2018  5:06 AM   Positive Blood Return (Site #3) Yes 2/19/2018  5:06 AM   Alcohol Cap Used Yes 2/19/2018  5:06 AM                External Female Catheter 02/15/18 (Active)   Site Assessment Clean, dry, & intact 2/19/2018  5:06 AM   Repositioned No 2/19/2018  5:06 AM   Perineal Care No 2/19/2018  5:06 AM   Wick Changed No 2/19/2018  5:06 AM   Suction Canister/Tubing Changed Yes 2/19/2018  5:06 AM   Urine Output (mL) 150 ml 2/19/2018  5:06 AM                   Readmission Risk Assessment Tool Score Low Risk            12       Total Score        3 Patient Length of Stay (>5 days = 3)    5 Pt.  Coverage (Medicare=5 , Medicaid, or Self-Pay=4)    4 Charlson Comorbidity Score (Age + Comorbid Conditions)        Criteria that do not apply:    Has Seen PCP in Last 6 Months (Yes=3, No=0)    . Living with Significant Other. Assisted Living. LTAC. SNF.  or   Rehab    IP Visits Last 12 Months (1-3=4, 4=9, >4=11)       Expected Length of Stay 4d 21h   Actual Length of Stay Jan Alvarez, RN

## 2018-02-19 NOTE — PROGRESS NOTES
Problem: Self Care Deficits Care Plan (Adult)  Goal: *Acute Goals and Plan of Care (Insert Text)  Occupational Therapy Goals  Initiated 2/19/2018  1. Patient will perform grooming in bed or supported sitting with moderate assistance  within 7 day(s). 2.  Patient will perform upper body dressing with moderate assistance  within 7 day(s). 3.  Patient will sit edge of bed for 5 minutes with moderate assistance for participation in functional task within 7 day(s). 4.  Patient will participate in assessment of functional transfers and establish goals as appropriate within 7 day(s). 5.  Patient will participate in upper extremity therapeutic exercises with moderate cues/assist for 10 minutes within 7 day(s). Occupational Therapy EVALUATION  Patient: Robin Harvey [de-identified]72 y.o. female)  Date: 2/19/2018  Primary Diagnosis: Sepsis ([address]  UTI (urinary tract infection)  Hx of systemic lupus erythematosus (SLE)  Hx of diabetes mellitus  Hx of essential hypertension  Mental retardation        Precautions:        ASSESSMENT :  Based on the objective data described below, the patient presents with significant deficits in all self-care, primarily due to poor activity tolerance, poor sitting balance/ability due to severe rigidity and extension, decreased cognition, lack of ability to follow commands, and general weakness. Patient is non-verbal and unable to provide history. Prior level of function is not available in the chart and no family was present. Patient was admitted from a private group home where she had been staying after her sister (primary caregiver) had surgery. She is requiring total A of 2 for most tasks and will need to go to a SNF for rehab prior to returning to the group home. Patient will benefit from skilled intervention to address the above impairments.   Patients rehabilitation potential is considered to be Guarded  Factors which may influence rehabilitation potential include:   []             None noted  []             Mental ability/status  []             Medical condition  []             Home/family situation and support systems  []             Safety awareness  []             Pain tolerance/management  []             Other:      PLAN :  Recommendations and Planned Interventions:  []               Self Care Training                  []        Therapeutic Activities  []               Functional Mobility Training    []        Cognitive Retraining  []               Therapeutic Exercises           []        Endurance Activities  []               Balance Training                   []        Neuromuscular Re-Education  []               Visual/Perceptual Training     []   Home Safety Training  []               Patient Education                 []        Family Training/Education  []               Other (comment):    Frequency/Duration: Patient will be followed by occupational therapy 3 times a week to address goals.   Discharge Recommendations: Ayo Dawn  Further Equipment Recommendations for Discharge: Defer to facility     SUBJECTIVE:   No vocalizations    OBJECTIVE DATA SUMMARY:   HISTORY:   Past Medical History:   Diagnosis Date    Arthritis     Colon polyp     Diabetes ([facility]     borderline no medications    Environmental allergies 4/28/2010    GERD (gastroesophageal reflux disease)     HTN (hypertension) 4/28/2010    dosent take medication now    Lupus     MR (mental retardation)     Osteoporosis, senile     Other ill-defined conditions     parkinson's disease possibly    Psychiatric disorder     anxious    PUD (peptic ulcer disease)     Scolioses     Sjogren's syndrome ([facility] [address] 3/4/2015     Past Surgical History:   Procedure Laterality Date    HX TONSILLECTOMY      [place] COLONOSCOPY FLX DX W/COLLJ SPEC WHEN PFRMD  2/17/2011    due 2013       Prior Level of Function/Environment/Context: Unsure  Expanded or extensive additional review of patient history:     Home Situation  Home Environment: Private residence  One/Two Story Residence: One story  Living Alone: No  Support Systems: Family member(s)  Patient Expects to be Discharged to[de-identified] Unknown  Current DME Used/Available at Home: None  []  Right hand dominant   []  Left hand dominant    EXAMINATION OF PERFORMANCE DEFICITS:  Cognitive/Behavioral Status:  Neurologic State: Alert;Confused     Cognition: No command following; Impaired decision making     Perseveration: No perseveration noted       Hearing: Auditory  Auditory Impairment: None    Vision/Perceptual:    Unable to assess                                Range of Motion:  AROM: Generally decreased, functional                         Strength:  Not able to assess due to cognition                   Coordination:     Fine Motor Skills-Upper: Left Impaired;Right Impaired    Gross Motor Skills-Upper: Left Impaired;Right Impaired    Tone & Sensation:  Tone: Abnormal                         Balance:  Sitting: Impaired  Sitting - Static: Poor (constant support)  Standing:  (Unsafe to test)    Functional Mobility and Transfers for ADLs:  Bed Mobility:  Supine to Sit: Total assistance;Assist x2  Sit to Supine: Total assistance;Assist x2  Scooting: Total assistance;Assist x2    Transfers:  Sit to Stand:  (Unsafe to assess)  Toilet Transfer :  (Unsafe to assess)    ADL Assessment:  Feeding: Total assistance    Oral Facial Hygiene/Grooming: Total assistance    Bathing: Total assistance    Upper Body Dressing: Total assistance    Lower Body Dressing: Total assistance    Toileting:  Total assistance                Functional Measure:  Barthel Index:    Bathin  Bladder: 0  Bowels: 0  Groomin  Dressin  Feedin  Mobility: 0  Stairs: 0  Toilet Use: 0  Transfer (Bed to Chair and Back): 0  Total: 0       Barthel and G-code impairment scale:  Percentage of impairment CH  0% CI  1-19% CJ  20-39% CK  40-59% CL  60-79% CM  80-99% CN  100%   Barthel Score 0-100 100 99-80 79-60 59-40 20-39 1-19   0 Barthel Score 0-20 20 17-19 13-16 9-12 5-8 1-4 0      The Barthel ADL Index: Guidelines  1. The index should be used as a record of what a patient does, not as a record of what a patient could do. 2. The main aim is to establish degree of independence from any help, physical or verbal, however minor and for whatever reason. 3. The need for supervision renders the patient not independent. 4. A patient's performance should be established using the best available evidence. Asking the patient, friends/relatives and nurses are the usual sources, but direct observation and common sense are also important. However direct testing is not needed. 5. Usually the patient's performance over the preceding 24-48 hours is important, but occasionally longer periods will be relevant. 6. Middle categories imply that the patient supplies over 50 per cent of the effort. 7. Use of aids to be independent is allowed. Kenia Bourne., Barthel, D.W. ([id number]). Functional evaluation: the Barthel Index. [address] [facility] (14)2. VESNA Valdes, [facility]., Jalen Phillips., [place], [address] [facility] (1999). Measuring the change indisability after inpatient rehabilitation; comparison of the responsiveness of the Barthel Index and Functional [place] Measure. Journal of Neurology, Neurosurgery, and Psychiatry, 66(4), 970-996. SHU Dickey, JERZY Alcala, & Radha Page, M.A. (2004.) Assessment of post-stroke quality of life in cost-effectiveness studies: The usefulness of the Barthel Index and the EuroQoL-5D. Quality of Life Research, 13, 819-20       G codes: In compliance with CMSs Claims Based Outcome Reporting, the following G-code set was chosen for this patient based on their primary functional limitation being treated: The outcome measure chosen to determine the severity of the functional limitation was the Barthel Index with a score of 0/100 which was correlated with the impairment scale. ?  Self Care:     - CURRENT STATUS: CN - 100% impaired, limited or restricted    - GOAL STATUS: CM - 80%-99% impaired, limited or restricted    - D/C STATUS:  ---------------To be determined---------------     Occupational Therapy Evaluation Charge Determination   History Examination Decision-Making   LOW Complexity : Brief history review  MEDIUM Complexity : 3-5 performance deficits relating to physical, cognitive , or psychosocial skils that result in activity limitations and / or participation restrictions MEDIUM Complexity : Patient may present with comorbidities that affect occupational performnce. Miniml to moderate modification of tasks or assistance (eg, physical or verbal ) with assesment(s) is necessary to enable patient to complete evaluation       Based on the above components, the patient evaluation is determined to be of the following complexity level: LOW   Pain:  Pain Scale 1: Adult Nonverbal Pain Scale  Pain Intensity 1: 0              Activity Tolerance:   Poor  After treatment:   [] Patient left in no apparent distress sitting up in chair  [x] Patient left in no apparent distress in bed  [x] Call bell left within reach  [x] Nursing notified (nursing students present)  [] Caregiver present  [] Bed alarm activated    COMMUNICATION/EDUCATION:   The patients plan of care was discussed with: Physical Therapist and Registered Nurse.  [] Home safety education was provided and the patient/caregiver indicated understanding. [] Patient/family have participated as able in goal setting and plan of care. [] Patient/family agree to work toward stated goals and plan of care. [] Patient understands intent and goals of therapy, but is neutral about his/her participation. [x] Patient is unable to participate in goal setting and plan of care. This patients plan of care is appropriate for delegation to [facility].     Thank you for this referral.  Roma Lira, OTR/L  Time Calculation: 20 mins

## 2018-02-19 NOTE — PROGRESS NOTES
Problem: Mobility Impaired (Adult and Pediatric)  Goal: *Acute Goals and Plan of Care (Insert Text)  Physical Therapy Goals  Initiated 2/19/2018  1. Patient will move from supine to sit and sit to supine  in bed with moderate assistance  within 7 day(s). 2.  Patient will transfer from bed to chair and chair to bed with maximal assistance using the least restrictive device within 7 day(s). 3.  Patient will perform sit to stand with maximal assistance within 7 day(s). 4.  Patient will tolerate seated edge of bed with mod assist x 10 minutes to improve balance and tolerance to activity and in prep for OOB transfers within 7 days  physical Therapy EVALUATION  Patient: Juliette Calderon (89 y.o. female)  Date: 2/19/2018  Primary Diagnosis: Sepsis ([address]  UTI (urinary tract infection)  Hx of systemic lupus erythematosus (SLE)  Hx of diabetes mellitus  Hx of essential hypertension  Mental retardation        Precautions:   Fall    ASSESSMENT :  Based on the objective data described below, the patient presents with decreased functional mobility, impaired balance and tolerance to activity, severe ROM limitations in bilateral LEs impacting functional mobility. Patient received supine in bed with RN students present. Patient essentially nonverbal during session and only stated \"thank you\" at end of session. Per , pt was ambulatory with a RW at the group home and when she lived with her sister. Pt's sister recently had back surgery and was unable to care for the patient therefore pt went to a group home. Patient with severe kyphosis and scoliosis and unable to lay flat in the bed. Patient with plantar flexor contractures and resistant to passive ROM assessment to bilateral LEs. Patient required total assist for supine to sit edge of bed.  While seated edge of bed patient's bilateral LEs remained in extended position and even with attempts to passively flex knees or attempts to stretch hamstrings, unable to flex knees. Patient also with significant posterior trunk leaning requiring max assist for trunk support. Patient returned to supine position with total assist. At this time, it is unsafe to attempt OOB transfers. Patient is functioning well below baseline mobility status. Patient will need SNF placement upon discharge. .    Patient will benefit from skilled intervention to address the above impairments. Patients rehabilitation potential is considered to be Fair  Factors which may influence rehabilitation potential include:   []         None noted  []         Mental ability/status  []         Medical condition  []         Home/family situation and support systems  []         Safety awareness  []         Pain tolerance/management  []         Other:      PLAN :  Recommendations and Planned Interventions:  []           Bed Mobility Training             []    Neuromuscular Re-Education  []           Transfer Training                   []    Orthotic/Prosthetic Training  []           Gait Training                         []    Modalities  []           Therapeutic Exercises           []    Edema Management/Control  []           Therapeutic Activities            []    Patient and Family Training/Education  []           Other (comment):    Frequency/Duration: Patient will be followed by physical therapy  3 times a week to address goals. Discharge Recommendations: Skilled Nursing Facility  Further Equipment Recommendations for Discharge: TBD at SNF     SUBJECTIVE:   Patient stated Thank you.     OBJECTIVE DATA SUMMARY:   HISTORY:    Past Medical History:   Diagnosis Date    Arthritis     Colon polyp     Diabetes ([facility] [address]     borderline no medications    Environmental allergies 4/28/2010    GERD (gastroesophageal reflux disease)     HTN (hypertension) 4/28/2010    dosent take medication now    Lupus     MR (mental retardation)     Osteoporosis, senile     Other ill-defined conditions     parkinson's disease possibly    Psychiatric disorder     anxious    PUD (peptic ulcer disease)     Scolioses     Sjogren's syndrome ([facility] [address] 3/4/2015     Past Surgical History:   Procedure Laterality Date    HX TONSILLECTOMY      [place] COLONOSCOPY FLX DX W/COLLJ SPEC WHEN PFRMD  2/17/2011    due 2013     Prior Level of Function/Home Situation: per case management: pt's sister reported patient was living with her up until her sister had back surgery. Pt was ambulating with a RW short distances within the home. Patient then went to a group home and continued to ambulate with a RW for short distances until she got the flu and admitted to the hospital. Patient was admitted on 2/2/18 and received PT/OT orders 2/18/18  Personal factors and/or comorbidities impacting plan of care:     Home Situation  Home Environment: Private residence  One/Two Story Residence: One story  Living Alone: No  Support Systems: Family member(s)  Patient Expects to be Discharged to[de-identified] Rehabilitation facility  Current DME Used/Available at Home: Walker, rolling    EXAMINATION/PRESENTATION/DECISION MAKING:   Critical Behavior:  Neurologic State: Alert, Confused  Orientation Level: Unable to verbalize  Cognition: No command following, Impaired decision making     Hearing: Auditory  Auditory Impairment: None  Skin:    Edema:   Range Of Motion:  AROM: Generally decreased, functional      Bilateral ankles: plantar flexor contractures (L>R) able to passively range R ankle to neutral  Knees: pt resistant to knee flexion in supine and while seated edge of bed  Hip adductors: severely limited     Strength:        Grossly decreased, non functional bilateral LEs     Tone & Sensation:   Tone: Abnormal   Functional Mobility:  Bed Mobility:     Supine to Sit: Total assistance;Assist x2  Sit to Supine: Total assistance;Assist x2  Scooting:  Total assistance;Assist x2  Transfers:  Sit to Stand:  (Unsafe to assess)                          Balance:   Sitting: Impaired  Sitting - Static: Poor (constant support)  Standing:  (Unsafe to test)  Ambulation/Gait Training:          Functional Measure:  Barthel Index:    Bathin  Bladder: 0  Bowels: 0  Groomin  Dressin  Feedin  Mobility: 0  Stairs: 0  Toilet Use: 0  Transfer (Bed to Chair and Back): 0  Total: 0       Barthel and G-code impairment scale:  Percentage of impairment CH  0% CI  1-19% CJ  20-39% CK  40-59% CL  60-79% CM  80-99% CN  100%   Barthel Score 0-100 100 99-80 79-60 59-40 20-39 1-19   0   Barthel Score 0-20 20 17-19 13-16 9-12 5-8 1-4 0      The Barthel ADL Index: Guidelines  1. The index should be used as a record of what a patient does, not as a record of what a patient could do. 2. The main aim is to establish degree of independence from any help, physical or verbal, however minor and for whatever reason. 3. The need for supervision renders the patient not independent. 4. A patient's performance should be established using the best available evidence. Asking the patient, friends/relatives and nurses are the usual sources, but direct observation and common sense are also important. However direct testing is not needed. 5. Usually the patient's performance over the preceding 24-48 hours is important, but occasionally longer periods will be relevant. 6. Middle categories imply that the patient supplies over 50 per cent of the effort. 7. Use of aids to be independent is allowed. Lori Meadows., Barthel, D.W. ([id number]). Functional evaluation: the Barthel Index. [address] [facility] (14)2. Baker Settles sixto [place], J.J.M.F, Connor Harris., Neri Girard., [place], [address] [place] [address] (). Measuring the change indisability after inpatient rehabilitation; comparison of the responsiveness of the Barthel Index and Functional [place] Measure. Journal of Neurology, Neurosurgery, and Psychiatry, 66(4), 220-967.   English SHU Drew, JERZY Alcala, & Binh Garcia MEneidaA. (2004.) Assessment of post-stroke quality of life in cost-effectiveness studies: The usefulness of the Barthel Index and the EuroQoL-5D. Quality of Life Research, 13, 176-65         G codes: In compliance with CMSs Claims Based Outcome Reporting, the following G-code set was chosen for this patient based on their primary functional limitation being treated: The outcome measure chosen to determine the severity of the functional limitation was the Barthel Index with a score of 0/100 which was correlated with the impairment scale. ? Mobility - Walking and Moving Around:     - CURRENT STATUS: CN - 100% impaired, limited or restricted    - GOAL STATUS: CM - 80%-99% impaired, limited or restricted    - D/C STATUS:  ---------------To be determined---------------       Based on the above components, the patient evaluation is determined to be of the following complexity level: LOW     Pain:  Pain Scale 1: Adult Nonverbal Pain Scale  Pain Intensity 1: 0              Activity Tolerance:   Fair. VSS  Please refer to the flowsheet for vital signs taken during this treatment. After treatment:   []         Patient left in no apparent distress sitting up in chair  [x]         Patient left in no apparent distress in bed  [x]         Call bell left within reach  [x]         Nursing notified  []         Caregiver present  [x]         Bed alarm activated    COMMUNICATION/EDUCATION:   The patients plan of care was discussed with: Occupational Therapist and Registered Nurse.  []         Fall prevention education was provided and the patient/caregiver indicated understanding. []         Patient/family have participated as able in goal setting and plan of care. []         Patient/family agree to work toward stated goals and plan of care. []         Patient understands intent and goals of therapy, but is neutral about his/her participation. [x]         Patient is unable to participate in goal setting and plan of care.     Thank you for this referral.  Konrad Baca Devin, PT, DPT   Time Calculation: 15 mins

## 2018-02-19 NOTE — INTERDISCIPLINARY ROUNDS
Oncology Interdisciplinary rounds were held today to discuss patient plan of care and outcomes. The following members were present: Nursing, Case Management, Pharmacy and Dietary.     Actual Length of Stay: 17    DRG GLOS: 4.9    Expected Length of Stay: 4d 21h                Plan for Day        Mobility        Plan for Stay      Plan for Way   Work with PT/OT  Finished tamiflu PT/OT consulted Continue current treatment plan TBD

## 2018-02-19 NOTE — PROGRESS NOTES
Nutrition Assessment:    INTERVENTIONS/RECOMMENDATIONS:   Diet progression per SLP  Trial of ensure stephanie  May benefit from MVI, hard to get a good grasp on her PO intake    ASSESSMENT:   Chart reviewed. Went to visit with pt this morning and Nursing students were feeding pt. They report she was slowly eating her breakfast. No family at bedside. BG ranging from  over the weekend. Unsure if she is consuming supplements. Will add ensure shake for pt to try due to its kcal and protein dense and may be easy to consume. Will continue to monitor PO intake. Diet Order: Mechanical soft  % Eaten:  Patient Vitals for the past 72 hrs:   % Diet Eaten   02/18/18 0830 10 %     Pertinent Medications: [x]Reviewed: reglan, PPI  Pertinent Labs: [x]Reviewed:   Food Allergies: [x]NKFA  []Other   Last BM:  2/18  Edema:      []RUE   []LUE   [x]RLE 1+  [x]LLE 1+      Pressure Ulcer:      [] Stage I   [] Stage II   [] Stage III   [] Stage IV      Anthropometrics: Height: 5' 1\" (154.9 cm) Weight: 73.9 kg (162 lb 14.4 oz)    IBW (%IBW):   ( ) UBW (%UBW):   (  %)    BMI: Body mass index is 30.78 kg/(m^2). This BMI is indicative of:  []Underweight   []Normal   []Overweight   [x] Obesity   [] Extreme Obesity (BMI>40)  Last Weight Metrics:  Weight Loss Metrics 2/19/2018 5/31/2017 5/23/2017 5/8/2017 12/13/2016 6/27/2016 5/2/2016   Today's Wt 162 lb 14.4 oz 156 lb 156 lb 156 lb 155 lb 136 lb 136 lb   BMI 30.78 kg/m2 29.48 kg/m2 29.48 kg/m2 30.47 kg/m2 30.27 kg/m2 26.56 kg/m2 26.56 kg/m2       Estimated Nutrition Needs (Based on): 1463 Kcals/day (MSJ 1219 x 1.2) , 61 g (-77 (0.8-1gPro/kg)) Protein  Carbohydrate:  At Least 130 g/day  Fluids: 1463 mL/day or per primary team    NUTRITION DIAGNOSES:   Problem:  Self-feeding difficulty      Etiology: related to Acute encephalopathy      Signs/Symptoms: as evidenced by total feed      NUTRITION INTERVENTIONS:  Meals/Snacks: General/healthful diet Enteral/Parenteral Nutrition: Modify rate, concentration, composition, and schedule Supplements: Commercial supplement              GOAL:   Pt will consume >50% of meals/supplements in 2-4 days    NUTRITION MONITORING AND EVALUATION      Food/Nutrient Intake Outcomes:  Total energy intake, Liquid meal replacement  Physical Signs/Symptoms Outcomes: GI, Glucose profile, GI profile, Electrolyte and renal profile, Weight/weight change    Previous Goal Met:   [] Met              [x] Progressing Towards Goal              [] Not Progressing Towards Goal   Previous Recommendations:   [x] Implemented          [] Not Implemented          [] Not Applicable    LEARNING NEEDS (Diet, Food/Nutrient-Drug Interaction):    [x] None Identified   [] Identified and Education Provided/Documented   [] Identified and Pt declined/was not appropriate     Cultural, Buddhism, OR Ethnic Dietary Needs:    [x] None Identified   [] Identified and Addressed     [x] Interdisciplinary Care Plan Reviewed/Documented    [x] Discharge Planning: General healthy diet, texture modifications per SLP recommendations   [] Participated in Interdisciplinary Rounds    NUTRITION RISK:    [] High              [x] Moderate           []  Low  []  Minimal/Uncompromised      Sage Case RDN  Pager [phone]  Weekend Pager [phone]

## 2018-02-19 NOTE — PROGRESS NOTES
PALLIATIVE MEDICINE      Goals of care are clear; continue to treat acute illness, code status is partial as family wants vasopressors pre-arrest, if necessary to keep b/p up, but if pt's heart has stopped beating, no resuscitation. Will sign off, please reconsult if Palliative services if conditions change.

## 2018-02-20 LAB
ANION GAP SERPL CALC-SCNC: 6 MMOL/L (ref 5–15)
BUN SERPL-MCNC: 13 MG/DL (ref 6–20)
BUN/CREAT SERPL: 13 (ref 12–20)
CALCIUM SERPL-MCNC: 8.6 MG/DL (ref 8.5–10.1)
CHLORIDE SERPL-SCNC: 113 MMOL/L (ref 97–108)
CO2 SERPL-SCNC: 27 MMOL/L (ref 21–32)
CREAT SERPL-MCNC: 1.03 MG/DL (ref 0.55–1.02)
GLUCOSE BLD STRIP.AUTO-MCNC: 163 MG/DL (ref 65–100)
GLUCOSE SERPL-MCNC: 79 MG/DL (ref 65–100)
POTASSIUM SERPL-SCNC: 4.2 MMOL/L (ref 3.5–5.1)
SERVICE CMNT-IMP: ABNORMAL
SODIUM SERPL-SCNC: 146 MMOL/L (ref 136–145)

## 2018-02-20 PROCEDURE — 82962 GLUCOSE BLOOD TEST: CPT

## 2018-02-20 PROCEDURE — 74011250637 HC RX REV CODE- 250/637: Performed by: INTERNAL MEDICINE

## 2018-02-20 PROCEDURE — 65270000015 HC RM PRIVATE ONCOLOGY

## 2018-02-20 PROCEDURE — 80048 BASIC METABOLIC PNL TOTAL CA: CPT | Performed by: INTERNAL MEDICINE

## 2018-02-20 PROCEDURE — 36415 COLL VENOUS BLD VENIPUNCTURE: CPT | Performed by: INTERNAL MEDICINE

## 2018-02-20 RX ADMIN — LEVOTHYROXINE SODIUM 25 MCG: 50 TABLET ORAL at 05:35

## 2018-02-20 RX ADMIN — METOCLOPRAMIDE 5 MG: 10 TABLET ORAL at 00:50

## 2018-02-20 RX ADMIN — Medication 10 ML: at 16:55

## 2018-02-20 RX ADMIN — Medication 20 ML: at 16:55

## 2018-02-20 RX ADMIN — METOCLOPRAMIDE 5 MG: 10 TABLET ORAL at 09:46

## 2018-02-20 RX ADMIN — METOCLOPRAMIDE 5 MG: 10 TABLET ORAL at 23:21

## 2018-02-20 RX ADMIN — MIDODRINE HYDROCHLORIDE 10 MG: 5 TABLET ORAL at 00:50

## 2018-02-20 RX ADMIN — MIDODRINE HYDROCHLORIDE 10 MG: 5 TABLET ORAL at 23:21

## 2018-02-20 RX ADMIN — Medication 10 ML: at 23:21

## 2018-02-20 RX ADMIN — Medication 10 ML: at 00:51

## 2018-02-20 RX ADMIN — METOCLOPRAMIDE 5 MG: 10 TABLET ORAL at 16:55

## 2018-02-20 RX ADMIN — MIDODRINE HYDROCHLORIDE 10 MG: 5 TABLET ORAL at 16:55

## 2018-02-20 RX ADMIN — MIDODRINE HYDROCHLORIDE 10 MG: 5 TABLET ORAL at 09:46

## 2018-02-20 RX ADMIN — METOCLOPRAMIDE 5 MG: 10 TABLET ORAL at 11:49

## 2018-02-20 RX ADMIN — Medication 10 ML: at 05:35

## 2018-02-20 RX ADMIN — PANTOPRAZOLE SODIUM 40 MG: 40 TABLET, DELAYED RELEASE ORAL at 09:46

## 2018-02-20 NOTE — PROGRESS NOTES
Hospitalist Progress Note    NAME: Kike Mckoy   :  1945   MRN:  [id number]     Admission summary:   72year old female with history of mental retardation, SLE, DM-2, HTN who normally resides in a group home, presented on 18 with decreased responsiveness. She had been diagnosed with acute influenza infection along with urinary tract infection with associated septic shock. Assessment / Plan:  1. Acute toxic-metabolic encephalopathy and septic shock due to pan-susecptible E. Coli UTI and Influenza B (POA)   - MRI brain 2/3 - mild small vessel ischemic changes. No acute abnormality. - CT chest/abd/pelvis  - severe scoliosis. Atelectasis in the lower lobes, left greater than right. Anemia. Atherosclerotic abdominal aorta without aneurysm. - urine culture  - pansensitive E Coli   - blood cultures  - no growth   - she had been treated with a course of Zosyn to completion in the hospital   - influenza B positive  - she had additionally been treated with a course of oseltamivir to completion here   - seen by neurology, recommending continuation of Keppra   - diet advanced to pureed    2. Acute hypoxic respiratory failure secondary to acute pulmonary edema with acute on chronic diastolic heart failure   - Echo 18  - \"Left ventricle: Ejection fraction was estimated in the range of 60 %. No obvious wall motion abnormalities identified in the views obtained. There was mild concentric hypertrophy. Features were consistent with a pseudonormal left ventricular filling pattern, with concomitant abnormal relaxation and increased filling pressure (grade 2 diastolic dysfunction). \"   - suspect likely triggered by influenza / treatment for UTI   - seen by pulmonology, she had briefly required vasopressor support, which was subsequently weaned off    - now weaned off O2    3. Resolved TIFFANIE   - Cr peak of 1.55 on , subsequently downtrending    4.   Hypernatremia    - suspect secondary to insufficient oral fluid intake, encouraging patient to drink fluids    5. Hypokalemia   - replete as needed    6. Acute on chronic blood loss anemia   - Hgb appears to be fairly stable without evident bleed on examination   - continue to monitor    7. Hypoglycemia   - reported history of \"borderline DM,\" not on meds at home.     - HgA1c <3.5. Improved   - discontinued POC glucose monitoring; continue bedtime snack       8. Elevated Transaminases   - presumed in setting of sepsis, generally improving    9. MR   - seen by PT/OT, recommending SNF    10. SLE   - holding home plaquenil    11. Hypothyroidism   - continue home levothyroxine     12. Obesity (Body mass index is 32.45 kg/(m^2)      Code Status: PARTIAL CODE - ACLS meds only, no compressions, no intubation  Surrogate Decision Maker: sister   DVT Prophylaxis: heparin      Baseline: Mental retardation, lives with sister normally but placed in group home after sister had to undergo recent back surgery     Subjective:     Chief Complaint / Reason for Physician Visit:    Patient seen and examined at bedside this AM.  Not much in terms of volitional history obtained, which I'm told is baseline. No acute events reported overnight. Awaiting SNF placement. Has been tolerating pureed diets fairly well by report. Review of Systems:  Symptom Y/N Comments  Symptom Y/N Comments   Fever/Chills    Chest Pain     Poor Appetite    Edema     Cough    Abdominal Pain     Sputum    Joint Pain     SOB/YOUNG    Pruritis/Rash     Nausea/vomit    Tolerating PT/OT     Diarrhea    Tolerating Diet     Constipation    Other       Could NOT obtain due to: MR, non-verbal     Objective:     VITALS:   Last 24hrs VS reviewed since prior progress note.  Most recent are:  Patient Vitals for the past 24 hrs:   Temp Pulse Resp BP SpO2   02/20/18 1500 97.5 °F (36.4 °C) 88 18 129/82 97 %   02/20/18 0800 96.1 °F (35.6 °C) 91 18 (!) 150/98 100 %   02/19/18 2344 97.1 °F (36.2 °C) 98 16 149/75 100 %   02/19/18 2020 97.6 °F (36.4 °C) 82 18 127/89 100 %   02/19/18 1638 97.8 °F (36.6 °C) 80 20 131/89 100 %       Intake/Output Summary (Last 24 hours) at 02/20/18 1606  Last data filed at 02/20/18 0521   Gross per 24 hour   Intake                0 ml   Output              700 ml   Net             -700 ml        PHYSICAL EXAM:  General:                    Altered; severe kyphoscolosis  EENT:                       Anicteric sclerae, PERRL  Resp:                        No Wheezing. Frequent shallow respirations  CV:                            Regular rhythm with normal rate during ascultation,  No edema  GI:                              Soft, Non distended, Non tender.  +Bowel sounds  Neurologic:                Alert but does not speak, does not follow any commands  Psych:                       No insight  Skin:                          No rashes.  No jaundice    Reviewed most current lab test results and cultures  YES  Reviewed most current radiology test results   YES  Review and summation of old records today    NO  Reviewed patient's current orders and MAR    YES  PMH/ reviewed - no change compared to H&P  ________________________________________________________________________  Care Plan discussed with:    Comments   Patient x    Family      RN x    Care Manager x    Consultant                        Multidiciplinary team rounds were held today with , nursing, pharmacist and clinical coordinator. Patient's plan of care was discussed; medications were reviewed and discharge planning was addressed.      ________________________________________________________________________  Total NON critical care TIME:  25   Minutes    Total CRITICAL CARE TIME Spent:   Minutes non procedure based      Comments   >50% of visit spent in counseling and coordination of care x dispo planning   ________________________________________________________________________  Fabiano Double, MD     Procedures: see electronic medical records for all procedures/Xrays and details which were not copied into this note but were reviewed prior to creation of Plan. LABS:  I reviewed today's most current labs and imaging studies.   Pertinent labs include:  Recent Labs      02/19/18   0503  02/18/18   0352   WBC  4.3  5.5   HGB  7.9*  8.6*   HCT  25.2*  27.2*   PLT  269  320     Recent Labs      02/20/18   0536  02/19/18   0503  02/18/18   0352   NA  146*  146*  144   K  4.2  3.7  3.8   CL  113*  111*  109*   CO2  27  28  27   GLU  79  80  85   BUN  13  16  18   CREA  1.03*  0.98  0.97   CA  8.6  8.2*  8.5   ALB   --   1.8*  1.9*   TBILI   --   0.4  0.4   SGOT   --   34  35   ALT   --   30  34       Signed: Toro Medina MD

## 2018-02-20 NOTE — PROGRESS NOTES
Speech path  Pt is currently being fed by [facility] staff. She is tolerating po well. Recommend single sips via straw. At risk to aspirate given her cognitive status and positioning.    Kika Jackson, SLP

## 2018-02-20 NOTE — PROGRESS NOTES
CM received notice from [facility] that they had denied patient to their facility. CM still waiting on information from [facility] and [facility]. Patient would need a Level II completed for admission to SNF per [facility].     Carly Gee RN, BSN, [facility]   - Medical Oncology  [phone]

## 2018-02-20 NOTE — PROGRESS NOTES
Oncology Nursing Communication Tool  9:01 PM  2/19/2018     Bedside shift change report given to Frederick Gowers, RN (incoming nurse) by Hilario Anderson RN (outgoing nurse) on [facility]. Report included the following information SBAR and Kardex. Shift Summary: none      Issues for physician to address: none         Oncology Shift Note   Admission Date 2/2/2018   Admission Diagnosis Sepsis ([address]  UTI (urinary tract infection)  Hx of systemic lupus erythematosus (SLE)  Hx of diabetes mellitus  Hx of essential hypertension  Mental retardation   Code Status Partial Code   Consults IP CONSULT TO NEUROLOGY  IP CONSULT TO CARDIOLOGY      Cardiac Monitoring [] Yes [] No      Purposeful Hourly Rounding [] Yes    Cole Score Total Score: 4   Cole score 3 or > [] Bed Alarm [] Avasys [] 1:1 sitter [] Patient refused (Place signed refusal form in chart)      Pain Managed [] Yes [] No    Key Pain Meds             acetaminophen (TYLENOL ARTHRITIS PAIN) 650 mg TbER  (Taking) Take 650 mg by mouth every eight (8) hours as needed for Pain. Influenza Vaccine Received Flu Vaccine for Current Season (usually Sept-March): Yes           Oxygen needs?  [] Room air Oxygen @  []1L    []2L    []3L   []4L    []5L   []6L     Use home O2? [] Yes [] No  Perform O2 challenge test using  smartphrase (.oxygenchallenge)      Last bowel movement Last Bowel Movement Date: 02/18/18  bowel movement      Urinary Catheter [REMOVED] Urinary Catheter 02/02/18 [facility] - Temperature-Criteria for Appropriate Use: Medically/surgically unstable  [REMOVED] Urinary Catheter 02/15/18 [facility]-Criteria for Appropriate Use: Obstruction/retention     External Female Catheter 02/15/18-Urine Output (mL): 150 ml  [REMOVED] Urinary Catheter 02/02/18 [facility] - Temperature-Urine Output (mL): 10 ml  [REMOVED] External Female Catheter 02/03/18-Urine Output (mL): 0 ml  [REMOVED] Urinary Catheter 02/15/18 [facility]-Urine Output (mL): 150 ml     LDAs         PICC Triple Lumen 02/05/18 Right;Brachial (Active)   Central Line Being Utilized Yes 2/19/2018  7:10 PM   Criteria for Appropriate Use Limited/no vessel suitable for conventional peripheral access 2/19/2018  3:15 PM   Site Assessment Clean, dry, & intact 2/19/2018  3:15 PM   Phlebitis Assessment 0 2/19/2018  3:15 PM   Infiltration Assessment 0 2/19/2018  3:15 PM   Arm Circumference (cm) 0 cm 2/6/2018 12:00 AM   Date of Last Dressing Change 02/12/18 2/19/2018  5:06 AM   Dressing Status Clean, dry, & intact 2/19/2018  3:15 PM   External Catheter Length (cm) 0 centimeters 2/13/2018  4:00 PM   Dressing Type Disk with Chlorhexadine gluconate (CHG); Transparent 2/19/2018  3:15 PM   Action Taken Blood drawn 2/19/2018  5:06 AM   Hub Color/Line Status White;Capped 2/19/2018  3:15 PM   Positive Blood Return (Site #1) Yes 2/19/2018  3:15 PM   Hub Color/Line Status Gray;Capped 2/19/2018  3:15 PM   Positive Blood Return (Site #2) Yes 2/19/2018  3:15 PM   Hub Color/Line Status Red;Capped 2/19/2018  3:15 PM   Positive Blood Return (Site #3) Yes 2/19/2018  3:15 PM   Alcohol Cap Used Yes 2/19/2018  5:06 AM                External Female Catheter 02/15/18 (Active)   Site Assessment Clean, dry, & intact 2/19/2018  7:10 PM   Repositioned Yes 2/19/2018  3:15 PM   Perineal Care Yes 2/19/2018  3:15 PM   Wick Changed No 2/19/2018  3:15 PM   Suction Canister/Tubing Changed Yes 2/19/2018  5:06 AM   Urine Output (mL) 150 ml 2/19/2018  5:06 AM                   Readmission Risk Assessment Tool Score Low Risk            12       Total Score        3 Patient Length of Stay (>5 days = 3)    5 Pt. Coverage (Medicare=5 , Medicaid, or Self-Pay=4)    4 Charlson Comorbidity Score (Age + Comorbid Conditions)        Criteria that do not apply:    Has Seen PCP in Last 6 Months (Yes=3, No=0)    . Living with Significant Other. Assisted Living. LTAC. SNF.  or   Rehab    IP Visits Last 12 Months (1-3=4, 4=9, >4=11)       Expected Length of Stay 4d 21h Actual Length of Stay [address], RN

## 2018-02-20 NOTE — PROGRESS NOTES
Oncology Nursing Communication Tool  7:35 AM  2/20/2018     Bedside shift change report given to Fany Peck RN (incoming nurse) by Cecile Condon RN (outgoing nurse) on [facility]. Report included the following information SBAR. Shift Summary:       Issues for physician to address: Oncology Shift Note   Admission Date 2/2/2018   Admission Diagnosis Sepsis ([address]  UTI (urinary tract infection)  Hx of systemic lupus erythematosus (SLE)  Hx of diabetes mellitus  Hx of essential hypertension  Mental retardation   Code Status Partial Code   Consults IP CONSULT TO NEUROLOGY  IP CONSULT TO CARDIOLOGY      Cardiac Monitoring [] Yes [] No      Purposeful Hourly Rounding [] Yes    Cole Score Total Score: 5   Cole score 3 or > [] Bed Alarm [] Avasys [] 1:1 sitter [] Patient refused (Place signed refusal form in chart)      Pain Managed [] Yes [] No    Key Pain Meds             acetaminophen (TYLENOL ARTHRITIS PAIN) 650 mg TbER  (Taking) Take 650 mg by mouth every eight (8) hours as needed for Pain. Influenza Vaccine Received Flu Vaccine for Current Season (usually Sept-March): Yes           Oxygen needs?  [] Room air Oxygen @  []1L    []2L    []3L   []4L    []5L   []6L     Use home O2? [] Yes [] No  Perform O2 challenge test using  smartphrase (.oxygenchallenge)      Last bowel movement Last Bowel Movement Date: 02/20/18  bowel movement      Urinary Catheter [REMOVED] Urinary Catheter 02/02/18 [facility] - Temperature-Criteria for Appropriate Use: Medically/surgically unstable  [REMOVED] Urinary Catheter 02/15/18 [facility]-Criteria for Appropriate Use: Obstruction/retention     External Female Catheter 02/15/18-Urine Output (mL): 150 ml  [REMOVED] Urinary Catheter 02/02/18 [facility] - Temperature-Urine Output (mL): 10 ml  [REMOVED] External Female Catheter 02/03/18-Urine Output (mL): 0 ml  [REMOVED] Urinary Catheter 02/15/18 [facility]-Urine Output (mL): 150 ml     LDAs         PICC Triple Lumen 02/05/18 Right;Brachial (Active)   Central Line Being Utilized Yes 2/20/2018  3:00 AM   Criteria for Appropriate Use Limited/no vessel suitable for conventional peripheral access 2/20/2018  3:00 AM   Site Assessment Clean, dry, & intact 2/20/2018  3:00 AM   Phlebitis Assessment 0 2/20/2018  3:00 AM   Infiltration Assessment 0 2/20/2018  3:00 AM   Arm Circumference (cm) 0 cm 2/6/2018 12:00 AM   Date of Last Dressing Change 02/12/18 2/20/2018  3:00 AM   Dressing Status Clean, dry, & intact 2/20/2018  3:00 AM   External Catheter Length (cm) 0 centimeters 2/13/2018  4:00 PM   Dressing Type Disk with Chlorhexadine gluconate (CHG); Tape;Transparent 2/20/2018  3:00 AM   Action Taken Blood drawn 2/19/2018  5:06 AM   Hub Color/Line Status White;Capped;Flushed;Patent 2/20/2018  3:00 AM   Positive Blood Return (Site #1) Yes 2/20/2018  3:00 AM   Hub Color/Line Status Gray;Capped;Flushed;Patent 2/20/2018  3:00 AM   Positive Blood Return (Site #2) Yes 2/20/2018  3:00 AM   Hub Color/Line Status Red;Capped;Flushed;Patent 2/20/2018  3:00 AM   Positive Blood Return (Site #3) Yes 2/20/2018  3:00 AM   Alcohol Cap Used Yes 2/20/2018  3:00 AM                External Female Catheter 02/15/18 (Active)   Site Assessment Clean, dry, & intact 2/20/2018  3:00 AM   Repositioned No 2/20/2018  3:00 AM   Perineal Care No 2/20/2018  3:00 AM   Wick Changed No 2/20/2018  3:00 AM   Suction Canister/Tubing Changed No 2/20/2018  3:00 AM   Urine Output (mL) 150 ml 2/19/2018  5:06 AM                   Readmission Risk Assessment Tool Score Low Risk            12       Total Score        3 Patient Length of Stay (>5 days = 3)    5 Pt. Coverage (Medicare=5 , Medicaid, or Self-Pay=4)    4 Charlson Comorbidity Score (Age + Comorbid Conditions)        Criteria that do not apply:    Has Seen PCP in Last 6 Months (Yes=3, No=0)    . Living with Significant Other. Assisted Living. LTAC. SNF.  or   Rehab    IP Visits Last 12 Months (1-3=4, 4=9, >4=11) Expected Length of Stay 4d 21h   Actual Length of Stay [address], RN

## 2018-02-20 NOTE — INTERDISCIPLINARY ROUNDS
Oncology Interdisciplinary rounds were held today to discuss patient plan of care and outcomes. The following members were present: Nursing, Case Management, Pharmacy and Dietary.     Actual Length of Stay: 18    DRG GLOS: 4.9    Expected Length of Stay: 4d 21h                Plan for Day        Mobility        Plan for Stay      Plan for Way   Work with PT bedrest Continue current [address] Discharge today or tomorrow

## 2018-02-21 LAB
ANION GAP SERPL CALC-SCNC: 5 MMOL/L (ref 5–15)
BUN SERPL-MCNC: 12 MG/DL (ref 6–20)
BUN/CREAT SERPL: 11 (ref 12–20)
CALCIUM SERPL-MCNC: 8.3 MG/DL (ref 8.5–10.1)
CHLORIDE SERPL-SCNC: 113 MMOL/L (ref 97–108)
CO2 SERPL-SCNC: 29 MMOL/L (ref 21–32)
CREAT SERPL-MCNC: 1.06 MG/DL (ref 0.55–1.02)
GLUCOSE SERPL-MCNC: 69 MG/DL (ref 65–100)
POTASSIUM SERPL-SCNC: 3.9 MMOL/L (ref 3.5–5.1)
SODIUM SERPL-SCNC: 147 MMOL/L (ref 136–145)

## 2018-02-21 PROCEDURE — 80048 BASIC METABOLIC PNL TOTAL CA: CPT | Performed by: STUDENT IN AN ORGANIZED HEALTH CARE EDUCATION/TRAINING PROGRAM

## 2018-02-21 PROCEDURE — 36415 COLL VENOUS BLD VENIPUNCTURE: CPT | Performed by: STUDENT IN AN ORGANIZED HEALTH CARE EDUCATION/TRAINING PROGRAM

## 2018-02-21 PROCEDURE — 77030038269 HC DRN EXT URIN PURWCK BARD -A

## 2018-02-21 PROCEDURE — 74011250637 HC RX REV CODE- 250/637: Performed by: INTERNAL MEDICINE

## 2018-02-21 PROCEDURE — 65270000015 HC RM PRIVATE ONCOLOGY

## 2018-02-21 PROCEDURE — 74011250636 HC RX REV CODE- 250/636: Performed by: STUDENT IN AN ORGANIZED HEALTH CARE EDUCATION/TRAINING PROGRAM

## 2018-02-21 RX ORDER — DEXTROSE MONOHYDRATE 50 MG/ML
100 INJECTION, SOLUTION INTRAVENOUS CONTINUOUS
Status: DISCONTINUED | OUTPATIENT
Start: 2018-02-21 | End: 2018-03-01

## 2018-02-21 RX ADMIN — DEXTROSE MONOHYDRATE 100 ML/HR: 5 INJECTION, SOLUTION INTRAVENOUS at 09:28

## 2018-02-21 RX ADMIN — Medication 10 ML: at 21:42

## 2018-02-21 RX ADMIN — Medication 20 ML: at 15:06

## 2018-02-21 RX ADMIN — MIDODRINE HYDROCHLORIDE 10 MG: 5 TABLET ORAL at 21:41

## 2018-02-21 RX ADMIN — Medication 10 ML: at 12:11

## 2018-02-21 RX ADMIN — PANTOPRAZOLE SODIUM 40 MG: 40 TABLET, DELAYED RELEASE ORAL at 09:28

## 2018-02-21 RX ADMIN — METOCLOPRAMIDE 5 MG: 10 TABLET ORAL at 21:41

## 2018-02-21 RX ADMIN — METOCLOPRAMIDE 5 MG: 10 TABLET ORAL at 12:14

## 2018-02-21 RX ADMIN — METOCLOPRAMIDE 5 MG: 10 TABLET ORAL at 16:27

## 2018-02-21 RX ADMIN — MIDODRINE HYDROCHLORIDE 10 MG: 5 TABLET ORAL at 16:27

## 2018-02-21 RX ADMIN — MIDODRINE HYDROCHLORIDE 10 MG: 5 TABLET ORAL at 09:28

## 2018-02-21 RX ADMIN — METOCLOPRAMIDE 5 MG: 10 TABLET ORAL at 09:28

## 2018-02-21 RX ADMIN — LEVOTHYROXINE SODIUM 25 MCG: 50 TABLET ORAL at 06:36

## 2018-02-21 NOTE — PROGRESS NOTES
Hospitalist Progress Note    NAME: Brian Carreon   :  1945   MRN:  [id number]     Admission summary:   72year old female with history of mental retardation, SLE, DM-2, HTN who normally resides in a group home, presented on 18 with decreased responsiveness. She had been diagnosed with acute influenza infection along with urinary tract infection with associated septic shock. Assessment / Plan:  1. Acute toxic-metabolic encephalopathy and septic shock due to pan-susecptible E. Coli UTI and Influenza B (POA)   - MRI brain 2/3 - mild small vessel ischemic changes. No acute abnormality. - CT chest/abd/pelvis  - severe scoliosis. Atelectasis in the lower lobes, left greater than right. Anemia. Atherosclerotic abdominal aorta without aneurysm. - urine culture  - pansensitive E Coli   - blood cultures  - no growth   - she had been treated with a course of Zosyn to completion in the hospital   - influenza B positive  - she had additionally been treated with a course of oseltamivir to completion here   - seen by neurology, recommending continuation of Keppra   - diet advanced to pureed    2. Acute hypoxic respiratory failure secondary to acute pulmonary edema with acute on chronic diastolic heart failure   - Echo 18  - \"Left ventricle: Ejection fraction was estimated in the range of 60 %. No obvious wall motion abnormalities identified in the views obtained. There was mild concentric hypertrophy. Features were consistent with a pseudonormal left ventricular filling pattern, with concomitant abnormal relaxation and increased filling pressure (grade 2 diastolic dysfunction). \"   - suspect likely triggered by influenza / treatment for UTI   - seen by pulmonology, she had briefly required vasopressor support, which was subsequently weaned off    - now weaned off O2    3. Resolved TIFFANIE   - Cr peak of 1.55 on , subsequently downtrending    4.   Hypernatremia    - suspect secondary to insufficient oral fluid intake, encouraging patient to drink fluids    5. Hypokalemia   - replete as needed    6. Acute on chronic blood loss anemia   - Hgb appears to be fairly stable without evident bleed on examination   - continue to monitor    7. Hypoglycemia   - reported history of \"borderline DM,\" not on meds at home.     - HgA1c <3.5   - discontinued POC glucose monitoring; continue bedtime snack       8. Elevated Transaminases   - presumed in setting of sepsis, generally improving    9. Mental retardation / cognitive impairment   - seen by PT/OT, recommending SNF    10. SLE   - holding home plaquenil    11. Hypothyroidism   - continue home levothyroxine     12. Obesity (Body mass index is 32.45 kg/(m^2)      Code Status: PARTIAL CODE - ACLS meds only, no compressions, no intubation  Surrogate Decision Maker: sister   DVT Prophylaxis: heparin      Baseline: Mental retardation, lives with sister normally but placed in group home after sister had to undergo recent back surgery  Disposition: needs SNF, awaiting bed offers     Subjective:     Chief Complaint / Reason for Physician Visit:   Patient is non-verbal but able to track my movements through the room. She does not appear acutely distressed. No acute events reported overnight. Review of Systems:  Symptom Y/N Comments  Symptom Y/N Comments   Fever/Chills    Chest Pain     Poor Appetite    Edema     Cough    Abdominal Pain     Sputum    Joint Pain     SOB/YOUNG    Pruritis/Rash     Nausea/vomit    Tolerating PT/OT     Diarrhea    Tolerating Diet     Constipation    Other       Could NOT obtain due to: MR, non-verbal     Objective:     VITALS:   Last 24hrs VS reviewed since prior progress note.  Most recent are:  Patient Vitals for the past 24 hrs:   Temp Pulse Resp BP SpO2   02/21/18 0311 98.4 °F (36.9 °C) 70 18 118/73 97 %   02/20/18 1941 97.6 °F (36.4 °C) 84 18 128/65 97 %   02/20/18 1500 97.5 °F (36.4 °C) 88 18 129/82 97 %   02/20/18 0800 96.1 °F (35.6 °C) 91 18 (!) 150/98 100 %     No intake or output data in the 24 hours ending 02/21/18 0711     PHYSICAL EXAM:  General:                    Altered; severe kyphoscolosis  EENT:                       Anicteric sclerae, PERRL  Resp:                        CTABL without appreciable wheeze/rhonchi/rales  CV:                            Regular rhythm with normal rate during ascultation,  No edema  GI:                              Soft, Non distended, Non tender.  +Bowel sounds  Neurologic:                Alert but does not speak, does not follow any commands  Psych:                       No insight  Skin:                          No rashes.  No jaundice    Reviewed most current lab test results and cultures  YES  Reviewed most current radiology test results   YES  Review and summation of old records today    NO  Reviewed patient's current orders and MAR    YES  PMH/SH reviewed - no change compared to H&P  ________________________________________________________________________  Care Plan discussed with:    Comments   Patient x    Family      RN x    Care Manager     Consultant                        Multidiciplinary team rounds were held today with , nursing, pharmacist and clinical coordinator. Patient's plan of care was discussed; medications were reviewed and discharge planning was addressed. ________________________________________________________________________  Total NON critical care TIME:  25   Minutes    Total CRITICAL CARE TIME Spent:   Minutes non procedure based      Comments   >50% of visit spent in counseling and coordination of care x dispo planning   ________________________________________________________________________  Luz Elena Santos MD     Procedures: see electronic medical records for all procedures/Xrays and details which were not copied into this note but were reviewed prior to creation of Plan.       LABS:  I reviewed today's most current labs and imaging studies.   Pertinent labs include:  Recent Labs      02/19/18   0503   WBC  4.3   HGB  7.9*   HCT  25.2*   PLT  269     Recent Labs      02/20/18   0536  02/19/18   0503   NA  146*  146*   K  4.2  3.7   CL  113*  111*   CO2  27  28   GLU  79  80   BUN  13  16   CREA  1.03*  0.98   CA  8.6  8.2*   ALB   --   1.8*   TBILI   --   0.4   SGOT   --   34   ALT   --   30       Signed: Fabiano De Santiago MD

## 2018-02-21 NOTE — PROGRESS NOTES
Oncology Nursing Communication Tool  0715 AM  2/21/2018     Bedside shift change report given to Jessica Correa RN (incoming nurse) by Stefani Castillo RN (outgoing nurse) on Devorah Hastings. Report included the following information SBAR. Shift Summary:       Issues for physician to address: Oncology Shift Note   Admission Date 2/2/2018   Admission Diagnosis Sepsis ([facility] [address]  UTI (urinary tract infection)  Hx of systemic lupus erythematosus (SLE)  Hx of diabetes mellitus  Hx of essential hypertension  Mental retardation   Code Status Partial Code   Consults IP CONSULT TO NEUROLOGY  IP CONSULT TO CARDIOLOGY      Cardiac Monitoring [] Yes [] No      Purposeful Hourly Rounding [] Yes    Cole Score Total Score: 5   Cole score 3 or > [] Bed Alarm [] Avasys [] 1:1 sitter [] Patient refused (Place signed refusal form in chart)      Pain Managed [] Yes [] No    Key Pain Meds             acetaminophen (TYLENOL ARTHRITIS PAIN) 650 mg TbER  (Taking) Take 650 mg by mouth every eight (8) hours as needed for Pain. Influenza Vaccine Received Flu Vaccine for Current Season (usually Sept-March): Yes           Oxygen needs?  [] Room air Oxygen @  []1L    []2L    []3L   []4L    []5L   []6L     Use home O2? [] Yes [] No  Perform O2 challenge test using  smartphrase (.oxygenchallenge)      Last bowel movement Last Bowel Movement Date: 02/20/18  bowel movement      Urinary Catheter [REMOVED] Urinary Catheter 02/02/18 [place] - Temperature-Criteria for Appropriate Use: Medically/surgically unstable  [REMOVED] Urinary Catheter 02/15/18 [place]-Criteria for Appropriate Use: Obstruction/retention     External Female Catheter 02/15/18-Urine Output (mL): 150 ml  [REMOVED] Urinary Catheter 02/02/18 [place] - Temperature-Urine Output (mL): 10 ml  [REMOVED] External Female Catheter 02/03/18-Urine Output (mL): 0 ml  [REMOVED] Urinary Catheter 02/15/18 [place]-Urine Output (mL): 150 ml     LDAs         PICC Triple Lumen 02/05/18 Right;Brachial (Active)   Central Line Being Utilized Yes 2/21/2018  4:06 AM   Criteria for Appropriate Use Limited/no vessel suitable for conventional peripheral access 2/21/2018  4:06 AM   Site Assessment Clean, dry, & intact 2/21/2018  4:06 AM   Phlebitis Assessment 0 2/21/2018  4:06 AM   Infiltration Assessment 0 2/21/2018  4:06 AM   Arm Circumference (cm) 0 cm 2/6/2018 12:00 AM   Date of Last Dressing Change 02/12/18 2/21/2018  4:06 AM   Dressing Status Clean, dry, & intact 2/21/2018  4:06 AM   External Catheter Length (cm) 0 centimeters 2/13/2018  4:00 PM   Dressing Type Disk with Chlorhexadine gluconate (CHG); Tape;Transparent 2/21/2018  4:06 AM   Action Taken Blood drawn 2/19/2018  5:06 AM   Hub Color/Line Status White;Capped;Flushed;Patent 2/21/2018  4:06 AM   Positive Blood Return (Site #1) Yes 2/21/2018  4:06 AM   Hub Color/Line Status Gray;Capped;Flushed;Patent 2/21/2018  4:06 AM   Positive Blood Return (Site #2) Yes 2/21/2018  4:06 AM   Hub Color/Line Status Red;Capped;Flushed;Patent 2/21/2018  4:06 AM   Positive Blood Return (Site #3) Yes 2/21/2018  4:06 AM   Alcohol Cap Used Yes 2/21/2018  4:06 AM                External Female Catheter 02/15/18 (Active)   Site Assessment Clean, dry, & intact 2/21/2018  4:06 AM   Repositioned Yes 2/21/2018  4:06 AM   Perineal Care Yes 2/21/2018  4:06 AM   Wick Changed Yes 2/21/2018  4:06 AM   Suction Canister/Tubing Changed No 2/21/2018  4:06 AM   Urine Output (mL) 150 ml 2/19/2018  5:06 AM                   Readmission Risk Assessment Tool Score Low Risk            12       Total Score        3 Patient Length of Stay (>5 days = 3)    5 Pt. Coverage (Medicare=5 , Medicaid, or Self-Pay=4)    4 Charlson Comorbidity Score (Age + Comorbid Conditions)        Criteria that do not apply:    Has Seen PCP in Last 6 Months (Yes=3, No=0)    . Living with Significant Other. Assisted Living. LTAC. SNF.  or   Rehab    IP Visits Last 12 Months (1-3=4, 4=9, >4=11) Expected Length of Stay 4d 21h   Actual Length of Stay 100 [facility] 60, RN

## 2018-02-21 NOTE — INTERDISCIPLINARY ROUNDS
Oncology Interdisciplinary rounds were held today to discuss patient plan of care and outcomes. The following members were present: Nursing, Case Management, Pharmacy and Dietary.     Actual Length of Stay: 19    DRG GLOS: 4.9    Expected Length of Stay: 4d 21h                Plan for Day        Mobility        Plan for Stay      Plan for Way   safety Bed bound Continue current plan Placement

## 2018-02-21 NOTE — PROGRESS NOTES
2:00 PM- Bedside report received from Samantha, [address] [place] [address]. Assumed care of patient. Oncology Nursing Communication Tool  6:31 PM  2/21/2018     Bedside shift change report given to Reji Nogueira RN (incoming nurse) by Arnulfo Brantley RN (outgoing nurse) on Robin Harvey. Report included the following information SBAR, Kardex, Procedure Summary, Intake/Output, MAR and Recent Results. Shift Summary: Uneventful shift. IV fluids administered to patient. Issues for physician to address: None at this time         Oncology Shift Note   Admission Date 2/2/2018   Admission Diagnosis Sepsis ([facility] [address]  UTI (urinary tract infection)  Hx of systemic lupus erythematosus (SLE)  Hx of diabetes mellitus  Hx of essential hypertension  Mental retardation   Code Status Partial Code   Consults IP CONSULT TO NEUROLOGY  IP CONSULT TO CARDIOLOGY      Cardiac Monitoring [] Yes [x] No      Purposeful Hourly Rounding [x] Yes    Cole Score Total Score: 4   Cole score 3 or > [] Bed Alarm [] Avasys [] 1:1 sitter [] Patient refused (Place signed refusal form in chart)      Pain Managed [] Yes [] No    Key Pain Meds             acetaminophen (TYLENOL ARTHRITIS PAIN) 650 mg TbER  (Taking) Take 650 mg by mouth every eight (8) hours as needed for Pain. Influenza Vaccine Received Flu Vaccine for Current Season (usually Sept-March): Yes           Oxygen needs?  [x] Room air Oxygen @  []1L    []2L    []3L   []4L    []5L   []6L     Use home O2? [] Yes [x] No  Perform O2 challenge test using  smartphrase (.oxygenchallenge)      Last bowel movement Last Bowel Movement Date: 02/20/18  bowel movement      Urinary Catheter [REMOVED] Urinary Catheter 02/02/18 [place] - Temperature-Criteria for Appropriate Use: Medically/surgically unstable  [REMOVED] Urinary Catheter 02/15/18 [place]-Criteria for Appropriate Use: Obstruction/retention     External Female Catheter 02/15/18-Urine Output (mL): 150 ml  [REMOVED] Urinary Catheter 02/02/18 [place] - Temperature-Urine Output (mL): 10 ml  [REMOVED] External Female Catheter 02/03/18-Urine Output (mL): 0 ml  [REMOVED] Urinary Catheter 02/15/18 [place]-Urine Output (mL): 150 ml     LDAs         PICC Triple Lumen 02/05/18 Right;Brachial (Active)   Central Line Being Utilized Yes 2/21/2018  2:17 PM   Criteria for Appropriate Use Limited/no vessel suitable for conventional peripheral access 2/21/2018  2:17 PM   Site Assessment Clean, dry, & intact 2/21/2018  2:17 PM   Phlebitis Assessment 0 2/21/2018  2:17 PM   Infiltration Assessment 0 2/21/2018  2:17 PM   Arm Circumference (cm) 0 cm 2/6/2018 12:00 AM   Date of Last Dressing Change 02/12/18 2/21/2018  2:17 PM   Dressing Status Clean, dry, & intact 2/21/2018  2:17 PM   External Catheter Length (cm) 0 centimeters 2/13/2018  4:00 PM   Dressing Type Transparent;Tape;Disk with Chlorhexadine gluconate (CHG) 2/21/2018  9:29 AM   Action Taken Blood drawn 2/19/2018  5:06 AM   Hub Color/Line Status White;Capped 2/21/2018  2:17 PM   Positive Blood Return (Site #1) Yes 2/21/2018  4:06 AM   Hub Color/Line Status Gray;Capped 2/21/2018  2:17 PM   Positive Blood Return (Site #2) Yes 2/21/2018  4:06 AM   Hub Color/Line Status Red;Capped 2/21/2018  2:17 PM   Positive Blood Return (Site #3) Yes 2/21/2018  4:06 AM   Alcohol Cap Used Yes 2/21/2018  2:17 PM                External Female Catheter 02/15/18 (Active)   Site Assessment Clean, dry, & intact 2/21/2018  9:29 AM   Repositioned Yes 2/21/2018  4:06 AM   Perineal Care Yes 2/21/2018  4:06 AM   Wick Changed Yes 2/21/2018  4:06 AM   Suction Canister/Tubing Changed No 2/21/2018  4:06 AM   Urine Output (mL) 150 ml 2/19/2018  5:06 AM                   Readmission Risk Assessment Tool Score Low Risk            12       Total Score        3 Patient Length of Stay (>5 days = 3)    5 Pt.  Coverage (Medicare=5 , Medicaid, or Self-Pay=4)    4 Charlson Comorbidity Score (Age + Comorbid Conditions)        Criteria that do not apply:    Has Seen PCP in Last 6 Months (Yes=3, No=0)    . Living with Significant Other. Assisted Living. LTAC. SNF.  or   Rehab    IP Visits Last 12 Months (1-3=4, 4=9, >4=11)       Expected Length of Stay 4d 21h   Actual Length of Stay [address] [facility]

## 2018-02-22 LAB
ANION GAP SERPL CALC-SCNC: 7 MMOL/L (ref 5–15)
BUN SERPL-MCNC: 14 MG/DL (ref 6–20)
BUN/CREAT SERPL: 12 (ref 12–20)
CALCIUM SERPL-MCNC: 8.6 MG/DL (ref 8.5–10.1)
CHLORIDE SERPL-SCNC: 110 MMOL/L (ref 97–108)
CO2 SERPL-SCNC: 29 MMOL/L (ref 21–32)
CREAT SERPL-MCNC: 1.15 MG/DL (ref 0.55–1.02)
GLUCOSE BLD STRIP.AUTO-MCNC: 101 MG/DL (ref 65–100)
GLUCOSE SERPL-MCNC: 69 MG/DL (ref 65–100)
POTASSIUM SERPL-SCNC: 4.1 MMOL/L (ref 3.5–5.1)
SERVICE CMNT-IMP: ABNORMAL
SODIUM SERPL-SCNC: 146 MMOL/L (ref 136–145)

## 2018-02-22 PROCEDURE — 97530 THERAPEUTIC ACTIVITIES: CPT

## 2018-02-22 PROCEDURE — 97535 SELF CARE MNGMENT TRAINING: CPT | Performed by: OCCUPATIONAL THERAPIST

## 2018-02-22 PROCEDURE — 74011250637 HC RX REV CODE- 250/637: Performed by: INTERNAL MEDICINE

## 2018-02-22 PROCEDURE — 82962 GLUCOSE BLOOD TEST: CPT

## 2018-02-22 PROCEDURE — 36415 COLL VENOUS BLD VENIPUNCTURE: CPT | Performed by: STUDENT IN AN ORGANIZED HEALTH CARE EDUCATION/TRAINING PROGRAM

## 2018-02-22 PROCEDURE — 65270000015 HC RM PRIVATE ONCOLOGY

## 2018-02-22 PROCEDURE — 80048 BASIC METABOLIC PNL TOTAL CA: CPT | Performed by: STUDENT IN AN ORGANIZED HEALTH CARE EDUCATION/TRAINING PROGRAM

## 2018-02-22 RX ADMIN — PANTOPRAZOLE SODIUM 40 MG: 40 TABLET, DELAYED RELEASE ORAL at 09:07

## 2018-02-22 RX ADMIN — Medication 10 ML: at 16:58

## 2018-02-22 RX ADMIN — MIDODRINE HYDROCHLORIDE 10 MG: 5 TABLET ORAL at 09:07

## 2018-02-22 RX ADMIN — MIDODRINE HYDROCHLORIDE 10 MG: 5 TABLET ORAL at 22:23

## 2018-02-22 RX ADMIN — METOCLOPRAMIDE 5 MG: 10 TABLET ORAL at 09:07

## 2018-02-22 RX ADMIN — Medication 20 ML: at 16:59

## 2018-02-22 RX ADMIN — METOCLOPRAMIDE 5 MG: 10 TABLET ORAL at 22:23

## 2018-02-22 RX ADMIN — METOCLOPRAMIDE 5 MG: 10 TABLET ORAL at 13:15

## 2018-02-22 RX ADMIN — Medication 10 ML: at 22:24

## 2018-02-22 RX ADMIN — LEVOTHYROXINE SODIUM 25 MCG: 50 TABLET ORAL at 06:30

## 2018-02-22 RX ADMIN — Medication 10 ML: at 16:59

## 2018-02-22 RX ADMIN — METOCLOPRAMIDE 5 MG: 10 TABLET ORAL at 16:55

## 2018-02-22 RX ADMIN — Medication 10 ML: at 06:00

## 2018-02-22 NOTE — PROGRESS NOTES
PICC line dressing changed per protocol, PICC site and dressing clean, dry and intact. Positive blood return from each port. Flushed with 20 ml of normal saline to each port. All end caps changed. New bio patch and stat lock applied. No complications noted. Vito Arriaga RN. BSN. CRNI. CMSRN. PICC Nurse.  Vascular Access Team

## 2018-02-22 NOTE — PROGRESS NOTES
CM spoke to patient's sister regarding placement options. Patient has referrals sent to multiple facilities for possible rehab placement. Patient has been refused to all except [facility] which is still pending. CM will complete [facility] and send for Level 2 assessment today for possible placement. If no facility willing to accept patient for rehab (Patient has [facility] and will need auth), patient's sister will take her back to her home or place her in group home.     Ashley Gramajo RN, BSN, AC   - Medical Oncology  [phone]

## 2018-02-22 NOTE — PROGRESS NOTES
Problem: Mobility Impaired (Adult and Pediatric)  Goal: *Acute Goals and Plan of Care (Insert Text)  Physical Therapy Goals  Initiated 2/19/2018  1. Patient will move from supine to sit and sit to supine  in bed with moderate assistance  within 7 day(s). 2.  Patient will transfer from bed to chair and chair to bed with maximal assistance using the least restrictive device within 7 day(s). 3.  Patient will perform sit to stand with maximal assistance within 7 day(s). 4.  Patient will tolerate seated edge of bed with mod assist x 10 minutes to improve balance and tolerance to activity and in prep for OOB transfers within 7 days   physical Therapy TREATMENT  Patient: Rishi Harvey (13 y.o. female)  Date: 2/22/2018  Diagnosis: Sepsis ([address]                        UTI (urinary tract infection)                        Hx of systemic lupus erythematosus (SLE)                        Hx of diabetes mellitus                        Hx of essential hypertension         Mental retardation     Precautions: Fall  Chart, physical therapy assessment, plan of care and goals were reviewed. ASSESSMENT: Based on the objective data described below, the patient presents with decreased functional mobility, impaired balance and tolerance to activity, severe ROM limitations in bilateral LEs impacting functional mobility. Patient essentially nonverbal during session. Patient with severe kyphosis and scoliosis and unable to lay flat in the bed. Patient with plantar flexor contractures and resistant to passive ROM assessment to bilateral LEs. Patient required total assist for supine to sit edge of bed. While seated edge of bed patient's bilateral LEs remained in extended position and even with attempts to passively flex knees or attempts to stretch hamstrings, unable to flex knees. Patient also with significant posterior trunk leaning requiring max assist for trunk support.  Patient returned to supine position with total assist. At this time, it is unsafe to attempt OOB transfers. Patient is functioning well below baseline mobility status. Patient will need SNF/ LTC placement upon discharge. Progression toward goals:  []      Improving appropriately and progressing toward goals  []      Improving slowly and progressing toward goals  [x]      Not making progress toward goals      PLAN:  Patient continues to benefit from skilled intervention to address the above impairments. Continue treatment per established plan of care. [facility]  Further Equipment Recommendations for Discharge:  mechanical lift and wheelchair      OBJECTIVE DATA SUMMARY:     Critical Behavior:  Neurologic State: Alert  Orientation Level: Unable to verbalize (Patient appears to be non-verbal)  Cognition: Decreased attention/concentration, Decreased command following     Functional Mobility Training:  Bed Mobility:  Rolling: Total assistance  Supine to Sit: Total assistance  Sit to Supine: Total assistance  Scooting: Total assistance  Level of Assistance: Total assistance   Interventions: Tactile cues;Manual cues; Verbal cues     Transfers:  Sit to Stand: Total assistance  Stand to Sit: Maximum assistance  Bed to Chair:  (unable)  Interventions: Manual cues; Tactile cues; Verbal cues  Level of Assistance:  Total assistance     Balance:  Sitting: Impaired  Sitting - Static: Poor (constant support) (constant posterior lean with knees in extension)  Sitting - Dynamic: Not tested  Standing:  (unable)     Ambulation/Gait Training:unable    Pain:  Pain Scale 1: Adult Nonverbal Pain Scale  Pain Intensity 1: 0  Pain Location 1: Generalized;Leg  Pain Intervention(s) 1: Nurse notified     Activity Tolerance:     After treatment:   [] Patient left in no apparent distress sitting up in chair  [x] Patient left in no apparent distress in bed  [x] Call bell left within reach  [x] Nursing notified  [] Caregiver present  [x] Bed alarm activated    COMMUNICATION/COLLABORATION:   The patients plan of care was discussed with: Registered Nurse    Francia Mohan PTA   Time Calculation: 20 mins

## 2018-02-22 NOTE — PROGRESS NOTES
Nutrition Assessment:    INTERVENTIONS/RECOMMENDATIONS:   Continue current diet order and supplements     ASSESSMENT:   Chart reviewed and pt discussed with RN. RN reports pt ate well for breakfast and consuming some but not all of supplements. Will continue to monitor PO intake. Nothing to add at this time. Diet Order: Puree  % Eaten:  No data found. Pertinent Medications: [x]Reviewed: reglan, PPI,   Pertinent Labs: [x]Reviewed:   Food Allergies: [x]NKFA  []Other   Last BM:  2/20  Edema:      []RUE   []LUE   [x]RLE 2+  [x]LLE 2+      Pressure Ulcer:      [] Stage I   [] Stage II   [] Stage III   [] Stage IV      Anthropometrics: Height: 5' 1\" (154.9 cm) Weight: 73.9 kg (162 lb 14.4 oz)    IBW (%IBW):   ( ) UBW (%UBW):   (  %)    BMI: Body mass index is 30.78 kg/(m^2). This BMI is indicative of:  []Underweight   []Normal   []Overweight   [x] Obesity   [] Extreme Obesity (BMI>40)  Last Weight Metrics:  Weight Loss Metrics 2/19/2018 5/31/2017 5/23/2017 5/8/2017 12/13/2016 6/27/2016 5/2/2016   Today's Wt 162 lb 14.4 oz 156 lb 156 lb 156 lb 155 lb 136 lb 136 lb   BMI 30.78 kg/m2 29.48 kg/m2 29.48 kg/m2 30.47 kg/m2 30.27 kg/m2 26.56 kg/m2 26.56 kg/m2       Estimated Nutrition Needs (Based on): 1463 Kcals/day (MSJ 1219 x 1.2) , 61 g (-77 (0.8-1gPro/kg)) Protein  Carbohydrate: At Least 130 g/day  Fluids: 1465 mL/day or per primary team    NUTRITION DIAGNOSES:   Problem:  Self-feeding difficulty      Etiology: related to Acute encephalopathy      Signs/Symptoms: as evidenced by total feed      NUTRITION INTERVENTIONS:  Meals/Snacks: General/healthful diet Enteral/Parenteral Nutrition: Modify rate, concentration, composition, and schedule Supplements: Commercial supplement              GOAL:   consume >50% of meals and ONS in 3-5 days    NUTRITION MONITORING AND EVALUATION      Food/Nutrient Intake Outcomes:  Total energy intake, Liquid meal replacement  Physical Signs/Symptoms Outcomes: GI, Glucose profile, GI profile, Electrolyte and renal profile, Weight/weight change    Previous Goal Met:   [x] Met              [] Progressing Towards Goal              [] Not Progressing Towards Goal   Previous Recommendations:   [x] Implemented          [] Not Implemented          [] Not Applicable    LEARNING NEEDS (Diet, Food/Nutrient-Drug Interaction):    [x] None Identified   [] Identified and Education Provided/Documented   [] Identified and Pt declined/was not appropriate     Cultural, Mormon, OR Ethnic Dietary Needs:    [x] None Identified   [] Identified and Addressed     [x] Interdisciplinary Care Plan Reviewed/Documented    [x] Discharge Planning: Consistency per SLP   [] Participated in Interdisciplinary Rounds    NUTRITION RISK:    [] High              [x] Moderate           []  Low  []  Minimal/Uncompromised      Santo Range, RDN  Pager [phone]  Weekend Pager [phone]

## 2018-02-22 NOTE — PROGRESS NOTES
Problem: Self Care Deficits Care Plan (Adult)  Goal: *Acute Goals and Plan of Care (Insert Text)  Occupational Therapy Goals  Initiated 2/19/2018  1. Patient will perform grooming in bed or supported sitting with moderate assistance  within 7 day(s). 2.  Patient will perform upper body dressing with moderate assistance  within 7 day(s). 3.  Patient will sit edge of bed for 5 minutes with moderate assistance for participation in functional task within 7 day(s). 4.  Patient will participate in assessment of functional transfers and establish goals as appropriate within 7 day(s). 5.  Patient will participate in upper extremity therapeutic exercises with moderate cues/assist for 10 minutes within 7 day(s). Occupational Therapy TREATMENT  Patient: Mikey Suarez (28 y.o. female)  Date: 2/22/2018  Diagnosis: Sepsis ([facility] [address]  UTI (urinary tract infection)  Hx of systemic lupus erythematosus (SLE)  Hx of diabetes mellitus  Hx of essential hypertension  Mental retardation <principal problem not specified>       Precautions: Fall    ASSESSMENT:  Patient alert, but non-verbal and minimally following 1 step commands with max cues. Overall she is total assist for bed mobility, unable to stand, is min A for self feeding and max A for grooming. Patient demonstrating poor sitting balance, leaning posteriorly and holding knees in extension during EOB sitting, requiring patient to be returned to supine soon after. At this point patient is minimally able to participate in OT and her frequency will be reduced to 2 times per week. Progression toward goals:  []       Improving appropriately and progressing toward goals  []       Improving slowly and progressing toward goals  [x]       Not making progress toward goals and plan of care will be adjusted     PLAN: Reduce frequency to 2 times per week, increasing it in the future if more able to effectively participate.    Patient continues to benefit from skilled intervention to address the above impairments. Continue treatment per established plan of care. Discharge Recommendations:  SNF VS. LTC  Further Equipment Recommendations for Discharge:  TBD         OBJECTIVE DATA SUMMARY:   Cognitive/Behavioral Status:  Neurologic State: Alert  Orientation Level: Unable to verbalize (Patient appears to be non-verbal)  Cognition: Decreased attention/concentration;Decreased command following           Functional Mobility and Transfers for ADLs:  Bed Mobility:  Rolling: Total assistance  Supine to Sit: Total assistance  Sit to Supine: Total assistance  Scooting: Total assistance  Transfers:         Balance:  Sitting: Impaired  Sitting - Static: Poor (constant support) (constant posterior lean with knees in extension)  Sitting - Dynamic: Not tested  Standing:  (unable)  ADL Intervention:  Self feeding and grooming performed in supine with HOB raised    Feeding  Utensil Management: Moderate assistance  Food to Mouth: Minimum assistance  Drink to Mouth: Minimum assistance  Cues: Tactile cues provided;Verbal cues provided;Visual cues provided    Grooming  Washing Face: Maximum assistance  Cues: Tactile cues provided;Verbal cues provided;Visual cues provided       Cognitive Retraining  Following Commands:  Follows one step commands/directions (less than 25% of the time with max cueing)  Cues: Tactile cues provided;Verbal cues provided;Visual cues provided     Pain:  Pain Scale 1: Numeric (0 - 10)  Pain Intensity 1: 0 (pt could not rate)  Pain Location 1: Generalized;Leg        Pain Intervention(s) 1: Nurse notified    After treatment:   [] Patient left in no apparent distress sitting up in chair  [x] Patient left in no apparent distress in bed  [x] Call bell left within reach  [x] Nursing notified  [] Caregiver present  [] Bed alarm activated    COMMUNICATION/COLLABORATION:   The patients plan of care was discussed with: Physical Therapy Assistant and Registered Nurse    Lew Juarez Pod, OTR/L  Time Calculation: 18 mins

## 2018-02-22 NOTE — WOUND CARE
Wound care Nurse Consult from staff nurse for \" excor between upper thigh ans sacrum, poss from incont\". Patient is a 73 y/o AAF admitted with UTI who is incontinent of urine and stool. With   Past Medical History:   Diagnosis Date    Arthritis     Colon polyp     Diabetes ([facility] [address]     borderline no medications    Environmental allergies 4/28/2010    GERD (gastroesophageal reflux disease)     HTN (hypertension) 4/28/2010    dosent take medication now    Lupus     MR (mental retardation)     Osteoporosis, senile     Other ill-defined conditions     parkinson's disease possibly    Psychiatric disorder     anxious    PUD (peptic ulcer disease)     Scolioses     Sjogren's syndrome ([facility] [address] 3/4/2015     She has incontinence MASD to buttocks and thighs. Recommend:   Incontinence Moisture Associated Skin Damage (MASD): cleanse gently with soap and water down to clean cream. Apply ES Desitin to skin to help heal and protect. Keep patient on blue and white incontinence chuxs.  Avoid diapers!!!    Jose Swanson RN, [employer]

## 2018-02-22 NOTE — PROGRESS NOTES
Oncology Nursing Communication Tool  7:36 AM  2/22/2018     Bedside shift change report given to Winnie Thomas RN (incoming nurse) by Maren Briceno (outgoing nurse) on [facility]. Report included the following information SBAR, Kardex, Intake/Output, MAR and Recent Results. Shift Summary: no change, slept through the night. Large BM this morning. Issues for physician to address: d/c, placement       Oncology Shift Note   Admission Date 2/2/2018   Admission Diagnosis Sepsis ([address]  UTI (urinary tract infection)  Hx of systemic lupus erythematosus (SLE)  Hx of diabetes mellitus  Hx of essential hypertension  Mental retardation   Code Status Partial Code   Consults IP CONSULT TO NEUROLOGY  IP CONSULT TO CARDIOLOGY      Cardiac Monitoring [] Yes [x] No      Purposeful Hourly Rounding [x] Yes    Cole Score Total Score: 4   Cole score 3 or > [] Bed Alarm [] Avasys [] 1:1 sitter [] Patient refused (Place signed refusal form in chart)      Pain Managed [x] Yes [] No    Key Pain Meds             acetaminophen (TYLENOL ARTHRITIS PAIN) 650 mg TbER  (Taking) Take 650 mg by mouth every eight (8) hours as needed for Pain. Influenza Vaccine Received Flu Vaccine for Current Season (usually Sept-March): Yes           Oxygen needs?  [x] Room air Oxygen @  []1L    []2L    []3L   []4L    []5L   []6L     Use home O2? [] Yes [] No  Perform O2 challenge test using  smartphrase (.oxygenchallenge)      Last bowel movement Last Bowel Movement Date: 02/20/18  bowel movement      Urinary Catheter [REMOVED] Urinary Catheter 02/02/18 [facility] - Temperature-Criteria for Appropriate Use: Medically/surgically unstable  [REMOVED] Urinary Catheter 02/15/18 [facility]-Criteria for Appropriate Use: Obstruction/retention     External Female Catheter 02/15/18-Urine Output (mL): 150 ml  [REMOVED] Urinary Catheter 02/02/18 [facility] - Temperature-Urine Output (mL): 10 ml  [REMOVED] External Female Catheter 02/03/18-Urine Output (mL): 0 ml  [REMOVED] Urinary Catheter 02/15/18 [facility]-Urine Output (mL): 150 ml     LDAs         PICC Triple Lumen 02/05/18 Right;Brachial (Active)   Central Line Being Utilized Yes 2/22/2018  3:08 AM   Criteria for Appropriate Use Limited/no vessel suitable for conventional peripheral access 2/22/2018  3:08 AM   Site Assessment Clean, dry, & intact 2/22/2018  3:08 AM   Phlebitis Assessment 0 2/22/2018  3:08 AM   Infiltration Assessment 0 2/22/2018  3:08 AM   Arm Circumference (cm) 0 cm 2/6/2018 12:00 AM   Date of Last Dressing Change 02/21/18 2/22/2018  3:08 AM   Dressing Status Clean, dry, & intact 2/22/2018  3:08 AM   External Catheter Length (cm) 0 centimeters 2/21/2018  8:29 PM   Dressing Type Disk with Chlorhexadine gluconate (CHG); Transparent 2/22/2018  3:08 AM   Action Taken Blood drawn 2/22/2018  3:08 AM   Hub Color/Line Status Gray;Flushed;Patent 2/22/2018  3:08 AM   Positive Blood Return (Site #1) Yes 2/22/2018  3:08 AM   Hub Color/Line Status Red;Flushed;Patent 2/22/2018  3:08 AM   Positive Blood Return (Site #2) Yes 2/22/2018  3:08 AM   Hub Color/Line Status White;Flushed;Patent 2/22/2018  3:08 AM   Positive Blood Return (Site #3) Yes 2/22/2018  3:08 AM   Alcohol Cap Used Yes 2/22/2018  3:08 AM                External Female Catheter 02/15/18 (Active)   Site Assessment Clean, dry, & intact 2/22/2018  3:08 AM   Repositioned Yes 2/22/2018  3:08 AM   Perineal Care Yes 2/22/2018  3:08 AM   Wick Changed Yes 2/22/2018  3:08 AM   Suction Canister/Tubing Changed Yes 2/22/2018  3:08 AM   Urine Output (mL) 150 ml 2/19/2018  5:06 AM                   Readmission Risk Assessment Tool Score Low Risk            12       Total Score        3 Patient Length of Stay (>5 days = 3)    5 Pt. Coverage (Medicare=5 , Medicaid, or Self-Pay=4)    4 Charlson Comorbidity Score (Age + Comorbid Conditions)        Criteria that do not apply:    Has Seen PCP in Last 6 Months (Yes=3, No=0)    . Living with Significant Other. Assisted Living. LTAC. SNF.  or   Rehab    IP Visits Last 12 Months (1-3=4, 4=9, >4=11)       Expected Length of Stay 4d 21h   Actual Length of Stay [address]

## 2018-02-22 NOTE — PROGRESS NOTES
Hospitalist Progress Note    NAME: Osman Bass   :  1945   MRN:  [id number]     Admission summary:   72year old female with history of mental retardation, SLE, DM-2, HTN who normally resides in a group home, presented on 18 with decreased responsiveness. She had been diagnosed with acute influenza infection along with urinary tract infection with associated septic shock. Assessment / Plan:  1. Acute toxic-metabolic encephalopathy and septic shock due to pan-susecptible E. Coli UTI and Influenza B (POA)   - MRI brain 2/3 - mild small vessel ischemic changes. No acute abnormality. - CT chest/abd/pelvis  - severe scoliosis. Atelectasis in the lower lobes, left greater than right. Anemia. Atherosclerotic abdominal aorta without aneurysm. - urine culture  - pansensitive E Coli   - blood cultures  - no growth   - she had been treated with a course of Zosyn to completion in the hospital   - influenza B positive  - she had additionally been treated with a course of oseltamivir to completion here   - seen by neurology, recommending continuation of Keppra   - diet advanced to pureed    2. Acute hypoxic respiratory failure secondary to acute pulmonary edema with acute on chronic diastolic heart failure   - Echo 18  - \"Left ventricle: Ejection fraction was estimated in the range of 60 %. No obvious wall motion abnormalities identified in the views obtained. There was mild concentric hypertrophy. Features were consistent with a pseudonormal left ventricular filling pattern, with concomitant abnormal relaxation and increased filling pressure (grade 2 diastolic dysfunction). \"   - suspect likely triggered by influenza / treatment for UTI   - seen by pulmonology, she had briefly required vasopressor support, which was subsequently weaned off    - now weaned off O2    3. Resolved TIFFANIE   - Cr peak of 1.55 on , subsequently downtrending    4.   Hypernatremia    - suspect secondary to insufficient oral fluid intake, encouraging patient to drink fluids, given IV fluids on 2/21 with improvement    5. Hypokalemia   - replete as needed    6. Acute on chronic blood loss anemia   - Hgb appears to be fairly stable without evident bleed on examination   - continue to monitor    7. Hypoglycemia   - reported history of \"borderline DM,\" not on meds at home.     - HgA1c <3.5   - discontinued POC glucose monitoring; continue bedtime snack       8. Elevated Transaminases   - presumed in setting of sepsis, generally improving    9. Mental retardation / cognitive impairment   - seen by PT/OT, recommending SNF    10. SLE   - holding home plaquenil    11. Hypothyroidism   - continue home levothyroxine     12. Obesity (Body mass index is 32.45 kg/(m^2)      Code Status: PARTIAL CODE - ACLS meds only, no compressions, no intubation  Surrogate Decision Maker: sister   DVT Prophylaxis: heparin      Baseline: Mental retardation, lives with sister normally but placed in group home after sister had to undergo recent back surgery  Disposition: needs SNF, awaiting bed offers     Subjective:     Chief Complaint / Reason for Physician Visit:   Patient non-verbal, which is reported as her baseline. Awaiting bed offers. No acute events reported overnight. She does not appear distressed. Review of Systems:  Symptom Y/N Comments  Symptom Y/N Comments   Fever/Chills    Chest Pain     Poor Appetite    Edema     Cough    Abdominal Pain     Sputum    Joint Pain     SOB/YOUNG    Pruritis/Rash     Nausea/vomit    Tolerating PT/OT     Diarrhea    Tolerating Diet     Constipation    Other       Could NOT obtain due to: MR, non-verbal     Objective:     VITALS:   Last 24hrs VS reviewed since prior progress note.  Most recent are:  Patient Vitals for the past 24 hrs:   Temp Pulse Resp BP SpO2   02/22/18 0023 97.9 °F (36.6 °C) 82 18 112/78 100 %   02/21/18 2042 98.1 °F (36.7 °C) 91 18 122/66 100 %   02/21/18 1609 97.3 °F (36.3 °C) 99 18 122/44 98 %   02/21/18 0755 97.3 °F (36.3 °C) 99 18 140/81 95 %       Intake/Output Summary (Last 24 hours) at 02/22/18 0326  Last data filed at 02/21/18 1814   Gross per 24 hour   Intake                0 ml   Output              575 ml   Net             -575 ml        PHYSICAL EXAM:  General:                    Non-verbal, no acute distress; severe kyphoscolosis  EENT:                       Anicteric sclerae, PERRL  Resp:                        CTABL without appreciable wheeze/rhonchi/rales  CV:                            Regular rhythm with normal rate during ascultation,  No edema  GI:                              Soft, Non distended, Non tender.  +Bowel sounds  Neurologic:                Alert but does not speak, does not follow any commands  Psych:                       No insight  Skin:                          No rashes.  No jaundice    Reviewed most current lab test results and cultures  YES  Reviewed most current radiology test results   YES  Review and summation of old records today    NO  Reviewed patient's current orders and MAR    YES  PMH/ reviewed - no change compared to H&P  ________________________________________________________________________  Care Plan discussed with:    Comments   Patient x    Family      RN x    Care Manager     Consultant                        Multidiciplinary team rounds were held today with , nursing, pharmacist and clinical coordinator. Patient's plan of care was discussed; medications were reviewed and discharge planning was addressed.      ________________________________________________________________________  Total NON critical care TIME:  25   Minutes    Total CRITICAL CARE TIME Spent:   Minutes non procedure based      Comments   >50% of visit spent in counseling and coordination of care x dispo planning   ________________________________________________________________________  True Quiñonez MD     Procedures: see electronic medical records for all procedures/Xrays and details which were not copied into this note but were reviewed prior to creation of Plan. LABS:  I reviewed today's most current labs and imaging studies. Pertinent labs include:  No results for input(s): WBC, HGB, HCT, PLT, HGBEXT, HCTEXT, PLTEXT, HGBEXT, HCTEXT, PLTEXT in the last 72 hours.   Recent Labs      02/22/18   0510  02/21/18   0637  02/20/18   0536   NA  146*  147*  146*   K  4.1  3.9  4.2   CL  110*  113*  113*   CO2  29  29  27   GLU  69  69  79   BUN  14  12  13   CREA  1.15*  1.06*  1.03*   CA  8.6  8.3*  8.6       Signed: Edson Kelly MD

## 2018-02-22 NOTE — INTERDISCIPLINARY ROUNDS
Oncology Interdisciplinary rounds were held today to discuss patient plan of care and outcomes. The following members were present: Nursing, Case Management, Pharmacy and Dietary.     Actual Length of Stay: 20    DRG GLOS: 4.9    Expected Length of Stay: 4d 21h                Plan for Day        Mobility        Plan for Stay      Plan for Way   Work with PT Work with PT  Bed Bound Continue treatment plan TBD

## 2018-02-23 LAB
GLUCOSE BLD STRIP.AUTO-MCNC: 101 MG/DL (ref 65–100)
GLUCOSE BLD STRIP.AUTO-MCNC: 68 MG/DL (ref 65–100)
GLUCOSE BLD STRIP.AUTO-MCNC: 73 MG/DL (ref 65–100)
GLUCOSE BLD STRIP.AUTO-MCNC: 86 MG/DL (ref 65–100)
GLUCOSE BLD STRIP.AUTO-MCNC: 93 MG/DL (ref 65–100)
SERVICE CMNT-IMP: ABNORMAL
SERVICE CMNT-IMP: NORMAL

## 2018-02-23 PROCEDURE — 74011250637 HC RX REV CODE- 250/637: Performed by: INTERNAL MEDICINE

## 2018-02-23 PROCEDURE — 65270000015 HC RM PRIVATE ONCOLOGY

## 2018-02-23 PROCEDURE — 82962 GLUCOSE BLOOD TEST: CPT

## 2018-02-23 RX ADMIN — METOCLOPRAMIDE 5 MG: 10 TABLET ORAL at 09:03

## 2018-02-23 RX ADMIN — Medication 10 ML: at 15:25

## 2018-02-23 RX ADMIN — MIDODRINE HYDROCHLORIDE 10 MG: 5 TABLET ORAL at 18:06

## 2018-02-23 RX ADMIN — LEVOTHYROXINE SODIUM 25 MCG: 50 TABLET ORAL at 07:05

## 2018-02-23 RX ADMIN — Medication 20 ML: at 15:25

## 2018-02-23 RX ADMIN — Medication 10 ML: at 07:05

## 2018-02-23 RX ADMIN — METOCLOPRAMIDE 5 MG: 10 TABLET ORAL at 18:08

## 2018-02-23 RX ADMIN — PANTOPRAZOLE SODIUM 40 MG: 40 TABLET, DELAYED RELEASE ORAL at 09:03

## 2018-02-23 RX ADMIN — METOCLOPRAMIDE 5 MG: 10 TABLET ORAL at 13:08

## 2018-02-23 RX ADMIN — Medication 10 ML: at 15:24

## 2018-02-23 NOTE — PROGRESS NOTES
I saw this pt yesterday and she is not able to participate in tx. At this point all I did was torture this poor women. She will not be an ambulator 2/2 to multiple contractions. Will discuss with PT & MD for possible d/c.

## 2018-02-23 NOTE — PROGRESS NOTES
Brief follow up visit on Oncology unit. PCT Saintclair Hampshire was feeding patient lunch. He shared that she usually does not engage too much with people. Her sister visits most evenings. Offered reassurance to patient. She did make eye contact for about a minute but made no response to my speaking to her. Will follow as able/requested.     Edythe Brunner, MPS, [facility], [address] [place] [address] Paging Service  [phone]

## 2018-02-23 NOTE — PROGRESS NOTES
Yesterday, CM spoke to patient's sister regarding placement options. Patient has referrals sent to multiple facilities for possible rehab placement. Patient has been refused to all except [facility] which is still pending.     CM will complete [facility] and send for Level 2 assessment today for possible placement.     If no facility willing to accept patient for rehab (Patient has [facility] and will need auth), patient's sister will take her back to her home or place her in group home.     Genet Mijares RN, BSN, [address] [place] [address] Oncology  [phone]    09  I contacted [facility] to see if they will accept. If they do, auth still would need to be obtained which takes 2 business days.   [place], RODRIGUEZ -[phone]

## 2018-02-23 NOTE — PROGRESS NOTES
Hospitalist Progress Note    NAME: Tom Rao   :  1945   MRN:  [id number]     Admission summary:   72year old female with history of mental retardation, SLE, DM-2, HTN who normally resides in a group home, presented on 18 with decreased responsiveness. She had been diagnosed with acute influenza infection along with urinary tract infection with associated septic shock. Assessment / Plan:  1. Acute toxic-metabolic encephalopathy and septic shock due to pan-susecptible E. Coli UTI and Influenza B (POA)   - MRI brain 2/3 - mild small vessel ischemic changes. No acute abnormality. - CT chest/abd/pelvis  - severe scoliosis. Atelectasis in the lower lobes, left greater than right. Anemia. Atherosclerotic abdominal aorta without aneurysm. - urine culture  - pansensitive E Coli   - blood cultures  - no growth   - she had been treated with a course of Zosyn to completion in the hospital   - influenza B positive  - she had additionally been treated with a course of oseltamivir to completion here   - seen by neurology, recommending continuation of Keppra   - diet advanced to pureed    2. Acute hypoxic respiratory failure secondary to acute pulmonary edema with acute on chronic diastolic heart failure   - Echo 18  - \"Left ventricle: Ejection fraction was estimated in the range of 60 %. No obvious wall motion abnormalities identified in the views obtained. There was mild concentric hypertrophy. Features were consistent with a pseudonormal left ventricular filling pattern, with concomitant abnormal relaxation and increased filling pressure (grade 2 diastolic dysfunction). \"   - suspect likely triggered by influenza / treatment for UTI   - seen by pulmonology, she had briefly required vasopressor support, which was subsequently weaned off    - now weaned off O2    3. Resolved TIFFANIE   - Cr peak of 1.55 on , subsequently downtrending    4.   Hypernatremia    - suspect secondary to insufficient oral fluid intake, encouraging patient to drink fluids, given IV fluids on 2/21 with improvement    5. Hypokalemia   - replete as needed    6. Acute on chronic blood loss anemia   - Hgb appears to be fairly stable without evident bleed on examination   - continue to monitor    7. Hypoglycemia   - reported history of \"borderline DM,\" not on meds at home.     - HgA1c <3.5   - discontinued POC glucose monitoring; continue bedtime snack       8. Elevated Transaminases   - presumed in setting of sepsis, generally improving    9. Mental retardation / cognitive impairment   - seen by PT/OT, recommending SNF    10. SLE   - holding home plaquenil    11. Hypothyroidism   - continue home levothyroxine     12. Obesity (Body mass index is 32.45 kg/(m^2)      Code Status: PARTIAL CODE - ACLS meds only, no compressions, no intubation  Surrogate Decision Maker: sister   DVT Prophylaxis: heparin      Baseline: Mental retardation, lives with sister normally but placed in group home after sister had to undergo recent back surgery  Disposition: needs SNF, awaiting bed offers     Subjective:     Chief Complaint / Reason for Physician Visit:   No acute events reported overnight. Patient is non-verbal, does track movements around the room, but does not provide any meaningful history for me. We are still awaiting placement. Review of Systems:  Symptom Y/N Comments  Symptom Y/N Comments   Fever/Chills    Chest Pain     Poor Appetite    Edema     Cough    Abdominal Pain     Sputum    Joint Pain     SOB/YOUNG    Pruritis/Rash     Nausea/vomit    Tolerating PT/OT     Diarrhea    Tolerating Diet     Constipation    Other       Could NOT obtain due to: MR, non-verbal     Objective:     VITALS:   Last 24hrs VS reviewed since prior progress note.  Most recent are:  Patient Vitals for the past 24 hrs:   Temp Pulse Resp BP SpO2   02/23/18 0900 97.5 °F (36.4 °C) 75 18 119/81 99 %   02/22/18 2307 98 °F (36.7 °C) 97 18 118/62 97 %   02/22/18 1923 96.9 °F (36.1 °C) 85 18 133/59 98 %   02/22/18 1634 98.7 °F (37.1 °C) 79 18 136/70 96 %       Intake/Output Summary (Last 24 hours) at 02/23/18 1539  Last data filed at 02/23/18 1500   Gross per 24 hour   Intake                0 ml   Output              500 ml   Net             -500 ml        PHYSICAL EXAM:  General:                    Non-verbal, no acute distress; severe kyphoscolosis  EENT:                       Anicteric sclerae, PERRL  Resp:                        CTABL without appreciable wheeze/rhonchi/rales  CV:                            Regular rhythm with normal rate during ascultation,  No edema  GI:                              Soft, Non distended, Non tender.  +Bowel sounds  Neurologic:                Alert but does not speak, does not follow any commands  Psych:                       No insight  Skin:                          No rashes.  No jaundice    Reviewed most current lab test results and cultures  YES  Reviewed most current radiology test results   YES  Review and summation of old records today    NO  Reviewed patient's current orders and MAR    YES  PMH/ reviewed - no change compared to H&P  ________________________________________________________________________  Care Plan discussed with:    Comments   Patient x    Family      RN x    Care Manager     Consultant                        Multidiciplinary team rounds were held today with , nursing, pharmacist and clinical coordinator. Patient's plan of care was discussed; medications were reviewed and discharge planning was addressed.      ________________________________________________________________________  Total NON critical care TIME:  25   Minutes    Total CRITICAL CARE TIME Spent:   Minutes non procedure based      Comments   >50% of visit spent in counseling and coordination of care x dispo planning   ________________________________________________________________________  Davin Staley MD Procedures: see electronic medical records for all procedures/Xrays and details which were not copied into this note but were reviewed prior to creation of Plan. LABS:  I reviewed today's most current labs and imaging studies. Pertinent labs include:  No results for input(s): WBC, HGB, HCT, PLT, HGBEXT, HCTEXT, PLTEXT, HGBEXT, HCTEXT, PLTEXT in the last 72 hours.   Recent Labs      02/22/18   0510  02/21/18   0637   NA  146*  147*   K  4.1  3.9   CL  110*  113*   CO2  29  29   GLU  69  69   BUN  14  12   CREA  1.15*  1.06*   CA  8.6  8.3*       Signed: Jean Carlos Ward MD

## 2018-02-23 NOTE — PROGRESS NOTES
Oncology Nursing Communication Tool  0715 AM  2/23/2018     Bedside shift change report given to Sunday Limon RN (incoming nurse) by Abdias Holguin RN (outgoing nurse) on [facility]. Report included the following information SBAR. Shift Summary:       Issues for physician to address: Oncology Shift Note   Admission Date 2/2/2018   Admission Diagnosis Sepsis ([address]  UTI (urinary tract infection)  Hx of systemic lupus erythematosus (SLE)  Hx of diabetes mellitus  Hx of essential hypertension  Mental retardation   Code Status Partial Code   Consults IP CONSULT TO NEUROLOGY  IP CONSULT TO CARDIOLOGY      Cardiac Monitoring [] Yes [] No      Purposeful Hourly Rounding [] Yes    Cole Score Total Score: 4   Cole score 3 or > [] Bed Alarm [] Avasys [] 1:1 sitter [] Patient refused (Place signed refusal form in chart)      Pain Managed [] Yes [] No    Key Pain Meds             acetaminophen (TYLENOL ARTHRITIS PAIN) 650 mg TbER  (Taking) Take 650 mg by mouth every eight (8) hours as needed for Pain. Influenza Vaccine Received Flu Vaccine for Current Season (usually Sept-March): Yes           Oxygen needs?  [] Room air Oxygen @  []1L    []2L    []3L   []4L    []5L   []6L     Use home O2? [] Yes [] No  Perform O2 challenge test using  smartphrase (.oxygenchallenge)      Last bowel movement Last Bowel Movement Date: 02/22/18  bowel movement      Urinary Catheter [REMOVED] Urinary Catheter 02/02/18 [facility] - Temperature-Criteria for Appropriate Use: Medically/surgically unstable  [REMOVED] Urinary Catheter 02/15/18 [facility]-Criteria for Appropriate Use: Obstruction/retention     External Female Catheter 02/15/18-Urine Output (mL): 150 ml  [REMOVED] Urinary Catheter 02/02/18 [facility] - Temperature-Urine Output (mL): 10 ml  [REMOVED] External Female Catheter 02/03/18-Urine Output (mL): 0 ml  [REMOVED] Urinary Catheter 02/15/18 [facility]-Urine Output (mL): 150 ml     LDAs         PICC Triple Lumen 02/05/18 Right;Brachial (Active)   Central Line Being Utilized Yes 2/22/2018 11:07 PM   Criteria for Appropriate Use Limited/no vessel suitable for conventional peripheral access 2/22/2018 11:07 PM   Site Assessment Clean, dry, & intact 2/22/2018  3:00 PM   Phlebitis Assessment 0 2/22/2018 11:07 PM   Infiltration Assessment 0 2/22/2018 11:07 PM   Arm Circumference (cm) 0 cm 2/6/2018 12:00 AM   Date of Last Dressing Change 02/21/18 2/22/2018 11:07 PM   Dressing Status Clean, dry, & intact 2/22/2018 11:07 PM   External Catheter Length (cm) 0 centimeters 2/21/2018  8:29 PM   Dressing Type Disk with Chlorhexadine gluconate (CHG); Tape;Transparent 2/22/2018 11:07 PM   Action Taken Blood drawn 2/22/2018  3:08 AM   Hub Color/Line Status Gray;Capped;Flushed;Patent 2/22/2018 11:07 PM   Positive Blood Return (Site #1) Yes 2/22/2018 11:07 PM   Hub Color/Line Status Red;Capped;Flushed;Patent 2/22/2018 11:07 PM   Positive Blood Return (Site #2) Yes 2/22/2018 11:07 PM   Hub Color/Line Status White;Capped;Flushed;Patent 2/22/2018 11:07 PM   Positive Blood Return (Site #3) Yes 2/22/2018 11:07 PM   Alcohol Cap Used Yes 2/22/2018 11:07 PM                External Female Catheter 02/15/18 (Active)   Site Assessment Clean, dry, & intact 2/22/2018 11:07 PM   Repositioned No 2/22/2018 11:07 PM   Perineal Care No 2/22/2018 11:07 PM   Wick Changed No 2/22/2018 11:07 PM   Suction Canister/Tubing Changed No 2/22/2018 11:07 PM   Urine Output (mL) 150 ml 2/19/2018  5:06 AM                   Readmission Risk Assessment Tool Score Low Risk            12       Total Score        3 Patient Length of Stay (>5 days = 3)    5 Pt. Coverage (Medicare=5 , Medicaid, or Self-Pay=4)    4 Charlson Comorbidity Score (Age + Comorbid Conditions)        Criteria that do not apply:    Has Seen PCP in Last 6 Months (Yes=3, No=0)    . Living with Significant Other. Assisted Living. LTAC. SNF.  or   Rehab    IP Visits Last 12 Months (1-3=4, 4=9, >4=11) Expected Length of Stay 4d 21h   Actual Length of Stay 2301 Maximo Box, RN

## 2018-02-24 LAB
ANION GAP SERPL CALC-SCNC: 5 MMOL/L (ref 5–15)
BUN SERPL-MCNC: 12 MG/DL (ref 6–20)
BUN/CREAT SERPL: 10 (ref 12–20)
CALCIUM SERPL-MCNC: 8.8 MG/DL (ref 8.5–10.1)
CHLORIDE SERPL-SCNC: 107 MMOL/L (ref 97–108)
CO2 SERPL-SCNC: 31 MMOL/L (ref 21–32)
CREAT SERPL-MCNC: 1.24 MG/DL (ref 0.55–1.02)
GLUCOSE BLD STRIP.AUTO-MCNC: 101 MG/DL (ref 65–100)
GLUCOSE BLD STRIP.AUTO-MCNC: 130 MG/DL (ref 65–100)
GLUCOSE BLD STRIP.AUTO-MCNC: 147 MG/DL (ref 65–100)
GLUCOSE BLD STRIP.AUTO-MCNC: 78 MG/DL (ref 65–100)
GLUCOSE BLD STRIP.AUTO-MCNC: 79 MG/DL (ref 65–100)
GLUCOSE SERPL-MCNC: 68 MG/DL (ref 65–100)
POTASSIUM SERPL-SCNC: 4.6 MMOL/L (ref 3.5–5.1)
SERVICE CMNT-IMP: ABNORMAL
SERVICE CMNT-IMP: NORMAL
SERVICE CMNT-IMP: NORMAL
SODIUM SERPL-SCNC: 143 MMOL/L (ref 136–145)

## 2018-02-24 PROCEDURE — 74011250637 HC RX REV CODE- 250/637: Performed by: INTERNAL MEDICINE

## 2018-02-24 PROCEDURE — 77030038269 HC DRN EXT URIN PURWCK BARD -A

## 2018-02-24 PROCEDURE — 80048 BASIC METABOLIC PNL TOTAL CA: CPT | Performed by: STUDENT IN AN ORGANIZED HEALTH CARE EDUCATION/TRAINING PROGRAM

## 2018-02-24 PROCEDURE — 36415 COLL VENOUS BLD VENIPUNCTURE: CPT | Performed by: STUDENT IN AN ORGANIZED HEALTH CARE EDUCATION/TRAINING PROGRAM

## 2018-02-24 PROCEDURE — 65270000015 HC RM PRIVATE ONCOLOGY

## 2018-02-24 PROCEDURE — 82962 GLUCOSE BLOOD TEST: CPT

## 2018-02-24 RX ORDER — HYDRALAZINE HYDROCHLORIDE 20 MG/ML
10 INJECTION INTRAMUSCULAR; INTRAVENOUS
Status: DISCONTINUED | OUTPATIENT
Start: 2018-02-24 | End: 2018-03-12 | Stop reason: HOSPADM

## 2018-02-24 RX ADMIN — Medication 30 ML: at 23:03

## 2018-02-24 RX ADMIN — Medication 10 ML: at 05:03

## 2018-02-24 RX ADMIN — METOCLOPRAMIDE 5 MG: 10 TABLET ORAL at 23:00

## 2018-02-24 RX ADMIN — METOCLOPRAMIDE 5 MG: 10 TABLET ORAL at 00:22

## 2018-02-24 RX ADMIN — MIDODRINE HYDROCHLORIDE 10 MG: 5 TABLET ORAL at 23:00

## 2018-02-24 RX ADMIN — Medication 10 ML: at 14:18

## 2018-02-24 RX ADMIN — Medication 20 ML: at 14:18

## 2018-02-24 RX ADMIN — PANTOPRAZOLE SODIUM 40 MG: 40 TABLET, DELAYED RELEASE ORAL at 10:38

## 2018-02-24 RX ADMIN — Medication 10 ML: at 00:23

## 2018-02-24 RX ADMIN — MIDODRINE HYDROCHLORIDE 10 MG: 5 TABLET ORAL at 10:39

## 2018-02-24 RX ADMIN — MIDODRINE HYDROCHLORIDE 10 MG: 5 TABLET ORAL at 17:00

## 2018-02-24 RX ADMIN — METOCLOPRAMIDE 5 MG: 10 TABLET ORAL at 17:00

## 2018-02-24 RX ADMIN — METOCLOPRAMIDE 5 MG: 10 TABLET ORAL at 10:38

## 2018-02-24 RX ADMIN — LEVOTHYROXINE SODIUM 25 MCG: 50 TABLET ORAL at 05:02

## 2018-02-24 RX ADMIN — MIDODRINE HYDROCHLORIDE 10 MG: 5 TABLET ORAL at 00:22

## 2018-02-24 RX ADMIN — Medication 10 ML: at 14:17

## 2018-02-24 NOTE — PROGRESS NOTES
Oncology Nursing Communication Tool  0720 AM  2/24/2018     Bedside shift change report given to DUSTIN Ly RN (incoming nurse) by Monica Bernard RN (outgoing nurse) on [place]. Report included the following information SBAR. Shift Summary:       Issues for physician to address: Oncology Shift Note   Admission Date 2/2/2018   Admission Diagnosis Sepsis ([address]  UTI (urinary tract infection)  Hx of systemic lupus erythematosus (SLE)  Hx of diabetes mellitus  Hx of essential hypertension  Mental retardation   Code Status Partial Code   Consults IP CONSULT TO NEUROLOGY  IP CONSULT TO CARDIOLOGY      Cardiac Monitoring [] Yes [] No      Purposeful Hourly Rounding [] Yes    Cole Score Total Score: 4   Cole score 3 or > [] Bed Alarm [] Avasys [] 1:1 sitter [] Patient refused (Place signed refusal form in chart)      Pain Managed [] Yes [] No    Key Pain Meds             acetaminophen (TYLENOL ARTHRITIS PAIN) 650 mg TbER  (Taking) Take 650 mg by mouth every eight (8) hours as needed for Pain. Influenza Vaccine Received Flu Vaccine for Current Season (usually Sept-March): Yes           Oxygen needs?  [] Room air Oxygen @  []1L    []2L    []3L   []4L    []5L   []6L     Use home O2? [] Yes [] No  Perform O2 challenge test using  smartphrase (.oxygenchallenge)      Last bowel movement Last Bowel Movement Date: 02/23/18  bowel movement      Urinary Catheter [REMOVED] Urinary Catheter 02/02/18 [facility] - Temperature-Criteria for Appropriate Use: Medically/surgically unstable  [REMOVED] Urinary Catheter 02/15/18 [facility]-Criteria for Appropriate Use: Obstruction/retention     External Female Catheter 02/15/18-Urine Output (mL): 500 ml  [REMOVED] Urinary Catheter 02/02/18 [facility] - Temperature-Urine Output (mL): 10 ml  [REMOVED] External Female Catheter 02/03/18-Urine Output (mL): 0 ml  [REMOVED] Urinary Catheter 02/15/18 [facility]-Urine Output (mL): 150 ml     LDAs         PICC Triple Lumen 02/05/18 Right;Brachial (Active)   Central Line Being Utilized Yes 2/24/2018  3:24 AM   Criteria for Appropriate Use Limited/no vessel suitable for conventional peripheral access 2/24/2018  3:24 AM   Site Assessment Clean, dry, & intact 2/24/2018  3:24 AM   Phlebitis Assessment 0 2/24/2018  3:24 AM   Infiltration Assessment 0 2/24/2018  3:24 AM   Arm Circumference (cm) 0 cm 2/6/2018 12:00 AM   Date of Last Dressing Change 02/21/18 2/24/2018  3:24 AM   Dressing Status Clean, dry, & intact 2/24/2018  3:24 AM   External Catheter Length (cm) 0 centimeters 2/21/2018  8:29 PM   Dressing Type Disk with Chlorhexadine gluconate (CHG); Tape;Transparent 2/24/2018  3:24 AM   Action Taken Blood drawn 2/22/2018  3:08 AM   Hub Color/Line Status Gray;Capped;Flushed;Patent 2/24/2018  3:24 AM   Positive Blood Return (Site #1) Yes 2/24/2018  3:24 AM   Hub Color/Line Status Red;Capped;Flushed;Patent 2/24/2018  3:24 AM   Positive Blood Return (Site #2) Yes 2/24/2018  3:24 AM   Hub Color/Line Status White;Capped;Flushed;Patent 2/24/2018  3:24 AM   Positive Blood Return (Site #3) Yes 2/24/2018  3:24 AM   Alcohol Cap Used Yes 2/24/2018  3:24 AM                External Female Catheter 02/15/18 (Active)   Site Assessment Clean, dry, & intact 2/24/2018  3:24 AM   Repositioned No 2/24/2018  3:24 AM   Perineal Care No 2/24/2018  3:24 AM   Wick Changed No 2/24/2018  3:24 AM   Suction Canister/Tubing Changed No 2/24/2018  3:24 AM   Urine Output (mL) 500 ml 2/23/2018  3:00 PM                   Readmission Risk Assessment Tool Score Low Risk            12       Total Score        3 Patient Length of Stay (>5 days = 3)    5 Pt. Coverage (Medicare=5 , Medicaid, or Self-Pay=4)    4 Charlson Comorbidity Score (Age + Comorbid Conditions)        Criteria that do not apply:    Has Seen PCP in Last 6 Months (Yes=3, No=0)    . Living with Significant Other. Assisted Living. LTAC. SNF.  or   Rehab    IP Visits Last 12 Months (1-3=4, 4=9, >4=11) Expected Length of Stay 4d 21h   Actual Length of Stay [address] [facility], RN

## 2018-02-24 NOTE — PROGRESS NOTES
Oncology Nursing Communication Tool  7:05 PM  2/23/2018     Bedside and Verbal shift change report given to Tiburcio Fung RN (incoming nurse) by Myla Galaviz (outgoing nurse) on [facility]. Report included the following information SBAR, Kardex, Procedure Summary, Intake/Output, MAR, Accordion and Recent Results. Shift Summary: Pt rested comfortably throughout shift      Issues for physician to address: discharge         Oncology Shift Note   Admission Date 2/2/2018   Admission Diagnosis Sepsis ([address]  UTI (urinary tract infection)  Hx of systemic lupus erythematosus (SLE)  Hx of diabetes mellitus  Hx of essential hypertension  Mental retardation   Code Status Partial Code   Consults IP CONSULT TO NEUROLOGY  IP CONSULT TO CARDIOLOGY      Cardiac Monitoring [] Yes [] No      Purposeful Hourly Rounding [] Yes    Cole Score Total Score: 4   Cole score 3 or > [] Bed Alarm [] Avasys [] 1:1 sitter [] Patient refused (Place signed refusal form in chart)      Pain Managed [] Yes [] No    Key Pain Meds             acetaminophen (TYLENOL ARTHRITIS PAIN) 650 mg TbER  (Taking) Take 650 mg by mouth every eight (8) hours as needed for Pain. Influenza Vaccine Received Flu Vaccine for Current Season (usually Sept-March): Yes           Oxygen needs?  [] Room air Oxygen @  []1L    []2L    []3L   []4L    []5L   []6L     Use home O2? [] Yes [] No  Perform O2 challenge test using  smartphrase (.oxygenchallenge)      Last bowel movement Last Bowel Movement Date: 02/23/18  bowel movement      Urinary Catheter [REMOVED] Urinary Catheter 02/02/18 [facility] - Temperature-Criteria for Appropriate Use: Medically/surgically unstable  [REMOVED] Urinary Catheter 02/15/18 [facility]-Criteria for Appropriate Use: Obstruction/retention     External Female Catheter 02/15/18-Urine Output (mL): 500 ml  [REMOVED] Urinary Catheter 02/02/18 [facility] - Temperature-Urine Output (mL): 10 ml  [REMOVED] External Female Catheter 02/03/18-Urine Output (mL): 0 ml  [REMOVED] Urinary Catheter 02/15/18 [facility]-Urine Output (mL): 150 ml     LDAs         PICC Triple Lumen 02/05/18 Right;Brachial (Active)   Central Line Being Utilized Yes 2/23/2018  8:59 AM   Criteria for Appropriate Use Limited/no vessel suitable for conventional peripheral access 2/23/2018  8:59 AM   Site Assessment Clean, dry, & intact 2/23/2018  8:59 AM   Phlebitis Assessment 0 2/23/2018  8:59 AM   Infiltration Assessment 0 2/23/2018  8:59 AM   Arm Circumference (cm) 0 cm 2/6/2018 12:00 AM   Date of Last Dressing Change 02/21/18 2/23/2018  8:59 AM   Dressing Status Clean, dry, & intact 2/23/2018  8:59 AM   External Catheter Length (cm) 0 centimeters 2/21/2018  8:29 PM   Dressing Type Disk with Chlorhexadine gluconate (CHG); Transparent;Tape 2/23/2018  8:59 AM   Action Taken Blood drawn 2/22/2018  3:08 AM   Hub Color/Line Status Gray;Flushed;Patent 2/23/2018  8:59 AM   Positive Blood Return (Site #1) Yes 2/22/2018 11:07 PM   Hub Color/Line Status Red;Capped;Flushed;Patent 2/23/2018  8:59 AM   Positive Blood Return (Site #2) Yes 2/22/2018 11:07 PM   Hub Color/Line Status White;Patent 2/23/2018  8:59 AM   Positive Blood Return (Site #3) Yes 2/22/2018 11:07 PM   Alcohol Cap Used Yes 2/23/2018  8:59 AM                External Female Catheter 02/15/18 (Active)   Site Assessment Clean, dry, & intact 2/23/2018  8:59 AM   Repositioned Yes 2/23/2018  8:59 AM   Perineal Care Yes 2/23/2018  8:59 AM   Wick Changed Yes 2/23/2018  8:59 AM   Suction Canister/Tubing Changed No 2/23/2018  8:59 AM   Urine Output (mL) 500 ml 2/23/2018  3:00 PM                   Readmission Risk Assessment Tool Score Low Risk            12       Total Score        3 Patient Length of Stay (>5 days = 3)    5 Pt. Coverage (Medicare=5 , Medicaid, or Self-Pay=4)    4 Charlson Comorbidity Score (Age + Comorbid Conditions)        Criteria that do not apply:    Has Seen PCP in Last 6 Months (Yes=3, No=0)    .  Living with Significant Other. Assisted Living. LTAC. SNF.  or   Rehab    IP Visits Last 12 Months (1-3=4, 4=9, >4=11)       Expected Length of Stay 4d 21h   Actual Length of Stay [place]

## 2018-02-24 NOTE — PROGRESS NOTES
Hospitalist Progress Note    NAME: Sandrine Wells   :  1945   MRN:  [id number]     Admission summary:   72year old female with history of mental retardation, SLE, DM-2, HTN who normally resides in a group home, presented on 18 with decreased responsiveness. She had been diagnosed with acute influenza infection along with urinary tract infection with associated septic shock. Assessment / Plan:    Waiting for placement    Elevated BP  Monitor. Hydralazine prn  If persistently high, will add low dose norvasc    Acute toxic-metabolic encephalopathy and septic shock due to pan-susecptible E. Coli UTI and Influenza B (POA)   - MRI brain 2/3 - mild small vessel ischemic changes. No acute abnormality. - CT chest/abd/pelvis  - severe scoliosis. Atelectasis in the lower lobes, left greater than right. Anemia. Atherosclerotic abdominal aorta without aneurysm. - urine culture  - pansensitive E Coli   - blood cultures  - no growth   - she had been treated with a course of Zosyn to completion in the hospital   - influenza B positive  - she had additionally been treated with a course of oseltamivir to completion here   - seen by neurology, recommending continuation of Keppra   - diet advanced to pureed      Acute hypoxic respiratory failure secondary to acute pulmonary edema with acute on chronic diastolic heart failure   - Echo 18  - \"Left ventricle: Ejection fraction was estimated in the range of 60 %. No obvious wall motion abnormalities identified in the views obtained. There was mild concentric hypertrophy. Features were consistent with a pseudonormal left ventricular filling pattern, with concomitant abnormal relaxation and increased filling pressure (grade 2 diastolic dysfunction). \"   - suspect likely triggered by influenza / treatment for UTI   - seen by pulmonology, she had briefly required vasopressor support, which was subsequently weaned off    - now weaned off O2 Resolved TIFFANIE   - Cr peak of 1.55 on 2/9, now 1.2      Hypernatremia    - suspect secondary to insufficient oral fluid intake, encouraging patient to drink fluids, given IV fluids on 2/21 with improvement          Acute on chronic blood loss anemia   - Hgb appears to be fairly stable without evident bleed on examination   - continue to monitor      Hypoglycemia   - reported history of \"borderline DM,\" not on meds at home.     - HgA1c <3.5   - discontinued POC glucose monitoring; continue bedtime snack         Elevated Transaminases   - presumed in setting of sepsis, generally improving     Mental retardation / cognitive impairment   - seen by PT/OT, recommending SNF      SLE   - holding home plaquenil     Hypothyroidism   - continue home levothyroxine       Obesity (Body mass index is 32.45 kg/(m^2)      Code Status: PARTIAL CODE - ACLS meds only, no compressions, no intubation  Surrogate Decision Maker: sister   DVT Prophylaxis: heparin      Baseline: Mental retardation, lives with sister normally but placed in group home after sister had to undergo recent back surgery  Disposition: needs SNF, awaiting bed offers     Subjective:     Chief Complaint / Reason for Physician Visit:   No acute events reported overnight. Review of Systems:  Symptom Y/N Comments  Symptom Y/N Comments   Fever/Chills    Chest Pain     Poor Appetite    Edema     Cough    Abdominal Pain     Sputum    Joint Pain     SOB/YOUNG    Pruritis/Rash     Nausea/vomit    Tolerating PT/OT     Diarrhea    Tolerating Diet     Constipation    Other       Could NOT obtain due to: MR, non-verbal     Objective:     VITALS:   Last 24hrs VS reviewed since prior progress note.  Most recent are:  Patient Vitals for the past 24 hrs:   Temp Pulse Resp BP SpO2   02/24/18 0900 97.8 °F (36.6 °C) 97 18 (!) 141/107 95 %   02/23/18 2354 99.7 °F (37.6 °C) (!) 114 18 (!) 144/97 100 %   02/23/18 2100 99 °F (37.2 °C) 78 18 107/75 97 %   02/23/18 1806 - 69 - 116/57 - 02/23/18 1616 98.1 °F (36.7 °C) 84 18 102/49 97 %       Intake/Output Summary (Last 24 hours) at 02/24/18 1508  Last data filed at 02/24/18 5659   Gross per 24 hour   Intake                0 ml   Output              450 ml   Net             -450 ml        PHYSICAL EXAM:  General:                    non verbal, no acute distress severe kyphoscolosis  EENT:                       aniecteric sclera, PERRLA  Resp:                        CTABL without appreciable wheeze/rhonchi/rales  CV:                            Regular rhythm with normal rate during ascultation,   No edema  GI:                              Soft, Non distended, Non tender.  +Bowel sounds  Neurologic:                Alert but does not speak, does not follow any commands  Psych:                       No insight  Skin:                          No rashes.  No jaundice    Reviewed most current lab test results and cultures  YES  Reviewed most current radiology test results   YES  Review and summation of old records today    NO  Reviewed patient's current orders and MAR    YES  PMH/ reviewed - no change compared to H&P  ________________________________________________________________________  Care Plan discussed with:    Comments   Patient     Family      RN y    Care Manager     Consultant                        Multidiciplinary team rounds were held today with , nursing, pharmacist and clinical coordinator. Patient's plan of care was discussed; medications were reviewed and discharge planning was addressed.      ________________________________________________________________________  Total NON critical care TIME:  20  Minutes    Total CRITICAL CARE TIME Spent:   Minutes non procedure based      Comments   >50% of visit spent in counseling and coordination of care x dispo planning   ________________________________________________________________________  Ni Morrison MD     Procedures: see electronic medical records for all procedures/Xrays and details which were not copied into this note but were reviewed prior to creation of Plan. LABS:  I reviewed today's most current labs and imaging studies. Pertinent labs include:  No results for input(s): WBC, HGB, HCT, PLT, HGBEXT, HCTEXT, PLTEXT, HGBEXT, HCTEXT, PLTEXT in the last 72 hours.   Recent Labs      02/24/18   0501  02/22/18   0510   NA  143  146*   K  4.6  4.1   CL  107  110*   CO2  31  29   GLU  68  69   BUN  12  14   CREA  1.24*  1.15*   CA  8.8  8.6       Signed: Brendalyn Koyanagi, MD

## 2018-02-25 LAB
GLUCOSE BLD STRIP.AUTO-MCNC: 107 MG/DL (ref 65–100)
GLUCOSE BLD STRIP.AUTO-MCNC: 115 MG/DL (ref 65–100)
GLUCOSE BLD STRIP.AUTO-MCNC: 66 MG/DL (ref 65–100)
GLUCOSE BLD STRIP.AUTO-MCNC: 75 MG/DL (ref 65–100)
SERVICE CMNT-IMP: ABNORMAL
SERVICE CMNT-IMP: ABNORMAL
SERVICE CMNT-IMP: NORMAL
SERVICE CMNT-IMP: NORMAL

## 2018-02-25 PROCEDURE — 74011250637 HC RX REV CODE- 250/637: Performed by: INTERNAL MEDICINE

## 2018-02-25 PROCEDURE — 82962 GLUCOSE BLOOD TEST: CPT

## 2018-02-25 PROCEDURE — 74011250637 HC RX REV CODE- 250/637: Performed by: PHYSICIAN ASSISTANT

## 2018-02-25 PROCEDURE — 74011250636 HC RX REV CODE- 250/636: Performed by: INTERNAL MEDICINE

## 2018-02-25 PROCEDURE — 65270000015 HC RM PRIVATE ONCOLOGY

## 2018-02-25 RX ADMIN — PANTOPRAZOLE SODIUM 40 MG: 40 TABLET, DELAYED RELEASE ORAL at 10:03

## 2018-02-25 RX ADMIN — Medication 10 ML: at 15:10

## 2018-02-25 RX ADMIN — Medication 20 ML: at 15:11

## 2018-02-25 RX ADMIN — METOCLOPRAMIDE 5 MG: 10 TABLET ORAL at 18:15

## 2018-02-25 RX ADMIN — METOCLOPRAMIDE 5 MG: 10 TABLET ORAL at 10:03

## 2018-02-25 RX ADMIN — MIDODRINE HYDROCHLORIDE 10 MG: 5 TABLET ORAL at 18:14

## 2018-02-25 RX ADMIN — MIDODRINE HYDROCHLORIDE 10 MG: 5 TABLET ORAL at 10:03

## 2018-02-25 RX ADMIN — Medication 300 UNITS: at 21:52

## 2018-02-25 RX ADMIN — MIDODRINE HYDROCHLORIDE 10 MG: 5 TABLET ORAL at 21:51

## 2018-02-25 RX ADMIN — LEVOTHYROXINE SODIUM 25 MCG: 50 TABLET ORAL at 06:06

## 2018-02-25 RX ADMIN — METOCLOPRAMIDE 5 MG: 10 TABLET ORAL at 21:51

## 2018-02-25 RX ADMIN — Medication 10 ML: at 21:52

## 2018-02-25 RX ADMIN — Medication 30 ML: at 06:05

## 2018-02-25 RX ADMIN — Medication 16 G: at 03:50

## 2018-02-25 NOTE — ROUTINE PROCESS
Oncology Nursing Communication Tool  8:10 AM  2/25/2018     Bedside shift change report given to Vera Sanchez RN (incoming nurse) by Latia Leger RN (outgoing nurse) on [employer]. Report included the following information SBAR, Kardex, Intake/Output, MAR and Recent Results. Shift Summary: low blood sugar overnight that came up well after glucose tabs & OJ. Bath & bed done. 2 BMs overnight. Issues for physician to address: can BS be changed to ac/hs? Oncology Shift Note   Admission Date 2/2/2018   Admission Diagnosis Sepsis ([facility] [address]  UTI (urinary tract infection)  Hx of systemic lupus erythematosus (SLE)  Hx of diabetes mellitus  Hx of essential hypertension  Mental retardation   Code Status Partial Code   Consults IP CONSULT TO NEUROLOGY  IP CONSULT TO CARDIOLOGY      Cardiac Monitoring [] Yes [x] No      Purposeful Hourly Rounding [x] Yes    Cole Score Total Score: 4   Cole score 3 or > [x] Bed Alarm [] Avasys [] 1:1 sitter [] Patient refused (Place signed refusal form in chart)      Pain Managed [x] Yes [] No    Key Pain Meds             acetaminophen (TYLENOL ARTHRITIS PAIN) 650 mg TbER  (Taking) Take 650 mg by mouth every eight (8) hours as needed for Pain. Influenza Vaccine Received Flu Vaccine for Current Season (usually Sept-March): Yes           Oxygen needs?  [x] Room air Oxygen @  []1L    []2L    []3L   []4L    []5L   []6L     Use home O2? [] Yes [x] No  Perform O2 challenge test using  smartphrase (.oxygenchallenge)      Last bowel movement Last Bowel Movement Date: 02/24/18  bowel movement      Urinary Catheter [REMOVED] Urinary Catheter 02/02/18 [facility] - Temperature-Criteria for Appropriate Use: Medically/surgically unstable  [REMOVED] Urinary Catheter 02/15/18 [facility]-Criteria for Appropriate Use: Obstruction/retention     [REMOVED] External Female Catheter 02/15/18-Urine Output (mL): 450 ml  [REMOVED] Urinary Catheter 02/02/18 [facility] - Temperature-Urine Output (mL): 10 ml  [REMOVED] External Female Catheter 02/03/18-Urine Output (mL): 0 ml  [REMOVED] Urinary Catheter 02/15/18 [facility]-Urine Output (mL): 150 ml     LDAs         PICC Triple Lumen 02/05/18 Right;Brachial (Active)   Central Line Being Utilized Yes 2/25/2018  3:52 AM   Criteria for Appropriate Use Limited/no vessel suitable for conventional peripheral access 2/25/2018  3:52 AM   Site Assessment Clean, dry, & intact 2/25/2018  3:52 AM   Phlebitis Assessment 0 2/25/2018  3:52 AM   Infiltration Assessment 0 2/25/2018  3:52 AM   Arm Circumference (cm) 0 cm 2/6/2018 12:00 AM   Date of Last Dressing Change 02/21/18 2/24/2018  8:10 PM   Dressing Status Clean, dry, & intact 2/25/2018  3:52 AM   External Catheter Length (cm) 0 centimeters 2/21/2018  8:29 PM   Dressing Type Disk with Chlorhexadine gluconate (CHG); Transparent 2/25/2018  3:52 AM   Action Taken Blood drawn 2/22/2018  3:08 AM   Hub Color/Line Status Gray;Capped 2/25/2018  3:52 AM   Positive Blood Return (Site #1) Yes 2/24/2018 11:25 PM   Hub Color/Line Status Red;Capped 2/25/2018  3:52 AM   Positive Blood Return (Site #2) Yes 2/24/2018 11:25 PM   Hub Color/Line Status White;Capped 2/25/2018  3:52 AM   Positive Blood Return (Site #3) Yes 2/24/2018 11:25 PM   Alcohol Cap Used Yes 2/25/2018  3:52 AM                               Readmission Risk Assessment Tool Score Low Risk            12       Total Score        3 Patient Length of Stay (>5 days = 3)    5 Pt. Coverage (Medicare=5 , Medicaid, or Self-Pay=4)    4 Charlson Comorbidity Score (Age + Comorbid Conditions)        Criteria that do not apply:    Has Seen PCP in Last 6 Months (Yes=3, No=0)    . Living with Significant Other. Assisted Living. LTAC. SNF.  or   Rehab    IP Visits Last 12 Months (1-3=4, 4=9, >4=11)       Expected Length of Stay 4d 21h   Actual Length of Stay [phone] Renato Stock RN

## 2018-02-25 NOTE — PROGRESS NOTES
Hospitalist Progress Note    NAME: Allyson Bernard   :  1945   MRN:  [id number]     Admission summary:   72year old female with history of mental retardation, SLE, DM-2, HTN who normally resides in a group home, presented on 18 with decreased responsiveness. She had been diagnosed with acute influenza infection along with urinary tract infection with associated septic shock. Assessment / Plan:    Waiting for placement    Elevated BP  Monitor. Hydralazine prn  If persistently high, will add low dose norvasc    Acute toxic-metabolic encephalopathy and septic shock due to pan-susecptible E. Coli UTI and Influenza B (POA)   - MRI brain 2/3 - mild small vessel ischemic changes. No acute abnormality. - CT chest/abd/pelvis  - severe scoliosis. Atelectasis in the lower lobes, left greater than right. Anemia. Atherosclerotic abdominal aorta without aneurysm. - urine culture  - pansensitive E Coli   - blood cultures  - no growth   - she had been treated with a course of Zosyn to completion in the hospital   - influenza B positive  - she had been treated with a course of oseltamivir to completion here   - seen by neurology, recommending continuation of Keppra   - diet advanced to pureed      Acute hypoxic respiratory failure secondary to acute pulmonary edema with acute on chronic diastolic heart failure   - Echo 18  - \"Left ventricle: Ejection fraction was estimated in the range of 60 %. No obvious wall motion abnormalities identified in the views obtained. There was mild concentric hypertrophy. Features were consistent with a pseudonormal left ventricular filling pattern, with concomitant abnormal relaxation and increased filling pressure (grade 2 diastolic dysfunction). \"   - suspect likely triggered by influenza / treatment for UTI   - seen by pulmonology, she had briefly required vasopressor support, which was subsequently weaned off    - now weaned off O2      Resolved TIFFANIE   - Cr peak of 1.55 on 2/9, now 1.2      Hypernatremia    - suspect secondary to insufficient oral fluid intake, encouraging patient to drink fluids, given IV fluids on 2/21 with improvement        Acute on chronic blood loss anemia   - Hgb appears to be fairly stable without evident bleed on examination   - continue to monitor      Hypoglycemia   - reported history of \"borderline DM,\" not on meds at home.     - HgA1c <3.5   - discontinued POC glucose monitoring; continue bedtime snack         Elevated Transaminases   - presumed in setting of sepsis, generally improving     Mental retardation / cognitive impairment   - seen by PT/OT, recommending SNF      SLE   - holding home plaquenil     Hypothyroidism   - continue home levothyroxine       Obesity (Body mass index is 32.45 kg/(m^2)      Code Status: PARTIAL CODE - ACLS meds only, no compressions, no intubation  Surrogate Decision Maker: sister   DVT Prophylaxis: heparin      Baseline: Mental retardation, lives with sister normally but placed in group home after sister had to undergo recent back surgery      Disposition: awaiting snf placement,  Need to follow up on monday     Subjective:     Chief Complaint / Reason for Physician Visit:   No acute events reported overnight. Eating ok    Review of Systems:  Symptom Y/N Comments  Symptom Y/N Comments   Fever/Chills    Chest Pain     Poor Appetite    Edema     Cough    Abdominal Pain     Sputum    Joint Pain     SOB/YOUNG    Pruritis/Rash     Nausea/vomit    Tolerating PT/OT     Diarrhea    Tolerating Diet     Constipation    Other       Could NOT obtain due to: MR, non-verbal     Objective:     VITALS:   Last 24hrs VS reviewed since prior progress note.  Most recent are:  Patient Vitals for the past 24 hrs:   Temp Pulse Resp BP SpO2   02/25/18 0731 97.8 °F (36.6 °C) 78 18 131/73 97 %   02/24/18 2257 99.4 °F (37.4 °C) 89 18 131/75 100 %   02/24/18 2030 98.4 °F (36.9 °C) 81 18 99/48 100 %   02/24/18 1621 97.6 °F (36.4 °C) 82 18 126/55 99 %       Intake/Output Summary (Last 24 hours) at 02/25/18 1222  Last data filed at 02/25/18 0352   Gross per 24 hour   Intake              300 ml   Output                0 ml   Net              300 ml        PHYSICAL EXAM:  General:                    non verbal,  No acute distress severe kyphoscoliosis  EENT:                       aniecteric sclera, PERRLA  Resp:                       b/l air entry, no crackles or wheezing  CV:                            Regular rhythm with normal rate during ascultation,   No edema  GI:                              Soft, Non distended, Non tender.  +Bowel sounds  Neurologic:                Alert but does not speak, does not follow any commands  Psych:                       No insight  Skin:                          No rashes.  No jaundice    Reviewed most current lab test results and cultures  YES  Reviewed most current radiology test results   YES  Review and summation of old records today    NO  Reviewed patient's current orders and MAR    YES  PMH/SH reviewed - no change compared to H&P  ________________________________________________________________________  Care Plan discussed with:    Comments   Patient     Family      RN y    Care Manager     Consultant                        Multidiciplinary team rounds were held today with , nursing, pharmacist and clinical coordinator. Patient's plan of care was discussed; medications were reviewed and discharge planning was addressed.      ________________________________________________________________________  Total NON critical care TIME:  10  Minutes    Total CRITICAL CARE TIME Spent:   Minutes non procedure based      Comments   >50% of visit spent in counseling and coordination of care x dispo planning   ________________________________________________________________________  Akira Hale MD     Procedures: see electronic medical records for all procedures/Xrays and details which were not copied into this note but were reviewed prior to creation of Plan. LABS:  I reviewed today's most current labs and imaging studies. Pertinent labs include:  No results for input(s): WBC, HGB, HCT, PLT, HGBEXT, HCTEXT, PLTEXT, HGBEXT, HCTEXT, PLTEXT in the last 72 hours.   Recent Labs      02/24/18   0501   NA  143   K  4.6   CL  107   CO2  31   GLU  68   BUN  12   CREA  1.24*   CA  8.8       Signed: Alma Jerry MD

## 2018-02-25 NOTE — PROGRESS NOTES
Oncology Nursing Communication Tool  7:05 PM  2/24/2018     Bedside shift change report given to Madina Armstrong RN (incoming nurse) by Jesus Patel RN (outgoing nurse) on Patricechelseacorie Torres. Report included the following information SBAR. Shift Summary:       Issues for physician to address: Oncology Shift Note   Admission Date 2/2/2018   Admission Diagnosis Sepsis ([address]  UTI (urinary tract infection)  Hx of systemic lupus erythematosus (SLE)  Hx of diabetes mellitus  Hx of essential hypertension  Mental retardation   Code Status Partial Code   Consults IP CONSULT TO NEUROLOGY  IP CONSULT TO CARDIOLOGY      Cardiac Monitoring [] Yes [x] No      Purposeful Hourly Rounding [] Yes    Cole Score Total Score: 4   Cole score 3 or > [] Bed Alarm [] Avasys [] 1:1 sitter [] Patient refused (Place signed refusal form in chart)      Pain Managed [x] Yes [] No    Key Pain Meds             acetaminophen (TYLENOL ARTHRITIS PAIN) 650 mg TbER  (Taking) Take 650 mg by mouth every eight (8) hours as needed for Pain. Influenza Vaccine Received Flu Vaccine for Current Season (usually Sept-March): Yes           Oxygen needs?  [x] Room air Oxygen @  []1L    []2L    []3L   []4L    []5L   []6L     Use home O2? [] Yes [] No  Perform O2 challenge test using  smartphrase (.oxygenchallenge)      Last bowel movement Last Bowel Movement Date: 02/23/18  bowel movement      Urinary Catheter [REMOVED] Urinary Catheter 02/02/18 [facility] - Temperature-Criteria for Appropriate Use: Medically/surgically unstable  [REMOVED] Urinary Catheter 02/15/18 [facility]-Criteria for Appropriate Use: Obstruction/retention     External Female Catheter 02/15/18-Urine Output (mL): 450 ml  [REMOVED] Urinary Catheter 02/02/18 [facility] - Temperature-Urine Output (mL): 10 ml  [REMOVED] External Female Catheter 02/03/18-Urine Output (mL): 0 ml  [REMOVED] Urinary Catheter 02/15/18 [facility]-Urine Output (mL): 150 ml     LDAs         PICC Triple Lumen 02/05/18 Right;Brachial (Active)   Central Line Being Utilized Yes 2/24/2018  8:58 AM   Criteria for Appropriate Use Limited/no vessel suitable for conventional peripheral access 2/24/2018  8:58 AM   Site Assessment Clean, dry, & intact 2/24/2018  8:58 AM   Phlebitis Assessment 0 2/24/2018  3:24 AM   Infiltration Assessment 0 2/24/2018  3:24 AM   Arm Circumference (cm) 0 cm 2/6/2018 12:00 AM   Date of Last Dressing Change 02/21/18 2/24/2018  3:24 AM   Dressing Status Clean, dry, & intact 2/24/2018  8:58 AM   External Catheter Length (cm) 0 centimeters 2/21/2018  8:29 PM   Dressing Type Disk with Chlorhexadine gluconate (CHG); Tape;Transparent 2/24/2018  3:24 AM   Action Taken Blood drawn 2/22/2018  3:08 AM   Hub Color/Line Status [facility] 2/24/2018  8:58 AM   Positive Blood Return (Site #1) Yes 2/24/2018  8:58 AM   Hub Color/Line Status Red 2/24/2018  8:58 AM   Positive Blood Return (Site #2) Yes 2/24/2018  8:58 AM   Hub Color/Line Status White 2/24/2018  8:58 AM   Positive Blood Return (Site #3) Yes 2/24/2018  8:58 AM   Alcohol Cap Used Yes 2/24/2018  8:58 AM                External Female Catheter 02/15/18 (Active)   Site Assessment Clean, dry, & intact 2/24/2018  8:58 AM   Repositioned Yes 2/24/2018  8:58 AM   Perineal Care Yes 2/24/2018  8:58 AM   Wick Changed Yes 2/24/2018  8:58 AM   Suction Canister/Tubing Changed Yes 2/24/2018  8:58 AM   Urine Output (mL) 450 ml 2/24/2018  5:12 AM                   Readmission Risk Assessment Tool Score Low Risk            12       Total Score        3 Patient Length of Stay (>5 days = 3)    5 Pt. Coverage (Medicare=5 , Medicaid, or Self-Pay=4)    4 Charlson Comorbidity Score (Age + Comorbid Conditions)        Criteria that do not apply:    Has Seen PCP in Last 6 Months (Yes=3, No=0)    . Living with Significant Other. Assisted Living. LTAC. SNF.  or   Rehab    IP Visits Last 12 Months (1-3=4, 4=9, >4=11)       Expected Length of Stay 4d 21h   Actual Length of Stay 22 Jason Castro RN

## 2018-02-26 LAB
GLUCOSE BLD STRIP.AUTO-MCNC: 113 MG/DL (ref 65–100)
GLUCOSE BLD STRIP.AUTO-MCNC: 71 MG/DL (ref 65–100)
GLUCOSE BLD STRIP.AUTO-MCNC: 82 MG/DL (ref 65–100)
GLUCOSE BLD STRIP.AUTO-MCNC: 87 MG/DL (ref 65–100)
SERVICE CMNT-IMP: ABNORMAL
SERVICE CMNT-IMP: NORMAL

## 2018-02-26 PROCEDURE — 97530 THERAPEUTIC ACTIVITIES: CPT

## 2018-02-26 PROCEDURE — 74011250637 HC RX REV CODE- 250/637: Performed by: INTERNAL MEDICINE

## 2018-02-26 PROCEDURE — 82962 GLUCOSE BLOOD TEST: CPT

## 2018-02-26 PROCEDURE — 65270000015 HC RM PRIVATE ONCOLOGY

## 2018-02-26 RX ORDER — LEVOTHYROXINE SODIUM 25 UG/1
25 TABLET ORAL
Status: DISCONTINUED | OUTPATIENT
Start: 2018-02-27 | End: 2018-03-12 | Stop reason: HOSPADM

## 2018-02-26 RX ADMIN — METOCLOPRAMIDE 5 MG: 10 TABLET ORAL at 18:51

## 2018-02-26 RX ADMIN — METOCLOPRAMIDE 5 MG: 10 TABLET ORAL at 06:19

## 2018-02-26 RX ADMIN — MIDODRINE HYDROCHLORIDE 10 MG: 5 TABLET ORAL at 21:08

## 2018-02-26 RX ADMIN — Medication 20 ML: at 16:10

## 2018-02-26 RX ADMIN — METOCLOPRAMIDE 5 MG: 10 TABLET ORAL at 13:49

## 2018-02-26 RX ADMIN — METOCLOPRAMIDE 5 MG: 10 TABLET ORAL at 09:41

## 2018-02-26 RX ADMIN — LEVOTHYROXINE SODIUM 25 MCG: 50 TABLET ORAL at 06:19

## 2018-02-26 RX ADMIN — Medication 16 G: at 06:19

## 2018-02-26 RX ADMIN — PANTOPRAZOLE SODIUM 40 MG: 40 TABLET, DELAYED RELEASE ORAL at 06:19

## 2018-02-26 RX ADMIN — MIDODRINE HYDROCHLORIDE 10 MG: 5 TABLET ORAL at 18:51

## 2018-02-26 RX ADMIN — Medication 20 ML: at 22:00

## 2018-02-26 RX ADMIN — Medication 10 ML: at 16:10

## 2018-02-26 RX ADMIN — Medication 10 ML: at 06:19

## 2018-02-26 RX ADMIN — METOCLOPRAMIDE 5 MG: 10 TABLET ORAL at 21:08

## 2018-02-26 RX ADMIN — PANTOPRAZOLE SODIUM 40 MG: 40 TABLET, DELAYED RELEASE ORAL at 09:40

## 2018-02-26 RX ADMIN — MIDODRINE HYDROCHLORIDE 10 MG: 5 TABLET ORAL at 09:41

## 2018-02-26 NOTE — PROGRESS NOTES
Hospitalist Progress Note    NAME: Carolyn Wallace   :  1945   MRN:  [id number]     Admission summary:   72year old female with history of mental retardation, SLE, DM-2, HTN who normally resides in a group home, presented on 18 with decreased responsiveness. She had been diagnosed with acute influenza infection along with urinary tract infection with associated septic shock. Assessment / Plan:    Waiting for placement    Acute toxic-metabolic encephalopathy and septic shock due to pan-susecptible E. Coli UTI and Influenza B (POA)   - MRI brain 2/3 - mild small vessel ischemic changes. No acute abnormality. - CT chest/abd/pelvis  - severe scoliosis. Atelectasis in the lower lobes, left greater than right. Anemia. Atherosclerotic abdominal aorta without aneurysm. - urine culture  - pansensitive E Coli   - blood cultures  - no growth   - she had been treated with a course of Zosyn to completion in the hospital   - influenza B positive  - she had been treated with a course of oseltamivir to completion here   - seen by neurology, recommending continuation of Keppra   - diet advanced to pureed      Acute hypoxic respiratory failure secondary to acute pulmonary edema with acute on chronic diastolic heart failure   - Echo 18  - \"Left ventricle: Ejection fraction was estimated in the range of 60 %. No obvious wall motion abnormalities identified in the views obtained. There was mild concentric hypertrophy. Features were consistent with a pseudonormal left ventricular filling pattern, with concomitant abnormal relaxation and increased filling pressure (grade 2 diastolic dysfunction). \"   - suspect likely triggered by influenza / treatment for UTI   - seen by pulmonology, she had briefly required vasopressor support, which was subsequently weaned off    - now weaned off O2      Resolved TIFFANIE   - Cr peak of 1.55 on , now 1.2      Hypernatremia resolved   - suspect secondary to insufficient oral fluid intake, encouraging patient to drink fluids, given IV fluids on 2/21 with improvement        Acute on chronic blood loss anemia   - Hgb appears to be fairly stable without evident bleed on examination   - continue to monitor      Hypoglycemia   - reported history of \"borderline DM,\" not on meds at home.     - HgA1c <3.5   - discontinued POC glucose monitoring; continue bedtime snack         Elevated Transaminases   - presumed in setting of sepsis, generally improving     Mental retardation / cognitive impairment   - seen by PT/OT, recommending SNF      SLE   - holding home plaquenil     Hypothyroidism   - continue home levothyroxine       Obesity (Body mass index is 32.45 kg/(m^2)      Code Status: PARTIAL CODE - ACLS meds only, no compressions, no intubation  Surrogate Decision Maker: sister   DVT Prophylaxis: heparin      Baseline: Mental retardation, lives with sister normally but placed in group home after sister had to undergo recent back surgery      Disposition: awaiting snf placement,  following     Subjective:     Chief Complaint / Reason for Physician Visit:   No acute events reported overnight. Eating ok    Review of Systems:  Symptom Y/N Comments  Symptom Y/N Comments   Fever/Chills    Chest Pain     Poor Appetite    Edema     Cough    Abdominal Pain     Sputum    Joint Pain     SOB/YOUNG    Pruritis/Rash     Nausea/vomit    Tolerating PT/OT     Diarrhea    Tolerating Diet     Constipation    Other       Could NOT obtain due to: MR, non-verbal     Objective:     VITALS:   Last 24hrs VS reviewed since prior progress note.  Most recent are:  Patient Vitals for the past 24 hrs:   Temp Pulse Resp BP SpO2   02/26/18 0800 98 °F (36.7 °C) 73 18 112/72 94 %   02/25/18 2342 97.5 °F (36.4 °C) 75 18 139/68 98 %   02/25/18 1910 98.1 °F (36.7 °C) 61 17 110/47 100 %   02/25/18 1622 98.3 °F (36.8 °C) (!) 107 18 121/67 96 %     No intake or output data in the 24 hours ending 02/26/18 0859 PHYSICAL EXAM:  General:                    non verbal,  No acute distress severe kyphoscoliosis  EENT:                       aniecteric sclera, PERRLA  Resp:                       b/l air entry, no crackles or wheezing  CV:                            Regular rhythm with normal rate during ascultation,   No edema  GI:                              Soft, Non distended, Non tender.  +Bowel sounds  Neurologic:                Alert but does not speak, does not follow any commands  Psych:                       No insight  Skin:                          No rashes.  No jaundice    Reviewed most current lab test results and cultures  YES  Reviewed most current radiology test results   YES  Review and summation of old records today    NO  Reviewed patient's current orders and MAR    YES  PMH/SH reviewed - no change compared to H&P  ________________________________________________________________________  Care Plan discussed with:    Comments   Patient     Family      RN y    Care Manager     Consultant                        Multidiciplinary team rounds were held today with , nursing, pharmacist and clinical coordinator. Patient's plan of care was discussed; medications were reviewed and discharge planning was addressed. ________________________________________________________________________  Total NON critical care TIME:  20  Minutes    Total CRITICAL CARE TIME Spent:   Minutes non procedure based      Comments   >50% of visit spent in counseling and coordination of care x dispo planning   ________________________________________________________________________  Woodrow Mckeon MD     Procedures: see electronic medical records for all procedures/Xrays and details which were not copied into this note but were reviewed prior to creation of Plan. LABS:  I reviewed today's most current labs and imaging studies.   Pertinent labs include:  No results for input(s): WBC, HGB, HCT, PLT, HGBEXT, HCTEXT, PLTEXT, HGBEXT, HCTEXT, PLTEXT in the last 72 hours.   Recent Labs      02/24/18   0501   NA  143   K  4.6   CL  107   CO2  31   GLU  68   BUN  12   CREA  1.24*   CA  8.8       Signed: Mal Xiao MD

## 2018-02-26 NOTE — INTERDISCIPLINARY ROUNDS
Oncology Interdisciplinary rounds were held today to discuss patient plan of care and outcomes. The following members were present: Nursing, Case Management, Pharmacy and Dietary.     Actual Length of Stay: 24    DRG GLOS: 4.9    Expected Length of Stay: 4d 21h                Plan for Day        Mobility        Plan for Stay      Plan for Way   Continue current plan  Looking at placement options PT/OT Continue current treatment TBD-rehab Level 2 needed

## 2018-02-26 NOTE — PROGRESS NOTES
Oncology Nursing Communication Tool  6:47 AM  2/26/2018     Bedside shift change report given to Cristina Huff RN (incoming nurse) by Sara Hendrix RN (outgoing nurse) on Andrew Torres. Report included the following information SBAR, Kardex, MAR and Accordion. Shift Summary: Incontinent of stool once, blood glucose 71 this morning, given glucose tabs and orange juice and came up to 72      Issues for physician to address: Can we discontinue the order for q6 glucose checks         Oncology Shift Note   Admission Date 2/2/2018   Admission Diagnosis Sepsis ([address]  UTI (urinary tract infection)  Hx of systemic lupus erythematosus (SLE)  Hx of diabetes mellitus  Hx of essential hypertension  Mental retardation   Code Status Partial Code   Consults IP CONSULT TO NEUROLOGY  IP CONSULT TO CARDIOLOGY      Cardiac Monitoring [] Yes [x] No      Purposeful Hourly Rounding [x] Yes    Cole Score Total Score: 4   Cole score 3 or > [] Bed Alarm [] Avasys [] 1:1 sitter [] Patient refused (Place signed refusal form in chart)      Pain Managed [x] Yes [] No    Key Pain Meds             acetaminophen (TYLENOL ARTHRITIS PAIN) 650 mg TbER  (Taking) Take 650 mg by mouth every eight (8) hours as needed for Pain. Influenza Vaccine Received Flu Vaccine for Current Season (usually Sept-March): Yes           Oxygen needs?  [x] Room air Oxygen @  []1L    []2L    []3L   []4L    []5L   []6L     Use home O2? [] Yes [x] No  Perform O2 challenge test using  smartphrase (.oxygenchallenge)      Last bowel movement Last Bowel Movement Date: 02/25/18  bowel movement      Urinary Catheter [REMOVED] Urinary Catheter 02/02/18 [facility] - Temperature-Criteria for Appropriate Use: Medically/surgically unstable  [REMOVED] Urinary Catheter 02/15/18 [facility]-Criteria for Appropriate Use: Obstruction/retention     [REMOVED] External Female Catheter 02/15/18-Urine Output (mL): 450 ml  [REMOVED] Urinary Catheter 02/02/18 [facility] - Temperature-Urine Output (mL): 10 ml  [REMOVED] External Female Catheter 02/03/18-Urine Output (mL): 0 ml  [REMOVED] Urinary Catheter 02/15/18 [facility]-Urine Output (mL): 150 ml     LDAs         PICC Triple Lumen 02/05/18 Right;Brachial (Active)   Central Line Being Utilized Yes 2/26/2018  3:04 AM   Criteria for Appropriate Use Limited/no vessel suitable for conventional peripheral access 2/26/2018  3:04 AM   Site Assessment Clean, dry, & intact 2/26/2018  3:04 AM   Phlebitis Assessment 0 2/26/2018  3:04 AM   Infiltration Assessment 0 2/26/2018  3:04 AM   Arm Circumference (cm) 0 cm 2/6/2018 12:00 AM   Date of Last Dressing Change 02/21/18 2/24/2018  8:10 PM   Dressing Status Clean, dry, & intact 2/26/2018  3:04 AM   External Catheter Length (cm) 0 centimeters 2/21/2018  8:29 PM   Dressing Type Disk with Chlorhexadine gluconate (CHG); Transparent 2/26/2018  3:04 AM   Action Taken Blood drawn 2/22/2018  3:08 AM   Hub Color/Line Status Gray;Capped 2/26/2018  3:04 AM   Positive Blood Return (Site #1) No 2/26/2018  3:04 AM   Hub Color/Line Status Red;Capped 2/26/2018  3:04 AM   Positive Blood Return (Site #2) No 2/26/2018  3:04 AM   Hub Color/Line Status White;Capped 2/26/2018  3:04 AM   Positive Blood Return (Site #3) No 2/26/2018  3:04 AM   Alcohol Cap Used Yes 2/26/2018  3:04 AM                               Readmission Risk Assessment Tool Score Low Risk            12       Total Score        3 Patient Length of Stay (>5 days = 3)    5 Pt. Coverage (Medicare=5 , Medicaid, or Self-Pay=4)    4 Charlson Comorbidity Score (Age + Comorbid Conditions)        Criteria that do not apply:    Has Seen PCP in Last 6 Months (Yes=3, No=0)    . Living with Significant Other. Assisted Living. LTAC. SNF.  or   Rehab    IP Visits Last 12 Months (1-3=4, 4=9, >4=11)       Expected Length of Stay 4d 21h   Actual Length of Stay Violet Bruce, RN

## 2018-02-26 NOTE — PROGRESS NOTES
Oncology Nursing Communication Tool  7:23 PM  2/25/2018     Bedside shift change report given to Rona Franco RN (incoming nurse) by Jakob Smith RN (outgoing nurse) on [facility]. Report included the following information SBAR. Shift Summary:       Issues for physician to address: Oncology Shift Note   Admission Date 2/2/2018   Admission Diagnosis Sepsis ([address]  UTI (urinary tract infection)  Hx of systemic lupus erythematosus (SLE)  Hx of diabetes mellitus  Hx of essential hypertension  Mental retardation   Code Status Partial Code   Consults IP CONSULT TO NEUROLOGY  IP CONSULT TO CARDIOLOGY      Cardiac Monitoring [] Yes [x] No      Purposeful Hourly Rounding [x] Yes    Cole Score Total Score: 4   Cole score 3 or > [] Bed Alarm [] Avasys [] 1:1 sitter [] Patient refused (Place signed refusal form in chart)      Pain Managed [] Yes [] No    Key Pain Meds             acetaminophen (TYLENOL ARTHRITIS PAIN) 650 mg TbER  (Taking) Take 650 mg by mouth every eight (8) hours as needed for Pain. Influenza Vaccine Received Flu Vaccine for Current Season (usually Sept-March): Yes           Oxygen needs?  [x] Room air Oxygen @  []1L    []2L    []3L   []4L    []5L   []6L     Use home O2? [] Yes [] No  Perform O2 challenge test using  smartphrase (.oxygenchallenge)      Last bowel movement Last Bowel Movement Date: 02/24/18  bowel movement      Urinary Catheter [REMOVED] Urinary Catheter 02/02/18 [facility] - Temperature-Criteria for Appropriate Use: Medically/surgically unstable  [REMOVED] Urinary Catheter 02/15/18 [facility]-Criteria for Appropriate Use: Obstruction/retention     [REMOVED] External Female Catheter 02/15/18-Urine Output (mL): 450 ml  [REMOVED] Urinary Catheter 02/02/18 [facility] - Temperature-Urine Output (mL): 10 ml  [REMOVED] External Female Catheter 02/03/18-Urine Output (mL): 0 ml  [REMOVED] Urinary Catheter 02/15/18 [facility]-Urine Output (mL): 150 ml     LDAs         PICC Triple Lumen 02/05/18 Right;Brachial (Active)   Central Line Being Utilized Yes 2/25/2018  8:29 AM   Criteria for Appropriate Use Limited/no vessel suitable for conventional peripheral access 2/25/2018  8:29 AM   Site Assessment Clean, dry, & intact 2/25/2018  8:29 AM   Phlebitis Assessment 0 2/25/2018  3:52 AM   Infiltration Assessment 0 2/25/2018  3:52 AM   Arm Circumference (cm) 0 cm 2/6/2018 12:00 AM   Date of Last Dressing Change 02/21/18 2/24/2018  8:10 PM   Dressing Status Clean, dry, & intact 2/25/2018  8:29 AM   External Catheter Length (cm) 0 centimeters 2/21/2018  8:29 PM   Dressing Type Disk with Chlorhexadine gluconate (CHG); Transparent 2/25/2018  3:52 AM   Action Taken Blood drawn 2/22/2018  3:08 AM   Hub Color/Line Status Gray;Capped 2/25/2018  8:29 AM   Positive Blood Return (Site #1) No 2/25/2018  8:29 AM   Hub Color/Line Status Red;Capped 2/25/2018  8:29 AM   Positive Blood Return (Site #2) No 2/25/2018  8:29 AM   Hub Color/Line Status White;Capped 2/25/2018  8:29 AM   Positive Blood Return (Site #3) No 2/25/2018  8:29 AM   Alcohol Cap Used Yes 2/25/2018  8:29 AM                               Readmission Risk Assessment Tool Score Low Risk            12       Total Score        3 Patient Length of Stay (>5 days = 3)    5 Pt. Coverage (Medicare=5 , Medicaid, or Self-Pay=4)    4 Charlson Comorbidity Score (Age + Comorbid Conditions)        Criteria that do not apply:    Has Seen PCP in Last 6 Months (Yes=3, No=0)    . Living with Significant Other. Assisted Living. LTAC. SNF.  or   Rehab    IP Visits Last 12 Months (1-3=4, 4=9, >4=11)       Expected Length of Stay 4d 21h   Actual Length of Stay 23          Crystal Shannon, RN

## 2018-02-26 NOTE — PROGRESS NOTES
Problem: Mobility Impaired (Adult and Pediatric)  Goal: *Acute Goals and Plan of Care (Insert Text)  Physical Therapy Goals  Initiated 2/19/2018, Re-Assessed 2/26/2018-continue progressing towards goal of A x 1  1. Patient will move from supine to sit and sit to supine  in bed with moderate assistance  within 7 day(s). 2.  Patient will transfer from bed to chair and chair to bed with maximal assistance using the least restrictive device within 7 day(s). 3.  Patient will perform sit to stand with maximal assistance within 7 day(s). 4.  Patient will tolerate seated edge of bed with mod assist x 10 minutes to improve balance and tolerance to activity and in prep for OOB transfers within 7 days   physical Therapy TREATMENT: WEEKLY REASSESSMENT  Patient: Mara Florian (77 y.o. female)  Date: 2/26/2018  Diagnosis: Sepsis ([facility] [address]  UTI (urinary tract infection)  Hx of systemic lupus erythematosus (SLE)  Hx of diabetes mellitus  Hx of essential hypertension  Mental retardation <principal problem not specified>       Precautions: Fall  Chart, physical therapy assessment, plan of care and goals were reviewed. ASSESSMENT:  Pt received supine in bed and participated well with therapy. Pt non-verbal throughout however shaking head. Pt requires Max A x 2 to come to sitting EOB, assisted by pt's rigid LEs. Sat EOB with supervision 7 minutes and tolerating passive stretching to bilateral LEs. Pt performed sit<>stand x 3 attempts with Mod A x 2. Pt grasping therapists arms to assist with standing. Third stand with pivot to chair MOD A x 2. Positioned in chair with pillow under arms and legs. Will continue to follow while awaiting pt's return to [facility]. Patient's progression toward goals since last assessment: Up in chair today. PLAN:  Goals have been updated based on progression since last assessment. Patient continues to benefit from skilled intervention to address the above impairments.   Continue to follow the patient 3 times a week to address goals. Planned Interventions:  [x]              Bed Mobility Training             [x]       Neuromuscular Re-Education  [x]              Transfer Training                   []       Orthotic/Prosthetic Training  [x]              Gait Training                         []       Modalities  [x]              Therapeutic Exercises           []       Edema Management/Control  [x]              Therapeutic Activities            [x]       Patient and Family Training/Education  []              Other (comment):  Discharge Recommendations: long-term + rehab pending pt's PLOF. Further Equipment Recommendations for Discharge: None     SUBJECTIVE:   Patient stated . Non-verbal    OBJECTIVE DATA SUMMARY:   Critical Behavior:  Neurologic State: Alert, Confused  Orientation Level: Unable to verbalize  Cognition: Unable to assess (comment)       Strength:                          Functional Mobility Training:  Bed Mobility:     Supine to Sit: Assist x2;Maximum assistance              Transfers:  Sit to Stand: Moderate assistance;Assist x2  Stand to Sit: Moderate assistance;Assist x2     Stand Pivot Transfers: Moderate assistance (A x 2)  Bed to Chair: Moderate assistance;Assist x2                    Balance:  Sitting: Impaired  Sitting - Static: Good (unsupported)  Sitting - Dynamic: Fair (occasional)  Standing: Impaired  Standing - Static: Poor  Standing - Dynamic : Poor  Ambulation/Gait Training:                                               Activity Tolerance:   Good  Please refer to the flowsheet for vital signs taken during this treatment.   After treatment:   [x]  Patient left in no apparent distress sitting up in chair  []  Patient left in no apparent distress in bed  [x]  Call bell left within reach  [x]  Nursing notified  []  Caregiver present  []  Bed alarm activated    COMMUNICATION/COLLABORATION:   The patients plan of care was discussed with: Registered Nurse    Kolby Andrews, PT   Time Calculation: 24 mins

## 2018-02-26 NOTE — PROGRESS NOTES
Nutrition Assessment:    INTERVENTIONS/RECOMMENDATIONS:   Continue current diet and supplement  SLP to advance diet consistency     ASSESSMENT:   Chart reviewed and pt discussed with PCT. He reports pt ate well for lunch today ~50% of meal and supplements. She was able to feed herself mashed potatoes. Pt waiting for placement. Diet Order: Puree  % Eaten:  No data found. Pertinent Medications: [x]Reviewed:   Pertinent Labs: [x]Reviewed:   Food Allergies: [x]NKFA  []Other   Last BM:  2/25  Edema:      []RUE   []LUE   [x]RLE 1+  [x]LLE 1+      Pressure Ulcer:      [] Stage I   [] Stage II   [] Stage III   [] Stage IV      Anthropometrics: Height: 5' 1\" (154.9 cm) Weight: 69.9 kg (154 lb)    IBW (%IBW):   ( ) UBW (%UBW):   (  %)    BMI: Body mass index is 29.1 kg/(m^2). This BMI is indicative of:  []Underweight   []Normal   [x]Overweight   [] Obesity   [] Extreme Obesity (BMI>40)  Last Weight Metrics:  Weight Loss Metrics 2/25/2018 5/31/2017 5/23/2017 5/8/2017 12/13/2016 6/27/2016 5/2/2016   Today's Wt 154 lb 156 lb 156 lb 156 lb 155 lb 136 lb 136 lb   BMI 29.1 kg/m2 29.48 kg/m2 29.48 kg/m2 30.47 kg/m2 30.27 kg/m2 26.56 kg/m2 26.56 kg/m2       Estimated Nutrition Needs (Based on): 1463 Kcals/day (MSJ 1219 x 1.2) , 61 g (-77 (0.8-1gPro/kg)) Protein  Carbohydrate: At Least 130 g/day  Fluids: 1463 mL/day or per primary team    NUTRITION DIAGNOSES:   Problem:  Self-feeding difficulty      Etiology: related to Acute encephalopathy      Signs/Symptoms: as evidenced by total feed      NUTRITION INTERVENTIONS:  Meals/Snacks: General/healthful diet Enteral/Parenteral Nutrition: Modify rate, concentration, composition, and schedule Supplements: Commercial supplement              GOAL:   consume >50% of meals and ONS in 3-5 days    NUTRITION MONITORING AND EVALUATION      Food/Nutrient Intake Outcomes:  Total energy intake, Liquid meal replacement  Physical Signs/Symptoms Outcomes: GI, Glucose profile, GI profile, Electrolyte and renal profile, Weight/weight change    Previous Goal Met:   [x] Met              [] Progressing Towards Goal              [] Not Progressing Towards Goal   Previous Recommendations:   [] Implemented          [] Not Implemented          [x] Not Applicable    LEARNING NEEDS (Diet, Food/Nutrient-Drug Interaction):    [x] None Identified   [] Identified and Education Provided/Documented   [] Identified and Pt declined/was not appropriate     Cultural, Gnosticism, OR Ethnic Dietary Needs:    [x] None Identified   [] Identified and Addressed     [x] Interdisciplinary Care Plan Reviewed/Documented    [x] Discharge Planning: General healthy diet, diet consistency per SLP recommendations    [] Participated in Interdisciplinary Rounds    NUTRITION RISK:    [] High              [x] Moderate           []  Low  []  Minimal/Uncompromised      Cuong Stevenson RDN  Pager [phone]  Weekend Pager [phone]

## 2018-02-27 LAB
GLUCOSE BLD STRIP.AUTO-MCNC: 98 MG/DL (ref 65–100)
SERVICE CMNT-IMP: NORMAL

## 2018-02-27 PROCEDURE — 97535 SELF CARE MNGMENT TRAINING: CPT | Performed by: OCCUPATIONAL THERAPIST

## 2018-02-27 PROCEDURE — 74011250637 HC RX REV CODE- 250/637: Performed by: INTERNAL MEDICINE

## 2018-02-27 PROCEDURE — 65270000015 HC RM PRIVATE ONCOLOGY

## 2018-02-27 PROCEDURE — 97530 THERAPEUTIC ACTIVITIES: CPT | Performed by: PHYSICAL THERAPIST

## 2018-02-27 PROCEDURE — 82962 GLUCOSE BLOOD TEST: CPT

## 2018-02-27 RX ADMIN — METOCLOPRAMIDE 5 MG: 10 TABLET ORAL at 21:02

## 2018-02-27 RX ADMIN — Medication 10 ML: at 14:56

## 2018-02-27 RX ADMIN — PANTOPRAZOLE SODIUM 40 MG: 40 TABLET, DELAYED RELEASE ORAL at 08:23

## 2018-02-27 RX ADMIN — METOCLOPRAMIDE 5 MG: 10 TABLET ORAL at 08:22

## 2018-02-27 RX ADMIN — Medication 10 ML: at 21:04

## 2018-02-27 RX ADMIN — Medication 20 ML: at 14:57

## 2018-02-27 RX ADMIN — METOCLOPRAMIDE 5 MG: 10 TABLET ORAL at 16:20

## 2018-02-27 RX ADMIN — LEVOTHYROXINE SODIUM 25 MCG: 25 TABLET ORAL at 06:22

## 2018-02-27 RX ADMIN — Medication 10 ML: at 08:28

## 2018-02-27 NOTE — PROGRESS NOTES
Oncology Nursing Communication Tool  7:52 AM  2/27/2018     Bedside shift change report given to Ben Flynn RN (incoming nurse) by Nelly Gray (outgoing nurse) on [facility]. Report included the following information SBAR, Kardex, Intake/Output, MAR and Recent Results. Shift Summary: slept most of the night, yelled out a few times. BM last night. Issues for physician to address: none, CM working on placement         Oncology Shift Note   Admission Date 2/2/2018   Admission Diagnosis Sepsis ([address]  UTI (urinary tract infection)  Hx of systemic lupus erythematosus (SLE)  Hx of diabetes mellitus  Hx of essential hypertension  Mental retardation   Code Status Partial Code   Consults IP CONSULT TO NEUROLOGY  IP CONSULT TO CARDIOLOGY      Cardiac Monitoring [] Yes [x] No      Purposeful Hourly Rounding [x] Yes    Cole Score Total Score: 3   Cole score 3 or > [] Bed Alarm [] Avasys [] 1:1 sitter [] Patient refused (Place signed refusal form in chart)      Pain Managed [x] Yes [] No    Key Pain Meds             acetaminophen (TYLENOL ARTHRITIS PAIN) 650 mg TbER  (Taking) Take 650 mg by mouth every eight (8) hours as needed for Pain. Influenza Vaccine Received Flu Vaccine for Current Season (usually Sept-March): Yes           Oxygen needs?  [x] Room air Oxygen @  []1L    []2L    []3L   []4L    []5L   []6L     Use home O2? [] Yes [] No  Perform O2 challenge test using  smartphrase (.oxygenchallenge)      Last bowel movement Last Bowel Movement Date: 02/26/18  bowel movement      Urinary Catheter [REMOVED] Urinary Catheter 02/02/18 [facility] - Temperature-Criteria for Appropriate Use: Medically/surgically unstable  [REMOVED] Urinary Catheter 02/15/18 [facility]-Criteria for Appropriate Use: Obstruction/retention     [REMOVED] External Female Catheter 02/15/18-Urine Output (mL): 450 ml  [REMOVED] Urinary Catheter 02/02/18 [facility] - Temperature-Urine Output (mL): 10 ml  [REMOVED] External Female Catheter 02/03/18-Urine Output (mL): 0 ml  [REMOVED] Urinary Catheter 02/15/18 [facility]-Urine Output (mL): 150 ml     LDAs         PICC Triple Lumen 02/05/18 Right;Brachial (Active)   Central Line Being Utilized Yes 2/26/2018 11:49 PM   Criteria for Appropriate Use Limited/no vessel suitable for conventional peripheral access 2/26/2018 11:49 PM   Site Assessment Clean, dry, & intact 2/26/2018 11:49 PM   Phlebitis Assessment 0 2/26/2018 11:49 PM   Infiltration Assessment 0 2/26/2018 11:49 PM   Arm Circumference (cm) 0 cm 2/6/2018 12:00 AM   Date of Last Dressing Change 02/21/18 2/26/2018 11:49 PM   Dressing Status Clean, dry, & intact 2/26/2018 11:49 PM   External Catheter Length (cm) 0 centimeters 2/21/2018  8:29 PM   Dressing Type Disk with Chlorhexadine gluconate (CHG); Transparent 2/26/2018 11:49 PM   Action Taken Blood drawn 2/22/2018  3:08 AM   Hub Color/Line Status Page Art 2/26/2018 11:49 PM   Positive Blood Return (Site #1) No 2/26/2018 11:49 PM   Hub Color/Line Status Red 2/26/2018 11:49 PM   Positive Blood Return (Site #2) Yes 2/26/2018 11:49 PM   Hub Color/Line Status White 2/26/2018 11:49 PM   Positive Blood Return (Site #3) No 2/26/2018 11:49 PM   Alcohol Cap Used Yes 2/26/2018 11:49 PM                               Readmission Risk Assessment Tool Score Low Risk            12       Total Score        3 Patient Length of Stay (>5 days = 3)    5 Pt. Coverage (Medicare=5 , Medicaid, or Self-Pay=4)    4 Charlson Comorbidity Score (Age + Comorbid Conditions)        Criteria that do not apply:    Has Seen PCP in Last 6 Months (Yes=3, No=0)    . Living with Significant Other. Assisted Living. LTAC. SNF.  or   Rehab    IP Visits Last 12 Months (1-3=4, 4=9, >4=11)       Expected Length of Stay 4d 21h   Actual Length of Stay [address]

## 2018-02-27 NOTE — PROGRESS NOTES
Problem: Self Care Deficits Care Plan (Adult)  Goal: *Acute Goals and Plan of Care (Insert Text)  Occupational Therapy Goals  Initiated 2/19/2018, goals reviewed and revised PRN as per 2/27 weekly reevaluation, goal 6 newly established  1.  Patient will perform grooming in bed or supported sitting with moderate assistance  within 7 day(s). Goal met, change to minimal assistance. 2.  Patient will perform upper body dressing with moderate assistance  within 7 day(s). Not met, continue goal  3.  Patient will sit edge of bed for 5 minutes with moderate assistance for participation in functional task within 7 day(s). Not met, continue goal   4.  Patient will participate in assessment of functional transfers and establish goals as appropriate within 7 day(s). Goal met. See goal 6.   5.  Patient will participate in upper extremity therapeutic exercises with moderate cues/assist for 10 minutes within 7 day(s). Not met, continue goal  6. Patient will perform bed to chair with maximum assistance of 1 via stand pivot within 7 days. Occupational Therapy TREATMENT: WEEKLY REASSESSMENT  Patient: Julieta Noble (77 y.o. female)  Date: 2/27/2018  Diagnosis: Sepsis ([facility] [address]  UTI (urinary tract infection)  Hx of systemic lupus erythematosus (SLE)  Hx of diabetes mellitus  Hx of essential hypertension  Mental retardation <principal problem not specified>       Precautions: Fall  Chart, occupational therapy assessment, plan of care, and goals were reviewed. Patient alert, but demonstrates decreased attention and decreased command following which greatly impacts her ability to effectively participate in OT intervention. She remains greatly impaired due to her baseline cognitive deficits, scoliosis which greatly limits her posture for participation in seated ADLs, decreased hip and knee ROM, BUE tremors, GW, poor balance and poor activity tolerance.  During this session she was supervision/setup for self feeding, mod A for grooming in supported sitting, and was total A for UB dressing and attempted sit to stand transfers. At this point the patient is slowly progressing and may benefit from continued OT now and via SNF rehab after discharge. She ultimately requiring significant assistance for all ADL and functional mobility and will likely need to transition to LTC if possible. Progression toward goals:  []          Improving appropriately and progressing toward goals  [x]          Improving slowly and progressing toward goals  []          Not making progress toward goals and plan of care will be adjusted     PLAN:  Goals have been updated based on progression since last assessment. Patient continues to benefit from skilled intervention to address the above impairments. Continue to follow patient 2 times a week to address goals. Planned Interventions:  [x]                  Self Care Training                  []           Therapeutic Activities  [x]                  Functional Mobility Training    [x]           Cognitive Retraining  [x]                  Therapeutic Exercises           []           Endurance Activities  [x]                  Balance Training                   []           Neuromuscular Re-Education  []                  Visual/Perceptual Training     []      Home Safety Training  []                  Patient Education                 []           Family Training/Education  []                  Other (comment):  Discharge Recommendations: A[facility] Dawn with transition to LTC  Further Equipment Recommendations for Discharge: TBD     SUBJECTIVE:   Patient nonverbal  OBJECTIVE DATA SUMMARY:   Cognitive/Behavioral Status:  Neurologic State: Alert  Orientation Level: Unable to verbalize  Cognition: Decreased command following;Decreased attention/concentration           Functional Mobility and Transfers for ADLs:   Bed Mobility:   Presented up in recliner   Transfers:  Sit to Stand:  Total assistance;Assist x1 (unable to stand. )        Balance:  Sitting: Impaired  Sitting - Static: poor (unsupported) (for short period)    Standing: Impaired  Standing - Static: Poor    ADL Intervention:  Feeding  Drink to Mouth: Supervision/set-up (using 1 or both hands, extremely tremulous )  Cues: Tactile cues provided;Verbal cues provided;Visual cues provided    Grooming  Grooming Assistance:  (Performed seated up in chair)  Washing Face: Moderate assistance (hand over hand guidance. )  Washing Hands: Moderate assistance  Cues: Physical assistance; Tactile cues provided;Verbal cues provided;Visual cues provided    [address]: Total assistance (dependent)  Cues: Tactile cues provided;Verbal cues provided;Visual cues provided    Cognitive Retraining  Following Commands: Follows one step commands/directions (less than 25 % of the time. )  Cues: Tactile cues provided;Verbal cues provided;Visual cues provided    Barthel Index:    Bathin  Bladder: 0  Bowels: 0  Groomin  Dressin  Feedin  Mobility: 0  Stairs: 0  Toilet Use: 0  Transfer (Bed to Chair and Back): 0  Total: 0       Barthel and G-code impairment scale:  Percentage of impairment CH  0% CI  1-19% CJ  20-39% CK  40-59% CL  60-79% CM  80-99% CN  100%   Barthel Score 0-100 100 99-80 79-60 59-40 20-39 1-19   0   Barthel Score 0-20 20 17-19 13-16 9-12 5-8 1-4 0      The Barthel ADL Index: Guidelines  1. The index should be used as a record of what a patient does, not as a record of what a patient could do. 2. The main aim is to establish degree of independence from any help, physical or verbal, however minor and for whatever reason. 3. The need for supervision renders the patient not independent. 4. A patient's performance should be established using the best available evidence. Asking the patient, friends/relatives and nurses are the usual sources, but direct observation and common sense are also important.  However direct testing is not needed. 5. Usually the patient's performance over the preceding 24-48 hours is important, but occasionally longer periods will be relevant. 6. Middle categories imply that the patient supplies over 50 per cent of the effort. 7. Use of aids to be independent is allowed. Margareth Pablo., Barthel, D.W. ([id number]). Functional evaluation: the Barthel Index. [address] [facility] (14)2. MARY LOU MontañoF, Adebayo Mcintosh., Savita Ma., Trina, [address] [place] [address] (1999). Measuring the change indisability after inpatient rehabilitation; comparison of the responsiveness of the Barthel Index and Functional Eidson Measure. Journal of Neurology, Neurosurgery, and Psychiatry, 66(4), 710-308. Иван Vargas, MARIELA.RUDDY.A, JERZY Alcala, & Rivka Green MJOHN. (2004.) Assessment of post-stroke quality of life in cost-effectiveness studies: The usefulness of the Barthel Index and the EuroQoL-5D.  Quality of Life Research, 13, 427-43     Pain:  Pain Scale 1: Adult Nonverbal Pain Scale  Pain Intensity 1: 0             After treatment:   [x] Patient left in no apparent distress sitting up in chair  [] Patient left in no apparent distress in bed  [x] Call bell left within reach  [x] Nursing notified  [] Caregiver present  [] Bed alarm activated    COMMUNICATION/COLLABORATION:   The patients plan of care was discussed with: Physical Therapist    DELVIS Loco/L  Time Calculation: 15 mins

## 2018-02-27 NOTE — PROGRESS NOTES
Problem: Mobility Impaired (Adult and Pediatric)  Goal: *Acute Goals and Plan of Care (Insert Text)  Physical Therapy Goals  Initiated 2/19/2018, Re-Assessed 2/26/2018-continue progressing towards goal of A x 1  1. Patient will move from supine to sit and sit to supine  in bed with moderate assistance  within 7 day(s). 2.  Patient will transfer from bed to chair and chair to bed with maximal assistance using the least restrictive device within 7 day(s). 3.  Patient will perform sit to stand with maximal assistance within 7 day(s). 4.  Patient will tolerate seated edge of bed with mod assist x 10 minutes to improve balance and tolerance to activity and in prep for OOB transfers within 7 days    physical Therapy TREATMENT  Patient: Ce Lovelace (71 y.o. female)  Date: 2/27/2018  Diagnosis: Sepsis ([address]  UTI (urinary tract infection)  Hx of systemic lupus erythematosus (SLE)  Hx of diabetes mellitus  Hx of essential hypertension  Mental retardation <principal problem not specified>       Precautions: Fall  Chart, physical therapy assessment, plan of care and goals were reviewed. ASSESSMENT:  Patient requiring max assist x 2 for all mobility today. Bed mobility with max assistt x2. Patient attempts to slide to EOB with LE's. Able to sit EOB without assistance for brief time, but leans backwards and keeps LE's straightened. On first attempt at sit to stand with max assist x 2; unable to come to stand. On second attempt, performed stand pivot transfer with max assist x 2. Patient able to bear some weight on LE's but does not really assist with transfer. According to PT note last visit, patient able to assist more. Attempted to assist with exercises but patient unable to move LE's independently or follow many commands. Up in chair with bed alarm in place at end of session. Recommend SNF at discharge.   Progression toward goals:  [x]    Improving appropriately and progressing toward goals  [] Improving slowly and progressing toward goals  []    Not making progress toward goals and plan of care will be adjusted     PLAN:  Patient continues to benefit from skilled intervention to address the above impairments. Continue treatment per established plan of care. Discharge Recommendations:  Ayo Dawn  Further Equipment Recommendations for Discharge:  TBD     SUBJECTIVE:   Patient is non-verbal.    OBJECTIVE DATA SUMMARY:   Critical Behavior:  Neurologic State: Alert, Confused  Orientation Level: Unable to verbalize  Cognition: Decreased attention/concentration, Decreased command following, No command following, Impaired decision making, Poor safety awareness     Functional Mobility Training:  Bed Mobility:     Supine to Sit: Maximum assistance;Assist x2     Scooting: Total assistance;Assist x2        Transfers:  Sit to Stand: Maximum assistance;Assist x2 (does not come to full stand)        Stand Pivot Transfers: Maximum assistance  Bed to Chair: Maximum assistance;Assist x2                    Balance:  Sitting: Impaired  Sitting - Static: Good (unsupported) (for short period)  Sitting - Dynamic: Fair (occasional)  Standing: Impaired  Standing - Static: Poor  Standing - Dynamic : Poor  Ambulation/Gait Training:                                                                        Therapeutic Exercises:   Unable to participate. Pain:  Pain Scale 1: Adult Nonverbal Pain Scale  Pain Intensity 1: 0              Activity Tolerance:   fair  Please refer to the flowsheet for vital signs taken during this treatment.   After treatment:   [x]    Patient left in no apparent distress sitting up in chair  []    Patient left in no apparent distress in bed  [x]    Call bell left within reach  [x]    Nursing notified  []    Caregiver present  []    Bed alarm activated    COMMUNICATION/COLLABORATION:   The patients plan of care was discussed with: Registered Nurse    Nolvia Schafer, PT   Time Calculation: 16 mins

## 2018-02-27 NOTE — PROGRESS NOTES
Hospitalist Progress Note    NAME: Juliette Manual   :  1945   MRN:  [id number]     Admission summary:   72year old female with history of mental retardation, SLE, DM-2, HTN who normally resides in a group home, presented on 18 with decreased responsiveness. She had been diagnosed with acute influenza infection along with urinary tract infection with associated septic shock. Assessment / Plan:    Waiting for placement    Acute toxic-metabolic encephalopathy and septic shock due to pan-susecptible E. Coli UTI and Influenza B (POA)   - MRI brain 2/3 - mild small vessel ischemic changes. No acute abnormality. - CT chest/abd/pelvis  - severe scoliosis. Atelectasis in the lower lobes, left greater than right. Anemia. Atherosclerotic abdominal aorta without aneurysm. - urine culture  - pansensitive E Coli   - blood cultures  - no growth   - she had been treated with a course of Zosyn to completion in the hospital   - influenza B positive  - she had been treated with a course of oseltamivir to completion here   - seen by neurology, recommending continuation of Keppra   - diet advanced to pureed      Acute hypoxic respiratory failure secondary to acute pulmonary edema with acute on chronic diastolic heart failure   - Echo 18  - \"Left ventricle: Ejection fraction was estimated in the range of 60 %. No obvious wall motion abnormalities identified in the views obtained. There was mild concentric hypertrophy. Features were consistent with a pseudonormal left ventricular filling pattern, with concomitant abnormal relaxation and increased filling pressure (grade 2 diastolic dysfunction). \"   - suspect likely triggered by influenza / treatment for UTI   - seen by pulmonology, she had briefly required vasopressor support, which was subsequently weaned off    - now weaned off O2      Resolved TIFFANIE   - Cr peak of 1.55 on , now 1.2      Hypernatremia resolved   - suspect secondary to insufficient oral fluid intake, encouraging patient to drink fluids, given IV fluids on 2/21 with improvement        Acute on chronic blood loss anemia   - Hgb appears to be fairly stable without evident bleed on examination   - continue to monitor      Hypoglycemia   - reported history of \"borderline DM,\" not on meds at home.     - HgA1c <3.5   - discontinued POC glucose monitoring; continue bedtime snack         Elevated Transaminases   - presumed in setting of sepsis, generally improving     Mental retardation / cognitive impairment   - seen by PT/OT, recommending SNF      SLE   - holding home plaquenil     Hypothyroidism   - continue home levothyroxine       Obesity (Body mass index is 32.45 kg/(m^2)      Code Status: PARTIAL CODE - ACLS meds only, no compressions, no intubation  Surrogate Decision Maker: sister   DVT Prophylaxis: heparin      Baseline: Mental retardation, lives with sister normally but placed in group home after sister had to undergo recent back surgery  Disposition: awaiting snf placement,  following     Subjective:     Chief Complaint / Reason for Physician Visit:   No acute events reported overnight. Eating ok    Review of Systems:  Symptom Y/N Comments  Symptom Y/N Comments   Fever/Chills    Chest Pain     Poor Appetite    Edema     Cough    Abdominal Pain     Sputum    Joint Pain     SOB/YOUNG    Pruritis/Rash     Nausea/vomit    Tolerating PT/OT     Diarrhea    Tolerating Diet     Constipation    Other       Could NOT obtain due to: MR, non-verbal     Objective:     VITALS:   Last 24hrs VS reviewed since prior progress note.  Most recent are:  Patient Vitals for the past 24 hrs:   Temp Pulse Resp BP SpO2   02/27/18 0800 98.5 °F (36.9 °C) 78 18 148/83 98 %   02/26/18 2321 97.6 °F (36.4 °C) 74 18 130/86 99 %   02/26/18 2013 97.1 °F (36.2 °C) 72 17 124/56 97 %   02/26/18 1557 96.9 °F (36.1 °C) 79 18 102/48 97 %     No intake or output data in the 24 hours ending 02/27/18 0848     PHYSICAL EXAM:  General:                    non verbal,  No acute distress severe kyphoscoliosis  EENT:                       aniecteric sclera, PERRLA  Resp:                       b/l air entry, no crackles or wheezing  CV:                            Regular rhythm with normal rate during ascultation,   No edema  GI:                              Soft, Non distended, Non tender.  +Bowel sounds  Neurologic:                Alert but does not speak, does not follow any commands  Psych:                       No insight  Skin:                          No rashes.  No jaundice    Reviewed most current lab test results and cultures  YES  Reviewed most current radiology test results   YES  Review and summation of old records today    NO  Reviewed patient's current orders and MAR    YES  PMH/SH reviewed - no change compared to H&P  ________________________________________________________________________  Care Plan discussed with:    Comments   Patient     Family      RN y    Care Manager     Consultant                        Multidiciplinary team rounds were held today with , nursing, pharmacist and clinical coordinator. Patient's plan of care was discussed; medications were reviewed and discharge planning was addressed. ________________________________________________________________________  Total NON critical care TIME:  20  Minutes    Total CRITICAL CARE TIME Spent:   Minutes non procedure based      Comments   >50% of visit spent in counseling and coordination of care x dispo planning   ________________________________________________________________________  Barby Rashid MD     Procedures: see electronic medical records for all procedures/Xrays and details which were not copied into this note but were reviewed prior to creation of Plan. LABS:  I reviewed today's most current labs and imaging studies.   Pertinent labs include:  No results for input(s): WBC, HGB, HCT, PLT, HGBEXT, HCTEXT, PLTEXT, HGBEXT, HCTEXT, PLTEXT in the last 72 hours. No results for input(s): NA, K, CL, CO2, GLU, BUN, CREA, CA, MG, PHOS, ALB, TBIL, TBILI, SGOT, ALT, INR in the last 72 hours.     No lab exists for component: Humphrey Yin    Signed: Janice Hope MD

## 2018-02-27 NOTE — PROGRESS NOTES
Oncology Nursing Communication Tool  7:49 PM  2/26/2018     Bedside and Verbal shift change report given to Asia Duff RN (incoming nurse) by Neetu Sargent RN (outgoing nurse) on Eugene Bone. Report included the following information SBAR, Kardex, Intake/Output, MAR and Recent Results. Shift Summary: Patient pleasantly confused. Good appetite, incontinent of bladder. Up to chair for meals with verbal cues and one person assist.      Issues for physician to address: DC PICC? Oncology Shift Note   Admission Date 2/2/2018   Admission Diagnosis Sepsis ([facility] [address]  UTI (urinary tract infection)  Hx of systemic lupus erythematosus (SLE)  Hx of diabetes mellitus  Hx of essential hypertension  Mental retardation   Code Status Partial Code   Consults IP CONSULT TO NEUROLOGY  IP CONSULT TO CARDIOLOGY      Cardiac Monitoring [] Yes [] No      Purposeful Hourly Rounding [] Yes    Cole Score Total Score: 4   Cole score 3 or > [] Bed Alarm [] Avasys [] 1:1 sitter [] Patient refused (Place signed refusal form in chart)      Pain Managed [] Yes [] No    Key Pain Meds             acetaminophen (TYLENOL ARTHRITIS PAIN) 650 mg TbER  (Taking) Take 650 mg by mouth every eight (8) hours as needed for Pain. Influenza Vaccine Received Flu Vaccine for Current Season (usually Sept-March): Yes           Oxygen needs?  [] Room air Oxygen @  []1L    []2L    []3L   []4L    []5L   []6L     Use home O2? [] Yes [] No  Perform O2 challenge test using  smartphrase (.oxygenchallenge)      Last bowel movement Last Bowel Movement Date: 02/25/18  bowel movement      Urinary Catheter [REMOVED] Urinary Catheter 02/02/18 [place] - Temperature-Criteria for Appropriate Use: Medically/surgically unstable  [REMOVED] Urinary Catheter 02/15/18 [place]-Criteria for Appropriate Use: Obstruction/retention     [REMOVED] External Female Catheter 02/15/18-Urine Output (mL): 450 ml  [REMOVED] Urinary Catheter 02/02/18 [facility] - Temperature-Urine Output (mL): 10 ml  [REMOVED] External Female Catheter 02/03/18-Urine Output (mL): 0 ml  [REMOVED] Urinary Catheter 02/15/18 [place]-Urine Output (mL): 150 ml     LDAs         PICC Triple Lumen 02/05/18 Right;Brachial (Active)   Central Line Being Utilized No 2/26/2018  3:57 PM   Criteria for Appropriate Use Limited/no vessel suitable for conventional peripheral access 2/26/2018  3:57 PM   Site Assessment Clean, dry, & intact 2/26/2018  3:57 PM   Phlebitis Assessment 0 2/26/2018  3:57 PM   Infiltration Assessment 0 2/26/2018  3:57 PM   Arm Circumference (cm) 0 cm 2/6/2018 12:00 AM   Date of Last Dressing Change 02/21/18 2/24/2018  8:10 PM   Dressing Status Clean, dry, & intact 2/26/2018  3:57 PM   External Catheter Length (cm) 0 centimeters 2/21/2018  8:29 PM   Dressing Type Disk with Chlorhexadine gluconate (CHG); Transparent 2/26/2018  3:57 PM   Action Taken Blood drawn 2/22/2018  3:08 AM   Hub Color/Line Status Gray;Capped;Flushed 2/26/2018  3:57 PM   Positive Blood Return (Site #1) No 2/26/2018  3:57 PM   Hub Color/Line Status Red;Flushed;Capped 2/26/2018  3:57 PM   Positive Blood Return (Site #2) No 2/26/2018  3:57 PM   Hub Color/Line Status White;Flushed;Capped 2/26/2018  3:57 PM   Positive Blood Return (Site #3) No 2/26/2018  3:57 PM   Alcohol Cap Used Yes 2/26/2018  3:57 PM                               Readmission Risk Assessment Tool Score Low Risk            12       Total Score        3 Patient Length of Stay (>5 days = 3)    5 Pt. Coverage (Medicare=5 , Medicaid, or Self-Pay=4)    4 Charlson Comorbidity Score (Age + Comorbid Conditions)        Criteria that do not apply:    Has Seen PCP in Last 6 Months (Yes=3, No=0)    . Living with Significant Other. Assisted Living. LTAC. SNF.  or   Rehab    IP Visits Last 12 Months (1-3=4, 4=9, >4=11)       Expected Length of Stay 4d 21h   Actual Length of Stay 24          Celina Mcleod RN

## 2018-02-27 NOTE — INTERDISCIPLINARY ROUNDS
Oncology Interdisciplinary rounds were held today to discuss patient plan of care and outcomes. The following members were present: Nursing, Case Management, Pharmacy and Dietary.     Actual Length of Stay: 25    DRG GLOS: 4.9    Expected Length of Stay: 4d 21h                Plan for Day        Mobility        Plan for Stay      Plan for Way   Work with PT Up with max assist Continue current treatment plan SNF- TBD

## 2018-02-28 LAB
GLUCOSE BLD STRIP.AUTO-MCNC: 125 MG/DL (ref 65–100)
GLUCOSE BLD STRIP.AUTO-MCNC: 83 MG/DL (ref 65–100)
SERVICE CMNT-IMP: ABNORMAL
SERVICE CMNT-IMP: NORMAL

## 2018-02-28 PROCEDURE — 74011250637 HC RX REV CODE- 250/637: Performed by: INTERNAL MEDICINE

## 2018-02-28 PROCEDURE — 82962 GLUCOSE BLOOD TEST: CPT

## 2018-02-28 PROCEDURE — 65270000015 HC RM PRIVATE ONCOLOGY

## 2018-02-28 RX ADMIN — Medication 10 ML: at 21:33

## 2018-02-28 RX ADMIN — METOCLOPRAMIDE 5 MG: 10 TABLET ORAL at 16:53

## 2018-02-28 RX ADMIN — MIDODRINE HYDROCHLORIDE 10 MG: 5 TABLET ORAL at 09:35

## 2018-02-28 RX ADMIN — METOCLOPRAMIDE 5 MG: 10 TABLET ORAL at 21:32

## 2018-02-28 RX ADMIN — METOCLOPRAMIDE 5 MG: 10 TABLET ORAL at 13:28

## 2018-02-28 RX ADMIN — LEVOTHYROXINE SODIUM 25 MCG: 25 TABLET ORAL at 05:34

## 2018-02-28 RX ADMIN — MIDODRINE HYDROCHLORIDE 10 MG: 5 TABLET ORAL at 16:53

## 2018-02-28 RX ADMIN — METOCLOPRAMIDE 5 MG: 10 TABLET ORAL at 09:35

## 2018-02-28 RX ADMIN — Medication 10 ML: at 16:54

## 2018-02-28 RX ADMIN — Medication 10 ML: at 05:37

## 2018-02-28 RX ADMIN — PANTOPRAZOLE SODIUM 40 MG: 40 TABLET, DELAYED RELEASE ORAL at 09:35

## 2018-02-28 RX ADMIN — Medication 20 ML: at 16:54

## 2018-02-28 NOTE — PROGRESS NOTES
Bedside report given to Ambrosio Van (oncoming nurse) by Carolyn Ventura RN and Grady Corona student nurse (outgoing nurse). SBAR report was cherellevin. Ms. Ally Betancourt spent most of her time sitting in the chair watching t.v.. Lift team was called to get patient back into bed. We put her back on the wick due to skin excoriation on right upper groin from adult brief. Waiting to hear back from [facility] for discharge.

## 2018-02-28 NOTE — PROGRESS NOTES
UAI completed and submitted. Level II needed and requested from [facility]. Patient will need [facility] after Level II completed.     Carly Gee RN, BSN, ACM   - Medical Oncology  [phone]

## 2018-02-28 NOTE — PROGRESS NOTES
Oncology Nursing Communication Tool  7:44 AM  2/28/2018     Bedside shift change report given to Linda Salguero RN (incoming nurse) by Jac Perez (outgoing nurse) on Liliya Dyer. Report included the following information SBAR, Kardex, Intake/Output, MAR and Recent Results. Shift Summary: purewick in place. Changed linen and gown multiple times. No BM. Waiting on placement. Issues for physician to address: none         Oncology Shift Note   Admission Date 2/2/2018   Admission Diagnosis Sepsis ([facility] [address]  UTI (urinary tract infection)  Hx of systemic lupus erythematosus (SLE)  Hx of diabetes mellitus  Hx of essential hypertension  Mental retardation   Code Status Partial Code   Consults IP CONSULT TO NEUROLOGY  IP CONSULT TO CARDIOLOGY      Cardiac Monitoring [] Yes [x] No      Purposeful Hourly Rounding [x] Yes    Cole Score Total Score: 3   Cole score 3 or > [] Bed Alarm [] Avasys [] 1:1 sitter [] Patient refused (Place signed refusal form in chart)      Pain Managed [x] Yes [] No    Key Pain Meds             acetaminophen (TYLENOL ARTHRITIS PAIN) 650 mg TbER  (Taking) Take 650 mg by mouth every eight (8) hours as needed for Pain. Influenza Vaccine Received Flu Vaccine for Current Season (usually Sept-March): Yes           Oxygen needs?  [x] Room air Oxygen @  []1L    []2L    []3L   []4L    []5L   []6L     Use home O2? [] Yes [] No  Perform O2 challenge test using  smartphrase (.oxygenchallenge)      Last bowel movement Last Bowel Movement Date: 02/27/18  bowel movement      Urinary Catheter [REMOVED] Urinary Catheter 02/02/18 [place] - Temperature-Criteria for Appropriate Use: Medically/surgically unstable  [REMOVED] Urinary Catheter 02/15/18 [place]-Criteria for Appropriate Use: Obstruction/retention     [REMOVED] External Female Catheter 02/15/18-Urine Output (mL): 450 ml  [REMOVED] Urinary Catheter 02/02/18 [place] - Temperature-Urine Output (mL): 10 ml  [REMOVED] External Female Catheter 02/03/18-Urine Output (mL): 0 ml  [REMOVED] Urinary Catheter 02/15/18 [place]-Urine Output (mL): 150 ml     LDAs         PICC Triple Lumen 02/05/18 Right;Brachial (Active)   Central Line Being Utilized Yes 2/28/2018  3:09 AM   Criteria for Appropriate Use Limited/no vessel suitable for conventional peripheral access 2/28/2018  3:09 AM   Site Assessment Clean, dry, & intact 2/28/2018  3:09 AM   Phlebitis Assessment 0 2/28/2018  3:09 AM   Infiltration Assessment 0 2/28/2018  3:09 AM   Arm Circumference (cm) 0 cm 2/6/2018 12:00 AM   Date of Last Dressing Change 02/21/18 2/28/2018  3:09 AM   Dressing Status Clean, dry, & intact 2/28/2018  3:09 AM   External Catheter Length (cm) 0 centimeters 2/21/2018  8:29 PM   Dressing Type Disk with Chlorhexadine gluconate (CHG); Transparent 2/28/2018  3:09 AM   Action Taken Blood drawn 2/22/2018  3:08 AM   Hub Color/Line Status Gray;Capped 2/28/2018  3:09 AM   Positive Blood Return (Site #1) Yes 2/28/2018  3:09 AM   Hub Color/Line Status Red;Capped 2/28/2018  3:09 AM   Positive Blood Return (Site #2) Yes 2/28/2018  3:09 AM   Hub Color/Line Status White;Capped 2/28/2018  3:09 AM   Positive Blood Return (Site #3) No 2/28/2018  3:09 AM   Alcohol Cap Used Yes 2/28/2018  3:09 AM                               Readmission Risk Assessment Tool Score Low Risk            12       Total Score        3 Patient Length of Stay (>5 days = 3)    5 Pt. Coverage (Medicare=5 , Medicaid, or Self-Pay=4)    4 Charlson Comorbidity Score (Age + Comorbid Conditions)        Criteria that do not apply:    Has Seen PCP in Last 6 Months (Yes=3, No=0)    . Living with Significant Other. Assisted Living. LTAC. SNF.  or   Rehab    IP Visits Last 12 Months (1-3=4, 4=9, >4=11)       Expected Length of Stay 4d 21h   Actual Length of Stay [address] [place] [address]

## 2018-02-28 NOTE — PROGRESS NOTES
Hospitalist Progress Note    NAME: Kavya Aguilar   :  1945   MRN:  [id number]     Admission summary:   72year old female with history of mental retardation, SLE, DM-2, HTN who normally resides in a group home, presented on 18 with decreased responsiveness. She had been diagnosed with acute influenza infection along with urinary tract infection with associated septic shock. Assessment / Plan:    Waiting for placement    Acute toxic-metabolic encephalopathy and septic shock due to pan-susecptible E. Coli UTI and Influenza B (POA)   - MRI brain 2/3 - mild small vessel ischemic changes. No acute abnormality. - CT chest/abd/pelvis  - severe scoliosis. Atelectasis in the lower lobes, left greater than right. Anemia. Atherosclerotic abdominal aorta without aneurysm. - urine culture  - pansensitive E Coli   - blood cultures  - no growth   - she had been treated with a course of Zosyn to completion in the hospital   - influenza B positive  - she had been treated with a course of oseltamivir to completion here   - seen by neurology, recommending continuation of Keppra   - diet advanced to pureed      Acute hypoxic respiratory failure secondary to acute pulmonary edema with acute on chronic diastolic heart failure   - Echo 18  - \"Left ventricle: Ejection fraction was estimated in the range of 60 %. No obvious wall motion abnormalities identified in the views obtained. There was mild concentric hypertrophy. Features were consistent with a pseudonormal left ventricular filling pattern, with concomitant abnormal relaxation and increased filling pressure (grade 2 diastolic dysfunction). \"   - suspect likely triggered by influenza / treatment for UTI   - seen by pulmonology, she had briefly required vasopressor support, which was subsequently weaned off    - now weaned off O2      Resolved TIFFANIE   - Cr peak of 1.55 on , now 1.2      Hypernatremia resolved   - suspect secondary to insufficient oral fluid intake, encouraging patient to drink fluids, given IV fluids on 2/21 with improvement        Acute on chronic blood loss anemia   - Hgb appears to be fairly stable without evident bleed on examination   - continue to monitor      Hypoglycemia   - reported history of \"borderline DM,\" not on meds at home.     - HgA1c <3.5   - discontinued POC glucose monitoring; continue bedtime snack         Elevated Transaminases   - presumed in setting of sepsis, generally improving     Mental retardation / cognitive impairment   - seen by PT/OT, recommending SNF      SLE   - holding home plaquenil     Hypothyroidism   - continue home levothyroxine       Obesity (Body mass index is 32.45 kg/(m^2)      Code Status: PARTIAL CODE - ACLS meds only, no compressions, no intubation  Surrogate Decision Maker: sister   DVT Prophylaxis: heparin      Baseline: Mental retardation, lives with sister normally but placed in group home after sister had to undergo recent back surgery  Disposition: awaiting snf placement,  following     Subjective:     Chief Complaint / Reason for Physician Visit:   No acute events reported overnight. Awake, non-verbal    Review of Systems:  Symptom Y/N Comments  Symptom Y/N Comments   Fever/Chills    Chest Pain     Poor Appetite    Edema     Cough    Abdominal Pain     Sputum    Joint Pain     SOB/YOUNG    Pruritis/Rash     Nausea/vomit    Tolerating PT/OT     Diarrhea    Tolerating Diet     Constipation    Other       Could NOT obtain due to: MR, non-verbal     Objective:     VITALS:   Last 24hrs VS reviewed since prior progress note.  Most recent are:  Patient Vitals for the past 24 hrs:   Temp Pulse Resp BP SpO2   02/28/18 0808 97.7 °F (36.5 °C) 95 20 154/82 100 %   02/28/18 0202 98 °F (36.7 °C) 89 20 125/84 98 %   02/27/18 2021 97.3 °F (36.3 °C) 63 20 (!) 133/91 97 %   02/27/18 1535 97.4 °F (36.3 °C) 83 18 (!) 139/93 98 %       Intake/Output Summary (Last 24 hours) at 02/28/18 6020  Last data filed at 02/27/18 1631   Gross per 24 hour   Intake              240 ml   Output                0 ml   Net              240 ml        PHYSICAL EXAM:  General:                    non verbal,  No acute distress severe kyphoscoliosis  EENT:                       aniecteric sclera, PERRLA  Resp:                       b/l air entry, no crackles or wheezing  CV:                            Regular rhythm with normal rate during ascultation,   No edema  GI:                              Soft, Non distended, Non tender.  +Bowel sounds  Neurologic:                Alert but does not speak, does not follow any commands  Psych:                       No insight  Skin:                          No rashes.  No jaundice    Reviewed most current lab test results and cultures  YES  Reviewed most current radiology test results   YES  Review and summation of old records today    NO  Reviewed patient's current orders and MAR    YES  PMH/SH reviewed - no change compared to H&P  ________________________________________________________________________  Care Plan discussed with:    Comments   Patient     Family      RN y    Care Manager     Consultant                        Multidiciplinary team rounds were held today with , nursing, pharmacist and clinical coordinator. Patient's plan of care was discussed; medications were reviewed and discharge planning was addressed. ________________________________________________________________________  Total NON critical care TIME:  15  Minutes    Total CRITICAL CARE TIME Spent:   Minutes non procedure based      Comments   >50% of visit spent in counseling and coordination of care x dispo planning   ________________________________________________________________________  Reij Morales MD     Procedures: see electronic medical records for all procedures/Xrays and details which were not copied into this note but were reviewed prior to creation of Plan.       LABS:  I reviewed today's most current labs and imaging studies. Pertinent labs include:  No results for input(s): WBC, HGB, HCT, PLT, HGBEXT, HCTEXT, PLTEXT, HGBEXT, HCTEXT, PLTEXT in the last 72 hours. No results for input(s): NA, K, CL, CO2, GLU, BUN, CREA, CA, MG, PHOS, ALB, TBIL, TBILI, SGOT, ALT, INR in the last 72 hours.     No lab exists for component: Humphrey Yin    Signed: Natasha Ramírez MD

## 2018-03-01 PROCEDURE — 74011250637 HC RX REV CODE- 250/637: Performed by: INTERNAL MEDICINE

## 2018-03-01 PROCEDURE — 77030038269 HC DRN EXT URIN PURWCK BARD -A

## 2018-03-01 PROCEDURE — 65270000015 HC RM PRIVATE ONCOLOGY

## 2018-03-01 RX ADMIN — Medication 10 ML: at 05:09

## 2018-03-01 RX ADMIN — METOCLOPRAMIDE 5 MG: 10 TABLET ORAL at 22:13

## 2018-03-01 RX ADMIN — PANTOPRAZOLE SODIUM 40 MG: 40 TABLET, DELAYED RELEASE ORAL at 10:09

## 2018-03-01 RX ADMIN — Medication 10 ML: at 22:13

## 2018-03-01 RX ADMIN — METOCLOPRAMIDE 5 MG: 10 TABLET ORAL at 10:09

## 2018-03-01 RX ADMIN — METOCLOPRAMIDE 5 MG: 10 TABLET ORAL at 17:51

## 2018-03-01 RX ADMIN — Medication 10 ML: at 17:38

## 2018-03-01 RX ADMIN — MIDODRINE HYDROCHLORIDE 10 MG: 5 TABLET ORAL at 22:13

## 2018-03-01 RX ADMIN — MIDODRINE HYDROCHLORIDE 10 MG: 5 TABLET ORAL at 10:09

## 2018-03-01 RX ADMIN — Medication 20 ML: at 17:39

## 2018-03-01 RX ADMIN — LEVOTHYROXINE SODIUM 25 MCG: 25 TABLET ORAL at 05:08

## 2018-03-01 RX ADMIN — METOCLOPRAMIDE 5 MG: 10 TABLET ORAL at 12:11

## 2018-03-01 RX ADMIN — Medication 10 ML: at 17:39

## 2018-03-01 NOTE — PROGRESS NOTES
Oncology Nursing Communication Tool  6:48 AM  3/1/2018     Bedside shift change report given to Jessica Mcleod RN (incoming nurse) by Terrell Horner (outgoing nurse) on Vishnu Harris. Report included the following information SBAR, Kardex, Intake/Output, MAR and Recent Results. Shift Summary: no change, no BM. Changed brief several times. All linen changed. Still waiting for insurance auth for placement. Issues for physician to address: none, CM waiting on placement. Oncology Shift Note   Admission Date 2/2/2018   Admission Diagnosis Sepsis ([address]  UTI (urinary tract infection)  Hx of systemic lupus erythematosus (SLE)  Hx of diabetes mellitus  Hx of essential hypertension  Mental retardation   Code Status Partial Code   Consults IP CONSULT TO NEUROLOGY  IP CONSULT TO CARDIOLOGY      Cardiac Monitoring [] Yes [x] No      Purposeful Hourly Rounding [x] Yes    Cole Score Total Score: 3   Cole score 3 or > [] Bed Alarm [] Avasys [] 1:1 sitter [] Patient refused (Place signed refusal form in chart)      Pain Managed [x] Yes [] No    Key Pain Meds             acetaminophen (TYLENOL ARTHRITIS PAIN) 650 mg TbER  (Taking) Take 650 mg by mouth every eight (8) hours as needed for Pain. Influenza Vaccine Received Flu Vaccine for Current Season (usually Sept-March): Yes           Oxygen needs?  [x] Room air Oxygen @  []1L    []2L    []3L   []4L    []5L   []6L     Use home O2? [] Yes [] No  Perform O2 challenge test using  smartphrase (.oxygenchallenge)      Last bowel movement Last Bowel Movement Date: 02/28/18  bowel movement      Urinary Catheter [REMOVED] Urinary Catheter 02/02/18 [facility] - Temperature-Criteria for Appropriate Use: Medically/surgically unstable  [REMOVED] Urinary Catheter 02/15/18 [facility]-Criteria for Appropriate Use: Obstruction/retention     [REMOVED] External Female Catheter 02/15/18-Urine Output (mL): 450 ml  [REMOVED] Urinary Catheter 02/02/18 [facility] - Temperature-Urine Output (mL): 10 ml  [REMOVED] External Female Catheter 02/03/18-Urine Output (mL): 0 ml  [REMOVED] Urinary Catheter 02/15/18 [facility]-Urine Output (mL): 150 ml     LDAs         PICC Triple Lumen 02/05/18 Right;Brachial (Active)   Central Line Being Utilized Yes 3/1/2018  3:07 AM   Criteria for Appropriate Use Limited/no vessel suitable for conventional peripheral access 3/1/2018  3:07 AM   Site Assessment Clean, dry, & intact 3/1/2018  3:07 AM   Phlebitis Assessment 0 3/1/2018  3:07 AM   Infiltration Assessment 0 3/1/2018  3:07 AM   Arm Circumference (cm) 0 cm 2/6/2018 12:00 AM   Date of Last Dressing Change 02/21/18 3/1/2018  3:07 AM   Dressing Status Clean, dry, & intact 3/1/2018  3:07 AM   External Catheter Length (cm) 0 centimeters 2/21/2018  8:29 PM   Dressing Type Disk with Chlorhexadine gluconate (CHG); Transparent 3/1/2018  3:07 AM   Action Taken Blood drawn 2/22/2018  3:08 AM   Hub Color/Line Status Gray;Capped 3/1/2018  3:07 AM   Positive Blood Return (Site #1) No 3/1/2018  3:07 AM   Hub Color/Line Status Red;Capped 3/1/2018  3:07 AM   Positive Blood Return (Site #2) No 3/1/2018  3:07 AM   Hub Color/Line Status White;Capped 3/1/2018  3:07 AM   Positive Blood Return (Site #3) No 3/1/2018  3:07 AM   Alcohol Cap Used Yes 2/28/2018  8:17 PM          Peripheral IV 02/28/18 Right Wrist (Active)   Site Assessment Clean, dry, & intact 3/1/2018  3:07 AM   Phlebitis Assessment 0 3/1/2018  3:07 AM   Infiltration Assessment 0 3/1/2018  3:07 AM   Dressing Status Clean, dry, & intact 3/1/2018  3:07 AM   Dressing Type Tape;Transparent 3/1/2018  3:07 AM   Hub Color/Line Status Pink;Capped 3/1/2018  3:07 AM   Action Taken Other (comment) 2/28/2018 12:52 PM                         Readmission Risk Assessment Tool Score Low Risk            12       Total Score        3 Patient Length of Stay (>5 days = 3)    5 Pt.  Coverage (Medicare=5 , Medicaid, or Self-Pay=4)    4 Charlson Comorbidity Score (Age + Comorbid Conditions) Criteria that do not apply:    Has Seen PCP in Last 6 Months (Yes=3, No=0)    . Living with Significant Other. Assisted Living. LTAC. SNF.  or   Rehab    IP Visits Last 12 Months (1-3=4, 4=9, >4=11)       Expected Length of Stay 4d 21h   Actual Length of Stay [facility]

## 2018-03-01 NOTE — PROGRESS NOTES
Hospitalist Progress Note    NAME: Sandrine Wells   :  1945   MRN:  [id number]     Admission summary:   72year old female with history of mental retardation, SLE, DM-2, HTN who normally resides in a group home, presented on 18 with decreased responsiveness. She had been diagnosed with acute influenza infection along with urinary tract infection with associated septic shock. Assessment / Plan:  79yo BF with acute metabolic encephalopathy and severe sepsis with shock secondary to UTI (E. Coli) and influenzae B. Acute metabolic encephalopathy  -resolved, Pt at baseline    Severe sepsis with shock, resoved  -ABx completed    Influenzae B, resolved  -Tamiflu completed    Acute respiratory failure with hypoxia, resolved  -weaned off supplemental O2    Acute on chronic diastolic congestive heart failure, resolved    Pulmonary edema, resolved    Acute kidney injury, resolved  -Cr has been trending up and was currently 1.24 2018  -BMP in AM    Acute blood loss anemia  -last H/L 2018 and was 7.9/25.2  -CBC with Diff in AM    Transaminitis  -will continue to monitor    Systemic lupus erythematosus  -will continue to monitor  -continue to hold Plaquenil    Hypothyroidism  -continue levothyroxine    Intellectual disability/mental retardation  -stable  -awaiting placement       Code Status: PARTIAL CODE - ACLS meds only, no compressions, no intubation  Surrogate Decision Maker: sister   DVT Prophylaxis: heparin      Baseline: Mental retardation, lives with sister normally but placed in group home after sister had to undergo recent back surgery  Disposition: awaiting SNF placement, CM following    BMI 29.12     Subjective:     Chief Complaint / Reason for Physician Visit:  Pt does not contribute to subjective portion of encounter. No adverse events overnight.     Review of Systems:  Symptom Y/N Comments  Symptom Y/N Comments   Fever/Chills    Chest Pain     Poor Appetite    Edema     Cough Abdominal Pain     Sputum    Joint Pain     SOB/YOUNG    Pruritis/Rash     Nausea/vomit    Tolerating PT/OT     Diarrhea    Tolerating Diet     Constipation    Other       Could NOT obtain due to: MR, non-verbal     Objective:     VITALS:   Last 24hrs VS reviewed since prior progress note. Most recent are:  Patient Vitals for the past 24 hrs:   Temp Pulse Resp BP SpO2   03/01/18 0715 98.2 °F (36.8 °C) 63 22 107/66 97 %   02/28/18 2300 98.1 °F (36.7 °C) 74 16 131/60 100 %   02/28/18 2017 97.6 °F (36.4 °C) 82 16 136/81 94 %   02/28/18 1555 97.2 °F (36.2 °C) (!) 117 20 167/78 95 %       Intake/Output Summary (Last 24 hours) at 03/01/18 1111  Last data filed at 03/01/18 1020   Gross per 24 hour   Intake             1030 ml   Output                0 ml   Net             1030 ml        PHYSICAL EXAM:  Gen: elderly BF, lying in bed, NAD  HENT: NC/AT, oral mucosa pink and moist  Eyes: PERRL, anicteric sclera, conjunctiva pink and moist  CVS: RRR, s1/s2 audible, no m/g/r, no edema, radial/DP pulses 2+ B/L  Lung: CTAB, no w/r/r  Abd: BS present, NT/ND, no HSM  Neuro: AAO to person and self, CN 2-12 grossly intact, biceps/patella DTRs 2+ B/L, muscle strength Pt does not cooperate    Reviewed most current lab test results and cultures  YES  Reviewed most current radiology test results   YES  Review and summation of old records today    NO  Reviewed patient's current orders and MAR    YES  PMH/ reviewed - no change compared to H&P  ________________________________________________________________________  Care Plan discussed with:    Comments   Patient     Family      RN y    Care Manager     Consultant                        Multidiciplinary team rounds were held today with , nursing, pharmacist and clinical coordinator. Patient's plan of care was discussed; medications were reviewed and discharge planning was addressed.      ________________________________________________________________________  Total NON critical care TIME:  15  Minutes    Total CRITICAL CARE TIME Spent:   Minutes non procedure based      Comments   >50% of visit spent in counseling and coordination of care x dispo planning   ________________________________________________________________________  Kayleigh Durbin MD     Procedures: see electronic medical records for all procedures/Xrays and details which were not copied into this note but were reviewed prior to creation of Plan. LABS:  I reviewed today's most current labs and imaging studies. Pertinent labs include:  No results for input(s): WBC, HGB, HCT, PLT, HGBEXT, HCTEXT, PLTEXT, HGBEXT, HCTEXT, PLTEXT in the last 72 hours. No results for input(s): NA, K, CL, CO2, GLU, BUN, CREA, CA, MG, PHOS, ALB, TBIL, TBILI, SGOT, ALT, INR in the last 72 hours. No lab exists for component: INREXT, INREXT    Signed:  Kayleigh Durbin MD

## 2018-03-01 NOTE — PROGRESS NOTES
Nutrition Assessment:    INTERVENTIONS/RECOMMENDATIONS:   Continue current diet  Ensure TID    ASSESSMENT:   Chart reviewed, pt waiting on placement. RN report fair PO intake but does well with liquids. Will adjust supplements. Diet Order: Puree  % Eaten:  Patient Vitals for the past 72 hrs:   % Diet Eaten   02/28/18 1925 15 %   02/27/18 1215 30 %   02/27/18 0815 40 %     Pertinent Medications: [x]Reviewed: reglan, PPI  Pertinent Labs: [x]Reviewed:   Food Allergies: [x]NKFA  []Other   Last BM:  2/28  Edema:      []RUE   []LUE   []RLE   []LLE      Pressure Ulcer:      [] Stage I   [] Stage II   [] Stage III   [] Stage IV      Anthropometrics: Height: 5' 1\" (154.9 cm) Weight: 69.9 kg (154 lb)    IBW (%IBW):   ( ) UBW (%UBW):   (  %)    BMI: Body mass index is 29.1 kg/(m^2). This BMI is indicative of:  []Underweight   []Normal   []Overweight   [] Obesity   [] Extreme Obesity (BMI>40)  Last Weight Metrics:  Weight Loss Metrics 2/25/2018 5/31/2017 5/23/2017 5/8/2017 12/13/2016 6/27/2016 5/2/2016   Today's Wt 154 lb 156 lb 156 lb 156 lb 155 lb 136 lb 136 lb   BMI 29.1 kg/m2 29.48 kg/m2 29.48 kg/m2 30.47 kg/m2 30.27 kg/m2 26.56 kg/m2 26.56 kg/m2       Estimated Nutrition Needs (Based on): 1463 Kcals/day (MSJ 1219 x 1.2) , 61 g (-77 (0.8-1gPro/kg)) Protein  Carbohydrate: At Least 130 g/day  Fluids: 1463 mL/day or per primary team    NUTRITION DIAGNOSES:   Problem:  Self-feeding difficulty      Etiology: related to Acute encephalopathy      Signs/Symptoms: as evidenced by total feed      NUTRITION INTERVENTIONS:  Meals/Snacks: General/healthful diet Enteral/Parenteral Nutrition: Modify rate, concentration, composition, and schedule Supplements: Commercial supplement              GOAL:   consume >50% of meals and ONS in 5-7 days    NUTRITION MONITORING AND EVALUATION      Food/Nutrient Intake Outcomes:  Total energy intake, Liquid meal replacement  Physical Signs/Symptoms Outcomes: GI, Glucose profile, GI profile, Electrolyte and renal profile, Weight/weight change    Previous Goal Met:   [x] Met              [] Progressing Towards Goal              [] Not Progressing Towards Goal   Previous Recommendations:   [] Implemented          [] Not Implemented          [x] Not Applicable    LEARNING NEEDS (Diet, Food/Nutrient-Drug Interaction):    [x] None Identified   [] Identified and Education Provided/Documented   [] Identified and Pt declined/was not appropriate     Cultural, [place], OR Ethnic Dietary Needs:    [x] None Identified   [] Identified and Addressed     [x] Interdisciplinary Care Plan Reviewed/Documented    [x] Discharge Planning: General healthy diet, consistency modifications as needed   [] Participated in Interdisciplinary Rounds    NUTRITION RISK:    [] High              [] Moderate           [x]  Low  []  Minimal/Uncompromised      Frederick Martins RDN  Pager [phone]  Weekend Pager [phone]

## 2018-03-01 NOTE — PROGRESS NOTES
Oncology Nursing Communication Tool  7:41 PM  2/28/2018     Bedside shift change report given to Shay Montejo RN (incoming nurse) by Sugey Joseph RN (outgoing nurse) on [employer] Manual. Report included the following information SBAR. Shift Summary:       Issues for physician to address: Oncology Shift Note   Admission Date 2/2/2018   Admission Diagnosis Sepsis ([address]  UTI (urinary tract infection)  Hx of systemic lupus erythematosus (SLE)  Hx of diabetes mellitus  Hx of essential hypertension  Mental retardation   Code Status Partial Code   Consults IP CONSULT TO NEUROLOGY  IP CONSULT TO CARDIOLOGY      Cardiac Monitoring [] Yes [] No      Purposeful Hourly Rounding [x] Yes    Cole Score Total Score: 3   Cole score 3 or > [] Bed Alarm [] Avasys [] 1:1 sitter [] Patient refused (Place signed refusal form in chart)      Pain Managed [x] Yes [] No    Key Pain Meds             acetaminophen (TYLENOL ARTHRITIS PAIN) 650 mg TbER  (Taking) Take 650 mg by mouth every eight (8) hours as needed for Pain. Influenza Vaccine Received Flu Vaccine for Current Season (usually Sept-March): Yes           Oxygen needs?  [] Room air Oxygen @  []1L    []2L    []3L   []4L    []5L   []6L     Use home O2? [] Yes [] No  Perform O2 challenge test using  smartphrase (.oxygenchallenge)      Last bowel movement Last Bowel Movement Date: 02/27/18  bowel movement      Urinary Catheter [REMOVED] Urinary Catheter 02/02/18 [facility] - Temperature-Criteria for Appropriate Use: Medically/surgically unstable  [REMOVED] Urinary Catheter 02/15/18 [facility]-Criteria for Appropriate Use: Obstruction/retention     [REMOVED] External Female Catheter 02/15/18-Urine Output (mL): 450 ml  [REMOVED] Urinary Catheter 02/02/18 [facility] - Temperature-Urine Output (mL): 10 ml  [REMOVED] External Female Catheter 02/03/18-Urine Output (mL): 0 ml  [REMOVED] Urinary Catheter 02/15/18 [facility]-Urine Output (mL): 150 ml     LDAs         PICC Triple Lumen 02/05/18 Right;Brachial (Active)   Central Line Being Utilized Yes 2/28/2018  3:09 AM   Criteria for Appropriate Use Limited/no vessel suitable for conventional peripheral access 2/28/2018  3:09 AM   Site Assessment Clean, dry, & intact 2/28/2018  3:09 AM   Phlebitis Assessment 0 2/28/2018  3:09 AM   Infiltration Assessment 0 2/28/2018  3:09 AM   Arm Circumference (cm) 0 cm 2/6/2018 12:00 AM   Date of Last Dressing Change 02/21/18 2/28/2018  3:09 AM   Dressing Status Clean, dry, & intact 2/28/2018  3:09 AM   External Catheter Length (cm) 0 centimeters 2/21/2018  8:29 PM   Dressing Type Disk with Chlorhexadine gluconate (CHG); Transparent 2/28/2018  3:09 AM   Action Taken Blood drawn 2/22/2018  3:08 AM   Hub Color/Line Status Gray;Capped 2/28/2018  3:09 AM   Positive Blood Return (Site #1) Yes 2/28/2018  3:09 AM   Hub Color/Line Status Red;Capped 2/28/2018  3:09 AM   Positive Blood Return (Site #2) Yes 2/28/2018  3:09 AM   Hub Color/Line Status White;Capped 2/28/2018  3:09 AM   Positive Blood Return (Site #3) No 2/28/2018  3:09 AM   Alcohol Cap Used Yes 2/28/2018  3:09 AM          Peripheral IV 02/28/18 Right Wrist (Active)   Site Assessment Clean, dry, & intact 2/28/2018 12:52 PM   Phlebitis Assessment 0 2/28/2018 12:52 PM   Infiltration Assessment 0 2/28/2018 12:52 PM   Dressing Status Clean, dry, & intact;New;Occlusive 2/28/2018 12:52 PM   Dressing Type Transparent;Tape 2/28/2018 12:52 PM   Hub Color/Line Status Pink;Patent;Capped;Flushed 2/28/2018 12:52 PM   Action Taken Other (comment) 2/28/2018 12:52 PM                         Readmission Risk Assessment Tool Score Low Risk            12       Total Score        3 Patient Length of Stay (>5 days = 3)    5 Pt. Coverage (Medicare=5 , Medicaid, or Self-Pay=4)    4 Charlson Comorbidity Score (Age + Comorbid Conditions)        Criteria that do not apply:    Has Seen PCP in Last 6 Months (Yes=3, No=0)    . Living with Significant Other. Assisted Living. LTAC. SNF.  or   Rehab    IP Visits Last 12 Months (1-3=4, 4=9, >4=11)       Expected Length of Stay 4d 21h   Actual Length of Stay Carlos Dejesus, RN

## 2018-03-01 NOTE — PROGRESS NOTES
CM received a call from [facility] with [facility] [phone] and she indicated that the received the passar on Vishnu Harris and they are getting started with that process.  should be out in the next 14 days to complete assessment on her. Per Estelita's Note: [facility] Completed and submitted. Pt will need [employer] after Level II completed. CM will continue to monitor discharge plan.       Carlos Gambino

## 2018-03-02 LAB
ALBUMIN SERPL-MCNC: 2.2 G/DL (ref 3.5–5)
ALBUMIN/GLOB SERPL: 0.4 {RATIO} (ref 1.1–2.2)
ALP SERPL-CCNC: 149 U/L (ref 45–117)
ALT SERPL-CCNC: 18 U/L (ref 12–78)
ANION GAP SERPL CALC-SCNC: 5 MMOL/L (ref 5–15)
AST SERPL-CCNC: 26 U/L (ref 15–37)
BASOPHILS # BLD: 0 K/UL (ref 0–0.1)
BASOPHILS NFR BLD: 1 % (ref 0–1)
BILIRUB SERPL-MCNC: 0.3 MG/DL (ref 0.2–1)
BUN SERPL-MCNC: 13 MG/DL (ref 6–20)
BUN/CREAT SERPL: 10 (ref 12–20)
CALCIUM SERPL-MCNC: 9 MG/DL (ref 8.5–10.1)
CHLORIDE SERPL-SCNC: 105 MMOL/L (ref 97–108)
CO2 SERPL-SCNC: 31 MMOL/L (ref 21–32)
CREAT SERPL-MCNC: 1.24 MG/DL (ref 0.55–1.02)
DIFFERENTIAL METHOD BLD: ABNORMAL
EOSINOPHIL # BLD: 0.5 K/UL (ref 0–0.4)
EOSINOPHIL NFR BLD: 8 % (ref 0–7)
ERYTHROCYTE [DISTWIDTH] IN BLOOD BY AUTOMATED COUNT: 16.6 % (ref 11.5–14.5)
GLOBULIN SER CALC-MCNC: 5.6 G/DL (ref 2–4)
GLUCOSE SERPL-MCNC: 73 MG/DL (ref 65–100)
HCT VFR BLD AUTO: 29.1 % (ref 35–47)
HGB BLD-MCNC: 8.9 G/DL (ref 11.5–16)
IMM GRANULOCYTES # BLD: 0 K/UL (ref 0–0.04)
IMM GRANULOCYTES NFR BLD AUTO: 1 % (ref 0–0.5)
LYMPHOCYTES # BLD: 1.8 K/UL (ref 0.8–3.5)
LYMPHOCYTES NFR BLD: 30 % (ref 12–49)
MCH RBC QN AUTO: 28.7 PG (ref 26–34)
MCHC RBC AUTO-ENTMCNC: 30.6 G/DL (ref 30–36.5)
MCV RBC AUTO: 93.9 FL (ref 80–99)
MONOCYTES # BLD: 0.6 K/UL (ref 0–1)
MONOCYTES NFR BLD: 10 % (ref 5–13)
NEUTS SEG # BLD: 2.9 K/UL (ref 1.8–8)
NEUTS SEG NFR BLD: 51 % (ref 32–75)
NRBC # BLD: 0 K/UL (ref 0–0.01)
NRBC BLD-RTO: 0 PER 100 WBC
PLATELET # BLD AUTO: 172 K/UL (ref 150–400)
PMV BLD AUTO: 11 FL (ref 8.9–12.9)
POTASSIUM SERPL-SCNC: 4.1 MMOL/L (ref 3.5–5.1)
PROT SERPL-MCNC: 7.8 G/DL (ref 6.4–8.2)
RBC # BLD AUTO: 3.1 M/UL (ref 3.8–5.2)
SODIUM SERPL-SCNC: 141 MMOL/L (ref 136–145)
WBC # BLD AUTO: 5.8 K/UL (ref 3.6–11)

## 2018-03-02 PROCEDURE — 36415 COLL VENOUS BLD VENIPUNCTURE: CPT | Performed by: INTERNAL MEDICINE

## 2018-03-02 PROCEDURE — 74011250637 HC RX REV CODE- 250/637: Performed by: INTERNAL MEDICINE

## 2018-03-02 PROCEDURE — 80053 COMPREHEN METABOLIC PANEL: CPT | Performed by: INTERNAL MEDICINE

## 2018-03-02 PROCEDURE — 85025 COMPLETE CBC W/AUTO DIFF WBC: CPT | Performed by: INTERNAL MEDICINE

## 2018-03-02 PROCEDURE — 65270000015 HC RM PRIVATE ONCOLOGY

## 2018-03-02 PROCEDURE — 92610 EVALUATE SWALLOWING FUNCTION: CPT

## 2018-03-02 PROCEDURE — 97535 SELF CARE MNGMENT TRAINING: CPT | Performed by: OCCUPATIONAL THERAPIST

## 2018-03-02 PROCEDURE — 74011250636 HC RX REV CODE- 250/636: Performed by: INTERNAL MEDICINE

## 2018-03-02 RX ORDER — HEPARIN SODIUM 5000 [USP'U]/ML
5000 INJECTION, SOLUTION INTRAVENOUS; SUBCUTANEOUS EVERY 8 HOURS
Status: DISCONTINUED | OUTPATIENT
Start: 2018-03-02 | End: 2018-03-12 | Stop reason: HOSPADM

## 2018-03-02 RX ADMIN — Medication 10 ML: at 22:38

## 2018-03-02 RX ADMIN — METOCLOPRAMIDE 5 MG: 10 TABLET ORAL at 11:52

## 2018-03-02 RX ADMIN — MIDODRINE HYDROCHLORIDE 10 MG: 5 TABLET ORAL at 22:37

## 2018-03-02 RX ADMIN — Medication 10 ML: at 04:52

## 2018-03-02 RX ADMIN — METOCLOPRAMIDE 5 MG: 10 TABLET ORAL at 18:28

## 2018-03-02 RX ADMIN — METOCLOPRAMIDE 5 MG: 10 TABLET ORAL at 22:37

## 2018-03-02 RX ADMIN — METOCLOPRAMIDE 5 MG: 10 TABLET ORAL at 05:04

## 2018-03-02 RX ADMIN — HEPARIN SODIUM 5000 UNITS: 5000 INJECTION, SOLUTION INTRAVENOUS; SUBCUTANEOUS at 18:29

## 2018-03-02 RX ADMIN — LEVOTHYROXINE SODIUM 25 MCG: 25 TABLET ORAL at 05:04

## 2018-03-02 RX ADMIN — PANTOPRAZOLE SODIUM 40 MG: 40 TABLET, DELAYED RELEASE ORAL at 05:05

## 2018-03-02 NOTE — PROGRESS NOTES
Bedside and Verbal shift change report given to Juan Carlos Beal RN (oncoming nurse) Luba Diez by offgoing nurse. Report given with SBAR, Kardex, MAR and Recent Results. 0630  Patient was assisted with her evening meal by the night PCT and she ate 100% of her diet. She rested off and on most of the shift without signs of distress noted. Patient turned every 2-3 hours. Patient was incontinent of urin x 3 this shift. Patient continues to rest without distress. 0745 Bedside and Verbal shift change report given to Luba Diez, oncoming nurse by Juan Carlos Beal RN (offgoing nurse). Report given with SBAR, Kardex, MAR and Recent Results.

## 2018-03-02 NOTE — PROGRESS NOTES
Problem: Self Care Deficits Care Plan (Adult)  Goal: *Acute Goals and Plan of Care (Insert Text)  Occupational Therapy Goals  Initiated 2/19/2018, goals reviewed and revised PRN as per 2/27 weekly reevaluation, goal 6 newly established  1.  Patient will perform grooming in bed or supported sitting with moderate assistance  within 7 day(s). Goal met, change to minimal assistance. 2.  Patient will perform upper body dressing with moderate assistance  within 7 day(s). Not met, continue goal  3.  Patient will sit edge of bed for 5 minutes with moderate assistance for participation in functional task within 7 day(s). Not met, continue goal   4.  Patient will participate in assessment of functional transfers and establish goals as appropriate within 7 day(s). Goal met. See goal 6.   5.  Patient will participate in upper extremity therapeutic exercises with moderate cues/assist for 10 minutes within 7 day(s). Not met, continue goal  6. Patient will perform bed to chair with maximum assistance of 1 via stand pivot within 7 days.     Occupational Therapy treatment  Patient: Mikey Suarez (8 y.o. female)  Date: 3/2/2018  Diagnosis: Sepsis ([facility] [address]  UTI (urinary tract infection)  Hx of systemic lupus erythematosus (SLE)  Hx of diabetes mellitus  Hx of essential hypertension  Mental retardation <principal problem not specified>       Precautions: Fall    ASSESSMENT:  Patient without change in her ability to effectively participate, still demonstrating decreased attention, decreased initiation and only following 1 step commands less than 25 % of the time. Significant cueing and hand over hand guidance provided during all grooming and self feeding. Patient still mod A to wash face, but declined to total A for hand washing and to mod A for self feeding due to hand tremors being more significant than last session.  She continues to demonstrate little to no progress with OT intervention, but will be followed for 1 more week to ensure that she will not benefit from OT services. Progression toward goals:  []       Improving appropriately and progressing toward goals  []       Improving slowly and progressing toward goals  [x]       Not making progress toward goals and plan of care will be adjusted     PLAN: Follow for 2 more treatment sessions next week, but likely discharge if patient does not demonstrate progress. Discharge Recommendations:  Ayo Dawn VS [facility]  Further Equipment Recommendations for Discharge:  TBD       OBJECTIVE DATA SUMMARY:   Cognitive/Behavioral Status:  Neurologic State: Alert  Orientation Level: Unable to verbalize  Cognition: Decreased attention/concentration;Decreased command following (still following 1 step commands less than 25% of the time)     Functional Mobility and Transfers for ADLs:  Bed Mobility:   Session performed in bend, HOB raised    Balance:  Sitting:  (session performed with patient in bed and HOB raised)  ADL Intervention:  Feeding  Drink to Mouth: Moderate assistance (due to significant B hand tremors, no decrease with proximal)  Cues: Tactile cues provided;Verbal cues provided;Visual cues provided    Grooming  Washing Face: Moderate assistance (due to significant hand tremors )  Washing Hands: Total assistance (dependent) (unable to open hand to rub palms together using )  Cues: Tactile cues provided;Verbal cues provided;Visual cues provided    Cognitive Retraining  Attention to Task: Distractibility; Single task  Following Commands:  Follows one step commands/directions (less than 25% of the time)    After treatment:   [] Patient left in no apparent distress sitting up in chair  [x] Patient left in no apparent distress in bed  [x] Call bell left within reach  [x] Nursing notified  [] Caregiver present  [] Bed alarm activated    COMMUNICATION/COLLABORATION:   The patients plan of care was discussed with: Physical Therapy Assistant    DELVIS Carrington/L  Time Calculation: 10 mins

## 2018-03-02 NOTE — INTERDISCIPLINARY ROUNDS
Oncology Interdisciplinary rounds were held today to discuss patient plan of care and outcomes. The following members were present: Nursing, Case Management, Pharmacy and Dietary. Actual Length of Stay: 28    DRG GLOS: 4.9    Expected Length of Stay: 4d 21h                Plan for Day        Mobility        Plan for Stay      Plan for Way   Work w/ PT  Level II eval  Turn q2HR Continue current TX Return to SNF 3/5??

## 2018-03-02 NOTE — WOUND CARE
Wound Care Consult: Chart reviewed and patient assessed for her left heel wound that was acquired most likely within the last three weeks. Pt. Was admitted for pneumonia on 2-2-18. She spent some time in the CCU and then was transferred to Medical Oncology unit on 2-15-18. The lesion on the left heel was discovered this morning during a routine morning assessment of the skin by her nurse. Dr. Luigi Turner was notified and treatment started today to treat the skin. This patient has severe foot drop and is not mobile at this time. She is mentally retarded and lives at a group home. The heel assessment is as follows:     Wound Heel Left;Lateral (Active)   DRESSING TYPE Open to air 3/2/2018  1:55 PM   Pressure Injury DTI 3/2/2018  1:55 PM   Wound Length (cm) 4 cm 3/2/2018  1:55 PM   Wound Width (cm) 4 cm 3/2/2018  1:55 PM   Wound Depth (cm) 0 3/2/2018  1:55 PM   Wound Surface area (cm^2) 16 cm^2 3/2/2018  1:55 PM   Condition of Base Purple 3/2/2018  1:55 PM   Condition of Edges Closed 3/2/2018  1:55 PM   Epithelialization (%) 0 3/2/2018  1:55 PM   Tissue Type Maroon/purple 3/2/2018  1:55 PM   Tissue Type Percent Maroon/Purple 100 % 3/2/2018  1:55 PM   Drainage Amount  None 3/2/2018  1:55 PM   Wound Odor None 3/2/2018  1:55 PM   Periwound Skin Condition Intact 3/2/2018  1:55 PM   Cleansing and Cleansing Agents  Betadine 3/2/2018  1:55 PM   Procedure Tolerated Well 3/2/2018  1:55 PM       Treatment recommendation: Paint the heel with Betadine and allow the skin to dry. This will heal on it's own time with off-loading of the pressure as well as good skin cleansing daily.    Edmar Muñoz RN, BSN, [employer]

## 2018-03-02 NOTE — PROGRESS NOTES
CM received phone call from Myra Dodd - evaluator assigned for Level II - who will be coming to assess patient at approximately 3:30PM today. CM also received phone call from [address] [place] [address] Rd - Taran Ghotra - [phone] - who states she will work with Nile Kim MD and [employer]ie [employer] regarding patient's eligibility for rehab if approved by [facility] for placement as patient would still need [employer] authorization. Per Taran Ghotra, she will speak to Nile Kim MD and patient's sister regarding plan of care.     This hospital CM will continue to follow Level II assessment and pursue placement for rehab per family request.    Ju Irizarry, RN, BSN, ACM   - Medical Oncology  [phone]

## 2018-03-02 NOTE — PROGRESS NOTES
Speech Pathology bedside swallow evaluation/discharge  Patient: Rishi Harvey (28 y.o. female)  Date: 3/2/2018  Primary Diagnosis: Sepsis ([address]  UTI (urinary tract infection)  Hx of systemic lupus erythematosus (SLE)  Hx of diabetes mellitus  Hx of essential hypertension  Mental retardation        Precautions:   Fall    ASSESSMENT :  Based on the objective data described below, the patient presents with mod oral and mild to mod pharyngeal dysphagia. Orally she accepts food and the straw well. Occasionally needs extra time to extract liquids via straw. She does not have a mature rotary chew due to her developmental level. She cleared her mouth well of solids. Posterior propulsion was mildly slow. Mild swallow delay and her hyolaryngeal excursion was mildly impaired. No coughing or choking during her breakfast of purees. She appeared to tolerate all well. Family reports that she ate solids but given her developmental delay and MR she has not developed a rotary chew and is not safe for solids. Recommend dys 2/minced diet but would not recommend advancing higher due to fear of choking and aspiration. Her poor cognitive status puts her at risk to aspirate as does her positioning as she is very kyphotic. Skilled therapy provided by a speech-language pathologist is not indicated at this time.      PLAN :  Recommendations:  dys 2/minced and thins  Discharge Recommendations: None     SUBJECTIVE:   Patient is nonverbal.    OBJECTIVE:     Past Medical History:   Diagnosis Date    Arthritis     Colon polyp     Diabetes ([facility] [address]     borderline no medications    Environmental allergies 4/28/2010    GERD (gastroesophageal reflux disease)     HTN (hypertension) 4/28/2010    dosent take medication now    Lupus     MR (mental retardation)     Osteoporosis, senile     Other ill-defined conditions     parkinson's disease possibly    Psychiatric disorder     anxious    PUD (peptic ulcer disease)     Scolioses     [facility] 3/4/2015     Past Surgical History:   Procedure Laterality Date    HX TONSILLECTOMY      NC COLONOSCOPY FLX DX W/COLLJ SPEC WHEN PFRMD  2/17/2011    due 2013     Prior Level of Function/Home Situation:   Home Situation  Home Environment: Private residence  One/Two Story Residence: One story  Living Alone: No  Support Systems: Family member(s)  Patient Expects to be Discharged to[de-identified] Rehabilitation facility  Current DME Used/Available at Home: Walker, rolling  Diet prior to admission:   Current Diet:  dys 1/purees and thins   Cognitive and Communication Status:  Neurologic State: Alert  Orientation Level: Unable to verbalize  Cognition: No command following  Perception: Appears intact  Perseveration: No perseveration noted          P.O. Trials:  Patient Position: upright in bed  Vocal quality prior to P.O.: Low volume (vocalizes)  Consistency Presented: Thin liquid (very soft solids requiring minimal mastication)  How Presented: Straw     Bolus Acceptance: No impairment  Bolus Formation/Control: Impaired  Type of Impairment: Delayed; Incomplete;Mastication  Propulsion: Delayed (# of seconds)  Oral Residue: None  Initiation of Swallow: Delayed (# of seconds)  Laryngeal Elevation: Decreased  Aspiration Signs/Symptoms: None                Oral Phase Severity: Moderate  Pharyngeal Phase Severity : Mild-moderate  NOMS:   The NOMS functional outcome measure was used to quantify this patient's level of swallowing impairment. Based on the NOMS, the patient was determined to be at level 3 for swallow function     G Codes: In compliance with CMSs Claims Based Outcome Reporting, the following G-code set was chosen for this patient based the use of the NOMS functional outcome to quantify this patient's level of swallowing impairment. Using the NOMS, the patient was determined to be at level 3 for swallow function which correlates with the CL= 60-79% level of severity.     Based on the objective assessment provided within this note, the current, goal, and discharge g-codes are as follows:    Swallow  Swallowing:   Swallow Current Status CL= 60-79%   Swallow Goal Status CL= 60-79%   Swallow D/C Status CL= 60-79%      NOMS Swallowing Levels:  Level 1 (CN): NPO  Level 2 (CM): NPO but takes consistency in therapy  Level 3 (CL): Takes less than 50% of nutrition p.o. and continues with nonoral feedings; and/or safe with mod cues; and/or max diet restriction  Level 4 (CK): Safe swallow but needs mod cues; and/or mod diet restriction; and/or still requires some nonoral feeding/supplements  Level 5 (CJ): Safe swallow with min diet restriction; and/or needs min cues  Level 6 (CI): Independent with p.o.; rare cues; usually self cues; may need to avoid some foods or needs extra time  Level 7 ([address] [place] [address]): Independent for all p.o.  JOÃO. (2003). National Outcomes Measurement System (NOMS): Adult Speech-Language Pathology User's Guide. Pain:           After treatment:   [] Patient left in no apparent distress sitting up in chair  [x] Patient left in no apparent distress in bed  [] Call bell left within reach  [x] Nursing notified  [] Caregiver present  [] Bed alarm activated    COMMUNICATION/EDUCATION:   The patients plan of care including findings, recommendations, and recommended diet changes were discussed with: Registered Nurse. Pt unable to benefit from education and goal setting  [x] Posted safety precautions in patient's room. [] Patient/family have participated as able and agree with findings and recommendations. [] Patient is unable to participate in plan of care at this time.     Thank you for this referral.  Hansa Wilson, SLP  Time Calculation: 18 mins

## 2018-03-02 NOTE — PROGRESS NOTES
Hospitalist Progress Note    NAME: Mia Olmedo   :  1945   MRN:  [id number]     Admission summary:   72year old female with history of mental retardation, SLE, DM-2, HTN who normally resides in a group home, presented on 18 with decreased responsiveness. She had been diagnosed with acute influenza infection along with urinary tract infection with associated septic shock. Assessment / Plan:  81yo BF with acute metabolic encephalopathy and severe sepsis with shock secondary to UTI (E. Coli) and influenzae B, awaiting placement. Acute metabolic encephalopathy in the setting of septic shock due to UTI and Iinfluenza B   - MRI brain 2/3 - mild small vessel ischemic changes. No acute abnormality. - CT chest/abd/pelvis  - severe scoliosis. Atelectasis in the lower lobes, left greater than right. Anemia. Atherosclerotic abdominal aorta without aneurysm. - urine culture  - pansensitive E Coli   - blood cultures  - no growth   - influenza B positive    -  Completed a course  of Zosyn and Tamiflu   - she had been treated with a course of oseltamivir to completion here   - seen by neurology, was on Keppra but discontinued on  by neurology   - will re-consult SLP for diet advance    Acute respiratory failure with hypoxia, resolved  Acute on chronic diastolic congestive heart failure, resolved  Acute pulmonary edema  -Echo on  showed EF 60%. Pt was on vasopressor support briefly weaned off on   -stable on RA    Acute kidney injury, resolved  -cr elevated but stable for several days. Discussed with nursing to monitor PO intake. May need IVF if poor po intake  -BMP in AM    Acute blood loss anemia  -hgb improved. No evidence of active bleeding  -re-start DVT prophylaxis today    Transaminitis, resolved.   This was due to sepsis  -will continue to monitor    Systemic lupus erythematosus  -will continue to monitor  -continue to hold Plaquenil    Hypothyroidism  -continue levothyroxine    Intellectual disability/mental retardation  -stable  -awaiting placement    L heel pressure ulcer, not POA  -wound care consulted    Obesity  Body mass index is 29.48 kg/(m^2). Code Status: PARTIAL CODE - ACLS meds only, no compressions, no intubation  Surrogate Decision Maker: sister   DVT Prophylaxis: heparin      Baseline: Mental retardation, lives with sister normally but placed in group home after sister had to undergo recent back surgery  Disposition: awaiting SNF placement, CM following    BMI 29.12     Subjective:     Chief Complaint / Reason for Physician Visit:  Pt seen in bed, was awake, drooling. Pt is nonverbal.  No acute event overnight. Review of Systems:  Symptom Y/N Comments  Symptom Y/N Comments   Fever/Chills    Chest Pain     Poor Appetite    Edema     Cough    Abdominal Pain     Sputum    Joint Pain     SOB/YOUNG    Pruritis/Rash     Nausea/vomit    Tolerating PT/OT     Diarrhea    Tolerating Diet     Constipation    Other       Could NOT obtain due to: MR, non-verbal     Objective:     VITALS:   Last 24hrs VS reviewed since prior progress note.  Most recent are:  Patient Vitals for the past 24 hrs:   Temp Pulse Resp BP SpO2   03/02/18 0726 98.4 °F (36.9 °C) (!) 110 16 115/81 94 %   03/01/18 2255 97.5 °F (36.4 °C) 77 16 131/66 97 %   03/1945 98.1 °F (36.7 °C) 72 16 106/64 92 %   03/01/18 1526 98.8 °F (37.1 °C) 67 18 155/42 99 %       Intake/Output Summary (Last 24 hours) at 03/02/18 0817  Last data filed at 03/02/18 9579   Gross per 24 hour   Intake             1320 ml   Output                0 ml   Net             1320 ml        PHYSICAL EXAM:  Gen: elderly BF, lying in bed, NAD  HENT: NC/AT, oral mucosa pink and moist  Eyes: PERRL, anicteric sclera, conjunctiva pink and moist  CVS: RRR, s1/s2 audible, no m/g/r, no edema, radial/DP pulses 2+ B/L  Lung: CTAB, no w/r/r  Abd: BS present, NT/ND, no HSM  Neuro: AAO to person and self, CN 2-12 grossly intact, biceps/patella DTRs 2+ B/L, muscle strength Pt does not cooperate    Reviewed most current lab test results and cultures  YES  Reviewed most current radiology test results   YES  Review and summation of old records today    NO  Reviewed patient's current orders and MAR    YES  PMH/SH reviewed - no change compared to H&P  ________________________________________________________________________  Care Plan discussed with:    Comments   Patient y    Family      RN y    Care Manager     Consultant                        Multidiciplinary team rounds were held today with , nursing, pharmacist and clinical coordinator. Patient's plan of care was discussed; medications were reviewed and discharge planning was addressed. ________________________________________________________________________  Total NON critical care TIME:  35  Minutes    Total CRITICAL CARE TIME Spent:   Minutes non procedure based      Comments   >50% of visit spent in counseling and coordination of care x dispo planning   ________________________________________________________________________  Huma Rodriguez MD     Procedures: see electronic medical records for all procedures/Xrays and details which were not copied into this note but were reviewed prior to creation of Plan. LABS:  I reviewed today's most current labs and imaging studies.   Pertinent labs include:  Recent Labs      03/02/18   0453   WBC  5.8   HGB  8.9*   HCT  29.1*   PLT  172     Recent Labs      03/02/18   0453   NA  141   K  4.1   CL  105   CO2  31   GLU  73   BUN  13   CREA  1.24*   CA  9.0   ALB  2.2*   TBILI  0.3   SGOT  26   ALT  18       Signed: Huma Rodriguez MD

## 2018-03-02 NOTE — PROGRESS NOTES
Oncology Nursing Communication Tool  7:48 PM  3/1/2018     Bedside shift change report given to Caterina RN (incoming nurse) by Irma Lau RN (outgoing nurse) on Corina Escoto. Report included the following information SBAR and Kardex. Shift Summary: picc line pulled. Issues for physician to address: none         Oncology Shift Note   Admission Date 2/2/2018   Admission Diagnosis Sepsis ([address]  UTI (urinary tract infection)  Hx of systemic lupus erythematosus (SLE)  Hx of diabetes mellitus  Hx of essential hypertension  Mental retardation   Code Status Partial Code   Consults IP CONSULT TO NEUROLOGY  IP CONSULT TO CARDIOLOGY      Cardiac Monitoring [] Yes [] No      Purposeful Hourly Rounding [] Yes    Cole Score Total Score: 3   Cole score 3 or > [] Bed Alarm [] Avasys [] 1:1 sitter [] Patient refused (Place signed refusal form in chart)      Pain Managed [] Yes [] No    Key Pain Meds             acetaminophen (TYLENOL ARTHRITIS PAIN) 650 mg TbER  (Taking) Take 650 mg by mouth every eight (8) hours as needed for Pain. Influenza Vaccine Received Flu Vaccine for Current Season (usually Sept-March): Yes           Oxygen needs?  [] Room air Oxygen @  []1L    []2L    []3L   []4L    []5L   []6L     Use home O2? [] Yes [] No  Perform O2 challenge test using  smartphrase (.oxygenchallenge)      Last bowel movement Last Bowel Movement Date: 02/28/18  bowel movement      Urinary Catheter [REMOVED] Urinary Catheter 02/02/18 [place] - Temperature-Criteria for Appropriate Use: Medically/surgically unstable  [REMOVED] Urinary Catheter 02/15/18 [place]-Criteria for Appropriate Use: Obstruction/retention     [REMOVED] External Female Catheter 02/15/18-Urine Output (mL): 450 ml  [REMOVED] Urinary Catheter 02/02/18 [place] - Temperature-Urine Output (mL): 10 ml  [REMOVED] External Female Catheter 02/03/18-Urine Output (mL): 0 ml  [REMOVED] Urinary Catheter 02/15/18 [place]-Urine Output (mL): 150 ml     LDAs               Peripheral IV 02/28/18 Right Wrist (Active)   Site Assessment Clean, dry, & intact 3/1/2018  3:26 PM   Phlebitis Assessment 0 3/1/2018  3:26 PM   Infiltration Assessment 0 3/1/2018  3:26 PM   Dressing Status Clean, dry, & intact 3/1/2018  3:26 PM   Dressing Type Transparent 3/1/2018  3:26 PM   Hub Color/Line Status Pink;Capped 3/1/2018  3:26 PM   Action Taken Other (comment) 2/28/2018 12:52 PM                         Readmission Risk Assessment Tool Score Low Risk            12       Total Score        3 Patient Length of Stay (>5 days = 3)    5 Pt. Coverage (Medicare=5 , Medicaid, or Self-Pay=4)    4 Charlson Comorbidity Score (Age + Comorbid Conditions)        Criteria that do not apply:    Has Seen PCP in Last 6 Months (Yes=3, No=0)    . Living with Significant Other. Assisted Living. LTAC. SNF.  or   Rehab    IP Visits Last 12 Months (1-3=4, 4=9, >4=11)       Expected Length of Stay 4d 21h   Actual Length of Stay 7018 Floridalma Aguila, RN

## 2018-03-03 LAB
GLUCOSE BLD STRIP.AUTO-MCNC: 141 MG/DL (ref 65–100)
GLUCOSE BLD STRIP.AUTO-MCNC: 149 MG/DL (ref 65–100)
GLUCOSE BLD STRIP.AUTO-MCNC: 72 MG/DL (ref 65–100)
GLUCOSE BLD STRIP.AUTO-MCNC: 89 MG/DL (ref 65–100)
SERVICE CMNT-IMP: ABNORMAL
SERVICE CMNT-IMP: ABNORMAL
SERVICE CMNT-IMP: NORMAL
SERVICE CMNT-IMP: NORMAL

## 2018-03-03 PROCEDURE — 82962 GLUCOSE BLOOD TEST: CPT

## 2018-03-03 PROCEDURE — 74011250637 HC RX REV CODE- 250/637: Performed by: INTERNAL MEDICINE

## 2018-03-03 PROCEDURE — 74011250636 HC RX REV CODE- 250/636: Performed by: INTERNAL MEDICINE

## 2018-03-03 PROCEDURE — 74011000250 HC RX REV CODE- 250: Performed by: PHYSICIAN ASSISTANT

## 2018-03-03 PROCEDURE — 65270000015 HC RM PRIVATE ONCOLOGY

## 2018-03-03 RX ADMIN — METOCLOPRAMIDE 5 MG: 10 TABLET ORAL at 08:59

## 2018-03-03 RX ADMIN — LEVOTHYROXINE SODIUM 25 MCG: 25 TABLET ORAL at 05:28

## 2018-03-03 RX ADMIN — HEPARIN SODIUM 5000 UNITS: 5000 INJECTION, SOLUTION INTRAVENOUS; SUBCUTANEOUS at 00:12

## 2018-03-03 RX ADMIN — HEPARIN SODIUM 5000 UNITS: 5000 INJECTION, SOLUTION INTRAVENOUS; SUBCUTANEOUS at 16:47

## 2018-03-03 RX ADMIN — HEPARIN SODIUM 5000 UNITS: 5000 INJECTION, SOLUTION INTRAVENOUS; SUBCUTANEOUS at 09:00

## 2018-03-03 RX ADMIN — Medication 10 ML: at 20:47

## 2018-03-03 RX ADMIN — DEXTROSE MONOHYDRATE 12.5 G: 25 INJECTION, SOLUTION INTRAVENOUS at 07:25

## 2018-03-03 RX ADMIN — MIDODRINE HYDROCHLORIDE 10 MG: 5 TABLET ORAL at 15:49

## 2018-03-03 RX ADMIN — MIDODRINE HYDROCHLORIDE 10 MG: 5 TABLET ORAL at 21:45

## 2018-03-03 RX ADMIN — METOCLOPRAMIDE 5 MG: 10 TABLET ORAL at 15:49

## 2018-03-03 RX ADMIN — PANTOPRAZOLE SODIUM 40 MG: 40 TABLET, DELAYED RELEASE ORAL at 08:59

## 2018-03-03 RX ADMIN — METOCLOPRAMIDE 5 MG: 10 TABLET ORAL at 12:12

## 2018-03-03 RX ADMIN — MIDODRINE HYDROCHLORIDE 10 MG: 5 TABLET ORAL at 09:00

## 2018-03-03 RX ADMIN — Medication 10 ML: at 05:29

## 2018-03-03 RX ADMIN — METOCLOPRAMIDE 5 MG: 10 TABLET ORAL at 21:46

## 2018-03-03 RX ADMIN — Medication 10 ML: at 15:48

## 2018-03-03 NOTE — PROGRESS NOTES
Bedside shift change report given to Ebony Vee (oncoming nurse) by Carissa Muñoz (offgoing nurse). Report included the following information SBAR, Kardex, ED Summary, Procedure Summary, MAR and Recent Results.

## 2018-03-03 NOTE — PROGRESS NOTES
Oncology Nursing Communication Tool  8:20 PM  3/2/2018     Bedside shift change report given to Mallika Batres RN (incoming nurse) by Stephanie Kruse RN (outgoing nurse) on [facility]. Report included the following information SBAR and Kardex. Shift Summary: dti to left heel      Issues for physician to address: Oncology Shift Note   Admission Date 2/2/2018   Admission Diagnosis Sepsis ([address]  UTI (urinary tract infection)  Hx of systemic lupus erythematosus (SLE)  Hx of diabetes mellitus  Hx of essential hypertension  Mental retardation   Code Status Partial Code   Consults IP CONSULT TO NEUROLOGY  IP CONSULT TO CARDIOLOGY      Cardiac Monitoring [] Yes [] No      Purposeful Hourly Rounding [] Yes    Cole Score Total Score: 3   Cole score 3 or > [] Bed Alarm [] Avasys [] 1:1 sitter [] Patient refused (Place signed refusal form in chart)      Pain Managed [] Yes [] No    Key Pain Meds             acetaminophen (TYLENOL ARTHRITIS PAIN) 650 mg TbER  (Taking) Take 650 mg by mouth every eight (8) hours as needed for Pain. Influenza Vaccine Received Flu Vaccine for Current Season (usually Sept-March): Yes           Oxygen needs?  [] Room air Oxygen @  []1L    []2L    []3L   []4L    []5L   []6L     Use home O2? [] Yes [] No  Perform O2 challenge test using  smartphrase (.oxygenchallenge)      Last bowel movement Last Bowel Movement Date: 02/28/18  bowel movement      Urinary Catheter [REMOVED] Urinary Catheter 02/02/18 [place] - Temperature-Criteria for Appropriate Use: Medically/surgically unstable  [REMOVED] Urinary Catheter 02/15/18 [place]-Criteria for Appropriate Use: Obstruction/retention     [REMOVED] External Female Catheter 02/15/18-Urine Output (mL): 450 ml  [REMOVED] Urinary Catheter 02/02/18 [place] - Temperature-Urine Output (mL): 10 ml  [REMOVED] External Female Catheter 02/03/18-Urine Output (mL): 0 ml  [REMOVED] Urinary Catheter 02/15/18 [place]-Urine Output (mL): 150 ml LDAs               Peripheral IV 02/28/18 Right Wrist (Active)   Site Assessment Clean, dry, & intact 3/2/2018  3:34 PM   Phlebitis Assessment 0 3/2/2018  3:34 PM   Infiltration Assessment 0 3/2/2018  3:34 PM   Dressing Status Clean, dry, & intact 3/2/2018  3:34 PM   Dressing Type Transparent 3/2/2018  3:34 PM   Hub Color/Line Status Pink;Capped 3/2/2018  3:34 PM   Action Taken Open ports on tubing capped 3/1/2018  8:40 PM                         Readmission Risk Assessment Tool Score Low Risk            12       Total Score        3 Patient Length of Stay (>5 days = 3)    5 Pt. Coverage (Medicare=5 , Medicaid, or Self-Pay=4)    4 Charlson Comorbidity Score (Age + Comorbid Conditions)        Criteria that do not apply:    Has Seen PCP in Last 6 Months (Yes=3, No=0)    . Living with Significant Other. Assisted Living. LTAC. SNF.  or   Rehab    IP Visits Last 12 Months (1-3=4, 4=9, >4=11)       Expected Length of Stay 4d 21h   Actual Length of Stay [address], RN

## 2018-03-03 NOTE — PROGRESS NOTES
Hospitalist Progress Note    NAME: Eugene Bone   :  1945   MRN:  [id number]     Admission summary:   72year old female with history of mental retardation, SLE, DM-2, HTN who normally resides in a group home, presented on 18 with decreased responsiveness. She had been diagnosed with acute influenza infection along with urinary tract infection with associated septic shock. Assessment / Plan:  81yo BF with acute metabolic encephalopathy and severe sepsis with shock secondary to UTI (E. Coli) and influenzae B, awaiting placement. Acute metabolic encephalopathy in the setting of septic shock due to UTI and Iinfluenza B   - MRI brain 2/3 - mild small vessel ischemic changes. No acute abnormality. - CT chest/abd/pelvis  - severe scoliosis. Atelectasis in the lower lobes, left greater than right. Anemia. Atherosclerotic abdominal aorta without aneurysm. - urine culture  - pansensitive E Coli   - blood cultures  - no growth   - influenza B positive    -  Completed a course  of Zosyn  And Tamiflu   - she had been treated with a course of oseltamivir to completion here   - seen by neurology, was on Keppra but discontinued on  by neurology   - SLP- recommended NDD2     Acute respiratory failure with hypoxia, resolved  Acute on chronic diastolic congestive heart failure, resolved  Acute pulmonary edema  -Echo on  showed EF 60%. Pt was on vasopressor support briefly weaned off on   -stable on RA    Hypoglycemia  -due to poor po intake. Encourage po intake. If bg cont' to low, will need to start D5  -add snacks with all meals    Acute kidney injury, resolved  -cr elevated but stable for several days. Discussed with nursing to monitor PO intake. May need IVF if poor po intake  -BMP in AM    Acute blood loss anemia  -hgb improved. No evidence of active bleeding  -re-start DVT prophylaxis today    Transaminitis, resolved.   This was due to sepsis  -will continue to monitor    Systemic lupus erythematosus  -will continue to monitor  -continue to hold Plaquenil    Hypothyroidism  -continue levothyroxine    Intellectual disability/mental retardation  -stable  -awaiting placement    L heel pressure ulcer, not POA  -wound care consulted    Obesity  Body mass index is 29.74 kg/(m^2). Code Status: PARTIAL CODE - ACLS meds only, no compressions, no intubation  Surrogate Decision Maker: sister   DVT Prophylaxis: heparin      Baseline: Mental retardation, lives with sister normally but placed in group home after sister had to undergo recent back surgery  Disposition: awaiting SNF placement, CM following    BMI 29.12     Subjective:     Chief Complaint / Reason for Physician Visit:  Pt seen sitting up in bed. Non acute changes overnight. Was BG low this morning at BG 72. Review of Systems:  Symptom Y/N Comments  Symptom Y/N Comments   Fever/Chills    Chest Pain     Poor Appetite    Edema     Cough    Abdominal Pain     Sputum    Joint Pain     SOB/YOUNG    Pruritis/Rash     Nausea/vomit    Tolerating PT/OT     Diarrhea    Tolerating Diet     Constipation    Other       Could NOT obtain due to: MR, non-verbal     Objective:     VITALS:   Last 24hrs VS reviewed since prior progress note.  Most recent are:  Patient Vitals for the past 24 hrs:   Temp Pulse Resp BP SpO2   03/02/18 2335 97.9 °F (36.6 °C) 90 16 (!) 156/94 100 %   03/02/18 2028 97.8 °F (36.6 °C) 73 16 104/62 93 %   03/02/18 1842 97.7 °F (36.5 °C) 77 16 124/64 98 %   03/02/18 1534 97.3 °F (36.3 °C) 71 16 96/70 100 %       Intake/Output Summary (Last 24 hours) at 03/03/18 0837  Last data filed at 03/03/18 0659   Gross per 24 hour   Intake              100 ml   Output              500 ml   Net             -400 ml        PHYSICAL EXAM:  Gen: elderly BF, up in bed, NAD  HENT: NC/AT, oral mucosa pink and moist  Eyes: PERRL, anicteric sclera, conjunctiva pink and moist  CVS: RRR, s1/s2 audible, no m/g/r, no edema, radial/DP pulses 2+ B/L  Lung: CTAB, no w/r/r  Abd: BS present, NT/ND, no HSM  Neuro: AAO x0, CN 2-12 grossly intact, biceps/patella DTRs 2+ B/L, muscle strength Pt does not cooperate    Reviewed most current lab test results and cultures  YES  Reviewed most current radiology test results   YES  Review and summation of old records today    NO  Reviewed patient's current orders and MAR    YES  PMH/SH reviewed - no change compared to H&P  ________________________________________________________________________  Care Plan discussed with:    Comments   Patient y    Family      RN y    Care Manager     Consultant                        Multidiciplinary team rounds were held today with , nursing, pharmacist and clinical coordinator. Patient's plan of care was discussed; medications were reviewed and discharge planning was addressed. ________________________________________________________________________  Total NON critical care TIME:  15  Minutes    Total CRITICAL CARE TIME Spent:   Minutes non procedure based      Comments   >50% of visit spent in counseling and coordination of care x dispo planning   ________________________________________________________________________  Pedro Jarvis MD     Procedures: see electronic medical records for all procedures/Xrays and details which were not copied into this note but were reviewed prior to creation of Plan. LABS:  I reviewed today's most current labs and imaging studies.   Pertinent labs include:  Recent Labs      03/02/18   0453   WBC  5.8   HGB  8.9*   HCT  29.1*   PLT  172     Recent Labs      03/02/18   0453   NA  141   K  4.1   CL  105   CO2  31   GLU  73   BUN  13   CREA  1.24*   CA  9.0   ALB  2.2*   TBILI  0.3   SGOT  26   ALT  18       Signed: Pedro Jarvis MD

## 2018-03-04 LAB
GLUCOSE BLD STRIP.AUTO-MCNC: 153 MG/DL (ref 65–100)
GLUCOSE BLD STRIP.AUTO-MCNC: 74 MG/DL (ref 65–100)
SERVICE CMNT-IMP: ABNORMAL
SERVICE CMNT-IMP: NORMAL

## 2018-03-04 PROCEDURE — 82962 GLUCOSE BLOOD TEST: CPT

## 2018-03-04 PROCEDURE — 74011000250 HC RX REV CODE- 250: Performed by: PHYSICIAN ASSISTANT

## 2018-03-04 PROCEDURE — 74011250637 HC RX REV CODE- 250/637: Performed by: INTERNAL MEDICINE

## 2018-03-04 PROCEDURE — 74011250636 HC RX REV CODE- 250/636: Performed by: INTERNAL MEDICINE

## 2018-03-04 PROCEDURE — 65270000015 HC RM PRIVATE ONCOLOGY

## 2018-03-04 RX ADMIN — METOCLOPRAMIDE 5 MG: 10 TABLET ORAL at 11:07

## 2018-03-04 RX ADMIN — LEVOTHYROXINE SODIUM 25 MCG: 25 TABLET ORAL at 05:55

## 2018-03-04 RX ADMIN — METOCLOPRAMIDE 5 MG: 10 TABLET ORAL at 22:04

## 2018-03-04 RX ADMIN — Medication 10 ML: at 15:00

## 2018-03-04 RX ADMIN — PANTOPRAZOLE SODIUM 40 MG: 40 TABLET, DELAYED RELEASE ORAL at 05:58

## 2018-03-04 RX ADMIN — MIDODRINE HYDROCHLORIDE 10 MG: 5 TABLET ORAL at 22:04

## 2018-03-04 RX ADMIN — Medication 10 ML: at 22:05

## 2018-03-04 RX ADMIN — HEPARIN SODIUM 5000 UNITS: 5000 INJECTION, SOLUTION INTRAVENOUS; SUBCUTANEOUS at 16:44

## 2018-03-04 RX ADMIN — METOCLOPRAMIDE 5 MG: 10 TABLET ORAL at 07:30

## 2018-03-04 RX ADMIN — DEXTROSE MONOHYDRATE 25 G: 25 INJECTION, SOLUTION INTRAVENOUS at 05:55

## 2018-03-04 RX ADMIN — MIDODRINE HYDROCHLORIDE 10 MG: 5 TABLET ORAL at 16:38

## 2018-03-04 RX ADMIN — MIDODRINE HYDROCHLORIDE 10 MG: 5 TABLET ORAL at 11:07

## 2018-03-04 RX ADMIN — Medication 10 ML: at 05:56

## 2018-03-04 RX ADMIN — METOCLOPRAMIDE 5 MG: 10 TABLET ORAL at 16:38

## 2018-03-04 RX ADMIN — HEPARIN SODIUM 5000 UNITS: 5000 INJECTION, SOLUTION INTRAVENOUS; SUBCUTANEOUS at 00:58

## 2018-03-04 RX ADMIN — HEPARIN SODIUM 5000 UNITS: 5000 INJECTION, SOLUTION INTRAVENOUS; SUBCUTANEOUS at 11:07

## 2018-03-04 NOTE — PROGRESS NOTES
Problem: Falls - Risk of  Goal: *Absence of Falls  Document Cole Fall Risk and appropriate interventions in the flowsheet.    Outcome: Progressing Towards Goal  Fall Risk Interventions:  Mobility Interventions: Bed/chair exit alarm, Communicate number of staff needed for ambulation/transfer, OT consult for ADLs, Patient to call before getting OOB, PT Consult for mobility concerns, PT Consult for assist device competence, Utilize walker, cane, or other assitive device, Utilize gait belt for transfers/ambulation    Mentation Interventions: Adequate sleep, hydration, pain control, Bed/chair exit alarm, Door open when patient unattended, Evaluate medications/consider consulting pharmacy, Gait belt with transfers/ambulation, More frequent rounding, Reorient patient, Toileting rounds    Medication Interventions: Bed/chair exit alarm, Evaluate medications/consider consulting pharmacy, Patient to call before getting OOB    Elimination Interventions: Bed/chair exit alarm, Call light in reach, Patient to call for help with toileting needs, Toileting schedule/hourly rounds    History of Falls Interventions: Door open when patient unattended

## 2018-03-04 NOTE — PROGRESS NOTES
Hospitalist Progress Note    NAME: Carmen Mckeon   :  1945   MRN:  [id number]     Admission summary:   72year old female with history of mental retardation, SLE, DM-2, HTN who normally resides in a group home, presented on 18 with decreased responsiveness. She had been diagnosed with acute influenza infection along with urinary tract infection with associated septic shock. Assessment / Plan:  79yo BF with acute metabolic encephalopathy and severe sepsis with shock secondary to UTI (E. Coli) and influenzae B, awaiting placement. Acute metabolic encephalopathy in the setting of septic shock due to UTI and Iinfluenza B   - MRI brain 2/3 - mild small vessel ischemic changes. No acute abnormality. - CT chest/abd/pelvis  - severe scoliosis. Atelectasis in the lower lobes, left greater than right. Anemia. Atherosclerotic abdominal aorta without aneurysm. - urine culture  - pansensitive E Coli   - blood cultures  - no growth   - influenza B positive    -  Completed a course  of Zosyn  And Tamiflu   - she had been treated with a course of oseltamivir to completion here   - seen by neurology, was on Keppra but discontinued on  by neurology   - SLP- recommended NDD2     Acute respiratory failure with hypoxia, resolved  Acute on chronic diastolic congestive heart failure, resolved  Acute pulmonary edema  -Echo on  showed EF 60%. Pt was on vasopressor support briefly weaned off on   -stable on RA    Hypoglycemia  -due to poor po intake. Encourage po intake. If bg cont' to low, will need to start D5  -add snacks with all meals    Acute kidney injury, resolved  -cr elevated but stable for several days. Discussed with nursing to monitor PO intake. May need IVF if poor po intake  -BMP in AM    Acute blood loss anemia  -hgb improved. No evidence of active bleeding  -re-start DVT prophylaxis today    Transaminitis, resolved.   This was due to sepsis  -will continue to monitor    Systemic lupus erythematosus  -will continue to monitor  -continue to hold Plaquenil    Hypothyroidism  -continue levothyroxine    Intellectual disability/mental retardation  -stable  -awaiting placement    L heel pressure ulcer, not POA  -wound care consulted    Obesity  Body mass index is 27.68 kg/(m^2). Code Status: PARTIAL CODE - ACLS meds only, no compressions, no intubation  Surrogate Decision Maker: sister   DVT Prophylaxis: heparin      Baseline: Mental retardation, lives with sister normally but placed in group home after sister had to undergo recent back surgery  Disposition: awaiting SNF placement, CM following    BMI 29.12     Subjective:     Chief Complaint / Reason for Physician Visit:  Pt seen sitting up in bed. No acute changes. Review of Systems:  Symptom Y/N Comments  Symptom Y/N Comments   Fever/Chills    Chest Pain     Poor Appetite    Edema     Cough    Abdominal Pain     Sputum    Joint Pain     SOB/YOUNG    Pruritis/Rash     Nausea/vomit    Tolerating PT/OT     Diarrhea    Tolerating Diet     Constipation    Other       Could NOT obtain due to: MR, non-verbal     Objective:     VITALS:   Last 24hrs VS reviewed since prior progress note.  Most recent are:  Patient Vitals for the past 24 hrs:   Temp Pulse Resp BP SpO2   03/04/18 0917 97.7 °F (36.5 °C) 89 17 103/69 94 %   03/04/18 0338 98.5 °F (36.9 °C) 64 17 115/61 99 %   03/03/18 2346 97.9 °F (36.6 °C) 87 18 107/52 100 %   03/03/18 1945 98.1 °F (36.7 °C) 70 18 115/54 98 %   03/03/18 1553 - 63 - - 100 %   03/03/18 1552 98.8 °F (37.1 °C) 95 18 122/53 97 %       Intake/Output Summary (Last 24 hours) at 03/04/18 1057  Last data filed at 03/04/18 0000   Gross per 24 hour   Intake              480 ml   Output              750 ml   Net             -270 ml        PHYSICAL EXAM:  Gen: elderly BF, up in bed, NAD  HENT: NC/AT, oral mucosa pink and moist  Eyes: PERRL, anicteric sclera, conjunctiva pink and moist  CVS: RRR, s1/s2 audible, no m/g/r, no edema, radial/DP pulses 2+ B/L  Lung: CTAB, no w/r/r  Abd: BS present, NT/ND, no HSM  Neuro: AAO x0, CN 2-12 grossly intact, biceps/patella DTRs 2+ B/L, muscle strength Pt does not cooperate    Reviewed most current lab test results and cultures  YES  Reviewed most current radiology test results   YES  Review and summation of old records today    NO  Reviewed patient's current orders and MAR    YES  PMH/SH reviewed - no change compared to H&P  ________________________________________________________________________  Care Plan discussed with:    Comments   Patient y    Family      RN y    Care Manager     Consultant                        Multidiciplinary team rounds were held today with , nursing, pharmacist and clinical coordinator. Patient's plan of care was discussed; medications were reviewed and discharge planning was addressed. ________________________________________________________________________  Total NON critical care TIME:  15  Minutes    Total CRITICAL CARE TIME Spent:   Minutes non procedure based      Comments   >50% of visit spent in counseling and coordination of care x dispo planning   ________________________________________________________________________  Daksha Woody MD     Procedures: see electronic medical records for all procedures/Xrays and details which were not copied into this note but were reviewed prior to creation of Plan. LABS:  I reviewed today's most current labs and imaging studies.   Pertinent labs include:  Recent Labs      03/02/18   0453   WBC  5.8   HGB  8.9*   HCT  29.1*   PLT  172     Recent Labs      03/02/18   0453   NA  141   K  4.1   CL  105   CO2  31   GLU  73   BUN  13   CREA  1.24*   CA  9.0   ALB  2.2*   TBILI  0.3   SGOT  26   ALT  18       Signed: Daksha Woody MD

## 2018-03-04 NOTE — PROGRESS NOTES
Problem: Falls - Risk of  Goal: *Absence of Falls  Document Cole Fall Risk and appropriate interventions in the flowsheet. Outcome: Progressing Towards Goal  Fall Risk Interventions:  Mobility Interventions: Mechanical lift, PT Consult for assist device competence, PT Consult for mobility concerns    Mentation Interventions: Reorient patient, Adequate sleep, hydration, pain control    Medication Interventions: Patient to call before getting OOB    Elimination Interventions: Call light in reach    History of Falls Interventions:  Investigate reason for fall

## 2018-03-04 NOTE — PROGRESS NOTES
0700: Report received  LUCRECIA THURMAN, , MAR, RECENT RESULTS were discussed.     Clovis Lambert RN

## 2018-03-04 NOTE — PROGRESS NOTES
Bedside and Verbal shift change report given to Mary Carranza (oncoming nurse) by Shell Kirk (offgoing nurse). Report included the following information SBAR, Kardex, ED Summary and Recent Results.

## 2018-03-05 LAB
ANION GAP SERPL CALC-SCNC: 5 MMOL/L (ref 5–15)
BASOPHILS # BLD: 0 K/UL (ref 0–0.1)
BASOPHILS NFR BLD: 1 % (ref 0–1)
BUN SERPL-MCNC: 14 MG/DL (ref 6–20)
BUN/CREAT SERPL: 11 (ref 12–20)
CALCIUM SERPL-MCNC: 8.9 MG/DL (ref 8.5–10.1)
CHLORIDE SERPL-SCNC: 107 MMOL/L (ref 97–108)
CO2 SERPL-SCNC: 27 MMOL/L (ref 21–32)
CREAT SERPL-MCNC: 1.27 MG/DL (ref 0.55–1.02)
DIFFERENTIAL METHOD BLD: ABNORMAL
EOSINOPHIL # BLD: 0.4 K/UL (ref 0–0.4)
EOSINOPHIL NFR BLD: 9 % (ref 0–7)
ERYTHROCYTE [DISTWIDTH] IN BLOOD BY AUTOMATED COUNT: 16.7 % (ref 11.5–14.5)
GLUCOSE SERPL-MCNC: 108 MG/DL (ref 65–100)
HCT VFR BLD AUTO: 31.1 % (ref 35–47)
HGB BLD-MCNC: 9.6 G/DL (ref 11.5–16)
IMM GRANULOCYTES # BLD: 0 K/UL (ref 0–0.04)
IMM GRANULOCYTES NFR BLD AUTO: 0 % (ref 0–0.5)
LYMPHOCYTES # BLD: 1.5 K/UL (ref 0.8–3.5)
LYMPHOCYTES NFR BLD: 34 % (ref 12–49)
MCH RBC QN AUTO: 29.1 PG (ref 26–34)
MCHC RBC AUTO-ENTMCNC: 30.9 G/DL (ref 30–36.5)
MCV RBC AUTO: 94.2 FL (ref 80–99)
MONOCYTES # BLD: 0.4 K/UL (ref 0–1)
MONOCYTES NFR BLD: 9 % (ref 5–13)
NEUTS SEG # BLD: 2.1 K/UL (ref 1.8–8)
NEUTS SEG NFR BLD: 47 % (ref 32–75)
NRBC # BLD: 0 K/UL (ref 0–0.01)
NRBC BLD-RTO: 0 PER 100 WBC
PLATELET # BLD AUTO: 163 K/UL (ref 150–400)
PMV BLD AUTO: 11 FL (ref 8.9–12.9)
POTASSIUM SERPL-SCNC: 4.7 MMOL/L (ref 3.5–5.1)
RBC # BLD AUTO: 3.3 M/UL (ref 3.8–5.2)
SODIUM SERPL-SCNC: 139 MMOL/L (ref 136–145)
WBC # BLD AUTO: 4.4 K/UL (ref 3.6–11)

## 2018-03-05 PROCEDURE — 74011250637 HC RX REV CODE- 250/637: Performed by: INTERNAL MEDICINE

## 2018-03-05 PROCEDURE — 65270000015 HC RM PRIVATE ONCOLOGY

## 2018-03-05 PROCEDURE — 74011250636 HC RX REV CODE- 250/636: Performed by: INTERNAL MEDICINE

## 2018-03-05 PROCEDURE — 80048 BASIC METABOLIC PNL TOTAL CA: CPT | Performed by: INTERNAL MEDICINE

## 2018-03-05 PROCEDURE — 85025 COMPLETE CBC W/AUTO DIFF WBC: CPT | Performed by: INTERNAL MEDICINE

## 2018-03-05 PROCEDURE — 36415 COLL VENOUS BLD VENIPUNCTURE: CPT | Performed by: INTERNAL MEDICINE

## 2018-03-05 RX ADMIN — Medication 10 ML: at 21:30

## 2018-03-05 RX ADMIN — HEPARIN SODIUM 5000 UNITS: 5000 INJECTION, SOLUTION INTRAVENOUS; SUBCUTANEOUS at 16:37

## 2018-03-05 RX ADMIN — Medication 10 ML: at 13:27

## 2018-03-05 RX ADMIN — HEPARIN SODIUM 5000 UNITS: 5000 INJECTION, SOLUTION INTRAVENOUS; SUBCUTANEOUS at 09:34

## 2018-03-05 RX ADMIN — LEVOTHYROXINE SODIUM 25 MCG: 25 TABLET ORAL at 06:13

## 2018-03-05 RX ADMIN — Medication 10 ML: at 06:13

## 2018-03-05 RX ADMIN — PANTOPRAZOLE SODIUM 40 MG: 40 TABLET, DELAYED RELEASE ORAL at 09:33

## 2018-03-05 RX ADMIN — METOCLOPRAMIDE 5 MG: 10 TABLET ORAL at 16:36

## 2018-03-05 RX ADMIN — HEPARIN SODIUM 5000 UNITS: 5000 INJECTION, SOLUTION INTRAVENOUS; SUBCUTANEOUS at 01:58

## 2018-03-05 RX ADMIN — METOCLOPRAMIDE 5 MG: 10 TABLET ORAL at 21:28

## 2018-03-05 RX ADMIN — MIDODRINE HYDROCHLORIDE 10 MG: 5 TABLET ORAL at 21:28

## 2018-03-05 RX ADMIN — MIDODRINE HYDROCHLORIDE 10 MG: 5 TABLET ORAL at 09:33

## 2018-03-05 RX ADMIN — METOCLOPRAMIDE 5 MG: 10 TABLET ORAL at 09:33

## 2018-03-05 RX ADMIN — MIDODRINE HYDROCHLORIDE 10 MG: 5 TABLET ORAL at 16:37

## 2018-03-05 RX ADMIN — METOCLOPRAMIDE 5 MG: 10 TABLET ORAL at 13:26

## 2018-03-05 NOTE — PROGRESS NOTES
Oncology Interdisciplinary rounds were held today to discuss patient plan of care and outcomes. The following members were present: Nursing, Case Management, Pharmacy and Dietary.     Actual Length of Stay: 31    DRG GLOS: 4.9    Expected Length of Stay: 4d 21h                Plan for Day        Mobility        Plan for Stay      Plan for Way   Continue current treatment Up with max assistance  Working with PT/OT Continue current treatment Working with Shashi Christopher CM  Pending Level 2  Snf vs Home

## 2018-03-05 NOTE — PROGRESS NOTES
Music Therapy Assessment    Mara Florian [id number]  [id number]    1945  72 y.o.  female    Patient Telephone Number: [phone] (home)   Baptism Affiliation: Devan Lover   Language: English   Extended Emergency Contact Information  Primary Emergency Contact: Zane Gupta Phone: [phone]  Mobile Phone: [phone]  Relation: Sister  Secondary Emergency Contact: [address] [place] [address] Place  Mobile Phone: [phone]  Relation: Other Relative   Patient Active Problem List    Diagnosis Date Noted    Counseling regarding goals of care 02/12/2018    Aspiration into respiratory tract 02/12/2018    Idiopathic hypotension 02/09/2018    Acute renal failure with tubular necrosis ([facility] 02/09/2018    UTI (urinary tract infection) 02/02/2018    Mental retardation 02/02/2018    Sepsis ([facility] 02/02/2018    Hx of systemic lupus erythematosus (SLE) 02/02/2018    Hx of diabetes mellitus 02/02/2018    Hx of essential hypertension 02/02/2018    Advance care planning 05/02/2016    SLE (systemic lupus erythematosus) ([facility] 03/04/2015    Sjogren's syndrome ([facility] 03/04/2015    Encounter for long-term (current) use of medications 03/05/2013    Hypersomnia, unspecified 09/26/2011    Hypothyroid 06/13/2011    MR (mental retardation)     Osteoporosis, senile     Colon polyp     Weight loss 02/17/2011    Anemia 02/17/2011    Environmental allergies 04/28/2010    HTN (hypertension) 04/28/2010        Date: 3/5/2018       Mental Status:   [x] Alert [  ] Marget Lauri [  ]  Confused  [  ] Minimally responsive    Communication Status: [  ] Impaired Speech [x] Nonverbal     Physical Status:   [  ] Oxygen in use  [  ] Hard of Hearing [  ] Vision Impaired  [  ] Ambulatory  [  ] Ambulatory with assistance [  ] Non-ambulatory -N/A    Music Preferences, Background: Pt's sister shared pt likes R&B, [address] [place] [address].     Clinical Problem addressed: Positive social interaction. Goal(s) met in session:  Physical/Pain management (Scale of 1-10):    Pre-session rating: Pt didn't respond when asked; no pain indicators were observed. Post-session rating: Pt didn't respond when asked; no pain indicators were observed. [  ] Increased relaxation   [  ] Regulated breathing patterns  [  ] Decreased muscle tension   [  ] Minimized physical distress     Emotional/Psychological:  [  ] Increased self-expression   [  ] Decreased aggressive behavior   [  ] Decreased sadness   [  ] Discussed healthy coping skills     [  ] Improved mood    [  ] Decreased withdrawn behavior     Social:  [  ] Decreased feelings of isolation/loneliness [x] Positive social interaction   [x] Provided support and/or comfort for family/friends    Spiritual:  [  ] Spiritual support    [  ] Expressed peace  [  ] Expressed racquel    [  ] Discussed beliefs    Techniques Utilized (Check all that apply):   [  ] Procedural support MT [  ] Music for relaxation [x] Patient preferred music  [  ] Yael analysis  [  ] Song choice  [  ] Music for validation  [  ] Entrainment  [  ] Progressive muscle relax. [  ] Guided visualization  [  ] Lou Leon  [  ] Patient instrument playing [  ] Delia Contreras writing  [  ] Roscoe baird   [  ] Improvisation  [x] Sensory stimulation  [  ] Active Listening  [  ] Music for spiritual support [  ] Making of CDs as gifts    Session Observations:  Referral from Aaron Juarez Palliative NP. This music therapist (MT) spoke on the phone with patient's sister Kallie Galeazzi to learn pt's current music preferences. Patient (pt) was observed to be awake and alert, holding one of her hands in a fist in bed. MT gently touched pt's shoulder while greeting her. MT shared with pt about speaking with her sister and asked pt if she'd like to hear music. Pt increased social interaction in response to this, as evidenced by (AEB) engaging in brief eye contact with MT and making a squeak kind of sound.  MT sat at bedside and sang and played the [employer] song [employer] and then the [employer] the [facility] with guitar. Pt's eyes appeared to become unfocused as she stared in front of her. MT sang the next song, a [employer], a capella (vocals only, without instrumental accompaniment) to increase the sensory stimulation the pt received. MT gently tapped the beat of the song on the pt's shoulder and then on the pt's hand that was in a fist. After this song, MT asked pt to answer a yes/no question using her eyes. MT asked pt if she's like to hear another song. Pt didn't move her eyes. MT offered to sing one more song, as MT didn't want to force music on pt, but also didn't want to stop it too soon if pt was enjoying it. MT sang and played the [employer] with guitar. Pt increased positive social interaction in response to the music, AEB engaging in eye contact with MT throughout the majority of this song. As MT said good bye to pt and thanked her, pt made a squeak kind of sound again. Will follow as able.     Maria Alejandra Rodriguez, MT-BC (Music Therapist-Board Certified)  Spiritual Care Department  Referral-based service

## 2018-03-05 NOTE — PROGRESS NOTES
Bedside and Verbal shift change report given to Jonny Arndt (oncoming nurse) by Yasmeen Umaña (offgoing nurse). Report included the following information SBAR, Kardex, Intake/Output, MAR, Accordion and Recent Results.

## 2018-03-05 NOTE — PROGRESS NOTES
Bedside and Verbal shift change report given to Tory Zavala rn (oncoming nurse) by Neisha Yee (offgoing nurse). Report included the following information SBAR, Procedure Summary, Intake/Output, MAR and Recent Results.

## 2018-03-05 NOTE — PROGRESS NOTES
Oncology Nursing Communication Tool  7:19 AM  3/5/2018     Bedside shift change report given to Deysi Callaway RN (incoming nurse) by Walter Frias RN (outgoing nurse) on [facility]. Report included the following information SBAR. Shift Summary: No changes      Issues for physician to address: Placement         Oncology Shift Note   Admission Date 2/2/2018   Admission Diagnosis Sepsis ([address]  UTI (urinary tract infection)  Hx of systemic lupus erythematosus (SLE)  Hx of diabetes mellitus  Hx of essential hypertension  Mental retardation   Code Status Partial Code   Consults IP CONSULT TO NEUROLOGY  IP CONSULT TO CARDIOLOGY      Cardiac Monitoring [] Yes [] No      Purposeful Hourly Rounding [] Yes    Cole Score Total Score: 3   Cole score 3 or > [] Bed Alarm [] Avasys [] 1:1 sitter [] Patient refused (Place signed refusal form in chart)      Pain Managed [] Yes [] No    Key Pain Meds             acetaminophen (TYLENOL ARTHRITIS PAIN) 650 mg TbER  (Taking) Take 650 mg by mouth every eight (8) hours as needed for Pain. Influenza Vaccine Received Flu Vaccine for Current Season (usually Sept-March): Yes           Oxygen needs?  [] Room air Oxygen @  []1L    []2L    []3L   []4L    []5L   []6L     Use home O2? [] Yes [] No  Perform O2 challenge test using  smartphrase (.oxygenchallenge)      Last bowel movement Last Bowel Movement Date: 03/04/18  bowel movement      Urinary Catheter [REMOVED] Urinary Catheter 02/02/18 [place] - Temperature-Criteria for Appropriate Use: Medically/surgically unstable  [REMOVED] Urinary Catheter 02/15/18 [place]-Criteria for Appropriate Use: Obstruction/retention     [REMOVED] External Female Catheter 02/15/18-Urine Output (mL): 450 ml  [REMOVED] Urinary Catheter 02/02/18 [place] - Temperature-Urine Output (mL): 10 ml  [REMOVED] External Female Catheter 02/03/18-Urine Output (mL): 0 ml  [REMOVED] Urinary Catheter 02/15/18 [place]-Urine Output (mL): 150 ml     LDAs Peripheral IV 02/28/18 Right Wrist (Active)   Site Assessment Clean, dry, & intact 3/5/2018  3:41 AM   Phlebitis Assessment 0 3/5/2018  3:41 AM   Infiltration Assessment 0 3/5/2018  3:41 AM   Dressing Status Clean, dry, & intact 3/5/2018  3:41 AM   Dressing Type Tape;Transparent 3/5/2018  3:41 AM   Hub Color/Line Status Pink;Capped;Flushed;Patent 3/5/2018  3:41 AM   Action Taken Open ports on tubing capped 3/3/2018  9:30 PM   Alcohol Cap Used Yes 3/4/2018 12:40 AM                         Readmission Risk Assessment Tool Score Low Risk            12       Total Score        3 Patient Length of Stay (>5 days = 3)    5 Pt. Coverage (Medicare=5 , Medicaid, or Self-Pay=4)    4 Charlson Comorbidity Score (Age + Comorbid Conditions)        Criteria that do not apply:    Has Seen PCP in Last 6 Months (Yes=3, No=0)    . Living with Significant Other. Assisted Living. LTAC. SNF.  or   Rehab    IP Visits Last 12 Months (1-3=4, 4=9, >4=11)       Expected Length of Stay 4d 21h   Actual Length of Stay [address] [facility] RODRIGUEZ Roland'

## 2018-03-05 NOTE — PROGRESS NOTES
Patient Care Conference with Iwona Barajas MD - Dr. Elzbieta Cottrell - [phone] and Oniel Veras - [phone] and patient's sister - Michael Ask - [phone]. Patient has had UAI completed and awaiting Level 2 Screening completion for possible SNF placement. Per patient's sister, she has spoken to \"someone at [facility]" but is unable to identify job description of provide phone number for them, who state patient could be approved for at least 20 days of rehab. Violet Corona 1996 Director states patient should complete Level 2 and attempt SNF placement per that possibility.  left message for [facility] this morning to confirm that Level 2 screening has been completed (scheduled for 3:30pm on 3/1/2018) and is pending. Pascale Mena RN, BSN, AC   - Medical Oncology  [phone]    Addendum:  11:35AM - [place] [phone] stating Level 2 evaluation was completed on 3/2/2018 and hospital should receive FAX by 3/7/2018 for possible placement. Referrals to local SNF's re-sent via [employer].     Pascale Mena RN, BSN, AC   - Medical Oncology  [phone]

## 2018-03-05 NOTE — PROGRESS NOTES
Hospitalist Progress Note    NAME: Kenji Richard   :  1945   MRN:  [id number]     Admission summary:   72year old female with history of mental retardation, SLE, DM-2, HTN who normally resides in a group home, presented on 18 with decreased responsiveness. She had been diagnosed with acute influenza infection along with urinary tract infection with associated septic shock. Assessment / Plan:  79yo BF with acute metabolic encephalopathy and severe sepsis with shock secondary to UTI (E. Coli) and influenzae B, awaiting placement. Acute metabolic encephalopathy in the setting of septic shock due to UTI and Iinfluenza B   - MRI brain 2/3 - mild small vessel ischemic changes. No acute abnormality. - CT chest/abd/pelvis  - severe scoliosis. Atelectasis in the lower lobes, left greater than right. Anemia. Atherosclerotic abdominal aorta without aneurysm. - urine culture  - pansensitive E Coli   - blood cultures  - no growth   - influenza B positive    -  Completed a course  of Zosyn  And Tamiflu   - she had been treated with a course of oseltamivir to completion here   - seen by neurology, was on Keppra but discontinued on  by neurology   - SLP- recommended NDD2    -awaiting placement    Acute respiratory failure with hypoxia, resolved  Acute on chronic diastolic congestive heart failure, resolved  Acute pulmonary edema  -Echo on  showed EF 60%. Pt was on vasopressor support briefly weaned off on   -stable on RA    Hypoglycemia  -due to poor po intake. Encourage po intake. Acute kidney injury, resolved  -cr elevated but stable for several days. Discussed with nursing to monitor PO intake. May need IVF if poor po intake  -BMP in AM    Acute blood loss anemia  -hgb improved. No evidence of active bleeding  -re-start DVT prophylaxis today    Transaminitis, resolved.   This was due to sepsis  -will continue to monitor    Systemic lupus erythematosus  -will continue to monitor  -continue to hold Plaquenil    Hypothyroidism  -continue levothyroxine    Intellectual disability/mental retardation  -stable  -awaiting placement    L heel pressure ulcer, not POA  -wound care consulted    Obesity  Body mass index is 27.68 kg/(m^2). Code Status: PARTIAL CODE - ACLS meds only, no compressions, no intubation  Surrogate Decision Maker: sister   DVT Prophylaxis: heparin        Weekly labs    Baseline: Mental retardation, lives with sister normally but placed in group home after sister had to undergo recent back surgery  Disposition: awaiting SNF placement, CM following    BMI 29.12     Subjective:     Chief Complaint / Reason for Physician Visit:  No acute changes overnight. Review of Systems:  Symptom Y/N Comments  Symptom Y/N Comments   Fever/Chills    Chest Pain     Poor Appetite    Edema     Cough    Abdominal Pain     Sputum    Joint Pain     SOB/YOUNG    Pruritis/Rash     Nausea/vomit    Tolerating PT/OT     Diarrhea    Tolerating Diet     Constipation    Other       Could NOT obtain due to: MR, non-verbal     Objective:     VITALS:   Last 24hrs VS reviewed since prior progress note.  Most recent are:  Patient Vitals for the past 24 hrs:   Temp Pulse Resp BP SpO2   03/05/18 0745 97.7 °F (36.5 °C) 70 18 (!) 112/98 100 %   03/04/18 2342 98.3 °F (36.8 °C) 78 17 (!) 109/34 94 %   03/04/18 2023 97.6 °F (36.4 °C) 67 17 98/79 92 %   03/04/18 1559 97.6 °F (36.4 °C) 73 18 107/70 92 %   03/04/18 0917 97.7 °F (36.5 °C) 89 17 103/69 94 %     No intake or output data in the 24 hours ending 03/05/18 0808     PHYSICAL EXAM:  Gen: elderly BF, up in bed, NAD  HENT: NC/AT, oral mucosa pink and moist  Eyes: PERRL, anicteric sclera, conjunctiva pink and moist  CVS: RRR,+S1, S2  Lung: CTAB, no wheezing  Abd: BS present, NT, ND  Neuro: AAO x0, non verbal    Reviewed most current lab test results and cultures  YES  Reviewed most current radiology test results   YES  Review and summation of old records today    NO  Reviewed patient's current orders and MAR    YES  PMH/SH reviewed - no change compared to H&P  ________________________________________________________________________  Care Plan discussed with:    Comments   Patient y    Family      RN y    Care Manager     Consultant                        Multidiciplinary team rounds were held today with , nursing, pharmacist and clinical coordinator. Patient's plan of care was discussed; medications were reviewed and discharge planning was addressed. ________________________________________________________________________  Total NON critical care TIME:  15  Minutes    Total CRITICAL CARE TIME Spent:   Minutes non procedure based      Comments   >50% of visit spent in counseling and coordination of care x dispo planning   ________________________________________________________________________  Regan Morris MD     Procedures: see electronic medical records for all procedures/Xrays and details which were not copied into this note but were reviewed prior to creation of Plan. LABS:  I reviewed today's most current labs and imaging studies. Pertinent labs include:  No results for input(s): WBC, HGB, HCT, PLT, HGBEXT, HCTEXT, PLTEXT, HGBEXT, HCTEXT, PLTEXT in the last 72 hours. No results for input(s): NA, K, CL, CO2, GLU, BUN, CREA, CA, MG, PHOS, ALB, TBIL, TBILI, SGOT, ALT, INR in the last 72 hours.     No lab exists for component: Chavies, INREXT    Signed: Regan Morris MD

## 2018-03-05 NOTE — PROGRESS NOTES
Problem: Falls - Risk of  Goal: *Absence of Falls  Document Cole Fall Risk and appropriate interventions in the flowsheet.    Outcome: Progressing Towards Goal  Fall Risk Interventions:  Mobility Interventions: Bed/chair exit alarm, OT consult for ADLs, Patient to call before getting OOB, PT Consult for mobility concerns, PT Consult for assist device competence, Utilize walker, cane, or other assitive device, Utilize gait belt for transfers/ambulation    Mentation Interventions: Bed/chair exit alarm, Door open when patient unattended, Evaluate medications/consider consulting pharmacy, Gait belt with transfers/ambulation, More frequent rounding, Toileting rounds    Medication Interventions: Bed/chair exit alarm, Evaluate medications/consider consulting pharmacy, Patient to call before getting OOB, Utilize gait belt for transfers/ambulation    Elimination Interventions: Bed/chair exit alarm, Call light in reach, Patient to call for help with toileting needs, Toileting schedule/hourly rounds    History of Falls Interventions: Door open when patient unattended

## 2018-03-06 PROCEDURE — 65270000015 HC RM PRIVATE ONCOLOGY

## 2018-03-06 PROCEDURE — 97535 SELF CARE MNGMENT TRAINING: CPT | Performed by: OCCUPATIONAL THERAPIST

## 2018-03-06 PROCEDURE — 97530 THERAPEUTIC ACTIVITIES: CPT | Performed by: PHYSICAL THERAPIST

## 2018-03-06 PROCEDURE — 74011250637 HC RX REV CODE- 250/637: Performed by: INTERNAL MEDICINE

## 2018-03-06 PROCEDURE — 97530 THERAPEUTIC ACTIVITIES: CPT | Performed by: OCCUPATIONAL THERAPIST

## 2018-03-06 PROCEDURE — 74011250636 HC RX REV CODE- 250/636: Performed by: INTERNAL MEDICINE

## 2018-03-06 RX ADMIN — HEPARIN SODIUM 5000 UNITS: 5000 INJECTION, SOLUTION INTRAVENOUS; SUBCUTANEOUS at 01:50

## 2018-03-06 RX ADMIN — Medication 10 ML: at 15:11

## 2018-03-06 RX ADMIN — Medication 10 ML: at 06:51

## 2018-03-06 RX ADMIN — METOCLOPRAMIDE 5 MG: 10 TABLET ORAL at 21:47

## 2018-03-06 RX ADMIN — PANTOPRAZOLE SODIUM 40 MG: 40 TABLET, DELAYED RELEASE ORAL at 06:51

## 2018-03-06 RX ADMIN — LEVOTHYROXINE SODIUM 25 MCG: 25 TABLET ORAL at 06:51

## 2018-03-06 RX ADMIN — Medication 10 ML: at 21:48

## 2018-03-06 RX ADMIN — MIDODRINE HYDROCHLORIDE 10 MG: 5 TABLET ORAL at 08:35

## 2018-03-06 RX ADMIN — HEPARIN SODIUM 5000 UNITS: 5000 INJECTION, SOLUTION INTRAVENOUS; SUBCUTANEOUS at 08:36

## 2018-03-06 RX ADMIN — METOCLOPRAMIDE 5 MG: 10 TABLET ORAL at 06:51

## 2018-03-06 RX ADMIN — METOCLOPRAMIDE 5 MG: 10 TABLET ORAL at 13:07

## 2018-03-06 RX ADMIN — METOCLOPRAMIDE 5 MG: 10 TABLET ORAL at 16:26

## 2018-03-06 RX ADMIN — HEPARIN SODIUM 5000 UNITS: 5000 INJECTION, SOLUTION INTRAVENOUS; SUBCUTANEOUS at 16:26

## 2018-03-06 RX ADMIN — MIDODRINE HYDROCHLORIDE 10 MG: 5 TABLET ORAL at 16:26

## 2018-03-06 RX ADMIN — MIDODRINE HYDROCHLORIDE 10 MG: 5 TABLET ORAL at 21:47

## 2018-03-06 RX ADMIN — Medication 10 ML: at 15:10

## 2018-03-06 RX ADMIN — Medication 20 ML: at 15:11

## 2018-03-06 NOTE — PROGRESS NOTES
Problem: Mobility Impaired (Adult and Pediatric)  Goal: *Acute Goals and Plan of Care (Insert Text)  Physical Therapy Goals  Revised 3/6/2018  1. Patient will move from supine to sit and sit to supine , scoot up and down and roll side to side in bed with supervision/set-up within 7 day(s). 2.  Patient will transfer from bed to chair and chair to bed with moderate assistance  using the least restrictive device within 7 day(s). 3.  Patient will perform sit to stand with moderate assistance  within 7 day(s). 4.  Patient will ambulate with maximal assistance for 3 feet with the least restrictive device within 7 day(s). Physical Therapy Goals  Initiated 2/19/2018, Re-Assessed 2/26/2018-continue progressing towards goal of A x 1  1. Patient will move from supine to sit and sit to supine  in bed with moderate assistance  within 7 day(s). 2.  Patient will transfer from bed to chair and chair to bed with maximal assistance using the least restrictive device within 7 day(s). 3.  Patient will perform sit to stand with maximal assistance within 7 day(s). 4.  Patient will tolerate seated edge of bed with mod assist x 10 minutes to improve balance and tolerance to activity and in prep for OOB transfers within 7 days    physical Therapy TREATMENT: WEEKLY REASSESSMENT  Patient: Andrew Torres (77 y.o. female)  Date: 3/6/2018  Diagnosis: Sepsis ([facility] [address]  UTI (urinary tract infection)  Hx of systemic lupus erythematosus (SLE)  Hx of diabetes mellitus  Hx of essential hypertension  Mental retardation <principal problem not specified>       Precautions: Fall  Chart, physical therapy assessment, plan of care and goals were reviewed. ASSESSMENT:  Patient is demonstrating improvement in her overall mobility. Came to sitting EOB today with minimal assist of 1 and additional time. She is able to move her LE's to the EOB and had difficulty with scooting but did initiate the motion.   Once sitting EOB her balance was good for static and fair for dynamic. She was able to sit for 10 minutes for activities. Attempted standing x 1 with a RW. She was able to initiate the activity but required assist to come fully upright. She has bilat plantar flexion/inversion contractures and decreased knee extension so comes up keeping her knees fairly extended and her toes pointed. She also has a significant kyphoscoliosis so does not come fully upright. Unable to get her balance so she was returned to sitting. She didn't want to return to the bed but we were able to convince her to and then put the bed in the chair position. She would be max assist of 2 for a transfer to a chair and I am worried she might tumble forward. I am not sure if she has a wheelchair. She is a good candidate to progress back to her baseline level of function. Her sister states she was ambulatory with a RW or HHA and able to get to the car and out for appts. Patient's progression toward goals since last assessment: able to participate, cooperative. She did say yes and no and shook her head appropriately. Tried to stand. PLAN:  Goals have been updated based on progression since last assessment. Patient continues to benefit from skilled intervention to address the above impairments. Continue to follow the patient 3 times a week to address goals.   Planned Interventions:  [x]              Bed Mobility Training             []       Neuromuscular Re-Education  [x]              Transfer Training                   []       Orthotic/Prosthetic Training  [x]              Gait Training                         []       Modalities  [x]              Therapeutic Exercises           []       Edema Management/Control  [x]              Therapeutic Activities            [x]       Patient and Family Training/Education  []              Other (comment):  Discharge Recommendations: Skilled Nursing Facility  Further Equipment Recommendations for Discharge: none     SUBJECTIVE: Patient stated no.     OBJECTIVE DATA SUMMARY:   Critical Behavior:  Neurologic State: Alert, Confused, Eyes open spontaneously  Orientation Level: Unable to verbalize  Cognition: Unable to assess (comment)       Strength:       Generally decreased but functional.                    Functional Mobility Training:  Bed Mobility:     Supine to Sit: Additional time;Assist x2;Minimum assistance     Scooting: Moderate assistance        Transfers:      Max assist of 2. Balance:  Sitting: Impaired  Sitting - Static: Good (unsupported)  Sitting - Dynamic: Fair (occasional)  Standing: Impaired  Standing - Static: Constant support;Poor  Standing - Dynamic : Poor  Ambulation/Gait Training:                                                            Pain:  Pain Scale 1: Adult Nonverbal Pain Scale  Pain Intensity 1: 0              Activity Tolerance:   Good. Please refer to the flowsheet for vital signs taken during this treatment.   After treatment:   []  Patient left in no apparent distress sitting up in chair  [x]  Patient left in no apparent distress in bed  [x]  Call bell left within reach  [x]  Nursing notified  []  Caregiver present  []  Bed alarm activated    COMMUNICATION/COLLABORATION:   The patients plan of care was discussed with: Occupational Therapist and Registered Nurse    Myesha Mccallum PT   Time Calculation: 30 mins

## 2018-03-06 NOTE — PROGRESS NOTES
Hospitalist Progress Note    NAME: Allyson Bernard   :  1945   MRN:  [id number]     Admission summary:   72year old female with history of mental retardation, SLE, DM-2, HTN who normally resides in a group home, presented on 18 with decreased responsiveness. She had been diagnosed with acute influenza infection along with urinary tract infection with associated septic shock. Assessment / Plan:  81yo BF with acute metabolic encephalopathy and severe sepsis with shock secondary to UTI (E. Coli) and influenzae B, awaiting placement. Acute metabolic encephalopathy in the setting of septic shock due to UTI and Iinfluenza B   - MRI brain 2/3 - mild small vessel ischemic changes. No acute abnormality. - CT chest/abd/pelvis  - severe scoliosis. Atelectasis in the lower lobes, left greater than right. Anemia. Atherosclerotic abdominal aorta without aneurysm. - urine culture  - pansensitive E Coli   - blood cultures  - no growth   - influenza B positive    -  Completed a course  of Zosyn  And Tamiflu   - she had been treated with a course of oseltamivir to completion here   - seen by neurology, was on Keppra but discontinued on  by neurology   - SLP- recommended NDD2    -awaiting placement, CM's note reviewed    Acute respiratory failure with hypoxia, resolved  Acute on chronic diastolic congestive heart failure, resolved  Acute pulmonary edema  -Echo on  showed EF 60%. Pt was on vasopressor support briefly weaned off on   -stable on RA    Hypoglycemia  -due to poor po intake. Encourage po intake. Acute kidney injury, resolved  -cr elevated but stable for several days. Discussed with nursing to monitor PO intake. May need IVF if poor po intake  -BMP in AM    Acute blood loss anemia  -hgb improved. No evidence of active bleeding    Transaminitis, resolved.   This was due to sepsis  -will continue to monitor    Systemic lupus erythematosus  -will continue to monitor  -continue to hold Plaquenil    Hypothyroidism  -continue levothyroxine    Intellectual disability/mental retardation  -stable  -awaiting placement    L heel pressure ulcer, not POA  -wound care consulted    Obesity  Body mass index is 27.68 kg/(m^2). Code Status: PARTIAL CODE - ACLS meds only, no compressions, no intubation  Surrogate Decision Maker: sister   DVT Prophylaxis: heparin        Weekly labs    Baseline: Mental retardation, lives with sister normally but placed in group home after sister had to undergo recent back surgery  Disposition: awaiting SNF placement, CM following    BMI 29.12     Subjective:     Chief Complaint / Reason for Physician Visit:  Discussed with nursing staff. No acute changes overnight. Review of Systems:  Symptom Y/N Comments  Symptom Y/N Comments   Fever/Chills    Chest Pain     Poor Appetite    Edema     Cough    Abdominal Pain     Sputum    Joint Pain     SOB/YOUNG    Pruritis/Rash     Nausea/vomit    Tolerating PT/OT     Diarrhea    Tolerating Diet     Constipation    Other       Could NOT obtain due to: MR, non-verbal     Objective:     VITALS:   Last 24hrs VS reviewed since prior progress note.  Most recent are:  Patient Vitals for the past 24 hrs:   Temp Pulse Resp BP SpO2   03/06/18 0738 96.5 °F (35.8 °C) 66 16 105/82 99 %   03/05/18 2300 96.5 °F (35.8 °C) 65 16 105/66 95 %   03/05/18 1900 98.2 °F (36.8 °C) (!) 56 18 122/75 97 %   03/05/18 1514 98.4 °F (36.9 °C) 68 18 134/61 95 %     No intake or output data in the 24 hours ending 03/06/18 0845     PHYSICAL EXAM:  Gen: elderly BF, up in bed, NAD  HENT: NC/AT, oral mucosa pink and moist  Eyes: PERRL, anicteric sclera, conjunctiva pink and moist  CVS: RRR,+S1, S2  Lung: CTAB, no wheezing  Abd: BS present, NT, ND  Neuro: AAO x0, non verbal    Reviewed most current lab test results and cultures  YES  Reviewed most current radiology test results   YES  Review and summation of old records today    NO  Reviewed patient's current orders and MAR    YES  PMH/SH reviewed - no change compared to H&P  ________________________________________________________________________  Care Plan discussed with:    Comments   Patient y    Family      RN y    Care Manager     Consultant                        Multidiciplinary team rounds were held today with , nursing, pharmacist and clinical coordinator. Patient's plan of care was discussed; medications were reviewed and discharge planning was addressed. ________________________________________________________________________  Total NON critical care TIME:  15  Minutes    Total CRITICAL CARE TIME Spent:   Minutes non procedure based      Comments   >50% of visit spent in counseling and coordination of care x dispo planning   ________________________________________________________________________  Heather Frye MD     Procedures: see electronic medical records for all procedures/Xrays and details which were not copied into this note but were reviewed prior to creation of Plan. LABS:  I reviewed today's most current labs and imaging studies.   Pertinent labs include:  Recent Labs      03/05/18   1015   WBC  4.4   HGB  9.6*   HCT  31.1*   PLT  163     Recent Labs      03/05/18   1015   NA  139   K  4.7   CL  107   CO2  27   GLU  108*   BUN  14   CREA  1.27*   CA  8.9       Signed: Heather Frye MD

## 2018-03-06 NOTE — PROGRESS NOTES
Oncology Interdisciplinary rounds were held today to discuss patient plan of care and outcomes. The following members were present: Nursing, Case Management, Pharmacy and Dietary.     Actual Length of Stay: 32    DRG GLOS: 4.9    Expected Length of Stay: 4d 21h                Plan for Day        Mobility        Plan for Stay      Plan for Way   Continue current plan Needs to ambulate with PT Continue current treatment Waiting for Level 2 Hopefully discharge tomorrow 3/7

## 2018-03-06 NOTE — PROGRESS NOTES
Occupational Therapy Goals  Revised 3/6/2018 as per weekly reevaluation and clarification of PLF  1. Patient will perform self-feeding with supervision/set-up, using utensils PRN for management of solid foods, within 7 day(s). Defer all functional mobility to PT. Patient otherwise total A for all other ADLs at baseline. Problem: Self Care Deficits Care Plan (Adult)  Goal: *Acute Goals and Plan of Care (Insert Text)  Occupational Therapy Goals  Initiated 2/19/2018, goals reviewed and revised PRN as per 2/27 weekly reevaluation, goal 6 newly established. As per 3/6 weekly re-evaluation and upon clarification of patient's PLF with her sister, goals 1 and 2 are not appropriate, and goal 6 will be omitted deferring functional mobility to PT. See Above for newly established goal.   1.  Patient will perform grooming in bed or supported sitting with moderate assistance  within 7 day(s). Goal met, change to minimal assistance. 2.  Patient will perform upper body dressing with moderate assistance  within 7 day(s). Not met, continue goal  3.  Patient will sit edge of bed for 5 minutes with moderate assistance for participation in functional task within 7 day(s). Goal met  4.  Patient will participate in assessment of functional transfers and establish goals as appropriate within 7 day(s). Goal met. See goal 6.   5.  Patient will participate in upper extremity therapeutic exercises with moderate cues/assist for 10 minutes within 7 day(s). Not met, Omit goal  6.   Patient will perform bed to chair with maximum assistance of 1 via stand pivot within 7 days.            Occupational Therapy weekly ReEVALUATION  Patient: Mai Olmedo (66 y.o. female)  Date: 3/6/2018  Diagnosis: Sepsis ([address]  UTI (urinary tract infection)  Hx of systemic lupus erythematosus (SLE)  Hx of diabetes mellitus  Hx of essential hypertension  Mental retardation <principal problem not specified>       Precautions: Fall    ASSESSMENT :   Patient seen for weekly reevaluation today and spoke with patient's sister to clarify patient's prior level of function. Per patient's sister/primary caregiver the patient was total A for all ADLs, with the exception of being able to perform most of her self feeding with supervision/setup. Patient has a baseline hand tremor preventing he from safely eating soup with a spoon and is total A for eating such things. In regards to functional mobility the patient was min A for bed mobility, min to mod A for transfers and was able to ambulate with CGA. At this point she is slightly below her self feeding baseline, now requiring as much as mod A, is min to mod a for bed mobility and is max A of 2 for transfers. Patient unable to ambulate. She is limited by GW, poor balance, decreased activity tolerance and limited knee flexion. Patient continues to be unable to position BLE in enough knee flexion to prepare herself for adequate sit to stand transfer attempt. Once in standing she maintains a flexed forward posture, unable to stand upright. Per patient's sister the patient was able to stand upright and did not have limited knee ROM at baseline. At this point the patient will benefit from OT to solely address self feeding. Will defer all functional mobility to PT. Patient will need SNF rehab for mainly PT at discharge. Patient will benefit from skilled intervention to address the above impairments.   Patients rehabilitation potential is considered to be Fair  Factors which may influence rehabilitation potential include:   []                None noted  [x]                Mental ability/status  []                Medical condition  []                Home/family situation and support systems  []                Safety awareness  []                Pain tolerance/management  []                Other:      PLAN :  Recommendations and Planned Interventions:  []                  Self Care Training                  []           Therapeutic Activities  []                  Functional Mobility Training    []           Cognitive Retraining  []                  Therapeutic Exercises           []           Endurance Activities  []                  Balance Training                   []           Neuromuscular Re-Education  []                  Visual/Perceptual Training     []      Home Safety Training  []                  Patient Education                 []           Family Training/Education  [x]                  Other (comment): Self feeding    Frequency/Duration: Patient will be followed by occupational therapy 2 times a week to address goals. Discharge Recommendations: [facility] mainly for PT intervention to address functional mobility. Further Equipment Recommendations for Discharge: TBD         OBJECTIVE DATA SUMMARY:   Prior Level of Function: Contacted patient's sister/original primary caregiver to clarify functional status prior to admit. Per patient's sister the patient was able to feed herself with supervision setup using her hands, covered cups with a straw and using a fork. She was not able to safely feed herself soup due to a R hand tremor. Although patient's sister does engage her to participation in dressing bathing and grooming the patient's sister reports she ultimately requires total A. Total also needed for toileting. In regards to functional mobility, the patient was min A for bed mobility, min to mod A sit to stand, and could ambulate with CGA via via hand hold. Patient ambulating CGA with a RW at group home. Patient able to stand upright at baseline and had functional ROM in her LEs.    Cognitive/Behavioral Status:  Neurologic State: Alert  Orientation Level: Oriented to person  Cognition: Decreased attention/concentration;Decreased command following    Balance:  Sitting: Impaired  Sitting - Static: Good (unsupported)  Sitting - Dynamic: Fair (occasional)  Standing: Impaired  Standing - Static: Constant support;Poor  Standing - Dynamic : Poor    Functional Mobility and Transfers for ADLs:  Bed Mobility:  Supine to Sit: Additional time;Assist x2;Minimum assistance  Scooting: Moderate assistance    Transfers:   Sit to standing using RW with maximum assistance of 2    ADL Assessment:  Feeding: Moderate assistance    Oral Facial Hygiene/Grooming: Maximum assistance    Bathing: Total assistance    Upper Body Dressing: Total assistance    Lower Body Dressing: Total assistance    Toileting: Total assistance              ADL Intervention:  Feeding  Drink to Mouth: Supervision/set-up (using straw. )  Cues: Verbal cues provided    Grooming  Washing Face: Moderate assistance  Washing Hands: Moderate assistance    Upper Body Dressing Assistance  Shirt simulation with hospital gown: Total assistance (dependent) (some active participation from patieint, but still total A)    Cognitive Retraining  Following Commands: Follows one step commands/directions      Functional Measure:  Barthel Index:    Bathin  Bladder: 0  Bowels: 0  Groomin  Dressin  Feedin  Mobility: 0  Stairs: 0  Toilet Use: 0  Transfer (Bed to Chair and Back): 5  Total: 5       Barthel and G-code impairment scale:  Percentage of impairment CH  0% CI  1-19% CJ  20-39% CK  40-59% CL  60-79% CM  80-99% CN  100%   Barthel Score 0-100 100 99-80 79-60 59-40 20-39 1-19   0   Barthel Score 0-20 20 17-19 13-16 9-12 5-8 1-4 0      The Barthel ADL Index: Guidelines  1. The index should be used as a record of what a patient does, not as a record of what a patient could do. 2. The main aim is to establish degree of independence from any help, physical or verbal, however minor and for whatever reason. 3. The need for supervision renders the patient not independent. 4. A patient's performance should be established using the best available evidence.  Asking the patient, friends/relatives and nurses are the usual sources, but direct observation and common sense are also important. However direct testing is not needed. 5. Usually the patient's performance over the preceding 24-48 hours is important, but occasionally longer periods will be relevant. 6. Middle categories imply that the patient supplies over 50 per cent of the effort. 7. Use of aids to be independent is allowed. Douglas Varma., Barthel, D.W. (1701). Functional evaluation: the Barthel Index. [facility] (14)2. VESNA Brown, Megan Alba., Nu Rodriguez., [place], [address] (1999). Measuring the change indisability after inpatient rehabilitation; comparison of the responsiveness of the Barthel Index and Functional [place] Measure. Journal of Neurology, Neurosurgery, and Psychiatry, 66(4), 808-894. SHU Maki, JERZY Alcala, & Becky Tierney MEneidaA. (2004.) Assessment of post-stroke quality of life in cost-effectiveness studies: The usefulness of the Barthel Index and the EuroQoL-5D. Quality of Life Research, 13, 617-87     G codes: In compliance with CMSs Claims Based Outcome Reporting, the following G-code set was chosen for this patient based on their primary functional limitation being treated: The outcome measure chosen to determine the severity of the functional limitation was the Barthel Index with a score of 5/100 which was correlated with the impairment scale. ? Self Care:     - CURRENT STATUS: CM - 80%-99% impaired, limited or restricted    - GOAL STATUS: CL - 60%-79% impaired, limited or restricted    - D/C STATUS:  ---------------To be determined---------------     Pain:  Pain Scale 1: Adult Nonverbal Pain Scale  Pain Intensity 1: 0              Activity Tolerance:   Sat EOB 10 minutes  Please refer to the flowsheet for vital signs taken during this treatment.   After treatment:   [] Patient left in no apparent distress sitting up in chair  [x] Patient left in no apparent distress in bed  [x] Call bell left within reach  [x] Nursing notified  [] Caregiver present  [] Bed alarm activated    COMMUNICATION/EDUCATION:   The patients plan of care was discussed with: Physical Therapist, Registered Nurse and Spoke with patients sister. []    Home safety education was provided and the patient/caregiver indicated understanding. []    Patient/family have participated as able in goal setting and plan of care. []    Patient/family agree to work toward stated goals and plan of care. []    Patient understands intent and goals of therapy, but is neutral about his/her participation. [x]    Patient is unable to participate in goal setting and plan of care. This patients plan of care is appropriate for delegation to [facility].     Thank you for this referral.  Lew Emanuel, OTR/L  Time Calculation: 34 mins

## 2018-03-06 NOTE — PROGRESS NOTES
Bedside and Verbal shift change report given to [place] (oncoming nurse) by Shell Peña (offgoing nurse). Report included the following information SBAR, Kardex, MAR and Recent Results.

## 2018-03-06 NOTE — PROGRESS NOTES
Patient's Level II has been completed and received by CM. Patient has been accepted by [facility] and [facility] and Level II, along with most recent clinical data was FAX'd to facility via [employer] with instructions to attempt to obtain [facility] authorization. CM will follow-up with [facility] in AM to check for further needs.     Copy of [facility] and Level II also sent to Medical Records to place on chart and mailed to [facility] Dept of Ashley Garcia RN, BSN, [address] [place] [address]  [phone]

## 2018-03-06 NOTE — PROGRESS NOTES
Oncology Nursing Communication Tool  10:45 AM  3/6/2018     Bedside shift change report given to [facility] AT JAJA WOODWARD RN (incoming nurse) by Jayden Shukla RN (outgoing nurse) on Vishnu Harris. Report included the following information SBAR, Kardex, Intake/Output, MAR, Accordion and Recent Results. Shift Summary: Patient at her baseline; Complete care; Awaiting placement for d/c      Issues for physician to address: Oncology Shift Note   Admission Date 2/2/2018   Admission Diagnosis Sepsis ([address]  UTI (urinary tract infection)  Hx of systemic lupus erythematosus (SLE)  Hx of diabetes mellitus  Hx of essential hypertension  Mental retardation   Code Status Partial Code   Consults IP CONSULT TO NEUROLOGY  IP CONSULT TO CARDIOLOGY      Cardiac Monitoring [] Yes [x] No      Purposeful Hourly Rounding [x] Yes    Cole Score Total Score: 3   Cole score 3 or > [x] Bed Alarm [] Avasys [] 1:1 sitter [] Patient refused (Place signed refusal form in chart)      Pain Managed [x] Yes [] No    Key Pain Meds             acetaminophen (TYLENOL ARTHRITIS PAIN) 650 mg TbER  (Taking) Take 650 mg by mouth every eight (8) hours as needed for Pain. Influenza Vaccine Received Flu Vaccine for Current Season (usually Sept-March): Yes           Oxygen needs?  [x] Room air Oxygen @  []1L    []2L    []3L   []4L    []5L   []6L     Use home O2? [] Yes [] No  Perform O2 challenge test using  smartphrase (.oxygenchallenge)      Last bowel movement Last Bowel Movement Date: 03/04/18  bowel movement      Urinary Catheter [REMOVED] Urinary Catheter 02/02/18 [place] - Temperature-Criteria for Appropriate Use: Medically/surgically unstable  [REMOVED] Urinary Catheter 02/15/18 [place]-Criteria for Appropriate Use: Obstruction/retention     [REMOVED] External Female Catheter 02/15/18-Urine Output (mL): 450 ml  [REMOVED] Urinary Catheter 02/02/18 [place] - Temperature-Urine Output (mL): 10 ml  [REMOVED] External Female Catheter 02/03/18-Urine Output (mL): 0 ml  [REMOVED] Urinary Catheter 02/15/18 [place]-Urine Output (mL): 150 ml     LDAs               Peripheral IV 02/28/18 Right Wrist (Active)   Site Assessment Clean, dry, & intact 3/6/2018  7:38 AM   Phlebitis Assessment 0 3/6/2018  7:38 AM   Infiltration Assessment 0 3/6/2018  7:38 AM   Dressing Status Clean, dry, & intact 3/6/2018  7:38 AM   Dressing Type Tape;Transparent 3/6/2018  7:38 AM   Hub Color/Line Status Pink 3/6/2018  7:38 AM   Action Taken Open ports on tubing capped 3/3/2018  9:30 PM   Alcohol Cap Used Yes 3/4/2018 12:40 AM                         Readmission Risk Assessment Tool Score Low Risk            12       Total Score        3 Patient Length of Stay (>5 days = 3)    5 Pt. Coverage (Medicare=5 , Medicaid, or Self-Pay=4)    4 Charlson Comorbidity Score (Age + Comorbid Conditions)        Criteria that do not apply:    Has Seen PCP in Last 6 Months (Yes=3, No=0)    . Living with Significant Other. Assisted Living. LTAC. SNF.  or   Rehab    IP Visits Last 12 Months (1-3=4, 4=9, >4=11)       Expected Length of Stay 4d 21h   Actual Length of Stay Lucia Harris, RN

## 2018-03-07 PROCEDURE — 74011250636 HC RX REV CODE- 250/636: Performed by: INTERNAL MEDICINE

## 2018-03-07 PROCEDURE — 97530 THERAPEUTIC ACTIVITIES: CPT

## 2018-03-07 PROCEDURE — 74011250637 HC RX REV CODE- 250/637: Performed by: INTERNAL MEDICINE

## 2018-03-07 PROCEDURE — 65270000015 HC RM PRIVATE ONCOLOGY

## 2018-03-07 RX ADMIN — METOCLOPRAMIDE 5 MG: 10 TABLET ORAL at 22:25

## 2018-03-07 RX ADMIN — PANTOPRAZOLE SODIUM 40 MG: 40 TABLET, DELAYED RELEASE ORAL at 08:02

## 2018-03-07 RX ADMIN — LEVOTHYROXINE SODIUM 25 MCG: 25 TABLET ORAL at 06:01

## 2018-03-07 RX ADMIN — MIDODRINE HYDROCHLORIDE 10 MG: 5 TABLET ORAL at 16:12

## 2018-03-07 RX ADMIN — HEPARIN SODIUM 5000 UNITS: 5000 INJECTION, SOLUTION INTRAVENOUS; SUBCUTANEOUS at 01:39

## 2018-03-07 RX ADMIN — Medication 10 ML: at 22:26

## 2018-03-07 RX ADMIN — METOCLOPRAMIDE 5 MG: 10 TABLET ORAL at 16:13

## 2018-03-07 RX ADMIN — Medication 10 ML: at 06:00

## 2018-03-07 RX ADMIN — Medication 10 ML: at 16:14

## 2018-03-07 RX ADMIN — HEPARIN SODIUM 5000 UNITS: 5000 INJECTION, SOLUTION INTRAVENOUS; SUBCUTANEOUS at 16:12

## 2018-03-07 RX ADMIN — METOCLOPRAMIDE 5 MG: 10 TABLET ORAL at 08:02

## 2018-03-07 RX ADMIN — METOCLOPRAMIDE 5 MG: 10 TABLET ORAL at 12:31

## 2018-03-07 RX ADMIN — MIDODRINE HYDROCHLORIDE 10 MG: 5 TABLET ORAL at 08:02

## 2018-03-07 RX ADMIN — MIDODRINE HYDROCHLORIDE 10 MG: 5 TABLET ORAL at 22:26

## 2018-03-07 RX ADMIN — HEPARIN SODIUM 5000 UNITS: 5000 INJECTION, SOLUTION INTRAVENOUS; SUBCUTANEOUS at 08:02

## 2018-03-07 RX ADMIN — Medication 20 ML: at 16:14

## 2018-03-07 NOTE — PROGRESS NOTES
Oncology Nursing Communication Tool  6:33 AM  3/7/2018     Bedside shift change report given to Jonnie RN (incoming nurse) by Radha Combs RN (outgoing nurse) on [facility]. Report included the following information SBAR, Kardex and MAR. Shift Summary: Patient rested through out night      Issues for physician to address: Oncology Shift Note   Admission Date 2/2/2018   Admission Diagnosis Sepsis ([address]  UTI (urinary tract infection)  Hx of systemic lupus erythematosus (SLE)  Hx of diabetes mellitus  Hx of essential hypertension  Mental retardation   Code Status Partial Code   Consults IP CONSULT TO NEUROLOGY  IP CONSULT TO CARDIOLOGY      Cardiac Monitoring [] Yes [] No      Purposeful Hourly Rounding [] Yes    Cole Score Total Score: 3   Cole score 3 or > [] Bed Alarm [] Avasys [] 1:1 sitter [] Patient refused (Place signed refusal form in chart)      Pain Managed [] Yes [] No    Key Pain Meds             acetaminophen (TYLENOL ARTHRITIS PAIN) 650 mg TbER  (Taking) Take 650 mg by mouth every eight (8) hours as needed for Pain. Influenza Vaccine Received Flu Vaccine for Current Season (usually Sept-March): Yes           Oxygen needs?  [] Room air Oxygen @  []1L    []2L    []3L   []4L    []5L   []6L     Use home O2? [] Yes [] No  Perform O2 challenge test using  smartphrase (.oxygenchallenge)      Last bowel movement Last Bowel Movement Date: 03/06/18  bowel movement      Urinary Catheter [REMOVED] Urinary Catheter 02/02/18 [place] - Temperature-Criteria for Appropriate Use: Medically/surgically unstable  [REMOVED] Urinary Catheter 02/15/18 [place]-Criteria for Appropriate Use: Obstruction/retention     [REMOVED] External Female Catheter 02/15/18-Urine Output (mL): 450 ml  [REMOVED] Urinary Catheter 02/02/18 [place] - Temperature-Urine Output (mL): 10 ml  [REMOVED] External Female Catheter 02/03/18-Urine Output (mL): 0 ml  [REMOVED] Urinary Catheter 02/15/18 [place]-Urine Output (mL): 150 ml     LDAs               Peripheral IV 02/28/18 Right Wrist (Active)   Site Assessment Clean, dry, & intact 3/7/2018  2:50 AM   Phlebitis Assessment 0 3/7/2018  2:50 AM   Infiltration Assessment 0 3/7/2018  2:50 AM   Dressing Status Clean, dry, & intact 3/7/2018  2:50 AM   Dressing Type Tape 3/7/2018  2:50 AM   Hub Color/Line Status Other (comment) 3/6/2018  3:09 PM   Action Taken Open ports on tubing capped 3/3/2018  9:30 PM   Alcohol Cap Used Yes 3/4/2018 12:40 AM                         Readmission Risk Assessment Tool Score Low Risk            12       Total Score        3 Patient Length of Stay (>5 days = 3)    5 Pt. Coverage (Medicare=5 , Medicaid, or Self-Pay=4)    4 Charlson Comorbidity Score (Age + Comorbid Conditions)        Criteria that do not apply:    Has Seen PCP in Last 6 Months (Yes=3, No=0)    . Living with Significant Other. Assisted Living. LTAC. SNF.  or   Rehab    IP Visits Last 12 Months (1-3=4, 4=9, >4=11)       Expected Length of Stay 4d 21h   Actual Length of Stay [address] [facility]

## 2018-03-07 NOTE — PROGRESS NOTES
Care Conference    CM had telephone conference with PCP - Rosy Ram - [address] [facility] regarding plan of care for patient. Rosy Ram would like to wait to see if Francies Dill is able to be obtained prior to discharging patient home. CM has left voice message for [facility] Admissions office to check on [facility] authorization process. Also spoke to patient's sister regarding updates on plan of care. If [facility] is unable to be obtained, will plan to discharge patient home with her sister with home health provided by Debi Kirkpatrick (in [address]) for skilled nursing, OT/PT eval and treat and social work. This CM would also recommend an APS referral if patient goes home to ensure that patient has all needs met.     Suzanne Perez, RN, BSN, ACM   - Medical Oncology  [phone]

## 2018-03-07 NOTE — PROGRESS NOTES
Hospitalist Progress Note    NAME: Devorah Hastings   :  1945   MRN:  [id number]     Admission summary:   72year old female with history of mental retardation, SLE, DM-2, HTN who normally resides in a group home, presented on 18 with decreased responsiveness. She had been diagnosed with acute influenza infection along with urinary tract infection with associated septic shock. Assessment / Plan:  79yo BF with acute metabolic encephalopathy and severe sepsis with shock secondary to UTI (E. Coli) and influenzae B, awaiting placement. Acute metabolic encephalopathy in the setting of septic shock due to UTI and Iinfluenza B   - MRI brain 2/3 - mild small vessel ischemic changes. No acute abnormality. - CT chest/abd/pelvis  - severe scoliosis. Atelectasis in the lower lobes, left greater than right. Anemia. Atherosclerotic abdominal aorta without aneurysm. - urine culture  - pansensitive E Coli   - blood cultures  - no growth   - influenza B positive    -  Completed a course  of Zosyn  And Tamiflu   - she had been treated with a course of oseltamivir to completion here   - seen by neurology, was on Keppra but discontinued on  by neurology   - SLP- recommended NDD2    - awaiting placement, CM's note reviewed    Acute respiratory failure with hypoxia, resolved  Acute on chronic diastolic congestive heart failure, resolved  Acute pulmonary edema  -Echo on  showed EF 60%. Pt was on vasopressor support briefly weaned off on   -stable on RA    Hypoglycemia  -due to poor po intake. Encourage po intake. Acute kidney injury, resolved  -cr elevated but stable for several days. Discussed with nursing to monitor PO intake. May need IVF if poor po intake  -BMP in AM    Acute blood loss anemia  -hgb improved. No evidence of active bleeding    Transaminitis, resolved.   This was due to sepsis  -will continue to monitor    Systemic lupus erythematosus  -will continue to monitor  -continue to hold Plaquenil    Hypothyroidism  -continue levothyroxine    Intellectual disability/mental retardation  -stable  -awaiting placement    L heel pressure ulcer, not POA  -wound care consulted    Obesity  Body mass index is 29.66 kg/(m^2). Code Status: PARTIAL CODE - ACLS meds only, no compressions, no intubation  Surrogate Decision Maker: sister   DVT Prophylaxis: heparin        Weekly labs    Baseline: Mental retardation, lives with sister normally but placed in group home after sister had to undergo recent back surgery  Disposition: awaiting SNF placement, CM following    BMI 29.12     Subjective:     Chief Complaint / Reason for Physician Visit:  Discussed with nursing staff. Awaiting placement    Review of Systems:  Symptom Y/N Comments  Symptom Y/N Comments   Fever/Chills    Chest Pain     Poor Appetite    Edema     Cough    Abdominal Pain     Sputum    Joint Pain     SOB/YOUNG    Pruritis/Rash     Nausea/vomit    Tolerating PT/OT     Diarrhea    Tolerating Diet     Constipation    Other       Could NOT obtain due to: MR, non-verbal     Objective:     VITALS:   Last 24hrs VS reviewed since prior progress note.  Most recent are:  Patient Vitals for the past 24 hrs:   Temp Pulse Resp BP SpO2   03/07/18 0747 97.5 °F (36.4 °C) 61 16 118/56 92 %   03/07/18 0139 97.5 °F (36.4 °C) (!) 111 16 91/71 94 %   03/06/18 1920 98.3 °F (36.8 °C) 64 18 97/65 99 %   03/06/18 1600 98.2 °F (36.8 °C) 72 18 128/80 -       Intake/Output Summary (Last 24 hours) at 03/07/18 6540  Last data filed at 03/06/18 1430   Gross per 24 hour   Intake              240 ml   Output                0 ml   Net              240 ml        PHYSICAL EXAM:  Gen: elderly BF, up in bed, NAD  HENT: NC/AT, oral mucosa pink and moist  Eyes: PERRL, anicteric sclera, conjunctiva pink and moist  CVS: RRR,+S1, S2  Lung: CTAB, no wheezing  Abd: BS present, NT, ND  Neuro: AAO x0, non verbal    Reviewed most current lab test results and cultures YES  Reviewed most current radiology test results   YES  Review and summation of old records today    NO  Reviewed patient's current orders and MAR    YES  PMH/SH reviewed - no change compared to H&P  ________________________________________________________________________  Care Plan discussed with:    Comments   Patient y    Family      RN y    Care Manager     Consultant                        Multidiciplinary team rounds were held today with , nursing, pharmacist and clinical coordinator. Patient's plan of care was discussed; medications were reviewed and discharge planning was addressed. ________________________________________________________________________  Total NON critical care TIME:  15  Minutes    Total CRITICAL CARE TIME Spent:   Minutes non procedure based      Comments   >50% of visit spent in counseling and coordination of care x dispo planning   ________________________________________________________________________  Gabby Lugo MD     Procedures: see electronic medical records for all procedures/Xrays and details which were not copied into this note but were reviewed prior to creation of Plan. LABS:  I reviewed today's most current labs and imaging studies.   Pertinent labs include:  Recent Labs      03/05/18   1015   WBC  4.4   HGB  9.6*   HCT  31.1*   PLT  163     Recent Labs      03/05/18   1015   NA  139   K  4.7   CL  107   CO2  27   GLU  108*   BUN  14   CREA  1.27*   CA  8.9       Signed: Gabby Lugo MD

## 2018-03-07 NOTE — PROGRESS NOTES
Problem: Mobility Impaired (Adult and Pediatric)  Goal: *Acute Goals and Plan of Care (Insert Text)  Physical Therapy Goals  Revised 3/7/2018  1. Patient will move from supine to sit and sit to supine , scoot up and down and roll side to side in bed with maximal assistance within 7 day(s). 2.  Patient will transfer from bed to chair and chair to bed with total assistance using the least restrictive device within 7 day(s). 3.  Patient will perform sit to stand with total assistance within 7 day(s). Physical Therapy Goals  Revised 3/6/2018  1. Patient will move from supine to sit and sit to supine , scoot up and down and roll side to side in bed with supervision/set-up within 7 day(s). 2.  Patient will transfer from bed to chair and chair to bed with moderate assistance  using the least restrictive device within 7 day(s). 3.  Patient will perform sit to stand with moderate assistance  within 7 day(s). 4.  Patient will ambulate with maximal assistance for 3 feet with the least restrictive device within 7 day(s). Physical Therapy Goals  Initiated 2/19/2018, Re-Assessed 2/26/2018-continue progressing towards goal of A x 1  1. Patient will move from supine to sit and sit to supine  in bed with moderate assistance  within 7 day(s). 2.  Patient will transfer from bed to chair and chair to bed with maximal assistance using the least restrictive device within 7 day(s). 3.  Patient will perform sit to stand with maximal assistance within 7 day(s).   4.  Patient will tolerate seated edge of bed with mod assist x 10 minutes to improve balance and tolerance to activity and in prep for OOB transfers within 7 days     physical Therapy TREATMENT: WEEKLY REASSESSMENT  Patient: Charly Carroll (77 y.o. female)  Date: 3/7/2018  Diagnosis: Sepsis ([address]  UTI (urinary tract infection)  Hx of systemic lupus erythematosus (SLE)  Hx of diabetes mellitus  Hx of essential hypertension  Mental retardation <principal problem not specified>       Precautions: Bed Alarm, Fall  Chart, physical therapy assessment, plan of care and goals were reviewed. ASSESSMENT:  Patient continues to present with markedly impaired functional mobility combined with baseline medical co morbidities including intellectual disability. As seen in prior sessions, she exhibits significant contractures (most notably B ankle PF, inversion) and heavy extensor tone (BLE's) that prevent safe upright activity. She required total A of 2 for all care today, following only x2 simple commands ('lift' - observed to slightly lift RLE up onto bed for sit>supine; 'scoot' - observed to slide LLE towards EOB for supine>sit) otherwise non-verbal without command following despite multi-modal facilitation. Patient's BLE extensor tone and lack of sitting balance prevent activity beyond x1' of sitting EOB with total A to remain in midline. At end of session, patient concluded back in bed with all extremities, as well as cervical spine given baseline kyphosis, offloaded appropriately and B prevalon boots in place. Given patient's current functioning, she will not be able to return to home unless able to receive full-time dependent level care. Per chart review, her home situation prevents this. In addition, her reported baseline is ambulatory with RW device. Recommend SNF to LTC at [place] and as such will continue to follow on an as needed basis for disposition and progress as able. Patient's progression toward goals since last assessment: None met; all downgraded     PLAN:  Goals have been updated based on progression since last assessment. Patient continues to benefit from skilled intervention to address the above impairments. Continue to follow the patient PRN to address goals.   Planned Interventions:  [x]              Bed Mobility Training             []       Neuromuscular Re-Education  [x]              Transfer Training                   []       Orthotic/Prosthetic Training  [x]              Gait Training                         []       Modalities  [x]              Therapeutic Exercises           []       Edema Management/Control  [x]              Therapeutic Activities            [x]       Patient and Family Training/Education  []              Other (comment):  Discharge Recommendations: Skilled Nursing Facility>LTC  Further Equipment Recommendations for Discharge: defer     SUBJECTIVE:   Patient non verbal throughout. OBJECTIVE DATA SUMMARY:   Critical Behavior:  Neurologic State: Alert, Confused  Orientation Level: Unable to verbalize  Cognition: Memory loss, Impaired decision making, Decreased command following  Safety/Judgement: Lack of insight into deficits    Strength:   Strength: Grossly decreased, non-functional                Tone: Abnormal (severe BLE extensor tone)                        Functional Mobility Training:  Bed Mobility:  Rolling: Assist x2;Total assistance  Supine to Sit: Assist x2;Total assistance  Sit to Supine: Total assistance;Assist x2  Scooting: Total assistance;Assist x2              Balance:  Sitting: Impaired; With support  Sitting - Static: Poor (constant support)  Sitting - Dynamic: Poor (constant support)  Barthel Index:    Bathin  Bladder: 0  Bowels: 0  Groomin  Dressin  Feedin  Mobility: 0  Stairs: 0  Toilet Use: 0  Transfer (Bed to Chair and Back): 0  Total: 0       Barthel and G-code impairment scale:  Percentage of impairment CH  0% CI  1-19% CJ  20-39% CK  40-59% CL  60-79% CM  80-99% CN  100%   Barthel Score 0-100 100 99-80 79-60 59-40 20-39 1-19   0   Barthel Score 0-20 20 17-19 13-16 9-12 5-8 1-4 0      The Barthel ADL Index: Guidelines  1. The index should be used as a record of what a patient does, not as a record of what a patient could do. 2. The main aim is to establish degree of independence from any help, physical or verbal, however minor and for whatever reason.   3. The need for supervision renders the patient not independent. 4. A patient's performance should be established using the best available evidence. Asking the patient, friends/relatives and nurses are the usual sources, but direct observation and common sense are also important. However direct testing is not needed. 5. Usually the patient's performance over the preceding 24-48 hours is important, but occasionally longer periods will be relevant. 6. Middle categories imply that the patient supplies over 50 per cent of the effort. 7. Use of aids to be independent is allowed. Jud Hurst., Barthel, D.W. (0039). Functional evaluation: the Barthel Index. [facility] (14)2. Norval Low sixto VESNA Fontaine, Steffen Mosher., Roslyn Sandhu., Trina, [address] (1999). Measuring the change indisability after inpatient rehabilitation; comparison of the responsiveness of the Barthel Index and Functional [place] Measure. Journal of Neurology, Neurosurgery, and Psychiatry, 66(4), 633-843. Kaleigh Molina, N.J.A, JERZY Alcala, & Karlee Curran MJOHN. (2004.) Assessment of post-stroke quality of life in cost-effectiveness studies: The usefulness of the Barthel Index and the EuroQoL-5D. Quality of Life Research, 13, 427-43       Pain:  Pain Scale 1: Adult Nonverbal Pain Scale  Pain Intensity 1: 0          Please refer to the flowsheet for vital signs taken during this treatment.   After treatment:   []  Patient left in no apparent distress sitting up in chair  [x]  Patient left in no apparent distress in bed with all extremities offloaded   [x]  Call bell left within reach  [x]  Nursing notified  []  Caregiver present  [x]  Bed alarm activated    COMMUNICATION/COLLABORATION:   The patients plan of care was discussed with: Registered Nurse and     Ramy Hernandez, PT, DPT, CEKRISTOPHER      Time Calculation: 13 mins

## 2018-03-07 NOTE — PROGRESS NOTES
Oncology Nursing Communication Tool  12:29 PM  3/7/2018     Bedside shift change report given to [facility] JAJA WOODWARD RN (incoming nurse) by Emerita Leal RN (outgoing nurse) on Mikey Suarez. Report included the following information SBAR, Kardex, Intake/Output, MAR, Accordion and Recent Results. Shift Summary: awaiting authorization for discharge disposition; Likely discharge with daughter home; P/T and O/T in to work with patient      Issues for physician to address: Oncology Shift Note   Admission Date 2/2/2018   Admission Diagnosis Sepsis ([address]  UTI (urinary tract infection)  Hx of systemic lupus erythematosus (SLE)  Hx of diabetes mellitus  Hx of essential hypertension  Mental retardation   Code Status Partial Code   Consults IP CONSULT TO NEUROLOGY  IP CONSULT TO CARDIOLOGY      Cardiac Monitoring [] Yes [x] No      Purposeful Hourly Rounding [x] Yes    Cole Score Total Score: 3   Cole score 3 or > [x] Bed Alarm [] Avasys [] 1:1 sitter [] Patient refused (Place signed refusal form in chart)      Pain Managed [x] Yes [] No    Key Pain Meds             acetaminophen (TYLENOL ARTHRITIS PAIN) 650 mg TbER  (Taking) Take 650 mg by mouth every eight (8) hours as needed for Pain. Influenza Vaccine Received Flu Vaccine for Current Season (usually Sept-March): Yes           Oxygen needs?  [x] Room air Oxygen @  []1L    []2L    []3L   []4L    []5L   []6L     Use home O2? [] Yes [] No  Perform O2 challenge test using  smartphrase (.oxygenchallenge)      Last bowel movement Last Bowel Movement Date: 03/07/18  bowel movement      Urinary Catheter [REMOVED] Urinary Catheter 02/02/18 [facility] - Temperature-Criteria for Appropriate Use: Medically/surgically unstable  [REMOVED] Urinary Catheter 02/15/18 [facility]-Criteria for Appropriate Use: Obstruction/retention     [REMOVED] External Female Catheter 02/15/18-Urine Output (mL): 450 ml  [REMOVED] Urinary Catheter 02/02/18 [facility] - Temperature-Urine Output (mL): 10 ml  [REMOVED] External Female Catheter 02/03/18-Urine Output (mL): 0 ml  [REMOVED] Urinary Catheter 02/15/18 [facility]-Urine Output (mL): 150 ml     LDAs               Peripheral IV 02/28/18 Right Wrist (Active)   Site Assessment Clean, dry, & intact 3/7/2018  7:47 AM   Phlebitis Assessment 0 3/7/2018  7:47 AM   Infiltration Assessment 0 3/7/2018  7:47 AM   Dressing Status Clean, dry, & intact 3/7/2018  7:47 AM   Dressing Type Tape;Transparent 3/7/2018  7:47 AM   Hub Color/Line Status Other (comment) 3/6/2018  3:09 PM   Action Taken Open ports on tubing capped 3/3/2018  9:30 PM   Alcohol Cap Used Yes 3/7/2018  7:47 AM                         Readmission Risk Assessment Tool Score Low Risk            12       Total Score        3 Patient Length of Stay (>5 days = 3)    5 Pt. Coverage (Medicare=5 , Medicaid, or Self-Pay=4)    4 Charlson Comorbidity Score (Age + Comorbid Conditions)        Criteria that do not apply:    Has Seen PCP in Last 6 Months (Yes=3, No=0)    . Living with Significant Other. Assisted Living. LTAC. SNF.  or   Rehab    IP Visits Last 12 Months (1-3=4, 4=9, >4=11)       Expected Length of Stay 4d 21h   Actual Length of Stay 61 Deanne Stock RN

## 2018-03-08 PROCEDURE — 74011250637 HC RX REV CODE- 250/637: Performed by: INTERNAL MEDICINE

## 2018-03-08 PROCEDURE — 65270000015 HC RM PRIVATE ONCOLOGY

## 2018-03-08 PROCEDURE — 74011250636 HC RX REV CODE- 250/636: Performed by: INTERNAL MEDICINE

## 2018-03-08 RX ADMIN — LEVOTHYROXINE SODIUM 25 MCG: 25 TABLET ORAL at 05:53

## 2018-03-08 RX ADMIN — PANTOPRAZOLE SODIUM 40 MG: 40 TABLET, DELAYED RELEASE ORAL at 10:08

## 2018-03-08 RX ADMIN — MIDODRINE HYDROCHLORIDE 10 MG: 5 TABLET ORAL at 10:08

## 2018-03-08 RX ADMIN — METOCLOPRAMIDE 5 MG: 10 TABLET ORAL at 23:17

## 2018-03-08 RX ADMIN — METOCLOPRAMIDE 5 MG: 10 TABLET ORAL at 10:08

## 2018-03-08 RX ADMIN — HEPARIN SODIUM 5000 UNITS: 5000 INJECTION, SOLUTION INTRAVENOUS; SUBCUTANEOUS at 10:08

## 2018-03-08 RX ADMIN — MIDODRINE HYDROCHLORIDE 10 MG: 5 TABLET ORAL at 17:19

## 2018-03-08 RX ADMIN — Medication 10 ML: at 05:53

## 2018-03-08 RX ADMIN — METOCLOPRAMIDE 5 MG: 10 TABLET ORAL at 17:19

## 2018-03-08 RX ADMIN — HEPARIN SODIUM 5000 UNITS: 5000 INJECTION, SOLUTION INTRAVENOUS; SUBCUTANEOUS at 00:43

## 2018-03-08 RX ADMIN — Medication 10 ML: at 17:20

## 2018-03-08 RX ADMIN — HEPARIN SODIUM 5000 UNITS: 5000 INJECTION, SOLUTION INTRAVENOUS; SUBCUTANEOUS at 17:19

## 2018-03-08 RX ADMIN — MIDODRINE HYDROCHLORIDE 10 MG: 5 TABLET ORAL at 23:17

## 2018-03-08 RX ADMIN — Medication 10 ML: at 23:18

## 2018-03-08 NOTE — PROGRESS NOTES
Hospitalist Progress Note    NAME: Mara Florian   :  1945   MRN:  [id number]     Admission summary:   72year old female with history of mental retardation, SLE, DM-2, HTN who normally resides in a group home, presented on 18 with decreased responsiveness. She had been diagnosed with acute influenza infection along with urinary tract infection with associated septic shock. Assessment / Plan:  79yo BF with acute metabolic encephalopathy and severe sepsis with shock secondary to UTI (E. Coli) and influenzae B, awaiting placement. Acute metabolic encephalopathy in the setting of septic shock due to UTI and Iinfluenza B   - MRI brain 2/3 - mild small vessel ischemic changes. No acute abnormality. - CT chest/abd/pelvis  - severe scoliosis. Atelectasis in the lower lobes, left greater than right. Anemia. Atherosclerotic abdominal aorta without aneurysm. - urine culture  - pansensitive E Coli   - blood cultures  - no growth   - influenza B positive    -  Completed a course  of Zosyn  And Tamiflu   - she had been treated with a course of oseltamivir to completion here   - seen by neurology, was on Keppra but discontinued on  by neurology   - SLP- recommended NDD2    - awaiting placement, CM's note reviewed    Acute respiratory failure with hypoxia, resolved  Acute on chronic diastolic congestive heart failure, resolved  Acute pulmonary edema  -Echo on  showed EF 60%. Pt was on vasopressor support briefly weaned off on   -stable on RA    Hypoglycemia  -due to poor po intake. Encourage po intake. Acute kidney injury, resolved  -cr elevated but stable for several days. Discussed with nursing to monitor PO intake. May need IVF if poor po intake  -BMP in AM    Acute blood loss anemia  -hgb improved. No evidence of active bleeding    Transaminitis, resolved.   This was due to sepsis  -will continue to monitor    Systemic lupus erythematosus  -will continue to monitor  -continue to hold Plaquenil    Hypothyroidism  -continue levothyroxine    Intellectual disability/mental retardation  -stable  -awaiting placement    L heel pressure ulcer, not POA  -wound care consulted    Obesity  Body mass index is 29.66 kg/(m^2). Code Status: PARTIAL CODE - ACLS meds only, no compressions, no intubation  Surrogate Decision Maker: sister   DVT Prophylaxis: heparin        Weekly labs    Baseline: Mental retardation, lives with sister normally but placed in group home after sister had to undergo recent back surgery  Disposition: awaiting SNF placement, CM following    BMI 29.12     Subjective:     Chief Complaint / Reason for Physician Visit:  Discussed with nursing staff. Awaiting placement    Review of Systems:  Symptom Y/N Comments  Symptom Y/N Comments   Fever/Chills    Chest Pain     Poor Appetite    Edema     Cough    Abdominal Pain     Sputum    Joint Pain     SOB/YOUNG    Pruritis/Rash     Nausea/vomit    Tolerating PT/OT     Diarrhea    Tolerating Diet     Constipation    Other       Could NOT obtain due to: MR, non-verbal     Objective:     VITALS:   Last 24hrs VS reviewed since prior progress note.  Most recent are:  Patient Vitals for the past 24 hrs:   Temp Pulse Resp BP SpO2   03/08/18 0207 97.4 °F (36.3 °C) 71 17 116/54 98 %   03/07/18 1900 97.2 °F (36.2 °C) 71 18 121/49 100 %   03/07/18 1651 98.6 °F (37 °C) 76 16 112/68 91 %     No intake or output data in the 24 hours ending 03/08/18 0832     PHYSICAL EXAM:  Gen: elderly BF, up in bed, NAD  HENT: NC/AT, oral mucosa pink and moist  Eyes: PERRL, anicteric sclera, conjunctiva pink and moist  CVS: RRR,+S1, S2  Lung: CTAB, no wheezing  Abd: BS present, NT, ND  Neuro: AAO x0, non verbal    Reviewed most current lab test results and cultures  YES  Reviewed most current radiology test results   YES  Review and summation of old records today    NO  Reviewed patient's current orders and MAR    YES  PMH/SH reviewed - no change compared to H&P  ________________________________________________________________________  Care Plan discussed with:    Comments   Patient y    Family      RN y    Care Manager     Consultant                        Multidiciplinary team rounds were held today with , nursing, pharmacist and clinical coordinator. Patient's plan of care was discussed; medications were reviewed and discharge planning was addressed. ________________________________________________________________________  Total NON critical care TIME:  15  Minutes    Total CRITICAL CARE TIME Spent:   Minutes non procedure based      Comments   >50% of visit spent in counseling and coordination of care x dispo planning   ________________________________________________________________________  Royer Barbosa MD     Procedures: see electronic medical records for all procedures/Xrays and details which were not copied into this note but were reviewed prior to creation of Plan. LABS:  I reviewed today's most current labs and imaging studies.   Pertinent labs include:  Recent Labs      03/05/18   1015   WBC  4.4   HGB  9.6*   HCT  31.1*   PLT  163     Recent Labs      03/05/18   1015   NA  139   K  4.7   CL  107   CO2  27   GLU  108*   BUN  14   CREA  1.27*   CA  8.9       Signed: Royer Barbosa MD

## 2018-03-08 NOTE — PROGRESS NOTES
Nutrition Assessment:    INTERVENTIONS/RECOMMENDATIONS:   Diet per SLP  Continue Ensure TID    ASSESSMENT:   Chart reviewed. PCT reports pt eating fairly well. She eats more for lunch than breakfast. Diet progressed to NDD2 (Minced). Pt able to herself now. Waiting on placement    Diet Order:  (NDD2)  % Eaten:  Patient Vitals for the past 72 hrs:   % Diet Eaten   03/06/18 1430 60 %     Pertinent Medications: [x]Reviewed: reglan, PPI  Pertinent Labs: [x]Reviewed:   Food Allergies: [x]NKFA  []Other   Last BM:  3/7  Edema:      []RUE   []LUE   [x]RLE 2+  [x]LLE 2+      Pressure Ulcer:      [] Stage I   [] Stage II   [] Stage III   [] Stage IV      Anthropometrics: Height: 5' 1\" (154.9 cm) Weight: 71.2 kg (156 lb 15.5 oz)    IBW (%IBW):   ( ) UBW (%UBW):   (  %)    BMI: Body mass index is 29.66 kg/(m^2). This BMI is indicative of:  []Underweight   []Normal   [x]Overweight   [] Obesity   [] Extreme Obesity (BMI>40)  Last Weight Metrics:  Weight Loss Metrics 3/7/2018 5/31/2017 5/23/2017 5/8/2017 12/13/2016 6/27/2016 5/2/2016   Today's Wt 156 lb 15.5 oz 156 lb 156 lb 156 lb 155 lb 136 lb 136 lb   BMI 29.66 kg/m2 29.48 kg/m2 29.48 kg/m2 30.47 kg/m2 30.27 kg/m2 26.56 kg/m2 26.56 kg/m2       Estimated Nutrition Needs (Based on): 1463 Kcals/day (MSJ 1219 x 1.2) , 61 g (-77 (0.8-1gPro/kg)) Protein  Carbohydrate: At Least 130 g/day  Fluids: 1463 mL/day or per primary team    NUTRITION DIAGNOSES:   Problem:  Swallowing difficulty      Etiology: related to dysphagia     Signs/Symptoms: as evidenced by SLP eval      NUTRITION INTERVENTIONS:  Meals/Snacks: General/healthful diet Enteral/Parenteral Nutrition: Modify rate, concentration, composition, and schedule Supplements: Commercial supplement              GOAL:   consume >50% of meals and ONS in 5-7 days    NUTRITION MONITORING AND EVALUATION      Food/Nutrient Intake Outcomes:  Total energy intake, Liquid meal replacement  Physical Signs/Symptoms Outcomes: GI, Glucose profile, GI profile, Electrolyte and renal profile, Weight/weight change    Previous Goal Met:   [x] Met              [] Progressing Towards Goal              [] Not Progressing Towards Goal   Previous Recommendations:   [] Implemented          [] Not Implemented          [] Not Applicable    LEARNING NEEDS (Diet, Food/Nutrient-Drug Interaction):    [x] None Identified   [] Identified and Education Provided/Documented   [] Identified and Pt declined/was not appropriate     Cultural, Adventist, OR Ethnic Dietary Needs:    [x] None Identified   [] Identified and Addressed     [x] Interdisciplinary Care Plan Reviewed/Documented    [x] Discharge Planning: General healthy diet, consistency per SLP recs   [] Participated in Interdisciplinary Rounds    NUTRITION RISK:    [] High              [] Moderate           [x]  Low  []  Minimal/Uncompromised      Cuong Stevenson RDN  Pager [phone]  Weekend Pager [phone]

## 2018-03-08 NOTE — PROGRESS NOTES
CM has spoken again to [facility] admissions and resent all clinical information, OT and PT notes which note previous level of care so that they can submit all information to [facility] [place]. All information has been sent via [employer], CC Link and direct FAX at [phone]. Guillermo Bermudez RN, BSN, ACM   - Medical Oncology  [phone]      .

## 2018-03-08 NOTE — PROGRESS NOTES
Oncology Nursing Communication Tool  4:45 AM  3/8/2018     Bedside shift change report given to Bartolome Quesada RN (incoming nurse) by Arlette Sanchez RN (outgoing nurse) on Enidla Hind. Report included the following information SBAR, Kardex and MAR. Shift Summary:   Patient is able to feed herself. PLease promote self feed. Sister who has been taking care of patietn past 30 yrs states patient can eat soft-solid foods. States patient does not like the pureed foods and wants to adjust diet. Issues for physician to address:  See above. Oncology Shift Note   Admission Date 2/2/2018   Admission Diagnosis Sepsis ([facility]  UTI (urinary tract infection)  Hx of systemic lupus erythematosus (SLE)  Hx of diabetes mellitus  Hx of essential hypertension  Mental retardation   Code Status Partial Code   Consults IP CONSULT TO NEUROLOGY  IP CONSULT TO CARDIOLOGY      Cardiac Monitoring [] Yes [] No      Purposeful Hourly Rounding [] Yes    Cole Score Total Score: 4   Cole score 3 or > [] Bed Alarm [] Avasys [] 1:1 sitter [] Patient refused (Place signed refusal form in chart)      Pain Managed [] Yes [] No    Key Pain Meds             acetaminophen (TYLENOL ARTHRITIS PAIN) 650 mg TbER  (Taking) Take 650 mg by mouth every eight (8) hours as needed for Pain. Influenza Vaccine Received Flu Vaccine for Current Season (usually Sept-March): Yes           Oxygen needs?  [] Room air Oxygen @  []1L    []2L    []3L   []4L    []5L   []6L     Use home O2? [] Yes [] No  Perform O2 challenge test using  smartphrase (.oxygenchallenge)      Last bowel movement Last Bowel Movement Date: 03/07/18  bowel movement      Urinary Catheter [REMOVED] Urinary Catheter 02/02/18 [facility] - Temperature-Criteria for Appropriate Use: Medically/surgically unstable  [REMOVED] Urinary Catheter 02/15/18 [facility]-Criteria for Appropriate Use: Obstruction/retention     [REMOVED] External Female Catheter 02/15/18-Urine Output (mL): 450 ml  [REMOVED] Urinary Catheter 02/02/18 [facility] - Temperature-Urine Output (mL): 10 ml  [REMOVED] External Female Catheter 02/03/18-Urine Output (mL): 0 ml  [REMOVED] Urinary Catheter 02/15/18 [facility]-Urine Output (mL): 150 ml     LDAs               Peripheral IV 02/28/18 Right Wrist (Active)   Site Assessment Clean, dry, & intact 3/8/2018  3:49 AM   Phlebitis Assessment 0 3/8/2018  3:49 AM   Infiltration Assessment 0 3/8/2018  3:49 AM   Dressing Status Clean, dry, & intact 3/8/2018  3:49 AM   Dressing Type Tape 3/8/2018  3:49 AM   Hub Color/Line Status Other (comment) 3/6/2018  3:09 PM   Action Taken Open ports on tubing capped 3/3/2018  9:30 PM   Alcohol Cap Used Yes 3/7/2018  7:47 AM                         Readmission Risk Assessment Tool Score Low Risk            12       Total Score        3 Patient Length of Stay (>5 days = 3)    5 Pt. Coverage (Medicare=5 , Medicaid, or Self-Pay=4)    4 Charlson Comorbidity Score (Age + Comorbid Conditions)        Criteria that do not apply:    Has Seen PCP in Last 6 Months (Yes=3, No=0)    . Living with Significant Other. Assisted Living. LTAC. SNF.  or   Rehab    IP Visits Last 12 Months (1-3=4, 4=9, >4=11)       Expected Length of Stay 4d 21h   Actual Length of Stay [address], [address] [place] [address]

## 2018-03-08 NOTE — INTERDISCIPLINARY ROUNDS
Oncology Interdisciplinary rounds were held today to discuss patient plan of care and outcomes. The following members were present: Nursing, Case Management, Pharmacy and Dietary. Actual Length of Stay: 34    DRG GLOS: 4.9    Expected Length of Stay: 4d 21h                Plan for Day        Mobility        Plan for Stay      Plan for Way   Get up in chair today   Turning Q2HR   Work with PT Continue current [address] [facility]? ?

## 2018-03-09 PROCEDURE — 74011250636 HC RX REV CODE- 250/636: Performed by: INTERNAL MEDICINE

## 2018-03-09 PROCEDURE — 74011250637 HC RX REV CODE- 250/637: Performed by: INTERNAL MEDICINE

## 2018-03-09 PROCEDURE — 65270000015 HC RM PRIVATE ONCOLOGY

## 2018-03-09 RX ADMIN — METOCLOPRAMIDE 5 MG: 10 TABLET ORAL at 08:12

## 2018-03-09 RX ADMIN — MIDODRINE HYDROCHLORIDE 10 MG: 5 TABLET ORAL at 22:02

## 2018-03-09 RX ADMIN — Medication 10 ML: at 14:00

## 2018-03-09 RX ADMIN — LEVOTHYROXINE SODIUM 25 MCG: 25 TABLET ORAL at 05:12

## 2018-03-09 RX ADMIN — MIDODRINE HYDROCHLORIDE 10 MG: 5 TABLET ORAL at 15:57

## 2018-03-09 RX ADMIN — MIDODRINE HYDROCHLORIDE 10 MG: 5 TABLET ORAL at 08:12

## 2018-03-09 RX ADMIN — METOCLOPRAMIDE 5 MG: 10 TABLET ORAL at 15:58

## 2018-03-09 RX ADMIN — HEPARIN SODIUM 5000 UNITS: 5000 INJECTION, SOLUTION INTRAVENOUS; SUBCUTANEOUS at 02:27

## 2018-03-09 RX ADMIN — HEPARIN SODIUM 5000 UNITS: 5000 INJECTION, SOLUTION INTRAVENOUS; SUBCUTANEOUS at 08:11

## 2018-03-09 RX ADMIN — METOCLOPRAMIDE 5 MG: 10 TABLET ORAL at 10:47

## 2018-03-09 RX ADMIN — PANTOPRAZOLE SODIUM 40 MG: 40 TABLET, DELAYED RELEASE ORAL at 08:12

## 2018-03-09 RX ADMIN — HEPARIN SODIUM 5000 UNITS: 5000 INJECTION, SOLUTION INTRAVENOUS; SUBCUTANEOUS at 16:00

## 2018-03-09 RX ADMIN — Medication 10 ML: at 22:03

## 2018-03-09 RX ADMIN — METOCLOPRAMIDE 5 MG: 10 TABLET ORAL at 22:02

## 2018-03-09 NOTE — PROGRESS NOTES
Care Conference with MD AMPARO, [facility] liaison, Tristen Robertson and [facility]. It is confirmed that [address] will seek authorization for patient for skilled nursing rehab. If unobtainable, patient will either go to group home of sister's choosing or home with home health and family providing 24 hour care assistance. Tristen Robertson will have phone conference with [facility] today.     Keith Sequeira, RN, BSN, ACM   - Medical Oncology  [phone]

## 2018-03-09 NOTE — PROGRESS NOTES
Problem: Falls - Risk of  Goal: *Absence of Falls  Document Cole Fall Risk and appropriate interventions in the flowsheet.    Outcome: Progressing Towards Goal  Fall Risk Interventions:  Mobility Interventions: Communicate number of staff needed for ambulation/transfer, PT Consult for mobility concerns, Strengthening exercises (ROM-active/passive)    Mentation Interventions: Reorient patient, Increase mobility, Bed/chair exit alarm, Adequate sleep, hydration, pain control, Door open when patient unattended    Medication Interventions: Patient to call before getting OOB    Elimination Interventions: Call light in reach, Patient to call for help with toileting needs, Toileting schedule/hourly rounds    History of Falls Interventions: Door open when patient unattended, Room close to nurse's station

## 2018-03-09 NOTE — PROGRESS NOTES
Hospitalist Progress Note    NAME: Carolyn Wallace   :  1945   MRN:  [id number]     Admission summary:   72year old female with history of mental retardation, SLE, DM-2, HTN who normally resides in a group home, presented on 18 with decreased responsiveness. She had been diagnosed with acute influenza infection along with urinary tract infection with associated septic shock. Assessment / Plan:  81yo BF with acute metabolic encephalopathy and severe sepsis with shock secondary to UTI (E. Coli) and influenzae B, awaiting placement. Acute metabolic encephalopathy in the setting of septic shock due to UTI and Iinfluenza B   - MRI brain 2/3 - mild small vessel ischemic changes. No acute abnormality. - CT chest/abd/pelvis  - severe scoliosis. Atelectasis in the lower lobes, left greater than right. Anemia. Atherosclerotic abdominal aorta without aneurysm. - urine culture  - pansensitive E Coli   - blood cultures  - no growth   - influenza B positive    - completed a course  of Zosyn  And Tamiflu   - she had been treated with a course of oseltamivir to completion here   - seen by neurology, was on Keppra but discontinued on  by neurology   - SLP- recommended NDD2, requires 1:1 feeding   - awaiting placement    Acute respiratory failure with hypoxia, resolved  Acute on chronic diastolic congestive heart failure, resolved  Acute pulmonary edema  -Echo on  showed EF 60%. Pt was on vasopressor support briefly weaned off on   -stable on RA    Hypoglycemia  -due to poor po intake. Encourage po intake. Acute kidney injury, resolved  -cr elevated but stable for several days. Discussed with nursing to monitor PO intake. May need IVF if poor po intake  -BMP in AM    Acute blood loss anemia  -hgb improved. No evidence of active bleeding    Transaminitis, resolved.   This was due to sepsis  -will continue to monitor    Systemic lupus erythematosus  -will continue to monitor  -continue to hold Plaquenil    Hypothyroidism  -continue levothyroxine    Intellectual disability/mental retardation  -stable  -awaiting placement    L heel pressure ulcer, not POA  -wound care consulted    Obesity  Body mass index is 29.08 kg/(m^2). Code Status: PARTIAL CODE - ACLS meds only, no compressions, no intubation  Surrogate Decision Maker: sister   DVT Prophylaxis: heparin        Weekly labs    Baseline: Mental retardation, lives with sister normally but placed in group home after sister had to undergo recent back surgery  Disposition: awaiting SNF placement, CM following    BMI 29.12     Subjective:     Chief Complaint / Reason for Physician Visit:  Discussed with nursing staff. Awaiting placement    Review of Systems:  Symptom Y/N Comments  Symptom Y/N Comments   Fever/Chills    Chest Pain     Poor Appetite    Edema     Cough    Abdominal Pain     Sputum    Joint Pain     SOB/YOUNG    Pruritis/Rash     Nausea/vomit    Tolerating PT/OT     Diarrhea    Tolerating Diet     Constipation    Other       Could NOT obtain due to: MR, non-verbal     Objective:     VITALS:   Last 24hrs VS reviewed since prior progress note.  Most recent are:  Patient Vitals for the past 24 hrs:   Temp Pulse Resp BP SpO2   03/09/18 0603 99.8 °F (37.7 °C) 88 18 105/62 97 %   03/09/18 0316 98.3 °F (36.8 °C) - - - -   03/08/18 2258 - 90 22 132/84 100 %   03/08/18 1916 98.9 °F (37.2 °C) (!) 55 18 98/59 93 %   03/08/18 1615 98.6 °F (37 °C) (!) 53 18 102/54 -       Intake/Output Summary (Last 24 hours) at 03/09/18 0913  Last data filed at 03/08/18 2043   Gross per 24 hour   Intake              600 ml   Output                0 ml   Net              600 ml        PHYSICAL EXAM:  Gen: elderly BF, up in bed, NAD  HENT: NC/AT, oral mucosa pink and moist  Eyes: PERRL, anicteric sclera, conjunctiva pink and moist  CVS: RRR,+S1, S2  Lung: CTAB, no wheezing  Abd: BS present, NT, ND  Neuro: AAO x0, non verbal    Reviewed most current lab test results and cultures  YES  Reviewed most current radiology test results   YES  Review and summation of old records today    NO  Reviewed patient's current orders and MAR    YES  PMH/SH reviewed - no change compared to H&P  ________________________________________________________________________  Care Plan discussed with:    Comments   Patient y    Family      RN y    Care Manager     Consultant                        Multidiciplinary team rounds were held today with , nursing, pharmacist and clinical coordinator. Patient's plan of care was discussed; medications were reviewed and discharge planning was addressed. ________________________________________________________________________  Total NON critical care TIME:  15  Minutes    Total CRITICAL CARE TIME Spent:   Minutes non procedure based      Comments   >50% of visit spent in counseling and coordination of care x dispo planning   ________________________________________________________________________  Huma Rodriguez MD     Procedures: see electronic medical records for all procedures/Xrays and details which were not copied into this note but were reviewed prior to creation of Plan. LABS:  I reviewed today's most current labs and imaging studies. Pertinent labs include:  No results for input(s): WBC, HGB, HCT, PLT, HGBEXT, HCTEXT, PLTEXT, HGBEXT, HCTEXT, PLTEXT in the last 72 hours. No results for input(s): NA, K, CL, CO2, GLU, BUN, CREA, CA, MG, PHOS, ALB, TBIL, TBILI, SGOT, ALT, INR in the last 72 hours.     No lab exists for component: [place], INREXT    Signed: Huma Rodriguez MD

## 2018-03-09 NOTE — PROGRESS NOTES
Oncology Interdisciplinary rounds were held today to discuss patient plan of care and outcomes. The following members were present: Nursing, Case Management, Pharmacy and Dietary.     Actual Length of Stay: 35    DRG GLOS: 4.9    Expected Length of Stay: 4d 21h                Plan for Day        Mobility        Plan for Stay      Plan for Way   Continue current treatment Bed bound Continue current treatment Discharge Monday 3/12

## 2018-03-09 NOTE — PROGRESS NOTES
Patient was not accepted by [facility], therefore clinical inforamtion was not sent to [facility] snapp[employer]me [facility] to obtain authorization. CM spoke to [address] and they will attempt to obtain [facility] [place] today. Patient's sister is looking into private group homes to see if she can locate a bed that she can afford. Patient's sister, Shena Roper, was provided multiple phone numbers of local group homes. She will make calls today. Plan is that if bed not found by Monday, 3/12/2018, then patient's sister will take her home with private pay personal care and home health. Patient's sister is checking with [employer] to see if patient is eligible for any personal care hours under her insurance policy. CM has left phone message for [address] Galdino Calero - [phone] - to see if patient is eligble for any home services through [employer]. Care Management Interventions  PCP Verified by CM: Yes  Palliative Care Criteria Met (RRAT>21 & CHF Dx)?: Yes  Palliative Consult Recommended?: Yes  Mode of Transport at Discharge: Other (see comment) (TBD)  Transition of Care Consult (CM Consult): Discharge Planning (Pt was evaluated for possible dc needs.)  Discharge Durable Medical Equipment: No  Physical Therapy Consult: No  Occupational Therapy Consult: No  Speech Therapy Consult: No  Current Support Network:  Adult Group Home  Discharge Location  Discharge Placement: Group home    Nathanael Han RN, BSN, [facility]   - Medical Oncology  [phone]

## 2018-03-09 NOTE — PROGRESS NOTES
Oncology Nursing Communication Tool  7:48 PM  3/8/2018     Bedside shift change report given to Caterina RN (incoming nurse) by Gene Briggs RN (outgoing nurse) on [employer]. Report included the following information SBAR. Shift Summary:       Issues for physician to address: Oncology Shift Note   Admission Date 2/2/2018   Admission Diagnosis Sepsis ([facility] [address]  UTI (urinary tract infection)  Hx of systemic lupus erythematosus (SLE)  Hx of diabetes mellitus  Hx of essential hypertension  Mental retardation   Code Status Partial Code   Consults IP CONSULT TO NEUROLOGY  IP CONSULT TO CARDIOLOGY      Cardiac Monitoring [] Yes [x] No      Purposeful Hourly Rounding [x] Yes    Cole Score Total Score: 4   Cole score 3 or > [] Bed Alarm [] Avasys [] 1:1 sitter [] Patient refused (Place signed refusal form in chart)      Pain Managed [x] Yes [] No    Key Pain Meds             acetaminophen (TYLENOL ARTHRITIS PAIN) 650 mg TbER  (Taking) Take 650 mg by mouth every eight (8) hours as needed for Pain. Influenza Vaccine Received Flu Vaccine for Current Season (usually Sept-March): Yes           Oxygen needs?  [] Room air Oxygen @  []1L    []2L    []3L   []4L    []5L   []6L     Use home O2? [] Yes [] No  Perform O2 challenge test using  smartphrase (.oxygenchallenge)      Last bowel movement Last Bowel Movement Date: 03/07/18  bowel movement      Urinary Catheter [REMOVED] Urinary Catheter 02/02/18 [place] - Temperature-Criteria for Appropriate Use: Medically/surgically unstable  [REMOVED] Urinary Catheter 02/15/18 [place]-Criteria for Appropriate Use: Obstruction/retention     [REMOVED] External Female Catheter 02/15/18-Urine Output (mL): 450 ml  [REMOVED] Urinary Catheter 02/02/18 [facility] - Temperature-Urine Output (mL): 10 ml  [REMOVED] External Female Catheter 02/03/18-Urine Output (mL): 0 ml  [REMOVED] Urinary Catheter 02/15/18 [facility]-Urine Output (mL): 150 ml     LDAs Peripheral IV 02/28/18 Right Wrist (Active)   Site Assessment Clean, dry, & intact 3/8/2018  9:00 AM   Phlebitis Assessment 0 3/8/2018  9:00 AM   Infiltration Assessment 0 3/8/2018  9:00 AM   Dressing Status Clean, dry, & intact 3/8/2018  9:00 AM   Dressing Type Tape;Transparent 3/8/2018  9:00 AM   Hub Color/Line Status Other (comment) 3/6/2018  3:09 PM   Action Taken Open ports on tubing capped 3/3/2018  9:30 PM   Alcohol Cap Used Yes 3/8/2018  9:00 AM                         Readmission Risk Assessment Tool Score Low Risk            12       Total Score        3 Patient Length of Stay (>5 days = 3)    5 Pt. Coverage (Medicare=5 , Medicaid, or Self-Pay=4)    4 Charlson Comorbidity Score (Age + Comorbid Conditions)        Criteria that do not apply:    Has Seen PCP in Last 6 Months (Yes=3, No=0)    . Living with Significant Other. Assisted Living. LTAC. SNF.  or   Rehab    IP Visits Last 12 Months (1-3=4, 4=9, >4=11)       Expected Length of Stay 4d 21h   Actual Length of Stay 3313 Albin Carranza RN

## 2018-03-10 PROCEDURE — 65270000015 HC RM PRIVATE ONCOLOGY

## 2018-03-10 PROCEDURE — 74011250637 HC RX REV CODE- 250/637: Performed by: INTERNAL MEDICINE

## 2018-03-10 PROCEDURE — 74011250636 HC RX REV CODE- 250/636: Performed by: INTERNAL MEDICINE

## 2018-03-10 RX ADMIN — LEVOTHYROXINE SODIUM 25 MCG: 25 TABLET ORAL at 05:14

## 2018-03-10 RX ADMIN — HEPARIN SODIUM 5000 UNITS: 5000 INJECTION, SOLUTION INTRAVENOUS; SUBCUTANEOUS at 09:42

## 2018-03-10 RX ADMIN — HEPARIN SODIUM 5000 UNITS: 5000 INJECTION, SOLUTION INTRAVENOUS; SUBCUTANEOUS at 00:14

## 2018-03-10 RX ADMIN — MORPHINE SULFATE 2 MG: 4 INJECTION INTRAVENOUS at 06:34

## 2018-03-10 RX ADMIN — MIDODRINE HYDROCHLORIDE 10 MG: 5 TABLET ORAL at 09:42

## 2018-03-10 RX ADMIN — METOCLOPRAMIDE 5 MG: 10 TABLET ORAL at 17:03

## 2018-03-10 RX ADMIN — Medication 10 ML: at 05:15

## 2018-03-10 RX ADMIN — MIDODRINE HYDROCHLORIDE 10 MG: 5 TABLET ORAL at 21:11

## 2018-03-10 RX ADMIN — HEPARIN SODIUM 5000 UNITS: 5000 INJECTION, SOLUTION INTRAVENOUS; SUBCUTANEOUS at 17:03

## 2018-03-10 RX ADMIN — METOCLOPRAMIDE 5 MG: 10 TABLET ORAL at 21:11

## 2018-03-10 RX ADMIN — Medication 10 ML: at 21:12

## 2018-03-10 RX ADMIN — Medication 10 ML: at 15:12

## 2018-03-10 RX ADMIN — METOCLOPRAMIDE 5 MG: 10 TABLET ORAL at 09:42

## 2018-03-10 RX ADMIN — MIDODRINE HYDROCHLORIDE 10 MG: 5 TABLET ORAL at 17:03

## 2018-03-10 RX ADMIN — PANTOPRAZOLE SODIUM 40 MG: 40 TABLET, DELAYED RELEASE ORAL at 09:42

## 2018-03-10 RX ADMIN — METOCLOPRAMIDE 5 MG: 10 TABLET ORAL at 11:30

## 2018-03-11 PROCEDURE — 74011250637 HC RX REV CODE- 250/637: Performed by: INTERNAL MEDICINE

## 2018-03-11 PROCEDURE — 65270000015 HC RM PRIVATE ONCOLOGY

## 2018-03-11 PROCEDURE — 74011250636 HC RX REV CODE- 250/636: Performed by: INTERNAL MEDICINE

## 2018-03-11 RX ADMIN — PANTOPRAZOLE SODIUM 40 MG: 40 TABLET, DELAYED RELEASE ORAL at 08:48

## 2018-03-11 RX ADMIN — METOCLOPRAMIDE 5 MG: 10 TABLET ORAL at 12:56

## 2018-03-11 RX ADMIN — Medication 10 ML: at 22:15

## 2018-03-11 RX ADMIN — HEPARIN SODIUM 5000 UNITS: 5000 INJECTION, SOLUTION INTRAVENOUS; SUBCUTANEOUS at 16:30

## 2018-03-11 RX ADMIN — MIDODRINE HYDROCHLORIDE 10 MG: 5 TABLET ORAL at 16:31

## 2018-03-11 RX ADMIN — LEVOTHYROXINE SODIUM 25 MCG: 25 TABLET ORAL at 06:35

## 2018-03-11 RX ADMIN — Medication 10 ML: at 06:35

## 2018-03-11 RX ADMIN — MIDODRINE HYDROCHLORIDE 10 MG: 5 TABLET ORAL at 08:48

## 2018-03-11 RX ADMIN — METOCLOPRAMIDE 5 MG: 10 TABLET ORAL at 08:49

## 2018-03-11 RX ADMIN — Medication 10 ML: at 14:59

## 2018-03-11 RX ADMIN — MORPHINE SULFATE 2 MG: 4 INJECTION INTRAVENOUS at 04:11

## 2018-03-11 RX ADMIN — MIDODRINE HYDROCHLORIDE 10 MG: 5 TABLET ORAL at 22:15

## 2018-03-11 RX ADMIN — METOCLOPRAMIDE 5 MG: 10 TABLET ORAL at 16:30

## 2018-03-11 RX ADMIN — HEPARIN SODIUM 5000 UNITS: 5000 INJECTION, SOLUTION INTRAVENOUS; SUBCUTANEOUS at 08:49

## 2018-03-11 RX ADMIN — METOCLOPRAMIDE 5 MG: 10 TABLET ORAL at 22:15

## 2018-03-11 RX ADMIN — HEPARIN SODIUM 5000 UNITS: 5000 INJECTION, SOLUTION INTRAVENOUS; SUBCUTANEOUS at 04:02

## 2018-03-11 NOTE — PROGRESS NOTES
Oncology Nursing Communication Tool  7:06 PM  3/11/2018     Bedside shift change report given to Lisa Mccormick RN (incoming nurse) by Asher Bray RN (outgoing nurse) on [facility]. Report included the following information SBAR. Shift Summary:       Issues for physician to address: Oncology Shift Note   Admission Date 2/2/2018   Admission Diagnosis Sepsis ([address]  UTI (urinary tract infection)  Hx of systemic lupus erythematosus (SLE)  Hx of diabetes mellitus  Hx of essential hypertension  Mental retardation   Code Status Partial Code   Consults IP CONSULT TO NEUROLOGY  IP CONSULT TO CARDIOLOGY      Cardiac Monitoring [] Yes [x] No      Purposeful Hourly Rounding [x] Yes    Cole Score Total Score: 3   Cole score 3 or > [] Bed Alarm [] Avasys [] 1:1 sitter [] Patient refused (Place signed refusal form in chart)      Pain Managed [x] Yes [] No    Key Pain Meds             acetaminophen (TYLENOL ARTHRITIS PAIN) 650 mg TbER  (Taking) Take 650 mg by mouth every eight (8) hours as needed for Pain. Influenza Vaccine Received Flu Vaccine for Current Season (usually Sept-March): Yes           Oxygen needs?  [] Room air Oxygen @  []1L    []2L    []3L   []4L    []5L   []6L     Use home O2? [] Yes [] No  Perform O2 challenge test using  smartphrase (.oxygenchallenge)      Last bowel movement Last Bowel Movement Date: 03/10/18  bowel movement      Urinary Catheter [REMOVED] Urinary Catheter 02/02/18 [place] - Temperature-Criteria for Appropriate Use: Medically/surgically unstable  [REMOVED] Urinary Catheter 02/15/18 [place]-Criteria for Appropriate Use: Obstruction/retention     [REMOVED] External Female Catheter 02/15/18-Urine Output (mL): 450 ml  External Female Catheter 03/10/18-Urine Output (mL): 800 ml  [REMOVED] Urinary Catheter 02/02/18 [place] - Temperature-Urine Output (mL): 10 ml  [REMOVED] External Female Catheter 02/03/18-Urine Output (mL): 0 ml  [REMOVED] Urinary Catheter 02/15/18 [place]-Urine Output (mL): 150 ml     LDAs               Peripheral IV 02/28/18 Right Wrist (Active)   Site Assessment Clean, dry, & intact 3/11/2018  7:48 AM   Phlebitis Assessment 0 3/11/2018  7:48 AM   Infiltration Assessment 0 3/11/2018  7:48 AM   Dressing Status Clean, dry, & intact 3/11/2018  7:48 AM   Dressing Type Tape;Transparent 3/11/2018  7:48 AM   Hub Color/Line Status Pink 3/11/2018  7:48 AM   Action Taken Open ports on tubing capped 3/8/2018  8:43 PM   Alcohol Cap Used Yes 3/11/2018  7:48 AM          External Female Catheter 03/10/18 (Active)   Site Assessment Clean, dry, & intact 3/11/2018  3:30 AM   Repositioned Yes 3/11/2018  3:30 AM   Perineal Care Yes 3/11/2018  3:30 AM   Wick Changed Yes 3/11/2018  3:30 AM   Suction Canister/Tubing Changed Yes 3/11/2018  3:30 AM   Urine Output (mL) 800 ml 3/10/2018  5:15 AM                   Readmission Risk Assessment Tool Score Low Risk            12       Total Score        3 Patient Length of Stay (>5 days = 3)    5 Pt. Coverage (Medicare=5 , Medicaid, or Self-Pay=4)    4 Charlson Comorbidity Score (Age + Comorbid Conditions)        Criteria that do not apply:    Has Seen PCP in Last 6 Months (Yes=3, No=0)    . Living with Significant Other. Assisted Living. LTAC. SNF.  or   Rehab    IP Visits Last 12 Months (1-3=4, 4=9, >4=11)       Expected Length of Stay 4d 21h   Actual Length of Stay [address] Brunilda, RN

## 2018-03-11 NOTE — PROGRESS NOTES
Oncology Nursing Communication Tool  7:22 PM  3/10/2018     Bedside shift change report given to RODRIGUEZ lux (incoming nurse) by Gene Briggs RN (outgoing nurse) on [facility]. Report included the following information SBAR. Shift Summary:       Issues for physician to address: Oncology Shift Note   Admission Date 2/2/2018   Admission Diagnosis Sepsis ([address]  UTI (urinary tract infection)  Hx of systemic lupus erythematosus (SLE)  Hx of diabetes mellitus  Hx of essential hypertension  Mental retardation   Code Status Partial Code   Consults IP CONSULT TO NEUROLOGY  IP CONSULT TO CARDIOLOGY      Cardiac Monitoring [] Yes [x] No      Purposeful Hourly Rounding [x] Yes    Cole Score Total Score: 3   Cole score 3 or > [] Bed Alarm [] Avasys [] 1:1 sitter [] Patient refused (Place signed refusal form in chart)      Pain Managed [x] Yes [] No    Key Pain Meds             acetaminophen (TYLENOL ARTHRITIS PAIN) 650 mg TbER  (Taking) Take 650 mg by mouth every eight (8) hours as needed for Pain. Influenza Vaccine Received Flu Vaccine for Current Season (usually Sept-March): Yes           Oxygen needs?  [x] Room air Oxygen @  []1L    []2L    []3L   []4L    []5L   []6L     Use home O2? [] Yes [] No  Perform O2 challenge test using  smartphrase (.oxygenchallenge)      Last bowel movement Last Bowel Movement Date: 03/10/18  bowel movement      Urinary Catheter [REMOVED] Urinary Catheter 02/02/18 [facility] - Temperature-Criteria for Appropriate Use: Medically/surgically unstable  [REMOVED] Urinary Catheter 02/15/18 [facility]-Criteria for Appropriate Use: Obstruction/retention     [REMOVED] External Female Catheter 02/15/18-Urine Output (mL): 450 ml  External Female Catheter 03/10/18-Urine Output (mL): 800 ml  [REMOVED] Urinary Catheter 02/02/18 [facility] - Temperature-Urine Output (mL): 10 ml  [REMOVED] External Female Catheter 02/03/18-Urine Output (mL): 0 ml  [REMOVED] Urinary Catheter 02/15/18 [facility]-Urine Output (mL): 150 ml     LDAs               Peripheral IV 02/28/18 Right Wrist (Active)   Site Assessment Clean, dry, & intact 3/10/2018 10:52 AM   Phlebitis Assessment 0 3/10/2018 10:52 AM   Infiltration Assessment 0 3/10/2018 10:52 AM   Dressing Status Clean, dry, & intact 3/10/2018 10:52 AM   Dressing Type Tape;Transparent 3/10/2018 10:52 AM   Hub Color/Line Status Pink 3/10/2018 10:52 AM   Action Taken Open ports on tubing capped 3/8/2018  8:43 PM   Alcohol Cap Used Yes 3/10/2018 10:52 AM          External Female Catheter 03/10/18 (Active)   Site Assessment Clean, dry, & intact 3/10/2018  3:18 AM   Repositioned Yes 3/10/2018  3:18 AM   Perineal Care Yes 3/10/2018  3:18 AM   Wick Changed Yes 3/10/2018  3:18 AM   Suction Canister/Tubing Changed Yes 3/10/2018  3:18 AM   Urine Output (mL) 800 ml 3/10/2018  5:15 AM                   Readmission Risk Assessment Tool Score Low Risk            12       Total Score        3 Patient Length of Stay (>5 days = 3)    5 Pt. Coverage (Medicare=5 , Medicaid, or Self-Pay=4)    4 Charlson Comorbidity Score (Age + Comorbid Conditions)        Criteria that do not apply:    Has Seen PCP in Last 6 Months (Yes=3, No=0)    . Living with Significant Other. Assisted Living. LTAC. SNF.  or   Rehab    IP Visits Last 12 Months (1-3=4, 4=9, >4=11)       Expected Length of Stay 4d 21h   Actual Length of Stay [address], RN

## 2018-03-11 NOTE — PROGRESS NOTES
Hospitalist Progress Note    NAME: Ruthie Alatorre   :  1945   MRN:  [id number]     Admission summary:   72year old female with history of mental retardation, SLE, DM-2, HTN who normally resides in a group home, presented on 18 with decreased responsiveness. She had been diagnosed with acute influenza infection along with urinary tract infection with associated septic shock. Assessment / Plan:  81yo BF with acute metabolic encephalopathy and severe sepsis with shock secondary to UTI (E. Coli) and influenzae B, awaiting placement. Acute metabolic encephalopathy in the setting of septic shock due to UTI and Iinfluenza B   - MRI brain 2/3 - mild small vessel ischemic changes. No acute abnormality. - CT chest/abd/pelvis  - severe scoliosis. Atelectasis in the lower lobes, left greater than right. Anemia. Atherosclerotic abdominal aorta without aneurysm. - urine culture  - pansensitive E Coli   - blood cultures  - no growth   - influenza B positive    - completed a course  of Zosyn  And Tamiflu   - seen by neurology, was on Keppra but discontinued on  by neurology   - SLP- recommended NDD2, requires 1:1 feeding   - weekly labs if pt is still here   - awaiting placement    Acute respiratory failure with hypoxia, resolved  Acute on chronic diastolic congestive heart failure, resolved  Acute pulmonary edema  -Echo on  showed EF 60%. Pt was on vasopressor support briefly weaned off on   -stable on RA    Hypoglycemia  -due to poor po intake. Encourage po intake. Acute kidney injury, resolved  -cr elevated but stable for several days. Discussed with nursing to monitor PO intake. May need IVF if poor po intake  -BMP in AM    Acute blood loss anemia  -hgb improved. No evidence of active bleeding    Transaminitis, resolved.   This was due to sepsis  -will continue to monitor    Systemic lupus erythematosus  -will continue to monitor  -continue to hold Plaquenil    Hypothyroidism  -continue levothyroxine    Intellectual disability/mental retardation  -stable  -awaiting placement    L heel pressure ulcer, not POA  -wound care consulted    Obesity  Body mass index is 31.63 kg/(m^2). Code Status: PARTIAL CODE - ACLS meds only, no compressions, no intubation  Surrogate Decision Maker: sister   DVT Prophylaxis: heparin        Weekly labs    Baseline: Mental retardation, lives with sister normally but placed in group home after sister had to undergo recent back surgery  Disposition: awaiting SNF placement, CM following    BMI 29.12     Subjective:     Chief Complaint / Reason for Physician Visit:  Discussed with nursing staff. Awaiting placement    Review of Systems:  Symptom Y/N Comments  Symptom Y/N Comments   Fever/Chills    Chest Pain     Poor Appetite    Edema     Cough    Abdominal Pain     Sputum    Joint Pain     SOB/YOUNG    Pruritis/Rash     Nausea/vomit    Tolerating PT/OT     Diarrhea    Tolerating Diet     Constipation    Other       Could NOT obtain due to: MR, non-verbal     Objective:     VITALS:   Last 24hrs VS reviewed since prior progress note.  Most recent are:  Patient Vitals for the past 24 hrs:   Temp Pulse Resp BP SpO2   03/11/18 0714 97.4 °F (36.3 °C) (!) 50 18 117/56 100 %   03/10/18 2353 97.8 °F (36.6 °C) (!) 48 18 119/62 100 %   03/10/18 1950 97.9 °F (36.6 °C) 60 18 112/56 97 %   03/10/18 1456 97.3 °F (36.3 °C) (!) 104 - (!) 104/31 97 %       Intake/Output Summary (Last 24 hours) at 03/11/18 2090  Last data filed at 03/10/18 1014   Gross per 24 hour   Intake              275 ml   Output              100 ml   Net              175 ml        PHYSICAL EXAM:  Gen: elderly BF, up in bed, NAD  HENT: NC/AT, oral mucosa pink and moist  Eyes: PERRL, anicteric sclera, conjunctiva pink and moist  CVS: RRR,+S1, S2  Lung: CTAB, no wheezing  Abd: BS present, NT, ND  Neuro: AAO x0, non verbal    Reviewed most current lab test results and cultures YES  Reviewed most current radiology test results   YES  Review and summation of old records today    NO  Reviewed patient's current orders and MAR    YES  PMH/SH reviewed - no change compared to H&P  ________________________________________________________________________  Care Plan discussed with:    Comments   Patient y    Family      RN y    Care Manager     Consultant                        Multidiciplinary team rounds were held today with , nursing, pharmacist and clinical coordinator. Patient's plan of care was discussed; medications were reviewed and discharge planning was addressed. ________________________________________________________________________  Total NON critical care TIME:  10  Minutes    Total CRITICAL CARE TIME Spent:   Minutes non procedure based      Comments   >50% of visit spent in counseling and coordination of care x dispo planning   ________________________________________________________________________  Amaris Rose MD     Procedures: see electronic medical records for all procedures/Xrays and details which were not copied into this note but were reviewed prior to creation of Plan. LABS:  I reviewed today's most current labs and imaging studies. Pertinent labs include:  No results for input(s): WBC, HGB, HCT, PLT, HGBEXT, HCTEXT, PLTEXT, HGBEXT, HCTEXT, PLTEXT in the last 72 hours. No results for input(s): NA, K, CL, CO2, GLU, BUN, CREA, CA, MG, PHOS, ALB, TBIL, TBILI, SGOT, ALT, INR in the last 72 hours.     No lab exists for component: [place], INREXT    Signed: Amaris Rose MD

## 2018-03-12 VITALS
DIASTOLIC BLOOD PRESSURE: 48 MMHG | BODY MASS INDEX: 31.6 KG/M2 | OXYGEN SATURATION: 97 % | WEIGHT: 167.4 LBS | TEMPERATURE: 97.7 F | RESPIRATION RATE: 16 BRPM | SYSTOLIC BLOOD PRESSURE: 102 MMHG | HEART RATE: 60 BPM | HEIGHT: 61 IN

## 2018-03-12 LAB
ANION GAP SERPL CALC-SCNC: 6 MMOL/L (ref 5–15)
BASOPHILS # BLD: 0 K/UL (ref 0–0.1)
BASOPHILS NFR BLD: 0 % (ref 0–1)
BUN SERPL-MCNC: 20 MG/DL (ref 6–20)
BUN/CREAT SERPL: 16 (ref 12–20)
CALCIUM SERPL-MCNC: 9.5 MG/DL (ref 8.5–10.1)
CHLORIDE SERPL-SCNC: 103 MMOL/L (ref 97–108)
CO2 SERPL-SCNC: 29 MMOL/L (ref 21–32)
CREAT SERPL-MCNC: 1.23 MG/DL (ref 0.55–1.02)
DIFFERENTIAL METHOD BLD: ABNORMAL
EOSINOPHIL # BLD: 0.4 K/UL (ref 0–0.4)
EOSINOPHIL NFR BLD: 7 % (ref 0–7)
ERYTHROCYTE [DISTWIDTH] IN BLOOD BY AUTOMATED COUNT: 16.3 % (ref 11.5–14.5)
GLUCOSE SERPL-MCNC: 75 MG/DL (ref 65–100)
HCT VFR BLD AUTO: 32 % (ref 35–47)
HGB BLD-MCNC: 10 G/DL (ref 11.5–16)
IMM GRANULOCYTES # BLD: 0 K/UL (ref 0–0.04)
IMM GRANULOCYTES NFR BLD AUTO: 0 % (ref 0–0.5)
LYMPHOCYTES # BLD: 2.4 K/UL (ref 0.8–3.5)
LYMPHOCYTES NFR BLD: 47 % (ref 12–49)
MCH RBC QN AUTO: 29.3 PG (ref 26–34)
MCHC RBC AUTO-ENTMCNC: 31.3 G/DL (ref 30–36.5)
MCV RBC AUTO: 93.8 FL (ref 80–99)
MONOCYTES # BLD: 0.6 K/UL (ref 0–1)
MONOCYTES NFR BLD: 11 % (ref 5–13)
NEUTS SEG # BLD: 1.7 K/UL (ref 1.8–8)
NEUTS SEG NFR BLD: 34 % (ref 32–75)
NRBC # BLD: 0 K/UL (ref 0–0.01)
NRBC BLD-RTO: 0 PER 100 WBC
PLATELET # BLD AUTO: 141 K/UL (ref 150–400)
PMV BLD AUTO: 11.2 FL (ref 8.9–12.9)
POTASSIUM SERPL-SCNC: 5.1 MMOL/L (ref 3.5–5.1)
RBC # BLD AUTO: 3.41 M/UL (ref 3.8–5.2)
SODIUM SERPL-SCNC: 138 MMOL/L (ref 136–145)
WBC # BLD AUTO: 5.1 K/UL (ref 3.6–11)

## 2018-03-12 PROCEDURE — 85025 COMPLETE CBC W/AUTO DIFF WBC: CPT | Performed by: INTERNAL MEDICINE

## 2018-03-12 PROCEDURE — 74011250637 HC RX REV CODE- 250/637: Performed by: INTERNAL MEDICINE

## 2018-03-12 PROCEDURE — 74011250636 HC RX REV CODE- 250/636: Performed by: INTERNAL MEDICINE

## 2018-03-12 PROCEDURE — 36415 COLL VENOUS BLD VENIPUNCTURE: CPT | Performed by: INTERNAL MEDICINE

## 2018-03-12 PROCEDURE — 80048 BASIC METABOLIC PNL TOTAL CA: CPT | Performed by: INTERNAL MEDICINE

## 2018-03-12 RX ORDER — PANTOPRAZOLE SODIUM 40 MG/1
40 TABLET, DELAYED RELEASE ORAL
Qty: 30 TAB | Refills: 0 | Status: SHIPPED | OUTPATIENT
Start: 2018-03-12

## 2018-03-12 RX ORDER — METOCLOPRAMIDE 5 MG/1
5 TABLET ORAL
Qty: 40 TAB | Refills: 0 | Status: SHIPPED | OUTPATIENT
Start: 2018-03-12 | End: 2018-03-22

## 2018-03-12 RX ORDER — MIDODRINE HYDROCHLORIDE 10 MG/1
10 TABLET ORAL 3 TIMES DAILY
Qty: 90 TAB | Refills: 0 | Status: SHIPPED | OUTPATIENT
Start: 2018-03-12 | End: 2018-04-11

## 2018-03-12 RX ORDER — MORPHINE SULFATE 4 MG/ML
1 INJECTION INTRAVENOUS
Status: DISCONTINUED | OUTPATIENT
Start: 2018-03-12 | End: 2018-03-12 | Stop reason: HOSPADM

## 2018-03-12 RX ADMIN — LEVOTHYROXINE SODIUM 25 MCG: 25 TABLET ORAL at 05:35

## 2018-03-12 RX ADMIN — Medication 10 ML: at 05:36

## 2018-03-12 RX ADMIN — MIDODRINE HYDROCHLORIDE 10 MG: 5 TABLET ORAL at 11:18

## 2018-03-12 RX ADMIN — HEPARIN SODIUM 5000 UNITS: 5000 INJECTION, SOLUTION INTRAVENOUS; SUBCUTANEOUS at 11:18

## 2018-03-12 RX ADMIN — METOCLOPRAMIDE 5 MG: 10 TABLET ORAL at 07:30

## 2018-03-12 RX ADMIN — PANTOPRAZOLE SODIUM 40 MG: 40 TABLET, DELAYED RELEASE ORAL at 11:18

## 2018-03-12 RX ADMIN — METOCLOPRAMIDE 5 MG: 10 TABLET ORAL at 11:18

## 2018-03-12 RX ADMIN — HEPARIN SODIUM 5000 UNITS: 5000 INJECTION, SOLUTION INTRAVENOUS; SUBCUTANEOUS at 01:18

## 2018-03-12 RX ADMIN — Medication 10 ML: at 15:32

## 2018-03-12 NOTE — PROGRESS NOTES
Patient cleared for discharge. Report given to sister as caregiver. AVS and prescriptions given to medical transport. IV DC'd without complications. Personal belongings bagged and sent with patient. Patient sent home with medical transport via stretcher.

## 2018-03-12 NOTE — DISCHARGE SUMMARY
Discharge Summary      Name: Abhijeet Bell  [id number]  YOB: 1945 (Age: 72 y.o.)   Date of Admission: 2/2/2018  Date of Discharge: 3/12/2018  Attending Physician: Korin Velez MD    Discharge Diagnosis:   Acute respiratory failure with hypoxia, resolved  Acute on chronic diastolic congestive heart failure, resolved  Acute pulmonary edema  Metabolic encephalopathy  Severe sepsis with shock  UTI  Influenza B  Hypoglycemia   Acute kidey injury  Acute blood loss anemia   Transaminitis   Systemic lupus erythematosus   Hypothyroidism   Intellectual disability/mental retardation   L heel pressure ulcer, not POA   Obesity  Acute on chronic diastolic congestive heart failure  Acute pulmonary edema     Consultations:  IP CONSULT TO NEUROLOGY  IP CONSULT TO CARDIOLOGY    Brief Admission History/Reason for Admission Per Leda Goodman MD:   Abhijeet Bell is a 72 y.o. Hx MR, LE,HTN,DM, who presnts from a group home ( daughter who normally takes care of her had a recent sx so she put the patient ion a group home until she recovers from sx) apparently while in the group home satff from it reported that she was not eating much and it was more lethargic. She was then transferred here for further evaluation  We were asked to admit for work up and evaluation of the above problems. Brief Hospital Course by Main Problems:   Metabolic encephalopathy and severe sepsis with shock secondary to UTI (E. Coli) and influenzae B   MRI brain 2/3 - mild small vessel ischemic changes. No acute abnormality. CT chest/abd/pelvis 2/2 - severe scoliosis. Atelectasis in the lower lobes, left greater than right. Anemia. Atherosclerotic abdominal aorta without aneurysm. Urine culture 2/2 - pansensitive E Coli. Blood cultures 2/2 - no growth. Influenza B positive 2/2. Pt completed a course  of Zosyn  and Tamiflu. Pt was evaluated by neurology in consultation.   EEG neg for seizure activities. Keppra started on admission, however discontinued by neurology on 2/8, confirmed with Dr Samantha Serrano. Pt's labs and vitals remains stable. She is at her baseline and pt's sister wish to have pt discharge to home under her care. Acute respiratory failure with hypoxia, resolved  Acute on chronic diastolic congestive heart failure, resolved  Acute pulmonary edema  Echo on 2/5 showed EF 60%. Pt was on vasopressor support briefly weaned off on 2/14. Pt's stable on RA.     Hypoglycemia, due to poor po intake. Encourage po intake. BG stable.     Acute kidey injury, resolved   Acute blood loss anemia  Hgb improved. No evidence of active bleeding. She received 1 unit prbc since admission.     Transaminitis, resolved. This was due to sepsis, resolved     Systemic lupus erythematosus, cnt' plaquenil on discharge. F/u with her rheumatologist.     Hypothyroidism, continue levothyroxine     Intellectual disability/mental retardation, at baseline.     L heel pressure ulcer, not POA, con't wound care with HH     Obesity  Body mass index is 31.63 kg/(m^2).       Discharge Exam:  Patient seen and examined by me on discharge day. Pertinent Findings:  Visit Vitals    /48 (BP 1 Location: Left arm, BP Patient Position: At rest)    Pulse 60    Temp 97.7 °F (36.5 °C)    Resp 16    Ht 5' 1\" (1.549 m)    Wt 75.9 kg (167 lb 6.4 oz)    SpO2 97%    BMI 31.63 kg/m2     Gen:    Not in distress  Chest: Clear lungs  CVS:   Regular rhythm.   No edema  Abd:  Soft, not distended, not tender    Discharge/Recent Laboratory Results:  Recent Labs      03/12/18   0404   NA  138   K  5.1   CL  103   CO2  29   BUN  20   GLU  75   CA  9.5     Recent Labs      03/12/18   0404   HGB  10.0*   HCT  32.0*   WBC  5.1   PLT  141*       Discharge Medications:  Current Discharge Medication List      START taking these medications    Details   midodrine (PROAMITINE) 10 mg tablet Take 1 Tab by mouth three (3) times daily for 30 days.  Qty: 90 Tab, Refills: 0      pantoprazole (PROTONIX) 40 mg tablet Take 1 Tab by mouth Daily (before breakfast). Qty: 30 Tab, Refills: 0      metoclopramide HCl (REGLAN) 5 mg tablet Take 1 Tab by mouth Before breakfast, lunch, dinner and at bedtime for 10 days. Qty: 40 Tab, Refills: 0         CONTINUE these medications which have NOT CHANGED    Details   cholecalciferol, vitamin D3, (VITAMIN D3) 2,000 unit tab Take 1 Tab by mouth daily. red yeast rice extract 600 mg cap Take 600 mg by mouth daily. acetaminophen (TYLENOL ARTHRITIS PAIN) 650 mg TbER Take 650 mg by mouth every eight (8) hours as needed for Pain.      hydroxychloroquine (PLAQUENIL) 200 mg tablet TAKE 1 TABLET EVERY DAY  Qty: 90 Tab, Refills: 1      levothyroxine (SYNTHROID) 25 mcg tablet TAKE 1 TABLET BY MOUTH DAILY BEFORE BREAKFAST  Qty: 90 Tab, Refills: 3      cetirizine (ZYRTEC) 10 mg tablet Take 1 Tab by mouth daily as needed for Allergies. Qty: 30 Tab, Refills: 0    Associated Diagnoses: Left facial swelling         STOP taking these medications       Cholecalciferol, Vitamin D3, 1,000 unit cap Comments:   Reason for Stopping:               DISPOSITION:    Home with Family:    Home with HH/PT/OT/RN: x   SNF/LTC:    LUIS:    OTHER:          Follow up with:   PCP : Roxanna Garcia MD  Follow-up Information     Follow up With Details Comments [address] NATANAEL Turner MD In 5 days 5 days after discharge [address] [place] [address]  [phone]            Code: code status is partial as family wants vasopressors pre-arrest, if necessary to keep b/p up, but if pt's heart has stopped beating, no resuscitation.      Diet: regular    Total time in minutes spent coordinating this discharge (includes going over instructions, follow-up, prescriptions, and preparing report for sign off to her PCP) :  35 minutes

## 2018-03-12 NOTE — PROGRESS NOTES
I spoke to Dr. Sherita Cleveland regarding the patient. Patient presented with altered mental status to the hospital.  The seizure could be secondary to hypoglycemia. No long-term Keppra indicated and the patient does not need to be on Keppra at the time of discharge.

## 2018-03-12 NOTE — FACE TO FACE
Home Health Care Discharge Planning: [facility]  Face to Face Encounter      NAME: Rishi Harvey   :  1945   MRN:  [id number]     Primary Diagnosis:   Severe sepsis with shock due to UTI and Influenza B  Acute encephalopathy    Date of Face to Face:  3/12/2018 10:26 AM                                  Face to Face Encounter findings are related to primary reason for home care:   YES    1. I certify that the patient needs intermittent skilled nursing care, physical therapy and/or speech therapy. I will not be following this patient in the Community and Dr. Mook Bunn MD will be responsible for signing the [address] 2. Initial Orders for Care: Physical Therapy and Occupational Therapy    3. I certify that this patient is homebound because of illness or injury, need the aid of supportive devices such as crutches, canes, wheelchairs, and walkers; the use of special transportation; or the assistance of another person in order to leave their place of residence. There exists a normal inability to leave home and leaving home requires a considerable and taxing effort. 4. I certify that this patient is under my care and that I had a Face-to-Face Encounter that meets the physician Face-to-Face Encounter requirements. Document the physical findings from the Face-to-Face Encounter that support the need for skilled services: Has new diagnosis that requires skilled nursing teaching and intervention , Has new medications that requires skilled nursing teaching and monitoring for understanding and compliance  and Has new finding of weakness and altered mobility that requires skilled physical/occupational and/or speech therapy services for evaluation and interventions.      Keith Verde MD  Discharging Physician                 Office:  [phone]  Fax:    [phone]

## 2018-03-13 ENCOUNTER — PATIENT OUTREACH (OUTPATIENT)
Dept: FAMILY MEDICINE CLINIC | Age: 73
End: 2018-03-13

## 2018-03-13 ENCOUNTER — DOCUMENTATION ONLY (OUTPATIENT)
Dept: CASE MANAGEMENT | Age: 73
End: 2018-03-13

## 2018-03-13 NOTE — PROGRESS NOTES
Spoke with sister Jonny Dawn after verifying patient identity with /ADDRESS. Mark Bueno was informed of hosp f/u appt with Dr. Ahmed Moritz. Mark Bueno informed this nurse that the patient is no longer followed by Dr. Chris Steiner but by Ade Levine. Mark Bueno stated she did not know when or who was coming to see the patient at home d/t inability to ambulate or move well. Will place call to [facility].

## 2018-03-13 NOTE — PROGRESS NOTES
Spoke with Meera Cardoza at [facility] in reference to patient having a f/u appt with her [facility] PCP. This nurse was informed that the patient's PCP would be a new physician Dr. Pearl Gerardo. This nurse requested to have patient seen by MD within 3 days of discharge and I was informed that [facility] likes to have all patient's seen within 3 days of discharge as well. The patient according to her sister Vera Hall is unable to ambulate and needed to be seen at home. Gloria Caraballo informed this nurse that a  will assess patient to determine if patient needs to be seen by a physician in her home. Gloria Caraballo stated she believed that the person handling patient's case would be Jes. Gloria Caraballo also stated the patient's sister Vera Hall is requesting HHA, PT, OT, Resp assistance as she has had recent back surgery and is unable to care for patient like she wants. Reuben Harvey HHA request has been expedited to assist Vera Hall with care for patient. However at this time unsure if patient will be seen by a physician in her home.

## 2018-12-31 ENCOUNTER — EXTERNAL NURSING HOME DOCUMENTATION (OUTPATIENT)
Dept: INTERNAL MEDICINE CLINIC | Age: 73
End: 2018-12-31

## 2018-12-31 DIAGNOSIS — G20 PD (PARKINSON'S DISEASE) (HCC): ICD-10-CM

## 2018-12-31 DIAGNOSIS — E03.9 HYPOTHYROIDISM, UNSPECIFIED TYPE: ICD-10-CM

## 2018-12-31 DIAGNOSIS — F79 INTELLECTUAL DISABILITY: ICD-10-CM

## 2018-12-31 DIAGNOSIS — I50.32 DIASTOLIC CHF, CHRONIC (HCC): Primary | ICD-10-CM

## 2018-12-31 DIAGNOSIS — M32.9 SYSTEMIC LUPUS ERYTHEMATOSUS, UNSPECIFIED SLE TYPE, UNSPECIFIED ORGAN INVOLVEMENT STATUS (HCC): ICD-10-CM

## 2018-12-31 NOTE — PROGRESS NOTES
THIS IS NOT A COMPLETE MEDICAL RECORD ON THIS PATIENT. THIS IS A PATIENT AT [facility]. PLEASE CONTACT THE FACILITY LISTED BELOW FOR MORE INFORMATION ON THIS PATIENT. THE COMPLETE RECORD RESIDES WITH THIS LONG TERM CARE FACILITY. HISTORY OF PRESENT ILLNESS  Sacha Jett is a 72 y.o. female. This patient is currently under the care of Dr. Virginia Parker at [facility].  The patient was admitted to this facility for hospice respite stay. She is currently under the care of [facility]. Her past medical history includes diastolic CHF, SLE, osteoporosis, hypothyroid, CAD, intellectual disability, and Parkinson disease  HPI    Review of Systems   Unable to perform ROS: Mental acuity       Physical Exam   Constitutional: She appears well-developed. No distress. HENT:   Head: Normocephalic. Eyes: Conjunctivae are normal.   Cardiovascular: Normal rate and regular rhythm. Pulmonary/Chest: Effort normal and breath sounds normal. No respiratory distress. She has no wheezes. She has no rales. Abdominal: Soft. Bowel sounds are normal. She exhibits no distension. There is no tenderness. Musculoskeletal: She exhibits no edema. Neurological: She is alert. Non-verbal at time of visit   Skin: Skin is warm and dry. ASSESSMENT and PLAN  Encounter Diagnoses   Name Primary?  Diastolic CHF, chronic (HCC) Yes    Systemic lupus erythematosus, unspecified SLE type, unspecified organ involvement status ([address]     Hypothyroidism, unspecified type     Intellectual disability     PD (Parkinson's disease) ([facility]      Continue care with [facility]. Reviewed medications and side effects in detail. Continue current medications at this time. Nursing to continue to monitor closely and notify MD/NP of any change in condition.

## 2019-01-02 ENCOUNTER — EXTERNAL NURSING HOME DOCUMENTATION (OUTPATIENT)
Dept: INTERNAL MEDICINE CLINIC | Age: 74
End: 2019-01-02

## 2019-01-02 DIAGNOSIS — G20 PD (PARKINSON'S DISEASE) (HCC): ICD-10-CM

## 2019-01-02 DIAGNOSIS — I50.32 DIASTOLIC CHF, CHRONIC (HCC): Primary | ICD-10-CM

## 2019-01-02 DIAGNOSIS — M32.9 SYSTEMIC LUPUS ERYTHEMATOSUS, UNSPECIFIED SLE TYPE, UNSPECIFIED ORGAN INVOLVEMENT STATUS (HCC): ICD-10-CM

## 2019-01-02 DIAGNOSIS — F79 INTELLECTUAL DISABILITY: ICD-10-CM

## 2019-01-02 DIAGNOSIS — R26.81 GAIT INSTABILITY: ICD-10-CM

## 2019-01-02 NOTE — PROGRESS NOTES
History of Present Illness:    Darby Jennings is a 72 y.o. black lady who was admitted to [facility] for a few days of respite care from home. She  Is followed by hospice. She lives with her sister. I have reviewed the records from hospice. The patient has an intellectual disability. She is nonverbal. She depends on others for most ADL's. She has a few other chronic medical issues. The staff do not report any significant new problems. PMH: Diastolic CHF with EF of 87%, SLE off medications now, hypothyroidism, intellectual disability, osteoporosis, gait instability, left heel ulcer, aortic atherosclerosis. SH: No tobacco or alcohol. FH: Noncontributory. Allergies: None. Medications: See NH list and per hospice including Tylenol,  Bactrim DS 5 days, Levothyroxine, Lorazepam prn, Morphine prn, Nystatin and Tramadol prn. Physical Examination: 
GENERAL: Vital signs stable, afebrile, lying in bed, NAD, nonverbal, no eye contact. HEENT: Unremarkable. NECK: Flexed, no JVD or bruits HEART: Regular. LUNGS: Clear with diminished sounds. ABDOMEN: Soft and nontender. EXTREMITIES: Without significant edema, DJD changes and decreased range of motion. NEURO: Generalized weakness. Impression: 
Diastolic CHF. Parkinson's disease. SLE. Intellectual disability. Gait instability. Plan: 
Continue current meds. Focus on comfort per hospice. Overall seems stable. DNR.

## 2019-03-26 ENCOUNTER — EXTERNAL NURSING HOME DOCUMENTATION (OUTPATIENT)
Dept: INTERNAL MEDICINE CLINIC | Age: 74
End: 2019-03-26

## 2019-03-26 DIAGNOSIS — M32.9 SYSTEMIC LUPUS ERYTHEMATOSUS, UNSPECIFIED SLE TYPE, UNSPECIFIED ORGAN INVOLVEMENT STATUS (HCC): ICD-10-CM

## 2019-03-26 DIAGNOSIS — I50.32 DIASTOLIC CHF, CHRONIC (HCC): ICD-10-CM

## 2019-03-26 DIAGNOSIS — E03.9 HYPOTHYROIDISM, UNSPECIFIED TYPE: ICD-10-CM

## 2019-03-26 DIAGNOSIS — F79 INTELLECTUAL DISABILITY: ICD-10-CM

## 2019-03-26 DIAGNOSIS — G20 PD (PARKINSON'S DISEASE) (HCC): Primary | ICD-10-CM

## 2019-03-26 NOTE — PROGRESS NOTES
THIS IS NOT A COMPLETE MEDICAL RECORD ON THIS PATIENT. THIS IS A PATIENT AT [facility]. PLEASE CONTACT THE FACILITY LISTED BELOW FOR MORE INFORMATION ON THIS PATIENT. THE COMPLETE RECORD RESIDES WITH THIS LONG TERM CARE FACILITY. HISTORY OF PRESENT ILLNESS  Marquez Siddiqi is a 69 y.o. female. This patient is currently under the care of Dr. Kim Corrales at [facility].  The patient was admitted to this facility for hospice respite stay. She is currently under the care of [facility]. Her past medical history includes diastolic CHF, SLE, osteoporosis, hypothyroid, CAD, intellectual disability, and Parkinson disease  Patient is scheduled to discharge tomorrow, 03/27/19. HPI    Review of Systems   Unable to perform ROS: Mental acuity       Physical Exam   Constitutional: She appears well-developed. No distress. HENT:   Head: Normocephalic. Eyes: Conjunctivae are normal.   Cardiovascular: Normal rate and regular rhythm. Pulmonary/Chest: Effort normal and breath sounds normal. No respiratory distress. She has no wheezes. She has no rales. Abdominal: Soft. Bowel sounds are normal. She exhibits no distension. There is no tenderness. Musculoskeletal: She exhibits no edema. Neurological: She is alert. Non-verbal at time of visit   Skin: Skin is warm and dry. ASSESSMENT and PLAN  Encounter Diagnoses   Name Primary?  PD (Parkinson's disease) ([address] Yes    Diastolic CHF, chronic ([address]     Systemic lupus erythematosus, unspecified SLE type, unspecified organ involvement status ([address]     Intellectual disability     Hypothyroidism, unspecified type      Patient to discharge 03/27/19 with continued care with [facility]. Nursing to continue to monitor closely and notify MD/NP of any change in condition prior to discharge.

## 2019-03-29 ENCOUNTER — TELEPHONE (OUTPATIENT)
Dept: FAMILY MEDICINE CLINIC | Age: 74
End: 2019-03-29

## 2019-03-29 NOTE — TELEPHONE ENCOUNTER
Patient has switched to [facility] and Hospice. No longer Dr Althea Luis patient's.

## 2019-03-29 NOTE — TELEPHONE ENCOUNTER
----- Message from Marcelo Brown sent at 3/29/2019 12:09 PM EDT -----  Regarding: /Telephone  Therese Mayo pt' sister called in regards to health maintance reminder informing pt is in [facility] and in hospice . Pleas remove pt off calling list . Best contact number is [phone] .

## 2020-04-21 NOTE — PROGRESS NOTES
Care Conference with Complex CM, patient's sister, Farrukhfarhan LI and CM. Patient's sister was notified that [facility] was still attempting to obtain [address]. Patient's sister reports she has visited [place] and does not want her sister to go there. She has also visited several private homes that could take her sister if necessary, but at this time, Mrs. Al Roa would like to bring her sister home and provide 24 hour care with the help of home health. CM has spokent to [address] [place] [address] - and will arrange [facility] for skilled nursing, OT/PT eval and treat, aide and . [place] of choice limited by [facility] YAZOO and physician preference. Patient's sister has agreed to [facility] and referral sent via [employer] for home health skilled nursing, OT/PT, aide and . [address] [place] [address] - will have their MD and community care team see patient at home, so no office follow-up appt has been made for patient. [facility] transport has been arranged for patient to go to [address] [place] [address], [address] via stretcher at 2:30PMm today - transportation set at that time to accomodate patient's sister arranging home health assistance with caregivers to be present upon patient's arrival.    Care Management Interventions  PCP Verified by CM:  Yes  Palliative Care Criteria Met (RRAT>21 & CHF Dx)?: Yes  Palliative Consult Recommended?: Yes (Seen by [facility])  Mode of Transport at Discharge: S  Transition of Care Consult (CM Consult): [address]: No  Reason Outside [place]: Managed care specific requirement ([address] per patient's caregiver and Farrukh [facility]))  MyChart Signup: No  Discharge Durable Medical Equipment: No (Patient has hospital bed and [facility])  Physical Therapy Consult: Yes  Occupational Therapy Consult: Yes  Speech Therapy Consult: Yes  Current Support Network: Lives with Caregiver, Own Home, Family Lives Nearby (Patient's sister is her caregiver)  Confirm Follow Up Transport: Family  Plan discussed with Pt/Family/Caregiver: Yes  Freedom of Choice Offered: Yes (Freedom of choice limited by [facility] received via telephone from PCP and caregiver - form placed on chart)  Discharge Location  Discharge Placement: Home with home health (Home with [address])    Remy Pena RN, BSN, [address] [place] [address] Oncology  [phone] Fall Risk

## 2020-09-23 NOTE — PROGRESS NOTES
Problem: Falls - Risk of  Goal: *Absence of Falls  Document Cole Fall Risk and appropriate interventions in the flowsheet.    Outcome: Progressing Towards Goal  Fall Risk Interventions:            Medication Interventions: Bed/chair exit alarm, Evaluate medications/consider consulting pharmacy, Patient to call before getting OOB    Elimination Interventions: Bed/chair exit alarm, Call light in reach    History of Falls Interventions: Bed/chair exit alarm, Room close to nurse's station, Door open when patient unattended Detail Level: Detailed Introduction Text (Please End With A Colon): The following procedure was deferred:

## 2022-03-18 PROBLEM — N39.0 UTI (URINARY TRACT INFECTION): Status: ACTIVE | Noted: 2018-02-02

## 2022-03-18 PROBLEM — Z71.89 COUNSELING REGARDING GOALS OF CARE: Status: ACTIVE | Noted: 2018-02-12

## 2022-03-18 PROBLEM — M32.9 HX OF SYSTEMIC LUPUS ERYTHEMATOSUS (SLE) (HCC): Status: ACTIVE | Noted: 2018-02-02

## 2022-03-19 PROBLEM — N17.0 ACUTE RENAL FAILURE WITH TUBULAR NECROSIS (HCC): Status: ACTIVE | Noted: 2018-02-09

## 2022-03-19 PROBLEM — Z86.79 HX OF ESSENTIAL HYPERTENSION: Status: ACTIVE | Noted: 2018-02-02

## 2022-03-19 PROBLEM — I95.0 IDIOPATHIC HYPOTENSION: Status: ACTIVE | Noted: 2018-02-09

## 2022-03-19 PROBLEM — A41.9 SEPSIS (HCC): Status: ACTIVE | Noted: 2018-02-02

## 2022-03-19 PROBLEM — Z86.39 HX OF DIABETES MELLITUS: Status: ACTIVE | Noted: 2018-02-02

## 2022-03-19 PROBLEM — T17.908A ASPIRATION INTO RESPIRATORY TRACT: Status: ACTIVE | Noted: 2018-02-12

## 2022-05-24 ENCOUNTER — HOSPICE ADMISSION (OUTPATIENT)
Dept: HOSPICE | Facility: HOSPICE | Age: 77
End: 2022-05-24
Payer: OTHER MISCELLANEOUS

## 2022-05-27 ENCOUNTER — HOSPITAL ENCOUNTER (INPATIENT)
Age: 77
LOS: 5 days | Discharge: HOME | End: 2022-06-01
Attending: FAMILY MEDICINE | Admitting: FAMILY MEDICINE

## 2022-05-27 PROCEDURE — 3336500001 HSPC ELECTION

## 2022-05-27 PROCEDURE — G0299 HHS/HOSPICE OF RN EA 15 MIN: HCPCS

## 2022-05-27 PROCEDURE — 0655 HSPC INPATIENT RESPITE

## 2022-05-27 RX ORDER — HALOPERIDOL 2 MG/ML
0.5 SOLUTION ORAL
Status: CANCELLED | OUTPATIENT
Start: 2022-05-27

## 2022-05-27 RX ORDER — GLYCOPYRROLATE 0.2 MG/ML
0.2 INJECTION INTRAMUSCULAR; INTRAVENOUS
Status: CANCELLED | OUTPATIENT
Start: 2022-05-27

## 2022-05-27 RX ORDER — HYDROXYCHLOROQUINE SULFATE 200 MG/1
200 TABLET, FILM COATED ORAL
Status: DISCONTINUED | OUTPATIENT
Start: 2022-05-28 | End: 2022-06-01 | Stop reason: HOSPADM

## 2022-05-27 RX ORDER — LORAZEPAM 2 MG/ML
1 CONCENTRATE ORAL
Status: CANCELLED | OUTPATIENT
Start: 2022-05-27 | End: 2022-05-28

## 2022-05-27 RX ORDER — DAPSONE 100 MG/1
100 TABLET ORAL DAILY
Status: DISCONTINUED | OUTPATIENT
Start: 2022-05-28 | End: 2022-06-01 | Stop reason: HOSPADM

## 2022-05-27 RX ORDER — MORPHINE SULFATE 20 MG/ML
5 SOLUTION ORAL
Status: CANCELLED | OUTPATIENT
Start: 2022-05-27

## 2022-05-27 RX ORDER — KETOROLAC TROMETHAMINE 30 MG/ML
30 INJECTION, SOLUTION INTRAMUSCULAR; INTRAVENOUS
Status: CANCELLED | OUTPATIENT
Start: 2022-05-27 | End: 2022-05-29

## 2022-05-27 RX ORDER — FACIAL-BODY WIPES
10 EACH TOPICAL DAILY PRN
Status: CANCELLED | OUTPATIENT
Start: 2022-05-27

## 2022-05-27 RX ORDER — DAPSONE 100 MG/1
100 TABLET ORAL DAILY
Status: DISCONTINUED | OUTPATIENT
Start: 2022-05-28 | End: 2022-05-27

## 2022-05-27 RX ORDER — ALPRAZOLAM 0.5 MG/1
0.5 TABLET ORAL
Status: DISCONTINUED | OUTPATIENT
Start: 2022-05-27 | End: 2022-05-27

## 2022-05-27 RX ORDER — ACETAMINOPHEN 325 MG/1
650 TABLET ORAL
Status: CANCELLED | OUTPATIENT
Start: 2022-05-27

## 2022-05-27 RX ORDER — ACETAMINOPHEN 650 MG/1
650 SUPPOSITORY RECTAL
Status: CANCELLED | OUTPATIENT
Start: 2022-05-27

## 2022-05-27 RX ORDER — DICLOFENAC SODIUM 10 MG/G
2 GEL TOPICAL
Status: CANCELLED | OUTPATIENT
Start: 2022-05-27

## 2022-05-27 RX ORDER — ALPRAZOLAM 0.5 MG/1
0.5 TABLET ORAL
Status: DISCONTINUED | OUTPATIENT
Start: 2022-05-27 | End: 2022-06-01 | Stop reason: HOSPADM

## 2022-05-27 RX ORDER — HYDROXYCHLOROQUINE SULFATE 200 MG/1
200 TABLET, FILM COATED ORAL
Status: DISCONTINUED | OUTPATIENT
Start: 2022-05-28 | End: 2022-05-27

## 2022-05-27 RX ORDER — TRAMADOL HYDROCHLORIDE 50 MG/1
50 TABLET ORAL
Status: DISCONTINUED | OUTPATIENT
Start: 2022-05-27 | End: 2022-05-27

## 2022-05-27 RX ORDER — HYDROXYCHLOROQUINE SULFATE 200 MG/1
200 TABLET, FILM COATED ORAL 2 TIMES DAILY WITH MEALS
Status: CANCELLED | OUTPATIENT
Start: 2022-05-27

## 2022-05-27 RX ORDER — NYSTATIN AND TRIAMCINOLONE ACETONIDE 100000; 1 [USP'U]/G; MG/G
CREAM TOPICAL 3 TIMES DAILY
Status: CANCELLED | OUTPATIENT
Start: 2022-05-27

## 2022-05-27 RX ORDER — TRAMADOL HYDROCHLORIDE 50 MG/1
50 TABLET ORAL
Status: DISCONTINUED | OUTPATIENT
Start: 2022-05-27 | End: 2022-06-01 | Stop reason: HOSPADM

## 2022-05-27 RX ORDER — MENTHOL AND ZINC OXIDE .44; 20.625 G/100G; G/100G
OINTMENT TOPICAL AS NEEDED
Status: CANCELLED | OUTPATIENT
Start: 2022-05-27

## 2022-05-27 NOTE — HOSPICE
1230  Patient admitted to [facility] Rm 7 from [facility]. Alert, anxious, nonverbal.    4571  Called patient's sister Behzad to advise of patient's arrival.  Report received. Behzad expressed appreciation for patient's care while at [facility]. Daniel Pedroza at [facility] with patient's orders. [address] with Dr. Ly Taveras. 1430  Patient resting quietly in bed with head covered by blanket. Calm, timid. Stuffed animal and book provided by family with patient. Two [place] visiting with patient. 1745  Patient ate 100% of her dinner with full assistance. 1815  Patient's wet brief changed. Patient calm, cooperative with care. 1900  Report given to oncoming nurse.       NAME OF PATIENT:  Esperanza Elizondo    LEVEL OF CARE:  Respite    REASON FOR GIP:  NA    *PATIENT REMAINS ELIGIBLE FOR [facility] LEVEL OF CARE AS EVIDENCED BY: (MUST BE ADDRESSED OF PATIENT GIP)  NA    REASON FOR RESPITE:  Caregiver Exhaustion    O2 SAFETY:  NA/Room Air    FALL INTERVENTIONS PROVIDED:   Implemented/recommended use of non-skid footwear, Implemented/recommended use of fall risk identification flag to all team members, Implemented/recommended assistive devices and encouraged their use, Implemented/recommended resources for alarm system (personal alarm, bed alarm, call bell, etc.) , Implemented/recommended environmental changes (remove hazards, lower bed, improve lighting, etc.) and Implemented/recommended increased supervision/assistance    INTERDISPLINARY COMMUNICATION/COLLABORATION:  Physician, MSW, [place] and RN, CNA    NEW MEDICATION INITIATION DOCUMENTATION:  NA    Reason medication is being initiated:  NA    MD / Provider name consulted re: change in status / initiation of new medication:  NA    New Symptom(s):  NA    New Order(s):  NA    Name of the person notified of the changes:  NA    Name of person being taught:  NA    Instructions given:  NA    Side Effects taught: NA    Response to teaching:  NA    COMFORTABLE DYING MEASURE:  Is Patient/family satisfied with symptom level?   Yes    DISCHARGE PLAN:  Home with [facility] 6/1/22 @ 1300

## 2022-05-27 NOTE — H&P
Son  [facility]  [phone]    Patient Name: Geoffrey Hale  YOB: 1945    Date of Provider Hospice Visit: 05/27/22    Level of Care:   [] General Inpatient (GIP)    [] Routine   [x] Respite    Current Location of Care:  [] [facility] [] [facility] [] [address] [facility] [] [facility] [x] [facility]      Principle Hospice Diagnosis: Chronic diastolic heart failure, lupus, MR       HOSPICE SUMMARY     Chart Reviewed for patient's medical history and hospice care plan. Hospice Physician Certification/Recertification Narrative per Floyd Morales / soheila MD:     Patient of [facility] and presents to the [facility] for respite level of care. Reviewed the CTI and patient with a history of diastolic heart failure. She also has a history of lupus and MR. Patient is essentially nonverbal.  She remains on 2 lupus medications and as needed tramadol. Patient sent to the [facility] for caregiver exhaustion as her sister is her caregiver. Patient being admitted for Respite care x 5 days for   [x]  Caregiver exhaustion and needing break  []  Caregiver unavailable       PLAN   1. Patient's home medications were reviewed and reconciled. Continue with current home medications and plan of care as outlined in chart. 2. Patient examined and appears comfortable with no symptom burden. She is nonverbal.  She does show evidence of contractures of the lower extremities. No skin changes. 3. Plan of care determined by [facility] hospice. 4.  and SW to support family needs. 5. Disposition: Home with hospice once respite stay is completed.

## 2022-05-27 NOTE — PROGRESS NOTES
Problem: Falls - Risk of  Goal: *Absence of Falls  Description: Document Regan Sites Fall Risk and appropriate interventions in the flowsheet. Outcome: Progressing Towards Goal  Note: Fall Risk Interventions:       Mentation Interventions: Adequate sleep, hydration, pain control,Bed/chair exit alarm,Door open when patient unattended,Reorient patient,Room close to nurse's station    Medication Interventions: Bed/chair exit alarm    Elimination Interventions: Bed/chair exit alarm              Problem: Pressure Injury - Risk of  Goal: *Prevention of pressure injury  Description: Document Adolph Scale and appropriate interventions in the flowsheet.   Outcome: Progressing Towards Goal  Note: Pressure Injury Interventions:  Sensory Interventions: Check visual cues for pain,Float heels,Keep linens dry and wrinkle-free    Moisture Interventions: Absorbent underpads,Apply protective barrier, creams and emollients    Activity Interventions: Pressure redistribution bed/mattress(bed type)    Mobility Interventions: Float heels,HOB 30 degrees or less,Pressure redistribution bed/mattress (bed type)    Nutrition Interventions: Document food/fluid/supplement intake,Offer support with meals,snacks and hydration    Friction and Shear Interventions: Apply protective barrier, creams and emollients,HOB 30 degrees or less,Lift sheet

## 2022-05-27 NOTE — HOSPICE
1900 Received report from Sathya Lei, [facility]. Assumed care of patient. 1930 Report given to Rao Banuelos RN.

## 2022-05-27 NOTE — PROGRESS NOTES
0700-received report from [facility]  0745-pt sleeping quietly on rt side. Upon assessment, noticed pt rt foot was very warm to touch, red, and slightly more swollen than the left. Notified Dr. Curry Melchor.

## 2022-05-28 PROCEDURE — G0299 HHS/HOSPICE OF RN EA 15 MIN: HCPCS

## 2022-05-28 PROCEDURE — 74011250637 HC RX REV CODE- 250/637: Performed by: FAMILY MEDICINE

## 2022-05-28 PROCEDURE — 0655 HSPC INPATIENT RESPITE

## 2022-05-28 RX ADMIN — DAPSONE 100 MG: 100 TABLET ORAL at 08:37

## 2022-05-28 RX ADMIN — HYDROXYCHLOROQUINE SULFATE 200 MG: 200 TABLET, FILM COATED ORAL at 08:37

## 2022-05-28 NOTE — PROGRESS NOTES
Problem: Falls - Risk of  Goal: *Absence of Falls  Description: Document Tiny Tay Fall Risk and appropriate interventions in the flowsheet. Outcome: Progressing Towards Goal  Note: Fall Risk Interventions:       Mentation Interventions: Adequate sleep, hydration, pain control,Bed/chair exit alarm,Door open when patient unattended,Reorient patient    Medication Interventions: Bed/chair exit alarm    Elimination Interventions: Bed/chair exit alarm,Call light in reach              Problem: Pressure Injury - Risk of  Goal: *Prevention of pressure injury  Description: Document Adolph Scale and appropriate interventions in the flowsheet.   Outcome: Progressing Towards Goal  Note: Pressure Injury Interventions:  Sensory Interventions: Assess changes in LOC,Check visual cues for pain,Float heels,Minimize linen layers    Moisture Interventions: Absorbent underpads,Apply protective barrier, creams and emollients,Minimize layers    Activity Interventions: Pressure redistribution bed/mattress(bed type)    Mobility Interventions: Float heels,HOB 30 degrees or less    Nutrition Interventions: Document food/fluid/supplement intake    Friction and Shear Interventions: Apply protective barrier, creams and emollients,Foam dressings/transparent film/skin sealants,Minimize layers

## 2022-05-28 NOTE — HOSPICE
1940: report received from Sindy Harris, [address]: pt is alert, unable to assess orientation. Pt is nonverbal at baseline. Pt appears calm and comfortable, no grimacing noted. Respirations are even and unlabored. See flowsheet for full assessment     2100: pt is resting quietly with eyes closed    2240: pt brief wet with urine, erna care completed and pt turned onto R side with heels floated on pillows     0000: pt appears to be sleeping. 0150: pt is resting quietly with relaxed facial expression    0335: pt is awake in bed, but is calm quiet    0410: pt incontinent of urine, brief changed and erna care completed.  Pt turned onto L side    0600: pt is resting quietly with eyes closed    0700: report given to oncoming nurse     NAME OF PATIENT:  Carlos Eduardo Mcintosh    LEVEL OF CARE: Respite    REASON FOR GIP:   n/a    *PATIENT REMAINS ELIGIBLE FOR GIP LEVEL OF CARE AS EVIDENCED BY: n/a    REASON FOR RESPITE:  caregiver exhaustion    O2 SAFETY:  n/a    FALL INTERVENTIONS PROVIDED:   Implemented/recommended resources for alarm system (personal alarm, bed alarm, call bell, etc.) , Implemented/recommended environmental changes (remove hazards, lower bed, improve lighting, etc.) and Implemented/recommended increased supervision/assistance    INTERDISPLINARY COMMUNICATION/COLLABORATION:  Physician, MSW, Rafael and RN, CNA    NEW MEDICATION INITIATION DOCUMENTATION:  n/a    Reason medication is being initiated: n/a    MD / Provider name consulted re: change in status / initiation of new medication: n/a    New Symptom(s): n/a    New Order(s): n/a    Name of the person notified of the changes: n/a    Name of person being taught: n/a    Instructions given:n/a    Side Effects taught: n/a    Response to teaching: n/a      COMFORTABLE DYING MEASURE:  Is Patient/family satisfied with symptom level?  yes    DISCHARGE PLAN: Pt will return home at the end of respite stay

## 2022-05-28 NOTE — PROGRESS NOTES
Problem: Falls - Risk of  Goal: *Absence of Falls  Description: Document Elizabeth Cotto Fall Risk and appropriate interventions in the flowsheet. Outcome: Progressing Towards Goal  Note: Fall Risk Interventions:       Mentation Interventions: Adequate sleep, hydration, pain control,Bed/chair exit alarm,Door open when patient unattended,Reorient patient    Medication Interventions: Bed/chair exit alarm    Elimination Interventions: Bed/chair exit alarm,Call light in reach              Problem: Patient Education: Go to Patient Education Activity  Goal: Patient/Family Education  Outcome: Progressing Towards Goal     Problem: Pressure Injury - Risk of  Goal: *Prevention of pressure injury  Description: Document Adolph Scale and appropriate interventions in the flowsheet. Outcome: Progressing Towards Goal  Note: Pressure Injury Interventions:  Sensory Interventions: Float heels,Minimize linen layers,Turn and reposition approx.  every two hours (pillows and wedges if needed),Check visual cues for pain,Keep linens dry and wrinkle-free    Moisture Interventions: Check for incontinence Q2 hours and as needed,Minimize layers,Apply protective barrier, creams and emollients,Moisture barrier    Activity Interventions: Pressure redistribution bed/mattress(bed type)    Mobility Interventions: Float heels    Nutrition Interventions: Document food/fluid/supplement intake    Friction and Shear Interventions: Apply protective barrier, creams and emollients,Minimize layers

## 2022-05-28 NOTE — HOSPICE
NAME OF PATIENT:  Patrick Decent    LEVEL OF CARE:  respite      REASON FOR RESPITE:  Caregiver breakdown    O2 SAFETY:     n/a    FALL INTERVENTIONS PROVIDED:   Implemented/recommended resources for alarm system (personal alarm, bed alarm, call bell, etc.) , Implemented/recommended environmental changes (remove hazards, lower bed, improve lighting, etc.) and Implemented/recommended increased supervision/assistance    INTERDISPLINARY COMMUNICATION/COLLABORATION:  MELLISSA FRIAS    NEW MEDICATION INITIATION DOCUMENTATION:     n/a    Reason medication is being initiated:    n/a    MD / Provider name consulted re: change in status / initiation of new medication:    n/a    New Symptom(s):    n/a    New Order(s):   n/a    Name of the person notified of the changes:   n/a    Name of person being taught:   n/a    Instructions given:   n/a    Side Effects taught:   n/a    Response to teaching:   n/a      COMFORTABLE DYING MEASURE:  Is Patient/family satisfied with symptom level?  yes    DISCHARGE PLAN:  Plan discharge back home with her sister June 1 st.  To continue home hospice care with [facility]. 0700  Report received from [address] [place] [address],  [address]  Patient eating her breakfast with max assistance. Tolerated morning medications well. 1020  Patient incontinent moderate amount urine. Priscila care provided. Protective cream applied. Patient tolerated well. Positioned for comfort. 1105  Update provided to patient's sister via phone. 1220  Patient ate 75% of her lunch with max assistance. Resting quietly watching television. 1400  Complete bed bath given. Protective cream applied to perineal and sacral area. Powder to creases per her sisters instructions. Patient somewhat upset after care. Positioned for comfort. [phone]  Patient remains slightly upset and anxious since bath. Adjusted her position and tempeture in her room . 1630  Patient resting quietly with her eyes closed.   1800  Patient eating dinner with assistance of Magen JACOBSEN  1900  Report to be given to oncoming nurse.]

## 2022-05-29 PROCEDURE — G0299 HHS/HOSPICE OF RN EA 15 MIN: HCPCS

## 2022-05-29 PROCEDURE — 74011250637 HC RX REV CODE- 250/637: Performed by: FAMILY MEDICINE

## 2022-05-29 PROCEDURE — 0655 HSPC INPATIENT RESPITE

## 2022-05-29 RX ADMIN — HYDROXYCHLOROQUINE SULFATE 200 MG: 200 TABLET, FILM COATED ORAL at 08:17

## 2022-05-29 RX ADMIN — DAPSONE 100 MG: 100 TABLET ORAL at 08:17

## 2022-05-29 NOTE — PROGRESS NOTES
Problem: Falls - Risk of  Goal: *Absence of Falls  Description: Document Yosvany Burton Fall Risk and appropriate interventions in the flowsheet. Outcome: Progressing Towards Goal  Note: Fall Risk Interventions:       Mentation Interventions: Bed/chair exit alarm,Adequate sleep, hydration, pain control,Door open when patient unattended    Medication Interventions: Bed/chair exit alarm    Elimination Interventions: Bed/chair exit alarm              Problem: Patient Education: Go to Patient Education Activity  Goal: Patient/Family Education  Outcome: Progressing Towards Goal     Problem: Pressure Injury - Risk of  Goal: *Prevention of pressure injury  Description: Document Adolph Scale and appropriate interventions in the flowsheet. Outcome: Progressing Towards Goal  Note: Pressure Injury Interventions:  Sensory Interventions: Float heels,Minimize linen layers,Turn and reposition approx.  every two hours (pillows and wedges if needed),Check visual cues for pain,Keep linens dry and wrinkle-free    Moisture Interventions: Apply protective barrier, creams and emollients,Moisture barrier,Minimize layers,Check for incontinence Q2 hours and as needed,Maintain skin hydration (lotion/cream)    Activity Interventions: Pressure redistribution bed/mattress(bed type)    Mobility Interventions: Float heels    Nutrition Interventions: Document food/fluid/supplement intake    Friction and Shear Interventions: Apply protective barrier, creams and emollients,Minimize layers,Feet elevated on foot rest

## 2022-05-29 NOTE — PROGRESS NOTES
Problem: Falls - Risk of  Goal: *Absence of Falls  Description: Document Marciana Distance Fall Risk and appropriate interventions in the flowsheet. Outcome: Progressing Towards Goal  Note: Fall Risk Interventions:       Mentation Interventions: Bed/chair exit alarm,Adequate sleep, hydration, pain control,Door open when patient unattended    Medication Interventions: Bed/chair exit alarm    Elimination Interventions: Bed/chair exit alarm              Problem: Pressure Injury - Risk of  Goal: *Prevention of pressure injury  Description: Document Adolph Scale and appropriate interventions in the flowsheet.   Outcome: Progressing Towards Goal  Note: Pressure Injury Interventions:  Sensory Interventions: Float heels,Check visual cues for pain    Moisture Interventions: Absorbent underpads,Apply protective barrier, creams and emollients,Minimize layers    Activity Interventions: Pressure redistribution bed/mattress(bed type)    Mobility Interventions: Float heels,HOB 30 degrees or less    Nutrition Interventions: Document food/fluid/supplement intake    Friction and Shear Interventions: Apply protective barrier, creams and emollients,HOB 30 degrees or less,Lift team/patient mobility team,Minimize layers

## 2022-05-29 NOTE — HOSPICE
1900: report received from RODRIGUEZ Fregoso    1939: assessment completed. Pt is alert, laying in bed. Nonverbal at baseline, pt smiling at RN during assessment. Lungs are diminished. Bowel sounds active. Bilateral foot drop present. See flowsheet for full assessment     2100: pt is watching TV, smiles when staff enters room    2240: pt is resting quietly watching TV    0010: pt brief is clean and dry    0200: pt is resting with relaxed facial expression     0258: pt has pulled covers over her head. Appears comfortable     0320: pt repositioned, now lying supine     0430: brief changed. Pt turned onto R side.  Pt drank several small sips of water     0600: pt resting quietly with eyes closed    0700: report given to oncoming nurse     NAME OF PATIENT:  Rajni     LEVEL OF CARE: Respite    REASON FOR GIP:   n/a    *PATIENT REMAINS ELIGIBLE FOR GIP LEVEL OF CARE AS EVIDENCED BY: n/a        REASON FOR RESPITE:  caregiver exhaustion    O2 SAFETY:  n/a    FALL INTERVENTIONS PROVIDED:   Implemented/recommended resources for alarm system (personal alarm, bed alarm, call bell, etc.) , Implemented/recommended environmental changes (remove hazards, lower bed, improve lighting, etc.) and Implemented/recommended increased supervision/assistance    INTERDISPLINARY COMMUNICATION/COLLABORATION:  Physician, MSW, Rafael and RN, CNA    NEW MEDICATION INITIATION DOCUMENTATION:  n/a    Reason medication is being initiated: n/a    MD / Provider name consulted re: change in status / initiation of new medication: n/a    New Symptom(s): n/a    New Order(s):  n/a    Name of the person notified of the changes:  n/a      Name of person being taught:  n/a    Instructions given: n/a    Side Effects taught: n/a    Response to teaching: n/a      COMFORTABLE DYING MEASURE:  Is Patient/family satisfied with symptom level?  yes    DISCHARGE PLAN: Pt will discharge home at the end of respite stay

## 2022-05-29 NOTE — HOSPICE
NAME OF PATIENT:  Omar Montgomery    LEVEL OF CARE:  respite      REASON FOR RESPITE:  Caregiver breakdown      FALL INTERVENTIONS PROVIDED:   Implemented/recommended resources for alarm system (personal alarm, bed alarm, call bell, etc.) , Implemented/recommended environmental changes (remove hazards, lower bed, improve lighting, etc.) and Implemented/recommended increased supervision/assistance    INTERDISPLINARY COMMUNICATION/COLLABORATION:  MELLISSA FRIAS    NEW MEDICATION INITIATION DOCUMENTATION:     n/a    Reason medication is being initiated:    n/a    MD / Provider name consulted re: change in status / initiation of new medication:    n/a    New Symptom(s):   n/a    New Order(s):    n/a    Name of the person notified of the changes:   n/a    Name of person being taught:    n/a    Instructions given:   n/a    Side Effects taught:   n/a    Response to teaching:   n/a      COMFORTABLE DYING MEASURE:  Is Patient/family satisfied with symptom level?  yes    DISCHARGE PLAN:    N/a    0700  Report received from Evens Aguila  Patient tolerated morning medications well. Eating her breakfast with max assistance. 0945  Patient resting quietly with her eyes closed. 1100 patient resting quietly with her eyes closed. 1230  Patient ate 100% of her lunch with max assistance. 1400  Patient incontinent large firm brown stool. Complete bed bath given and repositioned for comfort. Patient tolerated bath much better today. 1530  Patient resting quietly with her eyes closed. 1730  Patient ate 100% of her dinner with max assistance. 1900  Report to be given to on coming nurse.

## 2022-05-29 NOTE — PROGRESS NOTES
Problem: Falls - Risk of  Goal: *Absence of Falls  Description: Document Donta Lagos Fall Risk and appropriate interventions in the flowsheet. Outcome: Progressing Towards Goal  Note: Fall Risk Interventions:       Mentation Interventions: Bed/chair exit alarm,Adequate sleep, hydration, pain control    Medication Interventions: Bed/chair exit alarm    Elimination Interventions: Bed/chair exit alarm,Call light in reach              Problem: Pressure Injury - Risk of  Goal: *Prevention of pressure injury  Description: Document Adolph Scale and appropriate interventions in the flowsheet.   Outcome: Progressing Towards Goal  Note: Pressure Injury Interventions:  Sensory Interventions: Assess changes in LOC,Float heels,Minimize linen layers    Moisture Interventions: Apply protective barrier, creams and emollients,Absorbent underpads,Internal/External urinary devices,Minimize layers    Activity Interventions: Pressure redistribution bed/mattress(bed type)    Mobility Interventions: Float heels,HOB 30 degrees or less    Nutrition Interventions: Document food/fluid/supplement intake    Friction and Shear Interventions: Apply protective barrier, creams and emollients,HOB 30 degrees or less,Minimize layers

## 2022-05-30 PROCEDURE — 0655 HSPC INPATIENT RESPITE

## 2022-05-30 PROCEDURE — G0299 HHS/HOSPICE OF RN EA 15 MIN: HCPCS

## 2022-05-30 PROCEDURE — 74011250637 HC RX REV CODE- 250/637: Performed by: FAMILY MEDICINE

## 2022-05-30 RX ADMIN — DAPSONE 100 MG: 100 TABLET ORAL at 09:48

## 2022-05-30 RX ADMIN — HYDROXYCHLOROQUINE SULFATE 200 MG: 200 TABLET, FILM COATED ORAL at 09:48

## 2022-05-30 NOTE — PROGRESS NOTES
Problem: Falls - Risk of  Goal: *Absence of Falls  Description: Document Dominick Tabares Fall Risk and appropriate interventions in the flowsheet. Outcome: Progressing Towards Goal  Note: Fall Risk Interventions:       Mentation Interventions: Adequate sleep, hydration, pain control,Bed/chair exit alarm,Door open when patient unattended,Familiar objects from home,More frequent rounding,Room close to nurse's station    Medication Interventions: Bed/chair exit alarm    Elimination Interventions: Bed/chair exit alarm,Call light in reach              Problem: Pressure Injury - Risk of  Goal: *Prevention of pressure injury  Description: Document Adolph Scale and appropriate interventions in the flowsheet.   Outcome: Progressing Towards Goal  Note: Pressure Injury Interventions:  Sensory Interventions: Float heels,Pressure redistribution bed/mattress (bed type),Minimize linen layers,Check visual cues for pain,Keep linens dry and wrinkle-free,Avoid rigorous massage over bony prominences,Pad between skin to skin    Moisture Interventions: Apply protective barrier, creams and emollients,Maintain skin hydration (lotion/cream),Moisture barrier,Minimize layers    Activity Interventions: Increase time out of bed    Mobility Interventions: Pressure redistribution bed/mattress (bed type),Float heels    Nutrition Interventions: Document food/fluid/supplement intake,Offer support with meals,snacks and hydration    Friction and Shear Interventions: Apply protective barrier, creams and emollients,Minimize layers

## 2022-05-30 NOTE — HOSPICE
0700-Received report from [facility] and assumed care of pt. Pt is respite LOC with [facility]. Pt primary diagnosis is Diastolic Heart Failure. 0815-RN assessment completed. Pt smiled at RN, and returned to covering head with blanket. No distress or discomfort noted, pt unable to verbalize needs. 0930-Pt at all of applesauce, few bites of oatmeal and eggs. Pt did drink a cup of apple juice. Pt brief changed, and erna area cleaned. Pt repositioned in bed and given her book from home. Pt smiling, calm and relaxed at this time. 1130-Pt brief dry. Pt positioned for lunch. Pt ate cup of tomato soup and chocolate cake, ate a few bites of grilled cheese. Pt drank 8oz of ginger ale. Pt very pleasant and calm. 1330-Pt resting in bed with eyes closed. No distress or discomfort noted. 1530-Pt awake and looking at her book from home. No distress or discomfort noted. 1630-Pt sister and family member visiting, brought pt some crackers and drink. 1700-sister feeding pt. Pt only ate 25-30%. 1800-Pt given bed bath, linen changed, gown changed. Pt tolerated care well. 1900-Report given to oncoming RN.     NAME OF PATIENT:  Sona Richards    LEVEL OF CARE:  Respite    REASON FOR GIP:   NA    *PATIENT REMAINS ELIGIBLE FOR GIP LEVEL OF CARE AS EVIDENCED BY: NA      REASON FOR RESPITE:  caregiver exhaustion    O2 SAFETY:  NA    FALL INTERVENTIONS PROVIDED:   Implemented/recommended use of non-skid footwear, Implemented/recommended resources for alarm system (personal alarm, bed alarm, call bell, etc.) , Implemented/recommended environmental changes (remove hazards, lower bed, improve lighting, etc.) and Implemented/recommended increased supervision/assistance    INTERDISPLINARY COMMUNICATION/COLLABORATION:  Physician, MSW and RN, CNA    NEW MEDICATION INITIATION DOCUMENTATION:  NA    Reason medication is being initiated:  CADENCE    MD / Provider name consulted re: change in status / initiation of new medication:  NA    New Symptom(s):  NA    New Order(s):  NA    Name of the person notified of the changes:  NA    Name of person being taught:  NA    Instructions given:  NA    Side Effects taught:  NA    Response to teaching:  NA      COMFORTABLE DYING MEASURE:  Is Patient/family satisfied with symptom level?  yes    DISCHARGE PLAN:  Pt will continue her respite stay and is scheduled to discharge back home on 6/1 and will be followed by [facility].

## 2022-05-31 PROCEDURE — 74011250637 HC RX REV CODE- 250/637: Performed by: FAMILY MEDICINE

## 2022-05-31 PROCEDURE — G0299 HHS/HOSPICE OF RN EA 15 MIN: HCPCS

## 2022-05-31 PROCEDURE — G0300 HHS/HOSPICE OF LPN EA 15 MIN: HCPCS

## 2022-05-31 PROCEDURE — 0655 HSPC INPATIENT RESPITE

## 2022-05-31 RX ADMIN — ALPRAZOLAM 0.5 MG: 0.5 TABLET ORAL at 02:11

## 2022-05-31 RX ADMIN — DAPSONE 100 MG: 100 TABLET ORAL at 08:14

## 2022-05-31 RX ADMIN — HYDROXYCHLOROQUINE SULFATE 200 MG: 200 TABLET, FILM COATED ORAL at 08:14

## 2022-05-31 NOTE — HOSPICE
1900 Received report from [address] Patient is resting in bed looking at her book. Patient has relaxed face, please see flow sheet for assessment. 2130 Patient is resting in bed with relaxed face. 2300 Patient is sleeping with covers over her head. 0100 Patient is sleeping with relaxed face. 0300 Patient sleeping with covers over head. 4135 Changed patient's brief and repositioned in bed. Patient is sleeping with relaxed face. NAME OF PATIENT:  Tino Swan    LEVEL OF CARE:  Respite    REASON FOR GIP:   NA    *PATIENT REMAINS ELIGIBLE FOR GIP LEVEL OF CARE AS EVIDENCED BY: (MUST BE ADDRESSED OF PATIENT GIP)  NA    REASON FOR RESPITE:  Caregiver breakdown    O2 SAFETY:  NA    FALL INTERVENTIONS PROVIDED:   Implemented/recommended use of fall risk identification flag to all team members, Implemented/recommended assistive devices and encouraged their use, Implemented/recommended resources for alarm system (personal alarm, bed alarm, call bell, etc.) , Implemented/recommended environmental changes (remove hazards, lower bed, improve lighting, etc.) and Implemented/recommended increased supervision/assistance    INTERDISPLINARY COMMUNICATION/COLLABORATION:  Physician and RN, CNA    NEW MEDICATION INITIATION DOCUMENTATION:  NA    Reason medication is being initiated:  NA    MD / Provider name consulted re: change in status / initiation of new medication:  NA    New Symptom(s):  NA    New Order(s):  NA    Name of the person notified of the changes:  NA    Name of person being taught:  NA    Instructions given:  NA  Side Effects taught:  NA    Response to teaching:  NA      COMFORTABLE DYING MEASURE:  Is Patient/family satisfied with symptom level?  yes    DISCHARGE PLAN:  Home.

## 2022-05-31 NOTE — PROGRESS NOTES
Problem: Falls - Risk of  Goal: *Absence of Falls  Description: Document Star Landaverde Fall Risk and appropriate interventions in the flowsheet. Outcome: Progressing Towards Goal  Note: Fall Risk Interventions:       Mentation Interventions: Adequate sleep, hydration, pain control,Door open when patient unattended,More frequent rounding,Room close to nurse's station    Medication Interventions: Bed/chair exit alarm    Elimination Interventions: Stay With Me (per policy),Toileting schedule/hourly rounds              Problem: Patient Education: Go to Patient Education Activity  Goal: Patient/Family Education  Outcome: Progressing Towards Goal     Problem: Pressure Injury - Risk of  Goal: *Prevention of pressure injury  Description: Document Adolph Scale and appropriate interventions in the flowsheet. Outcome: Progressing Towards Goal  Note: Pressure Injury Interventions:  Sensory Interventions: Float heels,Minimize linen layers,Turn and reposition approx. every two hours (pillows and wedges if needed),Check visual cues for pain,Keep linens dry and wrinkle-free    Moisture Interventions: Check for incontinence Q2 hours and as needed,Minimize layers,Apply protective barrier, creams and emollients,Moisture barrier    Activity Interventions: Pressure redistribution bed/mattress(bed type)    Mobility Interventions: Float heels,Turn and reposition approx.  every two hours(pillow and wedges)    Nutrition Interventions: Document food/fluid/supplement intake    Friction and Shear Interventions: Apply protective barrier, creams and emollients,Feet elevated on foot rest,Minimize layers                Problem: Patient Education: Go to Patient Education Activity  Goal: Patient/Family Education  Outcome: Progressing Towards Goal

## 2022-05-31 NOTE — PROGRESS NOTES
1900-Handoff report received from [facility]. [facility] patient; Respite LOC with an admitting diagnosis of Diastolic HF.     4721-GKSHMBV resting quietly in bed watching TV and looking at a magazine. 2000-Shift assessment completed, see flow sheets. Neuro-A&Ox1  Cardio=WNL  Resp=RA  GI=Regular diet  =Incontinent brief  Skin=Intact; skin folds hold moisture   Access=None    2210-Patient turned and repositioned. Changed saturated brief. Applied zinc ointment and powder to skin folds. 0000-Patient resting quietly in bed watching TV and looking at a magazine. 0210-PRN Xanax given due to patient showing restlessness and anxiety. Administered with applesauce. Patient ate the entire applesauce     0400-Patient resting quietly in bed with eyes closed. 0600-Patient resting quietly in bed watching TV and looking at a magazine. Saturated brief changed.      0700-Handoff report given to Zenobia FRIAS    NAME OF PATIENT:  George Moyer    LEVEL OF CARE:  Respite     REASON FOR GIP:   N/A    *PATIENT REMAINS ELIGIBLE FOR GIP LEVEL OF CARE AS EVIDENCED BY: (MUST BE ADDRESSED OF PATIENT GIP)      REASON FOR RESPITE:  Caregiver breakdown    O2 SAFETY:  N/A    FALL INTERVENTIONS PROVIDED:   Implemented/recommended use of non-skid footwear, Implemented/recommended use of fall risk identification flag to all team members, Implemented/recommended assistive devices and encouraged their use, Implemented/recommended resources for alarm system (personal alarm, bed alarm, call bell, etc.) , Implemented/recommended environmental changes (remove hazards, lower bed, improve lighting, etc.) and Implemented/recommended increased supervision/assistance    INTERDISPLINARY COMMUNICATION/COLLABORATION:  Physician, MSW, [place] and RN, CNA    NEW MEDICATION INITIATION DOCUMENTATION:  N/A    Reason medication is being initiated:  N/A    MD / Provider name consulted re: change in status / initiation of new medication:  N/A    New Symptom(s):  N/A    New Order(s):  N/A    Name of the person notified of the changes:  N/A    Name of person being taught:  N/A    Instructions given:  N/A    Side Effects taught:  N/A    Response to teaching:  N/A      COMFORTABLE DYING MEASURE:  Is Patient/family satisfied with symptom level?  yes    DISCHARGE PLAN:  Home on Wednesday 6/1/22

## 2022-05-31 NOTE — PROGRESS NOTES
Problem: Falls - Risk of  Goal: *Absence of Falls  Description: Document Elizabeth Cotto Fall Risk and appropriate interventions in the flowsheet. Outcome: Progressing Towards Goal  Note: Fall Risk Interventions:       Mentation Interventions: Adequate sleep, hydration, pain control,Bed/chair exit alarm,Door open when patient unattended,Familiar objects from home,More frequent rounding,Room close to nurse's station    Medication Interventions: Bed/chair exit alarm    Elimination Interventions: Bed/chair exit alarm,Call light in reach              Problem: Patient Education: Go to Patient Education Activity  Goal: Patient/Family Education  Outcome: Progressing Towards Goal     Problem: Pressure Injury - Risk of  Goal: *Prevention of pressure injury  Description: Document Adolph Scale and appropriate interventions in the flowsheet.   Outcome: Progressing Towards Goal  Note: Pressure Injury Interventions:  Sensory Interventions: Float heels,Pressure redistribution bed/mattress (bed type),Minimize linen layers,Check visual cues for pain,Keep linens dry and wrinkle-free,Avoid rigorous massage over bony prominences,Pad between skin to skin    Moisture Interventions: Apply protective barrier, creams and emollients,Maintain skin hydration (lotion/cream),Moisture barrier,Minimize layers    Activity Interventions: Increase time out of bed    Mobility Interventions: Pressure redistribution bed/mattress (bed type),Float heels    Nutrition Interventions: Document food/fluid/supplement intake,Offer support with meals,snacks and hydration    Friction and Shear Interventions: Apply protective barrier, creams and emollients,Minimize layers                Problem: Patient Education: Go to Patient Education Activity  Goal: Patient/Family Education  Outcome: Progressing Towards Goal

## 2022-06-01 VITALS
OXYGEN SATURATION: 93 % | DIASTOLIC BLOOD PRESSURE: 64 MMHG | SYSTOLIC BLOOD PRESSURE: 117 MMHG | TEMPERATURE: 98.1 F | HEART RATE: 64 BPM | RESPIRATION RATE: 18 BRPM

## 2022-06-01 PROCEDURE — 74011250637 HC RX REV CODE- 250/637: Performed by: FAMILY MEDICINE

## 2022-06-01 PROCEDURE — 0651 HSPC ROUTINE HOME CARE

## 2022-06-01 PROCEDURE — G0300 HHS/HOSPICE OF LPN EA 15 MIN: HCPCS

## 2022-06-01 RX ADMIN — DAPSONE 100 MG: 100 TABLET ORAL at 08:55

## 2022-06-01 RX ADMIN — HYDROXYCHLOROQUINE SULFATE 200 MG: 200 TABLET, FILM COATED ORAL at 08:55

## 2022-06-01 NOTE — PROGRESS NOTES
Problem: Falls - Risk of  Goal: *Absence of Falls  Description: Document Ana Gautam Fall Risk and appropriate interventions in the flowsheet. Outcome: Progressing Towards Goal  Note: Fall Risk Interventions:       Mentation Interventions: Adequate sleep, hydration, pain control    Medication Interventions: Bed/chair exit alarm    Elimination Interventions: Bed/chair exit alarm              Problem: Patient Education: Go to Patient Education Activity  Goal: Patient/Family Education  Outcome: Progressing Towards Goal     Problem: Pressure Injury - Risk of  Goal: *Prevention of pressure injury  Description: Document Adolph Scale and appropriate interventions in the flowsheet.   Outcome: Progressing Towards Goal  Note: Pressure Injury Interventions:  Sensory Interventions: Float heels,Keep linens dry and wrinkle-free    Moisture Interventions: Apply protective barrier, creams and emollients    Activity Interventions: Pressure redistribution bed/mattress(bed type)    Mobility Interventions: HOB 30 degrees or less    Nutrition Interventions: Document food/fluid/supplement intake    Friction and Shear Interventions: Apply protective barrier, creams and emollients                Problem: Patient Education: Go to Patient Education Activity  Goal: Patient/Family Education  Outcome: Progressing Towards Goal

## 2022-06-01 NOTE — PROGRESS NOTES
..  0700:Report received from [employer] and [employer]. Pt sleeping no acute distress or discomfort noted HOB bed elevated bed alarm in place comfort and safety maintained. 08:55:Pt repositioned meal setup, adm am meds, tolerated well.  0900:Report called to James, no changes were made during pt's stay. 10:50:Pt resting with eyes closed, no discomfort noted. POA updated on discharge. 11:45:[place] aid at bedside, full bed bath given, pt had a small BM. 12:30:Pt ate 50% of her meal, tolerated well.  13:15:Transport arrived pt left with her meds DNR and personal belongings. NAME OF PATIENT:      LEVEL OF CARE: Respite  REASON FOR GIP: N/A     *PATIENT REMAINS ELIGIBLE FOR GIP LEVEL OF CARE AS EVIDENCED BY: n/a    REASON FOR RESPITE:  Caregiver Exhaustion     O2 SAFETY:  Concentrator positioning (6\" from furniture/drapes), Tanks stored in manuel , No petroleum based products on face while oxygen in use and Oxygen sign on the door     FALL INTERVENTIONS PROVIDED:   Implemented/recommended use of fall risk identification flag to all team members, Implemented/recommended assistive devices and encouraged their use, Implemented/recommended resources for alarm system (personal alarm, bed alarm, call bell, etc.)  and Implemented/recommended environmental changes (remove hazards, lower bed, improve lighting, etc.)     INTERDISPLINARY COMMUNICATION/COLLABORATION:  Physician, MSW, [place] and RN, CNA     NEW MEDICATION INITIATION DOCUMENTATION:N/A      Reason medication is being initiated:  N/A     MD / Provider name consulted re: change in status / initiation of new medication:  N/A     New Symptom(s):  N/A     New Order(s):  N/A     Name of the person notified of the changes:  N/A     Name of person being taught:  N/A     Instructions given:  N/A     Side Effects taught:  N/A     Response to teaching:  N/A        COMFORTABLE DYING MEASURE:  Is Patient/family satisfied with symptom level? yes     DISCHARGE PLAN:Pt will be discharge today.

## 2022-06-01 NOTE — PROGRESS NOTES
Problem: Falls - Risk of  Goal: *Absence of Falls  Description: Document Lenardbrennan Hayes Fall Risk and appropriate interventions in the flowsheet. Outcome: Resolved/Met     Problem: Patient Education: Go to Patient Education Activity  Goal: Patient/Family Education  Outcome: Resolved/Met     Problem: Pressure Injury - Risk of  Goal: *Prevention of pressure injury  Description: Document Adolph Scale and appropriate interventions in the flowsheet.   Outcome: Resolved/Met     Problem: Patient Education: Go to Patient Education Activity  Goal: Patient/Family Education  Outcome: Resolved/Met

## 2022-06-09 ENCOUNTER — HOSPICE ADMISSION (OUTPATIENT)
Dept: HOSPICE | Facility: HOSPICE | Age: 77
End: 2022-06-09
Payer: OTHER MISCELLANEOUS

## 2022-07-01 ENCOUNTER — HOSPITAL ENCOUNTER (INPATIENT)
Age: 77
LOS: 5 days | Discharge: HOSPICE | End: 2022-07-06
Attending: FAMILY MEDICINE | Admitting: FAMILY MEDICINE
Payer: COMMERCIAL

## 2022-07-01 PROCEDURE — 74011250637 HC RX REV CODE- 250/637: Performed by: FAMILY MEDICINE

## 2022-07-01 PROCEDURE — 3336500001 HSPC ELECTION

## 2022-07-01 PROCEDURE — 0655 HSPC INPATIENT RESPITE

## 2022-07-01 PROCEDURE — G0299 HHS/HOSPICE OF RN EA 15 MIN: HCPCS

## 2022-07-01 RX ORDER — DAPSONE 100 MG/1
100 TABLET ORAL DAILY
Status: DISCONTINUED | OUTPATIENT
Start: 2022-07-02 | End: 2022-07-06 | Stop reason: HOSPADM

## 2022-07-01 RX ORDER — NYSTATIN 100000 U/G
CREAM TOPICAL 3 TIMES DAILY
Status: DISCONTINUED | OUTPATIENT
Start: 2022-07-01 | End: 2022-07-01

## 2022-07-01 RX ORDER — NYSTATIN 100000 U/G
CREAM TOPICAL
Status: DISCONTINUED | OUTPATIENT
Start: 2022-07-01 | End: 2022-07-06 | Stop reason: HOSPADM

## 2022-07-01 RX ORDER — LORAZEPAM 2 MG/ML
1 CONCENTRATE ORAL
Status: DISCONTINUED | OUTPATIENT
Start: 2022-07-01 | End: 2022-07-06 | Stop reason: HOSPADM

## 2022-07-01 RX ORDER — ALPRAZOLAM 0.5 MG/1
0.5 TABLET ORAL
Status: DISCONTINUED | OUTPATIENT
Start: 2022-07-01 | End: 2022-07-06 | Stop reason: HOSPADM

## 2022-07-01 RX ORDER — MORPHINE SULFATE 20 MG/ML
5 SOLUTION ORAL
Status: DISCONTINUED | OUTPATIENT
Start: 2022-07-01 | End: 2022-07-06 | Stop reason: HOSPADM

## 2022-07-01 RX ORDER — FACIAL-BODY WIPES
10 EACH TOPICAL DAILY PRN
Status: DISCONTINUED | OUTPATIENT
Start: 2022-07-01 | End: 2022-07-06 | Stop reason: HOSPADM

## 2022-07-01 RX ORDER — HYDROXYCHLOROQUINE SULFATE 200 MG/1
200 TABLET, FILM COATED ORAL 2 TIMES DAILY WITH MEALS
Status: DISCONTINUED | OUTPATIENT
Start: 2022-07-01 | End: 2022-07-03

## 2022-07-01 RX ORDER — TRAMADOL HYDROCHLORIDE 50 MG/1
50 TABLET ORAL
Status: DISCONTINUED | OUTPATIENT
Start: 2022-07-01 | End: 2022-07-06 | Stop reason: HOSPADM

## 2022-07-01 RX ORDER — HYOSCYAMINE SULFATE 0.12 MG/1
0.12 TABLET SUBLINGUAL
Status: DISCONTINUED | OUTPATIENT
Start: 2022-07-01 | End: 2022-07-06 | Stop reason: HOSPADM

## 2022-07-01 RX ORDER — ACETAMINOPHEN 650 MG/1
650 SUPPOSITORY RECTAL
Status: DISCONTINUED | OUTPATIENT
Start: 2022-07-01 | End: 2022-07-06 | Stop reason: HOSPADM

## 2022-07-01 RX ORDER — HALOPERIDOL 2 MG/ML
0.5 SOLUTION ORAL
Status: DISCONTINUED | OUTPATIENT
Start: 2022-07-01 | End: 2022-07-06 | Stop reason: HOSPADM

## 2022-07-01 RX ADMIN — HYDROXYCHLOROQUINE SULFATE 200 MG: 200 TABLET, FILM COATED ORAL at 17:36

## 2022-07-01 NOTE — PROGRESS NOTES
1300: Patient arrived to [facility] via medical transport, assisted into position of comfort in bed. [facility] RN has provided orders. Patient assessment complete with RODRIGUEZ Ruiz. Patient's skin is intact. Left heel is boggy. Patient is alert, appears oriented to self only, speaks few words only. Patient has a pink stuffed animal and a binder with magazine pages. 1315: Called patient's sister, she is appreciative of call. She only sent in medications that patient uses at home, she only very occasionally takes tramadol or xanax. 1345: [facility] at the bedside. Patient resting in bed, no needs at this time   1500: Patient resting in bed with television on  1615: Patient resting in bed with eyes closed. 1700: Patient eating her dinner with assistance from Elie, [address] [place] [address] Drive: Patient's scheduled hydroxychloroquine administered.    Patient has completed 100%of her dinner  1830:  Patient resting in bed looking at her \"book\"   1900: Report given to [place], [facility]

## 2022-07-01 NOTE — H&P
[address] [facility] Help to Those in Need  [phone]    Patient Name: Malorie Hodge  YOB: 1945    Date of Provider Hospice Visit: 07/01/22    Level of Care:   [] General Inpatient (GIP)    [] Routine   [x] Respite    Current Location of Care:  [] [facility] [] [facility] [] [facility] [] [facility] [x] [facility]      Principle Hospice Diagnosis: Chronic diastolic heart failure, lupus, MR       HOSPICE SUMMARY     Chart Reviewed for patient's medical history and hospice care plan. Hospice Physician Certification/Recertification Narrative per Yennifer Mason / soheila MD:     Patient is a 79-year-old female with diastolic heart failure and sent to the [facility] hospice house for respite level care. Patient with a history of diastolic heart failure but also history of lupus with MR. Patient is nonverbal, has shown evidence of decline with decreased p.o. intake. Her sister is her primary caregiver. Patient currently being served by [facility] hospice    Patient being admitted for Respite care x 5 days for   [x]  Caregiver exhaustion and needing break  []  Caregiver unavailable       PLAN   1. Patient's home medications were reviewed and reconciled. Continue with current home medications and plan of care as outlined in chart. 2. Patient examined and appears stable. Plan of care dictated by [facility] hospice. Our team will continue to follow for symptom burden    3.  and SW to support family needs. 4. Disposition: Home with hospice once respite stay is completed.

## 2022-07-01 NOTE — PROGRESS NOTES
Problem: Pressure Injury - Risk of  Goal: *Prevention of pressure injury  Description: Document Adolph Scale and appropriate interventions in the flowsheet.   7/1/2022 1356 by David Masterson RN  Outcome: Progressing Towards Goal  Note: Pressure Injury Interventions:  Sensory Interventions: Assess changes in LOC,Avoid rigorous massage over bony prominences,Check visual cues for pain,Float heels,Minimize linen layers,Pressure redistribution bed/mattress (bed type)    Moisture Interventions: Apply protective barrier, creams and emollients,Limit adult briefs,Minimize layers,Maintain skin hydration (lotion/cream),Moisture barrier    Activity Interventions: Pressure redistribution bed/mattress(bed type)    Mobility Interventions: Float heels,HOB 30 degrees or less,Pressure redistribution bed/mattress (bed type)    Nutrition Interventions: Offer support with meals,snacks and hydration    Friction and Shear Interventions: Apply protective barrier, creams and emollients,HOB 30 degrees or less,Minimize layers             7/1/2022 1355 by David Masterson RN  Outcome: Progressing Towards Goal  Note: Pressure Injury Interventions:  Sensory Interventions: Assess changes in LOC,Avoid rigorous massage over bony prominences,Check visual cues for pain,Float heels,Minimize linen layers,Pressure redistribution bed/mattress (bed type)    Moisture Interventions: Apply protective barrier, creams and emollients,Limit adult briefs,Minimize layers,Maintain skin hydration (lotion/cream),Moisture barrier    Activity Interventions: Pressure redistribution bed/mattress(bed type)    Mobility Interventions: Float heels,HOB 30 degrees or less,Pressure redistribution bed/mattress (bed type)    Nutrition Interventions: Offer support with meals,snacks and hydration    Friction and Shear Interventions: Apply protective barrier, creams and emollients,HOB 30 degrees or less,Minimize layers                Problem: Patient Education: Go to Patient Education Activity  Goal: Patient/Family Education  7/1/2022 1358 by Felecia Rosales RN  Outcome: Progressing Towards Goal  7/1/2022 1356 by Felecia Rosales RN  Outcome: Progressing Towards Goal     Problem: Potential for Alteration in Skin Integrity  Goal: Monitor skin for areas of alteration in skin integrity  Description: Patient/family/caregiver will demonstrate ability to care for patient's skin, monitor for areas of breakdown, and demonstrate methods to prevent breakdown during hospice care. 7/1/2022 1358 by Felecia Rosales RN  Outcome: Progressing Towards Goal  7/1/2022 1356 by Felecia Rosales RN  Outcome: Progressing Towards Goal     Problem: Risk for Falls  Goal: Free of falls during inpatient stay  Description: Patient will be free of falls during inpatient stay. 7/1/2022 1358 by Felecia Rosales RN  Outcome: Progressing Towards Goal  7/1/2022 1356 by Felecia Rosales RN  Outcome: Progressing Towards Goal     Problem: Alteration in Mobility  Goal: Remain as independent as possible and remain safe in environment  Description: Patient will remain as independent as possible and remain safe in their environment.   7/1/2022 1358 by Felecia Rosales RN  Outcome: Progressing Towards Goal  7/1/2022 1356 by Felecia Rosales RN  Outcome: Progressing Towards Goal     Problem: Pain  Goal: Assess satisfaction of level of comfort and symptom control  7/1/2022 1358 by Felecia Rosales RN  Outcome: Progressing Towards Goal  7/1/2022 1356 by Felecia Rosales RN  Outcome: Progressing Towards Goal  Goal: *Control of acute pain  7/1/2022 1358 by Felecia Rosales RN  Outcome: Progressing Towards Goal  7/1/2022 1356 by Felecia Rosales RN  Outcome: Progressing Towards Goal     Problem: Anxiety/Agitation  Goal: Verbalize or staff assess the ability to manage anxiety  Description: The patient/family/caregiver will verbalize and demonstrate ability to manage the patient's anxiety throughout hospice care. 7/1/2022 1358 by Jose Gerber RN  Outcome: Progressing Towards Goal  7/1/2022 1356 by Jose Gerber RN  Outcome: Progressing Towards Goal     Problem: Communication Deficit  Goal: Effectively communicate symptoms, needs, and concerns  Description: Patient/family/caregiver will effectively communicate symptoms, needs and concerns. 7/1/2022 1358 by Jose Gerber RN  Outcome: Progressing Towards Goal  7/1/2022 1356 by Jose Gerber RN  Outcome: Progressing Towards Goal     Problem: Coping and Emotional Distress  Goal: Demonstrate acceptance of terminal illness and understanding of disease progression  Description: Patient/family/caregiver will demonstrate acceptance of terminal disease and understanding of disease progression while employing appropriate coping mechanisms.   7/1/2022 1358 by Jose Gerber RN  Outcome: Progressing Towards Goal  7/1/2022 1356 by Jose Gerber RN  Outcome: Progressing Towards Goal     Problem: Pressure Injury - Risk of  Goal: *Prevention of pressure injury  7/1/2022 1358 by Jose Gerber RN  Outcome: Progressing Towards Goal  Note: Pressure Injury Interventions:  Sensory Interventions: Assess changes in LOC,Avoid rigorous massage over bony prominences,Check visual cues for pain,Float heels,Minimize linen layers,Pressure redistribution bed/mattress (bed type)    Moisture Interventions: Apply protective barrier, creams and emollients,Limit adult briefs,Minimize layers,Maintain skin hydration (lotion/cream),Moisture barrier    Activity Interventions: Pressure redistribution bed/mattress(bed type)    Mobility Interventions: Float heels,HOB 30 degrees or less,Pressure redistribution bed/mattress (bed type)    Nutrition Interventions: Offer support with meals,snacks and hydration    Friction and Shear Interventions: Apply protective barrier, creams and emollients,HOB 30 degrees or less,Minimize layers             7/1/2022 1356 by Sacha Payton RN  Outcome: Progressing Towards Goal  Note: Pressure Injury Interventions:  Sensory Interventions: Assess changes in LOC,Avoid rigorous massage over bony prominences,Check visual cues for pain,Float heels,Minimize linen layers,Pressure redistribution bed/mattress (bed type)    Moisture Interventions: Apply protective barrier, creams and emollients,Limit adult briefs,Minimize layers,Maintain skin hydration (lotion/cream),Moisture barrier    Activity Interventions: Pressure redistribution bed/mattress(bed type)    Mobility Interventions: Float heels,HOB 30 degrees or less,Pressure redistribution bed/mattress (bed type)    Nutrition Interventions: Offer support with meals,snacks and hydration    Friction and Shear Interventions: Apply protective barrier, creams and emollients,HOB 30 degrees or less,Minimize layers

## 2022-07-01 NOTE — PROGRESS NOTES
Problem: Pressure Injury - Risk of  Goal: *Prevention of pressure injury  Description: Document Adolph Scale and appropriate interventions in the flowsheet. Outcome: Progressing Towards Goal  Note: Pressure Injury Interventions:  Sensory Interventions: Assess changes in LOC,Avoid rigorous massage over bony prominences,Check visual cues for pain,Float heels,Minimize linen layers,Pressure redistribution bed/mattress (bed type)    Moisture Interventions: Apply protective barrier, creams and emollients,Limit adult briefs,Minimize layers,Maintain skin hydration (lotion/cream),Moisture barrier    Activity Interventions: Pressure redistribution bed/mattress(bed type)    Mobility Interventions: Float heels,HOB 30 degrees or less,Pressure redistribution bed/mattress (bed type)    Nutrition Interventions: Offer support with meals,snacks and hydration    Friction and Shear Interventions: Apply protective barrier, creams and emollients,HOB 30 degrees or less,Minimize layers                Problem: Patient Education: Go to Patient Education Activity  Goal: Patient/Family Education  Outcome: Progressing Towards Goal     Problem: Potential for Alteration in Skin Integrity  Goal: Monitor skin for areas of alteration in skin integrity  Description: Patient/family/caregiver will demonstrate ability to care for patient's skin, monitor for areas of breakdown, and demonstrate methods to prevent breakdown during hospice care. Outcome: Progressing Towards Goal     Problem: Risk for Falls  Goal: Free of falls during inpatient stay  Description: Patient will be free of falls during inpatient stay. Outcome: Progressing Towards Goal     Problem: Alteration in Mobility  Goal: Remain as independent as possible and remain safe in environment  Description: Patient will remain as independent as possible and remain safe in their environment.   Outcome: Progressing Towards Goal     Problem: Pain  Goal: Assess satisfaction of level of comfort and symptom control  Outcome: Progressing Towards Goal  Goal: *Control of acute pain  Outcome: Progressing Towards Goal     Problem: Anxiety/Agitation  Goal: Verbalize or staff assess the ability to manage anxiety  Description: The patient/family/caregiver will verbalize and demonstrate ability to manage the patient's anxiety throughout hospice care. Outcome: Progressing Towards Goal     Problem: Communication Deficit  Goal: Effectively communicate symptoms, needs, and concerns  Description: Patient/family/caregiver will effectively communicate symptoms, needs and concerns. Outcome: Progressing Towards Goal     Problem: Coping and Emotional Distress  Goal: Demonstrate acceptance of terminal illness and understanding of disease progression  Description: Patient/family/caregiver will demonstrate acceptance of terminal disease and understanding of disease progression while employing appropriate coping mechanisms.   Outcome: Progressing Towards Goal     Problem: Pressure Injury - Risk of  Goal: *Prevention of pressure injury  Outcome: Progressing Towards Goal  Note: Pressure Injury Interventions:  Sensory Interventions: Assess changes in LOC,Avoid rigorous massage over bony prominences,Check visual cues for pain,Float heels,Minimize linen layers,Pressure redistribution bed/mattress (bed type)    Moisture Interventions: Apply protective barrier, creams and emollients,Limit adult briefs,Minimize layers,Maintain skin hydration (lotion/cream),Moisture barrier    Activity Interventions: Pressure redistribution bed/mattress(bed type)    Mobility Interventions: Float heels,HOB 30 degrees or less,Pressure redistribution bed/mattress (bed type)    Nutrition Interventions: Offer support with meals,snacks and hydration    Friction and Shear Interventions: Apply protective barrier, creams and emollients,HOB 30 degrees or less,Minimize layers

## 2022-07-02 PROCEDURE — 0655 HSPC INPATIENT RESPITE

## 2022-07-02 PROCEDURE — G0299 HHS/HOSPICE OF RN EA 15 MIN: HCPCS

## 2022-07-02 PROCEDURE — 74011250637 HC RX REV CODE- 250/637: Performed by: FAMILY MEDICINE

## 2022-07-02 RX ADMIN — HYDROXYCHLOROQUINE SULFATE 200 MG: 200 TABLET, FILM COATED ORAL at 08:02

## 2022-07-02 RX ADMIN — HYDROXYCHLOROQUINE SULFATE 200 MG: 200 TABLET, FILM COATED ORAL at 17:08

## 2022-07-02 RX ADMIN — DAPSONE 100 MG: 100 TABLET ORAL at 08:02

## 2022-07-02 NOTE — PROGRESS NOTES
1900 Report received from Lakhwinder, [facility].  8032 Assessment performed as documented in flow sheet. Patient provided with and drank 200ml apple juice. Patient admitted today respite LOC. She is a patient of [facility]. She is anxious and nervous with touch. 2120 Patient appears to be sleeping. No signs of pain or discomfort noted. 2317 Patient awake and looking at her magazine. 0102 Patient continues to flip through her magazines. 0234 Patient awake and resting quietly while looking through her magazine book. 0320 Patient repositioned with pillow under right side and brief checked which is clean and dry. 8635 Patient resting quietly with eyes closed. 4176 Patient appears to be sleeping with even and unlabored respirations. 0358 Patient's urine soiled brief changed, pillow placed under left side and positioned for comfort.  0700 Report given to RODRIGUEZ Keane.    NAME OF PATIENT:  Corine Slaughter    LEVEL OF CARE:  Respite    REASON FOR GIP:   N/A    *PATIENT REMAINS ELIGIBLE FOR [facility] LEVEL OF CARE AS EVIDENCED BY: (MUST BE ADDRESSED OF PATIENT GIP) N/A      REASON FOR RESPITE:  Caregiver breakdown    O2 SAFETY:  N/A patient is on room air.     FALL INTERVENTIONS PROVIDED:   Implemented/recommended resources for alarm system (personal alarm, bed alarm, call bell, etc.) , Implemented/recommended environmental changes (remove hazards, lower bed, improve lighting, etc.) and Implemented/recommended increased supervision/assistance    INTERDISPLINARY COMMUNICATION/COLLABORATION:  Physician, MSW, [place] and RN, CNA    NEW MEDICATION INITIATION DOCUMENTATION:  N/A    Reason medication is being initiated:  N/A    MD / Provider name consulted re: change in status / initiation of new medication:  N/A    New Symptom(s):  N/A    New Order(s):  N/A    Name of the person notified of the changes:  N/A    Name of person being taught:  N/A    Instructions given:  N/A    Side Effects taught:  N/A    Response to teaching: N/A      COMFORTABLE DYING MEASURE:  Is Patient/family satisfied with symptom level?  yes    DISCHARGE PLAN:  Home with sister Alexandra Calero at the end of her respite stay.

## 2022-07-02 NOTE — PROGRESS NOTES
0700: Received report from Anusha, [facility]. Patient is awake in bed, resting quietly, flipping through her magazine book  0800: Patient assessment complete. Patient is resting in bed, awake, with looking at her magazine book. Patient smiles occasionally during interaction. Scheduled dapsone and hydroxychloroquine administered as ordered. 0845: Patient has finished her breakfast, completed about 25% of her breakfast, with Mykel Domingo CNA. Neutral facial position observed. No apparent needs at his time. 0930: Patient in bed with her book and stuffed animal  [phone]: Patient appears sleeping  1130: Patient resting in bed, Mykel Domingo CNA, preparing to bathe patient  1300: Patient in bed, watching cartoons on television   1430: Patient appears sleeping  [phone]:  Patient looking at her magazine book  [phone]: Patient eating dinner with assistance of Mykel Domingo CNA. Scheduled hydroxychloroquine administered. 1800: Brief changed with Mykel Domingo CNA for urinary in continence.     1900: Report given to August Krishnan

## 2022-07-02 NOTE — PROGRESS NOTES
1900 [facility] performed as documented in flow sheet. Patient is [facility] respite patient with hospice diagnosis of CHF. Patient sitting in bed looking at her magazine book. Patient smiling when I showed her the picture and spoke of the therapy dog that visited her today. 2000 Patient provided with lemon/lime soda which she drank 100% of.  2200 Patient resting quietly with eyes closed and relaxed facial expression. 2300 Patient provided with soda. Patient drank 100%. 0000 Patient awake flipping through her magazine. 0020 Patient's diaper changed, erna care provided, and repositioned with pillow under left side and heels. 2300 Patient awake looking through magazine binder. No signs of pain or discomfort noted. 1430 Patient awake and flipping through her magazine binder. 7282 Patient awake and resting quietly with even and unlabored respirations. 2445 Patient's urine soiled diaper changed, erna care provided, heels elevated on pillows and patient positioned for comfort. 2668 Report given to RODRIGUEZ Rendon.    NAME OF PATIENT:  Malorie Hodge    LEVEL OF CARE:  Respite    REASON FOR GIP:   N/A    *PATIENT REMAINS ELIGIBLE FOR GIP LEVEL OF CARE AS EVIDENCED BY: (MUST BE ADDRESSED OF PATIENT GIP)  N/A      REASON FOR RESPITE:  Caregiver breakdown    O2 SAFETY:  N/A patient is on room air.     FALL INTERVENTIONS PROVIDED:   Implemented/recommended resources for alarm system (personal alarm, bed alarm, call bell, etc.) , Implemented/recommended environmental changes (remove hazards, lower bed, improve lighting, etc.) and Implemented/recommended increased supervision/assistance    INTERDISPLINARY COMMUNICATION/COLLABORATION:  Physician, MSW, Rafael and RN, CNA    NEW MEDICATION INITIATION DOCUMENTATION:  N/A    Reason medication is being initiated:  N/A    MD / Provider name consulted re: change in status / initiation of new medication:  N/A    New Symptom(s):  N/A    New Order(s):  N/A    Name of the person notified of the changes:  N/A    Name of person being taught:  N/A    Instructions given:  N/A    Side Effects taught:  N/A    Response to teaching:  N/A      COMFORTABLE DYING MEASURE:  Is Patient/family satisfied with symptom level?  yes    DISCHARGE PLAN:  Home at end of respite stay.

## 2022-07-02 NOTE — PROGRESS NOTES
Problem: Pressure Injury - Risk of  Goal: *Prevention of pressure injury  Description: Document Adolph Scale and appropriate interventions in the flowsheet. Outcome: Progressing Towards Goal  Note: Pressure Injury Interventions:  Sensory Interventions: Check visual cues for pain,Float heels,Avoid rigorous massage over bony prominences,Keep linens dry and wrinkle-free,Minimize linen layers,Pressure redistribution bed/mattress (bed type)    Moisture Interventions: Minimize layers,Limit adult briefs,Maintain skin hydration (lotion/cream),Moisture barrier    Activity Interventions: Pressure redistribution bed/mattress(bed type)    Mobility Interventions: Pressure redistribution bed/mattress (bed type),Float heels    Nutrition Interventions: Offer support with meals,snacks and hydration    Friction and Shear Interventions: Feet elevated on foot rest,Apply protective barrier, creams and emollients,HOB 30 degrees or less,Lift sheet,Minimize layers                Problem: Patient Education: Go to Patient Education Activity  Goal: Patient/Family Education  Outcome: Progressing Towards Goal     Problem: Potential for Alteration in Skin Integrity  Goal: Monitor skin for areas of alteration in skin integrity  Description: Patient/family/caregiver will demonstrate ability to care for patient's skin, monitor for areas of breakdown, and demonstrate methods to prevent breakdown during hospice care. Outcome: Progressing Towards Goal     Problem: Risk for Falls  Goal: Free of falls during inpatient stay  Description: Patient will be free of falls during inpatient stay. Outcome: Progressing Towards Goal     Problem: Alteration in Mobility  Goal: Remain as independent as possible and remain safe in environment  Description: Patient will remain as independent as possible and remain safe in their environment.   Outcome: Progressing Towards Goal     Problem: Pain  Goal: Assess satisfaction of level of comfort and symptom control  Outcome: Progressing Towards Goal  Goal: *Control of acute pain  Outcome: Progressing Towards Goal     Problem: Anxiety/Agitation  Goal: Verbalize or staff assess the ability to manage anxiety  Description: The patient/family/caregiver will verbalize and demonstrate ability to manage the patient's anxiety throughout hospice care. Outcome: Progressing Towards Goal     Problem: Communication Deficit  Goal: Effectively communicate symptoms, needs, and concerns  Description: Patient/family/caregiver will effectively communicate symptoms, needs and concerns. Outcome: Progressing Towards Goal     Problem: Coping and Emotional Distress  Goal: Demonstrate acceptance of terminal illness and understanding of disease progression  Description: Patient/family/caregiver will demonstrate acceptance of terminal disease and understanding of disease progression while employing appropriate coping mechanisms.   Outcome: Progressing Towards Goal     Problem: Pressure Injury - Risk of  Goal: *Prevention of pressure injury  Outcome: Progressing Towards Goal  Note: Pressure Injury Interventions:  Sensory Interventions: Check visual cues for pain,Float heels,Avoid rigorous massage over bony prominences,Keep linens dry and wrinkle-free,Minimize linen layers,Pressure redistribution bed/mattress (bed type)    Moisture Interventions: Minimize layers,Limit adult briefs,Maintain skin hydration (lotion/cream),Moisture barrier    Activity Interventions: Pressure redistribution bed/mattress(bed type)    Mobility Interventions: Pressure redistribution bed/mattress (bed type),Float heels    Nutrition Interventions: Offer support with meals,snacks and hydration    Friction and Shear Interventions: Feet elevated on foot rest,Apply protective barrier, creams and emollients,HOB 30 degrees or less,Lift sheet,Minimize layers                Problem: Falls - Risk of  Goal: *Absence of Falls  Description: Document Cole Fall Risk and appropriate interventions in the flowsheet.   Outcome: Progressing Towards Goal  Note: Fall Risk Interventions:  Mobility Interventions: Bed/chair exit alarm,Communicate number of staff needed for ambulation/transfer    Mentation Interventions: Adequate sleep, hydration, pain control,Bed/chair exit alarm,Door open when patient unattended    Medication Interventions: Bed/chair exit alarm    Elimination Interventions: Call light in reach,Bed/chair exit alarm              Problem: Patient Education: Go to Patient Education Activity  Goal: Patient/Family Education  Outcome: Progressing Towards Goal

## 2022-07-02 NOTE — PROGRESS NOTES
Problem: Pressure Injury - Risk of  Goal: *Prevention of pressure injury  Description: Document Adolph Scale and appropriate interventions in the flowsheet. Outcome: Progressing Towards Goal  Note: Pressure Injury Interventions:  Sensory Interventions: Float heels    Moisture Interventions: Apply protective barrier, creams and emollients,Limit adult briefs,Minimize layers,Maintain skin hydration (lotion/cream),Moisture barrier    Activity Interventions: Pressure redistribution bed/mattress(bed type)    Mobility Interventions: Float heels,HOB 30 degrees or less,Pressure redistribution bed/mattress (bed type)    Nutrition Interventions: Offer support with meals,snacks and hydration    Friction and Shear Interventions: Apply protective barrier, creams and emollients,HOB 30 degrees or less,Minimize layers                Problem: Potential for Alteration in Skin Integrity  Goal: Monitor skin for areas of alteration in skin integrity  Description: Patient/family/caregiver will demonstrate ability to care for patient's skin, monitor for areas of breakdown, and demonstrate methods to prevent breakdown during hospice care. Outcome: Progressing Towards Goal     Problem: Potential for Alteration in Skin Integrity  Goal: Monitor skin for areas of alteration in skin integrity  Description: Patient/family/caregiver will demonstrate ability to care for patient's skin, monitor for areas of breakdown, and demonstrate methods to prevent breakdown during hospice care. Outcome: Progressing Towards Goal     Problem: Risk for Falls  Goal: Free of falls during inpatient stay  Description: Patient will be free of falls during inpatient stay.   Outcome: Progressing Towards Goal

## 2022-07-03 PROCEDURE — 0655 HSPC INPATIENT RESPITE

## 2022-07-03 PROCEDURE — G0299 HHS/HOSPICE OF RN EA 15 MIN: HCPCS

## 2022-07-03 PROCEDURE — 74011250637 HC RX REV CODE- 250/637: Performed by: FAMILY MEDICINE

## 2022-07-03 RX ORDER — HYDROXYCHLOROQUINE SULFATE 200 MG/1
200 TABLET, FILM COATED ORAL DAILY
Status: DISCONTINUED | OUTPATIENT
Start: 2022-07-04 | End: 2022-07-06 | Stop reason: HOSPADM

## 2022-07-03 RX ADMIN — HYDROXYCHLOROQUINE SULFATE 200 MG: 200 TABLET, FILM COATED ORAL at 08:17

## 2022-07-03 RX ADMIN — DAPSONE 100 MG: 100 TABLET ORAL at 08:18

## 2022-07-03 NOTE — PROGRESS NOTES
Problem: Pressure Injury - Risk of  Goal: *Prevention of pressure injury  Description: Document Adolph Scale and appropriate interventions in the flowsheet. Outcome: Progressing Towards Goal  Note: Pressure Injury Interventions:  Sensory Interventions: Assess changes in LOC    Moisture Interventions: Absorbent underpads    Activity Interventions: Pressure redistribution bed/mattress(bed type)    Mobility Interventions: Float heels    Nutrition Interventions: Offer support with meals,snacks and hydration    Friction and Shear Interventions: HOB 30 degrees or less                Problem: Patient Education: Go to Patient Education Activity  Goal: Patient/Family Education  Outcome: Progressing Towards Goal     Problem: Potential for Alteration in Skin Integrity  Goal: Monitor skin for areas of alteration in skin integrity  Description: Patient/family/caregiver will demonstrate ability to care for patient's skin, monitor for areas of breakdown, and demonstrate methods to prevent breakdown during hospice care. Outcome: Progressing Towards Goal     Problem: Risk for Falls  Goal: Free of falls during inpatient stay  Description: Patient will be free of falls during inpatient stay. Outcome: Progressing Towards Goal     Problem: Alteration in Mobility  Goal: Remain as independent as possible and remain safe in environment  Description: Patient will remain as independent as possible and remain safe in their environment. Outcome: Progressing Towards Goal     Problem: Anxiety/Agitation  Goal: Verbalize or staff assess the ability to manage anxiety  Description: The patient/family/caregiver will verbalize and demonstrate ability to manage the patient's anxiety throughout hospice care.   Outcome: Progressing Towards Goal     Problem: Pain  Goal: Assess satisfaction of level of comfort and symptom control  Outcome: Progressing Towards Goal  Goal: *Control of acute pain  Outcome: Progressing Towards Goal     Problem: Communication Deficit  Goal: Effectively communicate symptoms, needs, and concerns  Description: Patient/family/caregiver will effectively communicate symptoms, needs and concerns. Outcome: Progressing Towards Goal     Problem: Pressure Injury - Risk of  Goal: *Prevention of pressure injury  Outcome: Progressing Towards Goal     Problem: Coping and Emotional Distress  Goal: Demonstrate acceptance of terminal illness and understanding of disease progression  Description: Patient/family/caregiver will demonstrate acceptance of terminal disease and understanding of disease progression while employing appropriate coping mechanisms. Outcome: Progressing Towards Goal     Problem: Falls - Risk of  Goal: *Absence of Falls  Description: Document Fabian Flow Fall Risk and appropriate interventions in the flowsheet.   Outcome: Progressing Towards Goal     Problem: Patient Education: Go to Patient Education Activity  Goal: Patient/Family Education  Outcome: Progressing Towards Goal

## 2022-07-03 NOTE — PROGRESS NOTES
0700: Report received from Sara Tavares [address]: Patient assessment complete, documented in flowsheets. Patient resting in bed, awake, looking at her book. Neutral facial expression observed. 0730: Patient has returned to sleep. 0815: Patient awake, looking at her magazine book. Scheduled medications administered. Patient fed her breakfast, completed 75% of her meal.   0930: Patient appears sleeping  1015: Patient awake, looking at her magazine book in bed  1130: Patient looking at her book in bed. 1230: Patient eating lunch with assistance from [place], [place]. 1330: Patient looking at her magazine book in bed  1510: [address] with turning for patient's bath. Patient anxious about turning, but tolerated much better than she did with bathing yesterday. 1645: Patient in bed looking at her magazine book  1800: Patient is resting in bed watching television.    1900: Report given to Saeed Oquendo

## 2022-07-03 NOTE — PROGRESS NOTES
Problem: Pressure Injury - Risk of  Goal: *Prevention of pressure injury  Description: Document Adolph Scale and appropriate interventions in the flowsheet. Outcome: Progressing Towards Goal  Note: Pressure Injury Interventions:  Sensory Interventions: Assess changes in LOC,Check visual cues for pain,Float heels,Keep linens dry and wrinkle-free,Minimize linen layers,Pressure redistribution bed/mattress (bed type)    Moisture Interventions: Absorbent underpads,Apply protective barrier, creams and emollients,Limit adult briefs,Minimize layers,Maintain skin hydration (lotion/cream)    Activity Interventions: Pressure redistribution bed/mattress(bed type)    Mobility Interventions: Float heels,Pressure redistribution bed/mattress (bed type)    Nutrition Interventions: Offer support with meals,snacks and hydration    Friction and Shear Interventions: HOB 30 degrees or less,Lift sheet,Minimize layers                Problem: Patient Education: Go to Patient Education Activity  Goal: Patient/Family Education  Outcome: Progressing Towards Goal     Problem: Potential for Alteration in Skin Integrity  Goal: Monitor skin for areas of alteration in skin integrity  Description: Patient/family/caregiver will demonstrate ability to care for patient's skin, monitor for areas of breakdown, and demonstrate methods to prevent breakdown during hospice care. Outcome: Progressing Towards Goal     Problem: Risk for Falls  Goal: Free of falls during inpatient stay  Description: Patient will be free of falls during inpatient stay. Outcome: Progressing Towards Goal     Problem: Alteration in Mobility  Goal: Remain as independent as possible and remain safe in environment  Description: Patient will remain as independent as possible and remain safe in their environment.   Outcome: Progressing Towards Goal     Problem: Pain  Goal: Assess satisfaction of level of comfort and symptom control  Outcome: Progressing Towards Goal  Goal: *Control of acute pain  Outcome: Progressing Towards Goal     Problem: Anxiety/Agitation  Goal: Verbalize or staff assess the ability to manage anxiety  Description: The patient/family/caregiver will verbalize and demonstrate ability to manage the patient's anxiety throughout hospice care. Outcome: Progressing Towards Goal     Problem: Communication Deficit  Goal: Effectively communicate symptoms, needs, and concerns  Description: Patient/family/caregiver will effectively communicate symptoms, needs and concerns. Outcome: Progressing Towards Goal     Problem: Coping and Emotional Distress  Goal: Demonstrate acceptance of terminal illness and understanding of disease progression  Description: Patient/family/caregiver will demonstrate acceptance of terminal disease and understanding of disease progression while employing appropriate coping mechanisms. Outcome: Progressing Towards Goal     Problem: Pressure Injury - Risk of  Goal: *Prevention of pressure injury  Outcome: Progressing Towards Goal  Note: Pressure Injury Interventions:  Sensory Interventions: Assess changes in LOC,Check visual cues for pain,Float heels,Keep linens dry and wrinkle-free,Minimize linen layers,Pressure redistribution bed/mattress (bed type)    Moisture Interventions: Absorbent underpads,Apply protective barrier, creams and emollients,Limit adult briefs,Minimize layers,Maintain skin hydration (lotion/cream)    Activity Interventions: Pressure redistribution bed/mattress(bed type)    Mobility Interventions: Float heels,Pressure redistribution bed/mattress (bed type)    Nutrition Interventions: Offer support with meals,snacks and hydration    Friction and Shear Interventions: HOB 30 degrees or less,Lift sheet,Minimize layers                Problem: Falls - Risk of  Goal: *Absence of Falls  Description: Document Cole Fall Risk and appropriate interventions in the flowsheet.   Outcome: Progressing Towards Goal  Note: Fall Risk Interventions:  Mobility Interventions: Bed/chair exit alarm,Communicate number of staff needed for ambulation/transfer    Mentation Interventions: Adequate sleep, hydration, pain control,Bed/chair exit alarm,Door open when patient unattended,Familiar objects from home    Medication Interventions: Bed/chair exit alarm    Elimination Interventions: Bed/chair exit alarm,Call light in reach              Problem: Patient Education: Go to Patient Education Activity  Goal: Patient/Family Education  Outcome: Progressing Towards Goal

## 2022-07-03 NOTE — PROGRESS NOTES
Problem: Pressure Injury - Risk of  Goal: *Prevention of pressure injury  Description: Document Adolph Scale and appropriate interventions in the flowsheet. Outcome: Progressing Towards Goal  Note: Pressure Injury Interventions:  Sensory Interventions: Check visual cues for pain,Float heels,Pressure redistribution bed/mattress (bed type),Keep linens dry and wrinkle-free,Minimize linen layers    Moisture Interventions: Absorbent underpads,Maintain skin hydration (lotion/cream),Minimize layers    Activity Interventions: Pressure redistribution bed/mattress(bed type)    Mobility Interventions: Float heels,Pressure redistribution bed/mattress (bed type)    Nutrition Interventions: Document food/fluid/supplement intake,Offer support with meals,snacks and hydration    Friction and Shear Interventions: Minimize layers,Feet elevated on foot rest                Problem: Potential for Alteration in Skin Integrity  Goal: Monitor skin for areas of alteration in skin integrity  Description: Patient/family/caregiver will demonstrate ability to care for patient's skin, monitor for areas of breakdown, and demonstrate methods to prevent breakdown during hospice care. Outcome: Progressing Towards Goal     Problem: Risk for Falls  Goal: Free of falls during inpatient stay  Description: Patient will be free of falls during inpatient stay. Outcome: Progressing Towards Goal     Problem: Alteration in Mobility  Goal: Remain as independent as possible and remain safe in environment  Description: Patient will remain as independent as possible and remain safe in their environment.   Outcome: Progressing Towards Goal

## 2022-07-04 PROCEDURE — G0299 HHS/HOSPICE OF RN EA 15 MIN: HCPCS

## 2022-07-04 PROCEDURE — 74011250637 HC RX REV CODE- 250/637: Performed by: FAMILY MEDICINE

## 2022-07-04 PROCEDURE — 0655 HSPC INPATIENT RESPITE

## 2022-07-04 RX ADMIN — DAPSONE 100 MG: 100 TABLET ORAL at 09:01

## 2022-07-04 RX ADMIN — HYDROXYCHLOROQUINE SULFATE 200 MG: 200 TABLET, FILM COATED ORAL at 09:01

## 2022-07-04 NOTE — PROGRESS NOTES
Problem: Pressure Injury - Risk of  Goal: *Prevention of pressure injury  Description: Document Adolph Scale and appropriate interventions in the flowsheet. Outcome: Progressing Towards Goal  Note: Pressure Injury Interventions:  Sensory Interventions: Assess changes in LOC,Avoid rigorous massage over bony prominences,Check visual cues for pain,Float heels,Minimize linen layers,Keep linens dry and wrinkle-free,Pressure redistribution bed/mattress (bed type)    Moisture Interventions: Minimize layers,Check for incontinence Q2 hours and as needed,Apply protective barrier, creams and emollients,Maintain skin hydration (lotion/cream)    Activity Interventions: Pressure redistribution bed/mattress(bed type)    Mobility Interventions: HOB 30 degrees or less,Pressure redistribution bed/mattress (bed type),Float heels    Nutrition Interventions: Offer support with meals,snacks and hydration    Friction and Shear Interventions: Apply protective barrier, creams and emollients,HOB 30 degrees or less,Lift sheet,Minimize layers                Problem: Patient Education: Go to Patient Education Activity  Goal: Patient/Family Education  Outcome: Progressing Towards Goal     Problem: Potential for Alteration in Skin Integrity  Goal: Monitor skin for areas of alteration in skin integrity  Description: Patient/family/caregiver will demonstrate ability to care for patient's skin, monitor for areas of breakdown, and demonstrate methods to prevent breakdown during hospice care. Outcome: Progressing Towards Goal     Problem: Risk for Falls  Goal: Free of falls during inpatient stay  Description: Patient will be free of falls during inpatient stay. Outcome: Progressing Towards Goal     Problem: Alteration in Mobility  Goal: Remain as independent as possible and remain safe in environment  Description: Patient will remain as independent as possible and remain safe in their environment.   Outcome: Progressing Towards Goal     Problem: Pain  Goal: Assess satisfaction of level of comfort and symptom control  Outcome: Progressing Towards Goal  Goal: *Control of acute pain  Outcome: Progressing Towards Goal     Problem: Anxiety/Agitation  Goal: Verbalize or staff assess the ability to manage anxiety  Description: The patient/family/caregiver will verbalize and demonstrate ability to manage the patient's anxiety throughout hospice care. Outcome: Progressing Towards Goal     Problem: Communication Deficit  Goal: Effectively communicate symptoms, needs, and concerns  Description: Patient/family/caregiver will effectively communicate symptoms, needs and concerns. Outcome: Progressing Towards Goal     Problem: Coping and Emotional Distress  Goal: Demonstrate acceptance of terminal illness and understanding of disease progression  Description: Patient/family/caregiver will demonstrate acceptance of terminal disease and understanding of disease progression while employing appropriate coping mechanisms. Outcome: Progressing Towards Goal     Problem: Pressure Injury - Risk of  Goal: *Prevention of pressure injury  Outcome: Progressing Towards Goal     Problem: Falls - Risk of  Goal: *Absence of Falls  Description: Document Cole Fall Risk and appropriate interventions in the flowsheet.   Outcome: Progressing Towards Goal     Problem: Patient Education: Go to Patient Education Activity  Goal: Patient/Family Education  Outcome: Progressing Towards Goal

## 2022-07-04 NOTE — PROGRESS NOTES
1900 Report from  [facility] Assessment completed. Patient is nonverbal, but very pleasant. Patient is resting in bed with relaxed facial expression. Bed alarm on.     2035 Patient asleep in bed with unlabored respirations. 2155 Patient resting in bed with neutral facial expression. 2315 Patient asleep in bed with no facial grimacing. 2350 Patient brief changed and incontinence care provided and patient repositioned. 0150 Patient asleep with neutral facial expression. 9430 Patient asleep in bed with relaxed facial expression. 3037 Patient resting in bed with no facial grimacing.     0615 Patient brief changed, incontinence care provided, patient repositioned. Patient resting in bed with neutral facial expression. 0700 Report to oncoming nurse.        NAME OF PATIENT:  Jeanette Ochoa    LEVEL OF CARE:  Respite    REASON FOR GIP:   n/a    *PATIENT REMAINS ELIGIBLE FOR GIP LEVEL OF CARE AS EVIDENCED BY: n/a      REASON FOR RESPITE:  Caregiver breakdown    O2 SAFETY:  n/a    FALL INTERVENTIONS PROVIDED:   Implemented/recommended use of non-skid footwear, Implemented/recommended use of fall risk identification flag to all team members, Implemented/recommended resources for alarm system (personal alarm, bed alarm, call bell, etc.) , Implemented/recommended environmental changes (remove hazards, lower bed, improve lighting, etc.) and Implemented/recommended increased supervision/assistance    INTERDISPLINARY COMMUNICATION/COLLABORATION:  Physician, MSW, Rafael and RN, CNA    NEW MEDICATION INITIATION DOCUMENTATION:  n/a    Reason medication is being initiated:  n/a    MD / Provider name consulted re: change in status / initiation of new medication:  n/a    New Symptom(s):  n/a    New Order(s):  n/a    Name of the person notified of the changes:  n/a    Name of person being taught:  n/a    Instructions given:  n/a    Side Effects taught:  n/a    Response to teaching:  n/a      COMFORTABLE DYING MEASURE:  Is Patient/family satisfied with symptom level?  yes    DISCHARGE PLAN:  Home at end of respite stay.

## 2022-07-04 NOTE — PROGRESS NOTES
0700: Report received from [address] [place] [address], [address] [place] [address]. Patient appears sleeping  0730: Patient assessment complete, documented in flowsheets. Patient sleeping, woke easily to voice and remained awake for assessment. Neutral facial position observed. Respirations are regular depth and rhythm. Television on in room for comfort   0900: Scheduled medications administered. Patient in bed with television on and looking at her magazine book   21 : Patient sleeping  1200: Patient resting in bed looking at her book  [id number] [phone]: Patient eating lunch with Citlaly Washburn CNA feeding. Patient chews her food, but is slow to swallow at times  1330: Patient in bed looking at her book  1500: Patient sleeping  1615: [address] with patient's bath. 1730: Assisted patient eating dinner. Patient does not like her spaghetti,she ate some of the other items on her tray and also drank some ensure. 1800: Patient assisted with drinking more ensure. She has completed 1/2.  1900: Report given to Magaly Rose RN.  Patient in bed looking through her book

## 2022-07-05 PROCEDURE — 74011250637 HC RX REV CODE- 250/637: Performed by: FAMILY MEDICINE

## 2022-07-05 PROCEDURE — G0299 HHS/HOSPICE OF RN EA 15 MIN: HCPCS

## 2022-07-05 PROCEDURE — 0655 HSPC INPATIENT RESPITE

## 2022-07-05 RX ADMIN — TRAMADOL HYDROCHLORIDE 50 MG: 50 TABLET, COATED ORAL at 12:09

## 2022-07-05 RX ADMIN — HYDROXYCHLOROQUINE SULFATE 200 MG: 200 TABLET, FILM COATED ORAL at 12:09

## 2022-07-05 RX ADMIN — DAPSONE 100 MG: 100 TABLET ORAL at 12:09

## 2022-07-05 NOTE — PROGRESS NOTES
Problem: Pressure Injury - Risk of  Goal: *Prevention of pressure injury  Description: Document Adolph Scale and appropriate interventions in the flowsheet. Outcome: Progressing Towards Goal  Note: Pressure Injury Interventions:  Sensory Interventions: Assess changes in LOC,Avoid rigorous massage over bony prominences,Check visual cues for pain,Float heels,Minimize linen layers,Keep linens dry and wrinkle-free,Pressure redistribution bed/mattress (bed type)    Moisture Interventions: Minimize layers,Check for incontinence Q2 hours and as needed,Apply protective barrier, creams and emollients,Maintain skin hydration (lotion/cream)    Activity Interventions: Pressure redistribution bed/mattress(bed type)    Mobility Interventions: HOB 30 degrees or less,Pressure redistribution bed/mattress (bed type),Float heels    Nutrition Interventions: Offer support with meals,snacks and hydration    Friction and Shear Interventions: Apply protective barrier, creams and emollients,HOB 30 degrees or less,Lift sheet,Minimize layers                Problem: Potential for Alteration in Skin Integrity  Goal: Monitor skin for areas of alteration in skin integrity  Description: Patient/family/caregiver will demonstrate ability to care for patient's skin, monitor for areas of breakdown, and demonstrate methods to prevent breakdown during hospice care. Outcome: Progressing Towards Goal     Problem: Risk for Falls  Goal: Free of falls during inpatient stay  Description: Patient will be free of falls during inpatient stay. Outcome: Progressing Towards Goal     Problem: Alteration in Mobility  Goal: Remain as independent as possible and remain safe in environment  Description: Patient will remain as independent as possible and remain safe in their environment.   Outcome: Progressing Towards Goal

## 2022-07-05 NOTE — PROGRESS NOTES
1900 Report received from Ignacia Keane 124 Patient resting in bed looking at her magazine binder. 2030 Patient assessment performed as documented. Patient of [facility] CAM here respite LOC.    2206 Patient appears to be sleeping. No signs of pain or discomfort noted. 2227 Patient's diaper and bed pad changed. Patient moved up in bed and repositioned with pillow under left side. 2330 Patient provided with soda. Patient is resting quietly watching television. 7368 Patient appears to be sleeping with even and unlabored respirations. 1830 Patient resting quietly with eyes closed. 0405 Patient appears to be sleeping.  0525 Patient's urine soiled diaper changed, erna care provided, powder applied and patient repositioned. NAME OF PATIENT:  Jennifer Conklin    LEVEL OF CARE:  Respite    REASON FOR GIP:   N/A    *PATIENT REMAINS ELIGIBLE FOR GIP LEVEL OF CARE AS EVIDENCED BY: (MUST BE ADDRESSED OF PATIENT GIP) N/A      REASON FOR RESPITE:  Caregiver breakdown    O2 SAFETY:  N/A patient on room air. FALL INTERVENTIONS PROVIDED:   Implemented/recommended resources for alarm system (personal alarm, bed alarm, call bell, etc.) , Implemented/recommended environmental changes (remove hazards, lower bed, improve lighting, etc.) and Implemented/recommended increased supervision/assistance    INTERDISPLINARY COMMUNICATION/COLLABORATION:  Physician, MSW, Rafael and RN, CNA    NEW MEDICATION INITIATION DOCUMENTATION:  N/A    Reason medication is being initiated:  N/A    MD / Provider name consulted re: change in status / initiation of new medication:  N/A    New Symptom(s):  N/A    New Order(s):  N/A    Name of the person notified of the changes:  N/A    Name of person being taught:  N/A    Instructions given:  N/A    Side Effects taught:  N/A    Response to teaching:  N/A      COMFORTABLE DYING MEASURE:  Is Patient/family satisfied with symptom level?  yes    DISCHARGE PLAN:  Home 7/6/22 at noon.

## 2022-07-05 NOTE — PROGRESS NOTES
Problem: Pressure Injury - Risk of  Goal: *Prevention of pressure injury  Description: Document Adolph Scale and appropriate interventions in the flowsheet. Outcome: Progressing Towards Goal  Note: Pressure Injury Interventions:  Sensory Interventions: Minimize linen layers,Float heels,Avoid rigorous massage over bony prominences,Keep linens dry and wrinkle-free,Pad between skin to skin,Pressure redistribution bed/mattress (bed type)    Moisture Interventions: Absorbent underpads,Apply protective barrier, creams and emollients,Maintain skin hydration (lotion/cream),Check for incontinence Q2 hours and as needed    Activity Interventions: Pressure redistribution bed/mattress(bed type)    Mobility Interventions: Pressure redistribution bed/mattress (bed type),Float heels    Nutrition Interventions: Document food/fluid/supplement intake,Offer support with meals,snacks and hydration    Friction and Shear Interventions: Apply protective barrier, creams and emollients,Minimize layers                Problem: Potential for Alteration in Skin Integrity  Goal: Monitor skin for areas of alteration in skin integrity  Description: Patient/family/caregiver will demonstrate ability to care for patient's skin, monitor for areas of breakdown, and demonstrate methods to prevent breakdown during hospice care. Outcome: Progressing Towards Goal     Problem: Risk for Falls  Goal: Free of falls during inpatient stay  Description: Patient will be free of falls during inpatient stay. Outcome: Progressing Towards Goal     Problem: Alteration in Mobility  Goal: Remain as independent as possible and remain safe in environment  Description: Patient will remain as independent as possible and remain safe in their environment.   Outcome: Progressing Towards Goal

## 2022-07-05 NOTE — HOSPICE
0700-Received report from [facility] and assumed care of pt. Pt is Respite LOC with primary diagnosis of Chronic Diastolic Heart Failure. Pt is under the hospice care of [facility] CAM team.  Pt at [facility] for caregiver exhaustion. 0800-RN assessment completed. Pt repositioned in bed. Being fed breakfast by [employer]. Pt awake, calm and pleasant. Pt smiles intermittently. 0945-Pt sleeping in bed, no distress or discomfort noted. 1100-Pt home hospice aide at bedside, giving pt bath, changing gown and linens. Pt tolerated care. 1145-Pt noted to be holding and rubbing left arm and shoulder. Pt home aide stated pt will do that when she is in pain. Pt given PRN Tramadol for pain and discomfort. Will continue to monitor and assess. Pt ate whole cup of applesauce with medications. 1230-CNA Sue feeding pt little bites of lunch at a time. Pt awake and calm, pleasant. 1400-Pt resting quietly in bed, flipping through her book. Pt calm and pleasant. No distress or discomfort  Noted. 1500-[facility] massage therapist at pt bedside, pt tolerating care well.  1700-Pt repositioned in bed for dinner. Sue CNA fed pt dinner, pt tolerated well. 1830-Pt brief changed, pt voided. Pt positioned for comfort and supported with pillows. Under pad changed as well. Pt tolerated care well. 1900-report given to oncoming RN.     NAME OF PATIENT:  Shira Mack    LEVEL OF CARE:  Respite    REASON FOR GIP:   NA    *PATIENT REMAINS ELIGIBLE FOR [facility] LEVEL OF CARE AS EVIDENCED BY: NA      REASON FOR RESPITE:  caregiver exhaustion    O2 SAFETY:  NA    FALL INTERVENTIONS PROVIDED:   Implemented/recommended resources for alarm system (personal alarm, bed alarm, call bell, etc.) , Implemented/recommended environmental changes (remove hazards, lower bed, improve lighting, etc.) and Implemented/recommended increased supervision/assistance    INTERDISPLINARY COMMUNICATION/COLLABORATION:  Physician, MSW and RN, CNA    NEW MEDICATION INITIATION DOCUMENTATION:  no new concerns to POC at this time    Reason medication is being initiated:  CADENCE    MD / Provider name consulted re: change in status / initiation of new medication:  NA    New Symptom(s):  NA    New Order(s):  NA    Name of the person notified of the changes:  NA    Name of person being taught:  NA    Instructions given:  NA    Side Effects taught:  NA    Response to teaching:  NA      COMFORTABLE DYING MEASURE:  Is Patient/family satisfied with symptom level?  yes    DISCHARGE PLAN:  Pt will continue her respite stay at [facility] and will be discharged back home under the care of [facility] team.

## 2022-07-06 VITALS
RESPIRATION RATE: 20 BRPM | OXYGEN SATURATION: 94 % | HEART RATE: 74 BPM | SYSTOLIC BLOOD PRESSURE: 116 MMHG | TEMPERATURE: 98 F | DIASTOLIC BLOOD PRESSURE: 70 MMHG

## 2022-07-06 PROCEDURE — G0299 HHS/HOSPICE OF RN EA 15 MIN: HCPCS

## 2022-07-06 PROCEDURE — 0651 HSPC ROUTINE HOME CARE

## 2022-07-06 PROCEDURE — 74011250637 HC RX REV CODE- 250/637: Performed by: FAMILY MEDICINE

## 2022-07-06 RX ADMIN — DAPSONE 100 MG: 100 TABLET ORAL at 10:08

## 2022-07-06 NOTE — HOSPICE
1900:  Report received from Sergio, [facility]:  Pt assessed. Smiling. Playing peek a johnson with magazine. 2130:  Resting with blanket over head. 2239:  Helped CNA turn pt. Inc of urine. 0030:  Lying in bed with covers over head. 0228:  Continues to rest quietly with blanket over head. Resp shallow and unlabored. 0430:  Resting with eyes closed. Resp shallow and unlabored. 0841:  Awakened to change diaper. Inc of a large amount of urine. Changed and turned with CNA assistance.      0700:  Report given to Cisco Lewis RN    NAME OF PATIENT:  Katlyn Force    LEVEL OF CARE:  Respite    REASON FOR GIP:   N/A    *PATIENT REMAINS ELIGIBLE FOR GIP LEVEL OF CARE AS EVIDENCED BY: (MUST BE ADDRESSED OF PATIENT GIP)      REASON FOR RESPITE:  Caregiver exhaustion    O2 SAFETY:  N/A    FALL INTERVENTIONS PROVIDED:   Implemented/recommended resources for alarm system (personal alarm, bed alarm, call bell, etc.)     INTERDISPLINARY COMMUNICATION/COLLABORATION:  Physician, MSW, [place] and RN, CNA    NEW MEDICATION INITIATION DOCUMENTATION:  N/A    Reason medication is being initiated:  N/A    MD / Provider name consulted re: change in status / initiation of new medication:  N/A    New Symptom(s):  N/A    New Order(s):  N/A    Name of the person notified of the changes:  N/A    Name of person being taught:  N/A    Instructions given:  N/A    Side Effects taught:  N/A    Response to teaching:  N/A      COMFORTABLE DYING MEASURE:  Is Patient/family satisfied with symptom level?  yes    DISCHARGE PLAN:  Home in AM

## 2022-07-06 NOTE — PROGRESS NOTES
Problem: Pressure Injury - Risk of  Goal: *Prevention of pressure injury  Description: Document Adolph Scale and appropriate interventions in the flowsheet. Outcome: Progressing Towards Goal  Note: Pressure Injury Interventions:  Sensory Interventions: Minimize linen layers,Float heels,Avoid rigorous massage over bony prominences,Keep linens dry and wrinkle-free,Pad between skin to skin,Pressure redistribution bed/mattress (bed type)    Moisture Interventions: Absorbent underpads,Apply protective barrier, creams and emollients,Maintain skin hydration (lotion/cream),Check for incontinence Q2 hours and as needed    Activity Interventions: Pressure redistribution bed/mattress(bed type)    Mobility Interventions: Pressure redistribution bed/mattress (bed type),Float heels    Nutrition Interventions: Document food/fluid/supplement intake,Offer support with meals,snacks and hydration    Friction and Shear Interventions: Apply protective barrier, creams and emollients,Minimize layers                Problem: Risk for Falls  Goal: Free of falls during inpatient stay  Description: Patient will be free of falls during inpatient stay. Outcome: Progressing Towards Goal     Problem: Alteration in Mobility  Goal: Remain as independent as possible and remain safe in environment  Description: Patient will remain as independent as possible and remain safe in their environment. Outcome: Progressing Towards Goal     Problem: Pain  Goal: Assess satisfaction of level of comfort and symptom control  Outcome: Progressing Towards Goal  Goal: *Control of acute pain  Outcome: Progressing Towards Goal     Problem: Anxiety/Agitation  Goal: Verbalize or staff assess the ability to manage anxiety  Description: The patient/family/caregiver will verbalize and demonstrate ability to manage the patient's anxiety throughout hospice care.   Outcome: Progressing Towards Goal     Problem: Pressure Injury - Risk of  Goal: *Prevention of pressure injury  Outcome: Progressing Towards Goal  Note: Pressure Injury Interventions:  Sensory Interventions: Minimize linen layers,Float heels,Avoid rigorous massage over bony prominences,Keep linens dry and wrinkle-free,Pad between skin to skin,Pressure redistribution bed/mattress (bed type)    Moisture Interventions: Absorbent underpads,Apply protective barrier, creams and emollients,Maintain skin hydration (lotion/cream),Check for incontinence Q2 hours and as needed    Activity Interventions: Pressure redistribution bed/mattress(bed type)    Mobility Interventions: Pressure redistribution bed/mattress (bed type),Float heels    Nutrition Interventions: Document food/fluid/supplement intake,Offer support with meals,snacks and hydration    Friction and Shear Interventions: Apply protective barrier, creams and emollients,Minimize layers                Problem: Falls - Risk of  Goal: *Absence of Falls  Description: Document Cole Fall Risk and appropriate interventions in the flowsheet. Outcome: Progressing Towards Goal  Note: Fall Risk Interventions:  Mobility Interventions: Bed/chair exit alarm,Communicate number of staff needed for ambulation/transfer    Mentation Interventions: Adequate sleep, hydration, pain control,Bed/chair exit alarm,Door open when patient unattended,Familiar objects from home,Room close to nurse's station    Medication Interventions: Bed/chair exit alarm    Elimination Interventions: Bed/chair exit alarm              Problem: Falls - Risk of  Goal: *Absence of Falls  Description: Document Ragena Glad Fall Risk and appropriate interventions in the flowsheet.   Outcome: Progressing Towards Goal  Note: Fall Risk Interventions:  Mobility Interventions: Bed/chair exit alarm,Communicate number of staff needed for ambulation/transfer    Mentation Interventions: Adequate sleep, hydration, pain control,Bed/chair exit alarm,Door open when patient unattended,Familiar objects from home,Room close to nurse's station    Medication Interventions: Bed/chair exit alarm    Elimination Interventions: Bed/chair exit alarm

## 2022-07-06 NOTE — ROUTINE PROCESS
0700: Received report from Haris Gudino, [facility] Patient sleeping  0730: Patient appears sleeping  0830: Patient is awake and eating breakfast with Dae Mendosa CNA.   7621: Patient resting in bed looking through her magazine binder. 1030: Patient's Home hospice aide at [facility] to bathe patient. 1130: Patient brief changed with Lissadayana Wallace for urinary incontinence. 1210: Patient leaving [facility] via medical transport. Called and spoke with patient's sister, Nahed Keating, to let her know. Patient's medications being returned home.

## 2022-07-06 NOTE — PROGRESS NOTES
Problem: Pressure Injury - Risk of  Goal: *Prevention of pressure injury  Description: Document Adolph Scale and appropriate interventions in the flowsheet. Outcome: Resolved/Not Met  Note: Pressure Injury Interventions:  Sensory Interventions: Assess changes in LOC,Check visual cues for pain,Avoid rigorous massage over bony prominences,Float heels,Keep linens dry and wrinkle-free,Minimize linen layers    Moisture Interventions: Check for incontinence Q2 hours and as needed,Limit adult briefs,Maintain skin hydration (lotion/cream)    Activity Interventions: Pressure redistribution bed/mattress(bed type)    Mobility Interventions: Float heels,Pressure redistribution bed/mattress (bed type)    Nutrition Interventions: Offer support with meals,snacks and hydration    Friction and Shear Interventions: Feet elevated on foot rest,HOB 30 degrees or less,Lift sheet,Minimize layers                Problem: Patient Education: Go to Patient Education Activity  Goal: Patient/Family Education  Outcome: Resolved/Not Met     Problem: Potential for Alteration in Skin Integrity  Goal: Monitor skin for areas of alteration in skin integrity  Description: Patient/family/caregiver will demonstrate ability to care for patient's skin, monitor for areas of breakdown, and demonstrate methods to prevent breakdown during hospice care. Outcome: Resolved/Not Met     Problem: Risk for Falls  Goal: Free of falls during inpatient stay  Description: Patient will be free of falls during inpatient stay. Outcome: Resolved/Not Met     Problem: Alteration in Mobility  Goal: Remain as independent as possible and remain safe in environment  Description: Patient will remain as independent as possible and remain safe in their environment.   Outcome: Resolved/Not Met     Problem: Pain  Goal: Assess satisfaction of level of comfort and symptom control  Outcome: Resolved/Not Met  Goal: *Control of acute pain  Outcome: Resolved/Not Met     Problem: Anxiety/Agitation  Goal: Verbalize or staff assess the ability to manage anxiety  Description: The patient/family/caregiver will verbalize and demonstrate ability to manage the patient's anxiety throughout hospice care. Outcome: Resolved/Not Met     Problem: Communication Deficit  Goal: Effectively communicate symptoms, needs, and concerns  Description: Patient/family/caregiver will effectively communicate symptoms, needs and concerns. Outcome: Resolved/Not Met     Problem: Coping and Emotional Distress  Goal: Demonstrate acceptance of terminal illness and understanding of disease progression  Description: Patient/family/caregiver will demonstrate acceptance of terminal disease and understanding of disease progression while employing appropriate coping mechanisms. Outcome: Resolved/Not Met     Problem: Pressure Injury - Risk of  Goal: *Prevention of pressure injury  Outcome: Resolved/Not Met  Note: Pressure Injury Interventions:  Sensory Interventions: Assess changes in LOC,Check visual cues for pain,Avoid rigorous massage over bony prominences,Float heels,Keep linens dry and wrinkle-free,Minimize linen layers    Moisture Interventions: Check for incontinence Q2 hours and as needed,Limit adult briefs,Maintain skin hydration (lotion/cream)    Activity Interventions: Pressure redistribution bed/mattress(bed type)    Mobility Interventions: Float heels,Pressure redistribution bed/mattress (bed type)    Nutrition Interventions: Offer support with meals,snacks and hydration    Friction and Shear Interventions: Feet elevated on foot rest,HOB 30 degrees or less,Lift sheet,Minimize layers                Problem: Falls - Risk of  Goal: *Absence of Falls  Description: Document Cole Fall Risk and appropriate interventions in the flowsheet.   Outcome: Resolved/Not Met  Note: Fall Risk Interventions:  Mobility Interventions: Assess mobility with egress test,Bed/chair exit alarm,Communicate number of staff needed for ambulation/transfer    Mentation Interventions: Adequate sleep, hydration, pain control,Bed/chair exit alarm,Door open when patient unattended    Medication Interventions: Bed/chair exit alarm    Elimination Interventions: Bed/chair exit alarm,Call light in reach              Problem: Patient Education: Go to Patient Education Activity  Goal: Patient/Family Education  Outcome: Resolved/Not Met

## 2022-08-30 ENCOUNTER — HOSPICE ADMISSION (OUTPATIENT)
Dept: HOSPICE | Facility: HOSPICE | Age: 77
End: 2022-08-30
Payer: OTHER MISCELLANEOUS

## 2022-09-02 ENCOUNTER — HOSPITAL ENCOUNTER (INPATIENT)
Age: 77
LOS: 5 days | Discharge: HOME | End: 2022-09-07
Attending: FAMILY MEDICINE | Admitting: FAMILY MEDICINE

## 2022-09-02 PROCEDURE — 3336500001 HSPC ELECTION

## 2022-09-02 PROCEDURE — G0299 HHS/HOSPICE OF RN EA 15 MIN: HCPCS

## 2022-09-02 PROCEDURE — 0655 HSPC INPATIENT RESPITE

## 2022-09-02 RX ORDER — ALPRAZOLAM 0.5 MG/1
0.5 TABLET ORAL
Status: DISCONTINUED | OUTPATIENT
Start: 2022-09-02 | End: 2022-09-07 | Stop reason: HOSPADM

## 2022-09-02 RX ORDER — HYDROXYCHLOROQUINE SULFATE 200 MG/1
200 TABLET, FILM COATED ORAL
Status: DISCONTINUED | OUTPATIENT
Start: 2022-09-03 | End: 2022-09-07 | Stop reason: HOSPADM

## 2022-09-02 RX ORDER — FACIAL-BODY WIPES
10 EACH TOPICAL DAILY PRN
Status: DISCONTINUED | OUTPATIENT
Start: 2022-09-02 | End: 2022-09-07 | Stop reason: HOSPADM

## 2022-09-02 RX ORDER — DAPSONE 100 MG/1
100 TABLET ORAL DAILY
Status: DISCONTINUED | OUTPATIENT
Start: 2022-09-03 | End: 2022-09-07 | Stop reason: HOSPADM

## 2022-09-02 RX ORDER — TRAMADOL HYDROCHLORIDE 50 MG/1
50 TABLET ORAL
Status: DISCONTINUED | OUTPATIENT
Start: 2022-09-02 | End: 2022-09-07 | Stop reason: HOSPADM

## 2022-09-02 NOTE — PROGRESS NOTES
Problem: Falls - Risk of  Goal: *Absence of Falls  Description: Document Earma Munir Fall Risk and appropriate interventions in the flowsheet. Outcome: Progressing Towards Goal  Note: Fall Risk Interventions:       Mentation Interventions: Adequate sleep, hydration, pain control, Bed/chair exit alarm, Door open when patient unattended, Familiar objects from home, Room close to nurse's station, Reorient patient         Elimination Interventions: Bed/chair exit alarm, Call light in reach              Problem: Pressure Injury - Risk of  Goal: *Prevention of pressure injury  Description: Document Adolph Scale and appropriate interventions in the flowsheet.   Outcome: Progressing Towards Goal  Note: Pressure Injury Interventions:  Sensory Interventions: Float heels, Minimize linen layers, Pressure redistribution bed/mattress (bed type), Keep linens dry and wrinkle-free, Check visual cues for pain, Avoid rigorous massage over bony prominences    Moisture Interventions: Absorbent underpads, Minimize layers, Moisture barrier, Apply protective barrier, creams and emollients, Check for incontinence Q2 hours and as needed    Activity Interventions: Pressure redistribution bed/mattress(bed type)    Mobility Interventions: Pressure redistribution bed/mattress (bed type), Float heels    Nutrition Interventions: Document food/fluid/supplement intake, Offer support with meals,snacks and hydration    Friction and Shear Interventions: Apply protective barrier, creams and emollients, Minimize layers

## 2022-09-02 NOTE — H&P
[facility]  [phone]    Patient Name: Natasha Iqbal  YOB: 1945    Date of Provider Hospice Visit: 09/02/22    Level of Care:   [] General Inpatient (GIP)    [] Routine   [x] Respite from [address] hosp[address]    Current Location of Care:  [] [facility] [] [facility] [] [facility] [] [facility] [x] Hospice [facility]      Principle Hospice Diagnosis: Advanced CHF       HOSPICE SUMMARY     Chart Reviewed for patient's medical history and hospice care plan. Hospice Physician Certification/Recertification Narrative per other hospice MD      Patient being admitted for Respite care x 5 days for   [x]  Caregiver exhaustion and needing break  []  Caregiver unavailable    Pt seen briefly and examined; appears comfortable, very sweet, has some developmental delay and slow to respond. Exam; benign       PLAN   Patient's home medications were reviewed and reconciled. Continue with current home medications and plan of care as outlined in chart.  and SW to support family needs. Disposition: Home with hospice once respite stay is completed.

## 2022-09-02 NOTE — PROGRESS NOTES
1900-report received from Sergio, [facility]  1945-pt awake and watching tv. Clear, unlabored resp. Pt nonverbal w/ neutral facial expression. No visual cues of pain. Skin intact and cool. 2210-pt asleep, leaning to far left side. Added a pillow for extra support. 0015-pt asleep, no visual cues of pain/discomfort. 0120-pt awake and alert. Brief wet. Changed brief and repositioned pt. Pt tolerated activity well. 0315-pt asleep w/ even unlabored respirations. 0425-pt awake and alert. Pt with childlike disposition, seeming to play hide and seek with her blankets. Offered pt lemon lime soda and pt able to drink independent of assistance. 0610-pt brief wet. Changed brief and repositioned pt. Pt tolerated activity fairly. 0705-report given to Nito Samaniego RN.    NAME OF PATIENT:  Dara Lam    LEVEL OF CARE:  respite    REASON FOR GIP:   N/A    *PATIENT REMAINS ELIGIBLE FOR [facility] LEVEL OF CARE AS EVIDENCED BY: (MUST BE ADDRESSED OF PATIENT GIP)  N/A    REASON FOR RESPITE:  Caregiver cannot care for patient due to:  exhaustion    O2 SAFETY:  N/A    FALL INTERVENTIONS PROVIDED:   Implemented/recommended use of fall risk identification flag to all team members, Implemented/recommended resources for alarm system (personal alarm, bed alarm, call bell, etc.) , and Implemented/recommended environmental changes (remove hazards, lower bed, improve lighting, etc.)    INTERDISPLINARY COMMUNICATION/COLLABORATION:  Physician, CHONG, Adore Harris, and RN, CNA    NEW MEDICATION INITIATION DOCUMENTATION:  N/A    Reason medication is being initiated:  N/A    MD / Provider name consulted re: change in status / initiation of new medication:  N/A    New Symptom(s):  N/A    New Order(s):  N/A    Name of the person notified of the changes:  N/A    Name of person being taught:  N/A    Instructions given:  N/A    Side Effects taught:  N/A    Response to teaching:  N/A      COMFORTABLE DYING MEASURE:  Is Patient/family satisfied with symptom level? yes    DISCHARGE PLAN:  home with hospice

## 2022-09-03 PROCEDURE — G0299 HHS/HOSPICE OF RN EA 15 MIN: HCPCS

## 2022-09-03 PROCEDURE — 74011250637 HC RX REV CODE- 250/637: Performed by: FAMILY MEDICINE

## 2022-09-03 PROCEDURE — 0655 HSPC INPATIENT RESPITE

## 2022-09-03 RX ADMIN — DAPSONE 100 MG: 100 TABLET ORAL at 09:03

## 2022-09-03 RX ADMIN — HYDROXYCHLOROQUINE SULFATE 200 MG: 200 TABLET, FILM COATED ORAL at 09:02

## 2022-09-03 NOTE — PROGRESS NOTES
Problem: Falls - Risk of  Goal: *Absence of Falls  Description: Document [place] Fall Risk and appropriate interventions in the flowsheet. 9/2/2022 2021 by Althea Sahu RN  Outcome: Progressing Towards Goal  Note: Fall Risk Interventions:       Mentation Interventions: Adequate sleep, hydration, pain control, Bed/chair exit alarm, Familiar objects from home    Medication Interventions: Bed/chair exit alarm    Elimination Interventions: Bed/chair exit alarm           9/2/2022 2020 by Althea Sahu RN  Outcome: Progressing Towards Goal  Note: Fall Risk Interventions:       Mentation Interventions: Adequate sleep, hydration, pain control, Bed/chair exit alarm, Familiar objects from home    Medication Interventions: Bed/chair exit alarm    Elimination Interventions: Bed/chair exit alarm              Problem: Pressure Injury - Risk of  Goal: *Prevention of pressure injury  Description: Document Adolph Scale and appropriate interventions in the flowsheet.   9/2/2022 2021 by Althea Sahu RN  Outcome: Progressing Towards Goal  Note: Pressure Injury Interventions:  Sensory Interventions: Assess changes in LOC, Check visual cues for pain, Pressure redistribution bed/mattress (bed type)    Moisture Interventions: Apply protective barrier, creams and emollients, Check for incontinence Q2 hours and as needed    Activity Interventions: Pressure redistribution bed/mattress(bed type)    Mobility Interventions: Pressure redistribution bed/mattress (bed type), Float heels    Nutrition Interventions: Document food/fluid/supplement intake, Offer support with meals,snacks and hydration    Friction and Shear Interventions: Transferring/repositioning devices             9/2/2022 2020 by Althea Sahu RN  Outcome: Progressing Towards Goal  Note: Pressure Injury Interventions:  Sensory Interventions: Assess changes in LOC, Check visual cues for pain, Pressure redistribution bed/mattress (bed type)    Moisture Interventions: Apply protective barrier, creams and emollients, Check for incontinence Q2 hours and as needed    Activity Interventions: Pressure redistribution bed/mattress(bed type)    Mobility Interventions: Pressure redistribution bed/mattress (bed type), Float heels    Nutrition Interventions: Document food/fluid/supplement intake, Offer support with meals,snacks and hydration    Friction and Shear Interventions: Transferring/repositioning devices                Problem: Patient Education: Go to Patient Education Activity  Goal: Patient/Family Education  9/2/2022 2021 by Kendy Yuen RN  Outcome: Progressing Towards Goal  9/2/2022 2020 by Kendy Yuen RN  Outcome: Progressing Towards Goal     Problem: Anxiety  Goal: *Alleviation of anxiety  Outcome: Progressing Towards Goal

## 2022-09-03 NOTE — PROGRESS NOTES
Problem: Falls - Risk of  Goal: *Absence of Falls  Description: Document Leafy Mendieta Fall Risk and appropriate interventions in the flowsheet.   Outcome: Progressing Towards Goal  Note: Fall Risk Interventions:       Mentation Interventions: Adequate sleep, hydration, pain control, Bed/chair exit alarm, Door open when patient unattended, Familiar objects from home    Medication Interventions: Bed/chair exit alarm    Elimination Interventions: Bed/chair exit alarm              Problem: Patient Education: Go to Patient Education Activity  Goal: Patient/Family Education  Outcome: Progressing Towards Goal     Problem: Anxiety  Goal: *Alleviation of anxiety  Outcome: Progressing Towards Goal     Problem: Anxiety  Goal: *Alleviation of anxiety  Outcome: Progressing Towards Goal

## 2022-09-03 NOTE — PROGRESS NOTES
0700  Report received from [address] [place] [address] 181, [address]  Pt resting quietly in bed listening to tv  0900  Scheduled medications administered. Assessment complete, see flowsheet. Pt nonverbal, ate 100% of breakfast.  Pt given stuffed animal and book from home. 1100  Pt sleeping. No indication of discomfort noted. 1300  Pt's soiled brief changed, bathed, gown and linens changed. Antifungal powder applied to skin folds per CG request.  Skin intact, no rashes noted. Pt remained calm and cooperative throughout care. 1500  Pt resting quietly in bed watching tv. Appears comfortable. 1700  Pt ate 100% of dinner. Pt's wet brief with small BM changed. 1900  Report given to oncoming nurse.       NAME OF PATIENT:  Jennifer Conklin    LEVEL OF CARE:  Respite    REASON FOR GIP:  NA    *PATIENT REMAINS ELIGIBLE FOR GIP LEVEL OF CARE AS EVIDENCED BY: (MUST BE ADDRESSED OF PATIENT GIP)  NA    REASON FOR RESPITE:  Caregiver Breakdown    O2 SAFETY:  NA/Room Air    FALL INTERVENTIONS PROVIDED:   Implemented/recommended use of non-skid footwear, Implemented/recommended use of fall risk identification flag to all team members, Implemented/recommended assistive devices and encouraged their use, Implemented/recommended resources for alarm system (personal alarm, bed alarm, call bell, etc.) , Implemented/recommended environmental changes (remove hazards, lower bed, improve lighting, etc.), and Implemented/recommended increased supervision/assistance    INTERDISPLINARY COMMUNICATION/COLLABORATION:  Physician, CHONG, Reva Bryan, and RN, CNA    NEW MEDICATION INITIATION DOCUMENTATION:  NA    Reason medication is being initiated:  NA    MD / Provider name consulted re: change in status / initiation of new medication:  NA    New Symptom(s):  NA    New Order(s):  NA    Name of the person notified of the changes:  NA    Name of person being taught:  NA    Instructions given:  NA    Side Effects taught:  NA    Response to teaching: NA    COMFORTABLE DYING MEASURE:  Is Patient/family satisfied with symptom level?  yes    DISCHARGE PLAN:  return home with [facility] 9/7 @ 1100

## 2022-09-04 PROCEDURE — 0655 HSPC INPATIENT RESPITE

## 2022-09-04 PROCEDURE — 74011250637 HC RX REV CODE- 250/637: Performed by: FAMILY MEDICINE

## 2022-09-04 RX ADMIN — HYDROXYCHLOROQUINE SULFATE 200 MG: 200 TABLET, FILM COATED ORAL at 08:55

## 2022-09-04 RX ADMIN — DAPSONE 100 MG: 100 TABLET ORAL at 08:55

## 2022-09-04 NOTE — PROGRESS NOTES
..  0700:Report received from [id number] using SBAR I/O and Kardex. Pt is awake and resting quietly in bed, no acute distress noted comfort and safety maintained. 08:55:Adm am meds, tolerated well.  1000:Pt ate 30% of her breakfast, fluids encourage. 1200:Meal setup assist pt with feeding. 13:12:Pt ate 100% of her meal.  13:30:Full bed bath given, linen and gown change. 15:04:Pt resting quietly flipping through her books. 16:20:Pt offered snack and soda. 17:35:Pt eating dinner, no discomfort noted. 18:30:Pt resting quietly family arrived at bedside. 1900:Report given to [id number]                   NAME OF PATIENT:      LEVEL OF CARE: Respite  REASON FOR GIP: N/A     *PATIENT REMAINS ELIGIBLE FOR GIP LEVEL OF CARE AS EVIDENCED BY: n/a      REASON FOR RESPITE:  Caregiver Exhaustion     O2 SAFETY:  Concentrator positioning (6\" from furniture/drapes), Tanks stored in manuel , No petroleum based products on face while oxygen in use and Oxygen sign on the door     FALL INTERVENTIONS PROVIDED:   Implemented/recommended use of fall risk identification flag to all team members, Implemented/recommended assistive devices and encouraged their use, Implemented/recommended resources for alarm system (personal alarm, bed alarm, call bell, etc.)  and Implemented/recommended environmental changes (remove hazards, lower bed, improve lighting, etc.)     INTERDISPLINARY COMMUNICATION/COLLABORATION:  Physician, MSW, [place] and RN, CNA     NEW MEDICATION INITIATION DOCUMENTATION:N/A      Reason medication is being initiated:  N/A     MD / Provider name consulted re: change in status / initiation of new medication:  N/A     New Symptom(s):  N/A     New Order(s):  N/A     Name of the person notified of the changes:  N/A     Name of person being taught:  N/A     Instructions given:  N/A     Side Effects taught:  N/A     Response to teaching:  N/A        COMFORTABLE DYING MEASURE:  Is Patient/family satisfied with symptom level?  yes DISCHARGE PLAN:Pt will complete respite stay and return home with family.

## 2022-09-04 NOTE — HOSPICE
1900: report received from [place], [address]: pt sister and another family member at the bedside. Pt is giggling and eating a bag of chips. Assessment deferred for now. 2100: assessment completed. Pt is alert, nonverbal at baseline. Unable to assess orientation. Lungs are diminished. Bowel sounds active. See flowsheet for full assessment     2300: pt brief soaked with urine, erna care completed and clean brief applied. Pt turned onto R side    0040: pt laying in bed with covers pulled up over her head     0225: pt is resting quietly with eyes closed    0420: pt is sleeping soundly    0600: pt incontinent of urine, brief changed.  Pt turned onto L side     0700: report given to oncoming nurse     NAME OF PATIENT:  [place]    LEVEL OF CARE: Respite    REASON FOR GIP:   N/a    *PATIENT REMAINS ELIGIBLE FOR GIP LEVEL OF CARE AS EVIDENCED BY: n/a      REASON FOR RESPITE:  Caregiver exhaustion    O2 SAFETY:  Concentrator positioning (6\" from furniture/drapes) and No petroleum based products on face while oxygen in use    FALL INTERVENTIONS PROVIDED:   Implemented/recommended resources for alarm system (personal alarm, bed alarm, call bell, etc.) , Implemented/recommended environmental changes (remove hazards, lower bed, improve lighting, etc.), and Implemented/recommended increased supervision/assistance    INTERDISPLINARY COMMUNICATION/COLLABORATION:  Physician, CHONG, Randy Yip, and RN, CNA    NEW MEDICATION INITIATION DOCUMENTATION:  N/a    Reason medication is being initiated: n/a    MD / Provider name consulted re: change in status / initiation of new medication: n/a    New Symptom(s): n/a    New Order(s): n/a    Name of the person notified of the changes: n/a    Name of person being taught: n/a    Instructions given: n/a    Side Effects taught: n/a    Response to teaching: n/a      COMFORTABLE DYING MEASURE:  Is Patient/family satisfied with symptom level?  yes    DISCHARGE PLAN: Pt will discharge home at the end of respite stay

## 2022-09-04 NOTE — PROGRESS NOTES
Problem: Falls - Risk of  Goal: *Absence of Falls  Description: Document Jeana Young Fall Risk and appropriate interventions in the flowsheet. Outcome: Progressing Towards Goal  Note: Fall Risk Interventions:       Mentation Interventions: Bed/chair exit alarm, Door open when patient unattended, Evaluate medications/consider consulting pharmacy    Medication Interventions: Bed/chair exit alarm, Evaluate medications/consider consulting pharmacy    Elimination Interventions: Bed/chair exit alarm              Problem: Pressure Injury - Risk of  Goal: *Prevention of pressure injury  Description: Document Adolph Scale and appropriate interventions in the flowsheet.   Outcome: Progressing Towards Goal  Note: Pressure Injury Interventions:  Sensory Interventions: Assess changes in LOC, Avoid rigorous massage over bony prominences    Moisture Interventions: Absorbent underpads    Activity Interventions: Pressure redistribution bed/mattress(bed type)    Mobility Interventions: HOB 30 degrees or less    Nutrition Interventions: Document food/fluid/supplement intake, Offer support with meals,snacks and hydration    Friction and Shear Interventions: HOB 30 degrees or less, Lift sheet, Minimize layers

## 2022-09-04 NOTE — HOSPICE
1900: report received from [place], [place]: pt is resting in bed watching TV, she is nonverbal at baseline. Alert. No signs of pain or discomfort noted. Lungs are clear. Bowel sounds active. Upper extremities are contracted. Bilateral foot drop noted. See flowsheet for full assessment     2100: pt is awake, flipping through her magazines. No pain or distress noted    2245: pt brief is clean and dry. Pt turned onto L side     0020: pt is sleeping soundly    0210: pt is resting quietly with unlabored respirations     0400: pt incontinent of urine and stool. Priscila care completed and patient turned onto R side    0530: pt is resting quietly with eyes closed    0700: report given to oncoming nurse    NAME OF PATIENT:  Dara Lam    LEVEL OF CARE: Respite    REASON FOR GIP:   Terminal agitation, despite changes to medications, Medication adjustment that must be monitored 24/7, and Stabilizing treatment that cannot take place at home    *PATIENT REMAINS ELIGIBLE FOR GIP LEVEL OF CARE AS EVIDENCED BY: n/a      REASON FOR RESPITE:  Caregiver exhaustion     O2 SAFETY:  N/a    FALL INTERVENTIONS PROVIDED:   Implemented/recommended resources for alarm system (personal alarm, bed alarm, call bell, etc.) , Implemented/recommended environmental changes (remove hazards, lower bed, improve lighting, etc.), and Implemented/recommended increased supervision/assistance    INTERDISPLINARY COMMUNICATION/COLLABORATION:  Physician, CHONG, Adore Harris, and RN, CNA    NEW MEDICATION INITIATION DOCUMENTATION:  N/a    Reason medication is being initiated: n/a    MD / Provider name consulted re: change in status / initiation of new medication: n/a    New Symptom(s): n/a    New Order(s): n/a    Name of the person notified of the changes: n/a    Name of person being taught: n/a    Instructions given: n/a    Side Effects taught: n/a    Response to teaching: n/a    COMFORTABLE DYING MEASURE:  Is Patient/family satisfied with symptom level? yes    DISCHARGE PLAN: Pt will return home at the end of respite stay

## 2022-09-04 NOTE — PROGRESS NOTES
Problem: Falls - Risk of  Goal: *Absence of Falls  Description: Document Umersharon Maldonado Fall Risk and appropriate interventions in the flowsheet. Outcome: Progressing Towards Goal  Note: Fall Risk Interventions:       Mentation Interventions: Bed/chair exit alarm, Door open when patient unattended    Medication Interventions: Bed/chair exit alarm    Elimination Interventions: Bed/chair exit alarm, Call light in reach              Problem: Pressure Injury - Risk of  Goal: *Prevention of pressure injury  Description: Document Adolph Scale and appropriate interventions in the flowsheet.   Outcome: Progressing Towards Goal  Note: Pressure Injury Interventions:  Sensory Interventions: Assess changes in LOC, Check visual cues for pain, Float heels, Minimize linen layers    Moisture Interventions: Internal/External urinary devices, Minimize layers, Moisture barrier    Activity Interventions: Pressure redistribution bed/mattress(bed type)    Mobility Interventions: HOB 30 degrees or less, Float heels    Nutrition Interventions: Document food/fluid/supplement intake    Friction and Shear Interventions: HOB 30 degrees or less, Minimize layers, Foam dressings/transparent film/skin sealants

## 2022-09-05 PROCEDURE — 0655 HSPC INPATIENT RESPITE

## 2022-09-05 PROCEDURE — G0299 HHS/HOSPICE OF RN EA 15 MIN: HCPCS

## 2022-09-05 PROCEDURE — 74011250637 HC RX REV CODE- 250/637: Performed by: FAMILY MEDICINE

## 2022-09-05 RX ADMIN — DAPSONE 100 MG: 100 TABLET ORAL at 08:25

## 2022-09-05 RX ADMIN — HYDROXYCHLOROQUINE SULFATE 200 MG: 200 TABLET, FILM COATED ORAL at 08:26

## 2022-09-05 NOTE — PROGRESS NOTES
Problem: Falls - Risk of  Goal: *Absence of Falls  Description: Document Bethel Monreal Fall Risk and appropriate interventions in the flowsheet. Outcome: Progressing Towards Goal  Note: Fall Risk Interventions:       Mentation Interventions: Adequate sleep, hydration, pain control, Bed/chair exit alarm, Door open when patient unattended    Medication Interventions: Bed/chair exit alarm    Elimination Interventions: Bed/chair exit alarm, Call light in reach              Problem: Patient Education: Go to Patient Education Activity  Goal: Patient/Family Education  Outcome: Progressing Towards Goal     Problem: Pressure Injury - Risk of  Goal: *Prevention of pressure injury  Description: Document Adolph Scale and appropriate interventions in the flowsheet.   Outcome: Progressing Towards Goal  Note: Pressure Injury Interventions:  Sensory Interventions: Assess changes in LOC, Check visual cues for pain, Float heels, Minimize linen layers    Moisture Interventions: Absorbent underpads, Apply protective barrier, creams and emollients, Minimize layers, Moisture barrier    Activity Interventions: Pressure redistribution bed/mattress(bed type)    Mobility Interventions: Float heels, HOB 30 degrees or less    Nutrition Interventions: Document food/fluid/supplement intake    Friction and Shear Interventions: HOB 30 degrees or less, Minimize layers                Problem: Patient Education: Go to Patient Education Activity  Goal: Patient/Family Education  Outcome: Progressing Towards Goal     Problem: Anxiety  Goal: *Alleviation of anxiety  Outcome: Progressing Towards Goal

## 2022-09-05 NOTE — PROGRESS NOTES
Problem: Falls - Risk of  Goal: *Absence of Falls  Description: Document Jeffrey Holt Fall Risk and appropriate interventions in the flowsheet. Outcome: Progressing Towards Goal  Note: Fall Risk Interventions:       Mentation Interventions: Adequate sleep, hydration, pain control, Bed/chair exit alarm, Door open when patient unattended    Medication Interventions: Bed/chair exit alarm    Elimination Interventions: Bed/chair exit alarm, Call light in reach              Problem: Pressure Injury - Risk of  Goal: *Prevention of pressure injury  Description: Document Adolph Scale and appropriate interventions in the flowsheet.   Outcome: Progressing Towards Goal  Note: Pressure Injury Interventions:  Sensory Interventions: Assess changes in LOC, Check visual cues for pain, Float heels, Minimize linen layers    Moisture Interventions: Absorbent underpads, Apply protective barrier, creams and emollients, Minimize layers, Moisture barrier    Activity Interventions: Pressure redistribution bed/mattress(bed type)    Mobility Interventions: Float heels, HOB 30 degrees or less    Nutrition Interventions: Document food/fluid/supplement intake    Friction and Shear Interventions: HOB 30 degrees or less, Minimize layers

## 2022-09-05 NOTE — HOSPICE
0700 Report received from [facility]. 0745 Patient resting in bed quietly, eyes are closed. Patient appears to be sleeping at this time. 0800 Patient's breakfast tray set up by CNA Candance Easter.     1614 Patient given scheduled medications, see MAR. Patient fed her self breakfast. Patient smiling in bed.    0940 Patient reading magazines and playing with stuffed animal.    1040 Patient given bath, gown change, and linen change. Patient turned and repositioned in bed, patient tolerated activity well. Gillian 74 at bedside with patient. 1210 Patient's lunch tray set up by CNA Candance Easter. 1230 Patient ate 100% of meal.     1320 Therapy dog Morgan at the bedside. 1400 Patient resting in bed quietly reading magazines and playing with stuffed animal, fresh drink brought to patient. 1525 Patient resting in bed quietly, patient appears to be sleeping at this time. 1620 Patient voided, erna care provided, brief changed. Patient turned and repositioned in bed with CNA Candance Easter. Patient tolerated activity well.    1705 Patient's dinner tray set up for patient. Patient feeding herself meal.    1805 Patient finished eating dinner.  Patient reading magazines and playing with stuffed animal.

## 2022-09-06 VITALS
HEART RATE: 82 BPM | SYSTOLIC BLOOD PRESSURE: 125 MMHG | TEMPERATURE: 97 F | OXYGEN SATURATION: 94 % | RESPIRATION RATE: 16 BRPM | DIASTOLIC BLOOD PRESSURE: 68 MMHG

## 2022-09-06 PROCEDURE — G0299 HHS/HOSPICE OF RN EA 15 MIN: HCPCS

## 2022-09-06 PROCEDURE — 74011250637 HC RX REV CODE- 250/637: Performed by: FAMILY MEDICINE

## 2022-09-06 PROCEDURE — 0655 HSPC INPATIENT RESPITE

## 2022-09-06 RX ADMIN — HYDROXYCHLOROQUINE SULFATE 200 MG: 200 TABLET, FILM COATED ORAL at 08:20

## 2022-09-06 RX ADMIN — DAPSONE 100 MG: 100 TABLET ORAL at 08:21

## 2022-09-06 NOTE — PROGRESS NOTES
Problem: Falls - Risk of  Goal: *Absence of Falls  Description: Document [place] Led Fall Risk and appropriate interventions in the flowsheet. Outcome: Progressing Towards Goal  Note: Fall Risk Interventions:       Mentation Interventions: Adequate sleep, hydration, pain control, Bed/chair exit alarm, Door open when patient unattended    Medication Interventions: Bed/chair exit alarm    Elimination Interventions: Bed/chair exit alarm, Call light in reach              Problem: Pressure Injury - Risk of  Goal: *Prevention of pressure injury  Description: Document Adolph Scale and appropriate interventions in the flowsheet.   Outcome: Progressing Towards Goal  Note: Pressure Injury Interventions:  Sensory Interventions: Assess changes in LOC, Check visual cues for pain, Float heels, Minimize linen layers    Moisture Interventions: Absorbent underpads, Apply protective barrier, creams and emollients, Minimize layers, Moisture barrier    Activity Interventions: Pressure redistribution bed/mattress(bed type)    Mobility Interventions: Float heels, HOB 30 degrees or less    Nutrition Interventions: Document food/fluid/supplement intake    Friction and Shear Interventions: HOB 30 degrees or less, Minimize layers                Problem: Anxiety  Goal: *Alleviation of anxiety  Outcome: Progressing Towards Goal

## 2022-09-06 NOTE — PROGRESS NOTES
1900- Report received from [address]- Patient sitting up in bed watching tv, patient alert and able to speak a few non comprehensible words to nurse. Lungs diminished in lower lobes to ausculation. Patient appears comfortable at this time. Bed in lowest position, call bell within reach, bed alarm on.     2132- Patient lying in bed with eyes closed. Patient contracted in upright position, head resting on left breast. Patient appears to be sleeping at this time. No signs of pain or shortness of breath. 2340- Patient awake in bed flipping pages of magazine. Patient appears calm and comfortable at this time. 6447- Patient incontinent of urine. Brief changed with assistance of Yarely MALLOY. Patient becoming mildly anxious with turning. Able to comfort patient and patient appears more relaxed at this time. 9530- Patient resting in bed with eyes closed. Respirations even and unlabored. 8590- Patient sitting up in bed laughing and watching tv. Patient giggling at stuffed bear at bedside. 2269- Patient resting in bed with blankets over head and face. Attempted to move blankets, patient quickly put them back over head. Chest rise noted. 3654- Patient brief changed due to incontinence. Barrier cream applied to sacrum and buttocks.      0700- Report given to Olga Seals RN      NAME OF PATIENT:  Malorie Hodge    LEVEL OF CARE:  Respite    REASON FOR GIP:   N/a    *PATIENT REMAINS ELIGIBLE FOR GIP LEVEL OF CARE AS EVIDENCED BY: (MUST BE ADDRESSED OF PATIENT GIP)      REASON FOR RESPITE:  Caregiver exhaustion     O2 SAFETY:  N/a    FALL INTERVENTIONS PROVIDED:   Implemented/recommended use of fall risk identification flag to all team members, Implemented/recommended assistive devices and encouraged their use, Implemented/recommended resources for alarm system (personal alarm, bed alarm, call bell, etc.) , Implemented/recommended environmental changes (remove hazards, lower bed, improve lighting, etc.), and Implemented/recommended increased supervision/assistance    INTERDISPLINARY COMMUNICATION/COLLABORATION:  Physician, CHONG, Chayo Lawton, and RNMELLISSA    NEW MEDICATION INITIATION DOCUMENTATION:  N/a    Reason medication is being initiated:  n/a    MD / Provider name consulted re: change in status / initiation of new medication:  n/a    New Symptom(s):  n/a    New Order(s):  n/a    Name of the person notified of the changes:  n/a    Name of person being taught:  n/a    Instructions given:  n/a    Side Effects taught:  n/a    Response to teaching:  n/a      COMFORTABLE DYING MEASURE:  Is Patient/family satisfied with symptom level?  yes    DISCHARGE PLAN:  Discharge home once respite stay is complete.

## 2022-09-06 NOTE — PROGRESS NOTES
Problem: Falls - Risk of  Goal: *Absence of Falls  Description: Document Catherine Mccormick Fall Risk and appropriate interventions in the flowsheet. Outcome: Progressing Towards Goal  Note: Fall Risk Interventions:       Mentation Interventions: Adequate sleep, hydration, pain control, Bed/chair exit alarm, Door open when patient unattended, Update white board    Medication Interventions: Bed/chair exit alarm    Elimination Interventions: Bed/chair exit alarm, Call light in reach              Problem: Patient Education: Go to Patient Education Activity  Goal: Patient/Family Education  Outcome: Progressing Towards Goal     Problem: Pressure Injury - Risk of  Goal: *Prevention of pressure injury  Description: Document Adolph Scale and appropriate interventions in the flowsheet.   Outcome: Progressing Towards Goal  Note: Pressure Injury Interventions:  Sensory Interventions: Assess changes in LOC, Check visual cues for pain, Float heels, Keep linens dry and wrinkle-free, Minimize linen layers, Pad between skin to skin, Pressure redistribution bed/mattress (bed type)    Moisture Interventions: Absorbent underpads, Apply protective barrier, creams and emollients, Check for incontinence Q2 hours and as needed, Minimize layers, Maintain skin hydration (lotion/cream)    Activity Interventions: Pressure redistribution bed/mattress(bed type)    Mobility Interventions: HOB 30 degrees or less, Float heels, Pressure redistribution bed/mattress (bed type)    Nutrition Interventions: Document food/fluid/supplement intake, Offer support with meals,snacks and hydration    Friction and Shear Interventions: HOB 30 degrees or less                Problem: Patient Education: Go to Patient Education Activity  Goal: Patient/Family Education  Outcome: Progressing Towards Goal     Problem: Anxiety  Goal: *Alleviation of anxiety  Outcome: Progressing Towards Goal

## 2022-09-06 NOTE — HOSPICE
0700 Report received from [facility]. 0800 Patient's breakfast tray set up by [facility] [employer]. 1034 Patient ate 100% of meal. Patient given scheduled medications crushed in applesauce, see MAR.     0945 Patient voided and had a small bowel movement, erna care provided and brief changed. Patient given bed bath, gown change, and linen change. Patient repositioned in bed with MELLISSA Camejo. Patient tolerated activities well. 1035 Patient resting in bed quietly, eyes are closed, neutral facial expression noted. Patient appears to be sleeping at this time. 1145 Patient resting in bed quietly, eyes are closed, neutral facial expression noted. Patient appears to be sleeping at this time. 1205 Patient's lunch tray set up by [facility] [employer]. 1235 Patient ate 100% of lunch. 1310 Paid caregiver at the bedside, patient voided, erna care provided and brief changed. 1400 [facility] Pa[facility] caregiver left the bedside. 1501 [facility] staff at bedside. 1545 Patient reading magazines and playing with stuffed animal.     1615 Patient voided, erna care provided and brief changed. Patient repositioned in bed.     1635 Fresh drink and snack brought to patient. 1715 Patient's dinner tray set up by MELLISSA Camejo.      1755 Patient ate 100% of meal.     1820 Patient resting in bed quietly reading magazines and playing with stuffed animal.

## 2022-09-07 PROCEDURE — 0651 HSPC ROUTINE HOME CARE

## 2022-09-07 PROCEDURE — 74011250637 HC RX REV CODE- 250/637: Performed by: FAMILY MEDICINE

## 2022-09-07 PROCEDURE — G0299 HHS/HOSPICE OF RN EA 15 MIN: HCPCS

## 2022-09-07 RX ADMIN — HYDROXYCHLOROQUINE SULFATE 200 MG: 200 TABLET, FILM COATED ORAL at 08:34

## 2022-09-07 RX ADMIN — DAPSONE 100 MG: 100 TABLET ORAL at 08:34

## 2022-09-07 NOTE — PROGRESS NOTES
Problem: Falls - Risk of  Goal: *Absence of Falls  Description: Document Sakina Arriaga Fall Risk and appropriate interventions in the flowsheet. Outcome: Progressing Towards Goal  Note: Fall Risk Interventions:       Mentation Interventions: Bed/chair exit alarm    Medication Interventions: Bed/chair exit alarm    Elimination Interventions: Bed/chair exit alarm              Problem: Patient Education: Go to Patient Education Activity  Goal: Patient/Family Education  Outcome: Progressing Towards Goal     Problem: Pressure Injury - Risk of  Goal: *Prevention of pressure injury  Description: Document Adolph Scale and appropriate interventions in the flowsheet.   Outcome: Progressing Towards Goal  Note: Pressure Injury Interventions:  Sensory Interventions: Float heels    Moisture Interventions: Absorbent underpads    Activity Interventions: Assess need for specialty bed    Mobility Interventions: Assess need for specialty bed    Nutrition Interventions: Document food/fluid/supplement intake    Friction and Shear Interventions: Apply protective barrier, creams and emollients                Problem: Patient Education: Go to Patient Education Activity  Goal: Patient/Family Education  Outcome: Progressing Towards Goal     Problem: Anxiety  Goal: *Alleviation of anxiety  Outcome: Progressing Towards Goal

## 2022-09-07 NOTE — PROGRESS NOTES
Problem: Falls - Risk of  Goal: *Absence of Falls  Description: Document Jessi Blood Fall Risk and appropriate interventions in the flowsheet. Outcome: Progressing Towards Goal  Note: Fall Risk Interventions:       Mentation Interventions: Bed/chair exit alarm, Door open when patient unattended    Medication Interventions: Bed/chair exit alarm    Elimination Interventions: Bed/chair exit alarm              Problem: Pressure Injury - Risk of  Goal: *Prevention of pressure injury  Description: Document Adolph Scale and appropriate interventions in the flowsheet.   Outcome: Progressing Towards Goal  Note: Pressure Injury Interventions:  Sensory Interventions: Check visual cues for pain, Float heels, Minimize linen layers    Moisture Interventions: Absorbent underpads, Minimize layers    Activity Interventions: Assess need for specialty bed    Mobility Interventions: Pressure redistribution bed/mattress (bed type)    Nutrition Interventions: Document food/fluid/supplement intake    Friction and Shear Interventions: Apply protective barrier, creams and emollients, Lift sheet, Minimize layers                Problem: Anxiety  Goal: *Alleviation of anxiety  Outcome: Progressing Towards Goal

## 2022-09-07 NOTE — PROGRESS NOTES
0700  Report received from [facility], RN    0730  Pt sitting up in bed turning magazine pages. Smiled and giggled upon greeting. Non verbal, no indication of discomfort or anxiety noted. 0830  Pt eating breakfast.  Scheduled medications administered and tolerated. 1030  Pt's brief and gown changed. Small BM. Priscila care complete.

## 2022-09-07 NOTE — PROGRESS NOTES
Problem: Falls - Risk of  Goal: *Absence of Falls  Description: Document Sejal Vaughn Fall Risk and appropriate interventions in the flowsheet. 9/7/2022 1126 by Tamica Pinzon RN  Outcome: Resolved/Met  9/7/2022 0749 by Tamica Pinzon RN  Outcome: Progressing Towards Goal  Note: Fall Risk Interventions:       Mentation Interventions: Bed/chair exit alarm, Door open when patient unattended    Medication Interventions: Bed/chair exit alarm    Elimination Interventions: Bed/chair exit alarm              Problem: Patient Education: Go to Patient Education Activity  Goal: Patient/Family Education  Outcome: Resolved/Met     Problem: Pressure Injury - Risk of  Goal: *Prevention of pressure injury  Description: Document Adolph Scale and appropriate interventions in the flowsheet.   9/7/2022 1126 by Tamica Pinzon RN  Outcome: Resolved/Met  9/7/2022 0749 by Tamica Pinzon RN  Outcome: Progressing Towards Goal  Note: Pressure Injury Interventions:  Sensory Interventions: Check visual cues for pain, Float heels, Minimize linen layers    Moisture Interventions: Absorbent underpads, Minimize layers    Activity Interventions: Assess need for specialty bed    Mobility Interventions: Pressure redistribution bed/mattress (bed type)    Nutrition Interventions: Document food/fluid/supplement intake    Friction and Shear Interventions: Apply protective barrier, creams and emollients, Lift sheet, Minimize layers                Problem: Patient Education: Go to Patient Education Activity  Goal: Patient/Family Education  Outcome: Resolved/Met     Problem: Anxiety  Goal: *Alleviation of anxiety  9/7/2022 1126 by Tamica Pinzon RN  Outcome: Resolved/Met  9/7/2022 0749 by Tamica Pinzon RN  Outcome: Progressing Towards Goal     Problem: Patient Education: Go to Patient Education Activity  Goal: Patient/Family Education  Outcome: Resolved/Met

## 2022-09-07 NOTE — PROGRESS NOTES
1900 Report from [facility] Patient resting in bed watching t.v. Patient smiles when RN enters room but no verbalization at this time and unable to assess orientation. Patient has no facial grimacing and does not appear in any pain. Assessment and vitals completed. Bed alarm on.     2110 Patient's brief changed and incontinence care provided. Patient repositioned on right side. Patient now resting with neutral facial expression. Bed alarm on.     2310 Patient asleep with relaxed facial expression. 0356 Patient asleep with unlabored respirations. 0210 Patient's brief changed and incontinence care provided. Patient repositioned on left side. Patient now resting with no facial grimacing watching t.v.    0410 Patient awake in bed with neutral facial expression. 7491 Patient asleep in bed with unlabored respirations. 4397 Patient's brief changed and incontinence care provided. Patient repositioned on right side. Patient now resting in bed looking at magazine with relaxed facial expression. 0700 Report to oncoming nurse.          NAME OF PATIENT:  Pau Juarez    LEVEL OF CARE:  Respite    REASON FOR GIP:   N/a    *PATIENT REMAINS ELIGIBLE FOR [facility] LEVEL OF CARE AS EVIDENCED BY: n/a      REASON FOR RESPITE:  Caregiver breakdown    O2 SAFETY:  N/a    FALL INTERVENTIONS PROVIDED:   Implemented/recommended use of non-skid footwear, Implemented/recommended use of fall risk identification flag to all team members, Implemented/recommended resources for alarm system (personal alarm, bed alarm, call bell, etc.) , Implemented/recommended environmental changes (remove hazards, lower bed, improve lighting, etc.), and Implemented/recommended increased supervision/assistance    INTERDISPLINARY COMMUNICATION/COLLABORATION:  Physician, CHONG, [facility], and RNMELLISSA    NEW MEDICATION INITIATION DOCUMENTATION:  N/a    Reason medication is being initiated:  n/a    MD / Provider name consulted re: change in status / initiation of new medication:  n/a    New Symptom(s):  n/a    New Order(s):  n/a    Name of the person notified of the changes:  n/a    Name of person being taught:  n/a    Instructions given:  n/a    Side Effects taught:  n/a    Response to teaching:  n/a      COMFORTABLE DYING MEASURE:  Is Patient/family satisfied with symptom level?  yes    DISCHARGE PLAN:  home at end of respite stay.

## 2022-10-21 ENCOUNTER — HOSPICE ADMISSION (OUTPATIENT)
Dept: HOSPICE | Facility: HOSPICE | Age: 77
End: 2022-10-21
Payer: OTHER MISCELLANEOUS

## 2022-10-21 ENCOUNTER — HOSPITAL ENCOUNTER (INPATIENT)
Age: 77
LOS: 5 days | Discharge: HOSPICE | End: 2022-10-26
Attending: FAMILY MEDICINE | Admitting: FAMILY MEDICINE

## 2022-10-21 PROCEDURE — 0655 HSPC INPATIENT RESPITE

## 2022-10-21 PROCEDURE — G0299 HHS/HOSPICE OF RN EA 15 MIN: HCPCS

## 2022-10-21 RX ORDER — HYDROXYCHLOROQUINE SULFATE 200 MG/1
200 TABLET, FILM COATED ORAL
Status: DISCONTINUED | OUTPATIENT
Start: 2022-10-22 | End: 2022-10-26 | Stop reason: HOSPADM

## 2022-10-21 RX ORDER — DAPSONE 100 MG/1
100 TABLET ORAL DAILY
Status: DISCONTINUED | OUTPATIENT
Start: 2022-10-22 | End: 2022-10-26 | Stop reason: HOSPADM

## 2022-10-21 RX ORDER — ALPRAZOLAM 0.5 MG/1
0.5 TABLET ORAL
Status: DISCONTINUED | OUTPATIENT
Start: 2022-10-21 | End: 2022-10-26 | Stop reason: HOSPADM

## 2022-10-21 RX ORDER — TRAMADOL HYDROCHLORIDE 50 MG/1
50 TABLET ORAL
Status: DISCONTINUED | OUTPATIENT
Start: 2022-10-21 | End: 2022-10-26 | Stop reason: HOSPADM

## 2022-10-21 RX ORDER — FACIAL-BODY WIPES
10 EACH TOPICAL DAILY PRN
Status: DISCONTINUED | OUTPATIENT
Start: 2022-10-21 | End: 2022-10-26 | Stop reason: HOSPADM

## 2022-10-21 NOTE — H&P
[facility]  [phone]    Patient Name: Jeremy Villarreal  YOB: 1945    Date of Provider Hospice Visit: 10/21/22    Level of Care:   [] General Inpatient (GIP)    [] Routine   [x] Respite    Current Location of Care:  [] [facility] [] [facility] [] [facility] [] [facility] [x] Hospice House [facility]      Principle Hospice Diagnosis: Systolic heart failure, lupus, MR       HOSPICE SUMMARY     Chart Reviewed for patient's medical history and hospice care plan. Hospice Physician Certification/Recertification Narrative per Shayy Wright / soheila CUMMINS:     Patient currently being served by [address] [place] [address]. Patient sent to the hospice house for respite level care secondary to caregiver exhaustion. Patient with a history of diastolic heart failure as well as lupus. Patient is nonverbal but currently appears comfortable. Patient being admitted for Respite care x 5 days for   [x]  Caregiver exhaustion and needing break  []  Caregiver unavailable       PLAN   Patient's home medications were reviewed and reconciled. Continue with current home medications and plan of care as outlined in chart. Plan outlined by P[address]. Patient appears comfortable currently.  and SW to support family needs. Disposition: Home with hospice once respite stay is completed.

## 2022-10-21 NOTE — PROGRESS NOTES
Problem: Potential for Alteration in Skin Integrity  Goal: Monitor skin for areas of alteration in skin integrity  Description: Patient/family/caregiver will demonstrate ability to care for patient's skin, monitor for areas of breakdown, and demonstrate methods to prevent breakdown during hospice care. Outcome: Progressing Towards Goal     Problem: Risk for Falls  Goal: Free of falls during inpatient stay  Description: Patient will be free of falls during inpatient stay. Outcome: Progressing Towards Goal     Problem: Alteration in Mobility  Goal: Remain as independent as possible and remain safe in environment  Description: Patient will remain as independent as possible and remain safe in their environment. Outcome: Progressing Towards Goal     Problem: Pain  Goal: Assess satisfaction of level of comfort and symptom control  Outcome: Progressing Towards Goal  Goal: *Control of acute pain  Outcome: Progressing Towards Goal     Problem: Anxiety/Agitation  Goal: Verbalize or staff assess the ability to manage anxiety  Description: The patient/family/caregiver will verbalize and demonstrate ability to manage the patient's anxiety throughout hospice care. Outcome: Progressing Towards Goal     Problem: Pressure Injury - Risk of  Goal: *Prevention of pressure injury  Description: Document Adolph Scale and appropriate interventions in the flowsheet. Outcome: Progressing Towards Goal  Note: Pressure Injury Interventions:  Sensory Interventions: Float heels, Minimize linen layers, Turn and reposition approx. every two hours (pillows and wedges if needed), Keep linens dry and wrinkle-free, Monitor skin under medical devices    Moisture Interventions: Absorbent underpads, Internal/External urinary devices, Minimize layers, Limit adult briefs, Moisture barrier    Activity Interventions: Assess need for specialty bed    Mobility Interventions: Assess need for specialty bed, Float heels, Turn and reposition approx.  every two hours(pillow and wedges)    Nutrition Interventions: Document food/fluid/supplement intake    Friction and Shear Interventions: Apply protective barrier, creams and emollients, Minimize layers                Problem: Patient Education: Go to Patient Education Activity  Goal: Patient/Family Education  Outcome: Progressing Towards Goal     Problem: Falls - Risk of  Goal: *Absence of Falls  Description: Document Cole Fall Risk and appropriate interventions in the flowsheet.   Outcome: Progressing Towards Goal  Note: Fall Risk Interventions:       Mentation Interventions: Adequate sleep, hydration, pain control, Bed/chair exit alarm, Door open when patient unattended    Medication Interventions: Bed/chair exit alarm    Elimination Interventions: Call light in reach, Bed/chair exit alarm              Problem: Patient Education: Go to Patient Education Activity  Goal: Patient/Family Education  Outcome: Progressing Towards Goal

## 2022-10-21 NOTE — PROGRESS NOTES
Problem: Potential for Alteration in Skin Integrity  Goal: Monitor skin for areas of alteration in skin integrity  Description: Patient/family/caregiver will demonstrate ability to care for patient's skin, monitor for areas of breakdown, and demonstrate methods to prevent breakdown during hospice care. Outcome: Progressing Towards Goal     Problem: Risk for Falls  Goal: Free of falls during inpatient stay  Description: Patient will be free of falls during inpatient stay. Outcome: Progressing Towards Goal     Problem: Alteration in Mobility  Goal: Remain as independent as possible and remain safe in environment  Description: Patient will remain as independent as possible and remain safe in their environment. Outcome: Progressing Towards Goal     Problem: Pain  Goal: Assess satisfaction of level of comfort and symptom control  Outcome: Progressing Towards Goal  Goal: *Control of acute pain  Outcome: Progressing Towards Goal     Problem: Anxiety/Agitation  Goal: Verbalize or staff assess the ability to manage anxiety  Description: The patient/family/caregiver will verbalize and demonstrate ability to manage the patient's anxiety throughout hospice care.   Outcome: Progressing Towards Goal     Problem: Pressure Injury - Risk of  Goal: *Prevention of pressure injury  Outcome: Progressing Towards Goal  Note: Pressure Injury Interventions:

## 2022-10-21 NOTE — HOSPICE
1315 Patient arrived via [facility] transport, patient transferred from stretcher to bed. Patient tolerated activity well. Lunch ordered for patient. 1330 Patient's lunch set up for patient. [phone] Patient's nurse and MSW at bedside. 1455 Patient resting in bed quietly reading magazines to self. [address] staff at the bedside. 1635 Patient resting in bed quietly, eyes are closed. Patient appears to be sleeping at this time. 1710 Patient voided, erna care provided and brief changed. Patient repositioned in bed, patient tolerated activities well. Patient's dinner tray set up for patient.      181 Patient ate 75% of meal.

## 2022-10-22 PROCEDURE — 74011250637 HC RX REV CODE- 250/637: Performed by: FAMILY MEDICINE

## 2022-10-22 PROCEDURE — 0655 HSPC INPATIENT RESPITE

## 2022-10-22 PROCEDURE — G0156 HHCP-SVS OF AIDE,EA 15 MIN: HCPCS

## 2022-10-22 PROCEDURE — G0299 HHS/HOSPICE OF RN EA 15 MIN: HCPCS

## 2022-10-22 RX ADMIN — HYDROXYCHLOROQUINE SULFATE 200 MG: 200 TABLET, FILM COATED ORAL at 08:59

## 2022-10-22 RX ADMIN — DAPSONE 100 MG: 100 TABLET ORAL at 08:59

## 2022-10-22 NOTE — HOSPICE
0700 [facility]    7786 Patient resting in bed quietly, eyes are closed. Patient appears to be sleeping at this time. 0840 Patient fed breakfast by MELLISSA Benton. 0900 Patient given scheduled medications, see MAR.     1035 Patient voided, erna care provided and brief changed. Patient repositioned in bed. 1110 Patient given bed bath and gown change. Patient tolerated activity well    1205 Patient's lunch tray set up for patient. [phone] Patient ate 90% of meal.     1355 Patient reading magazine and playing with stuffed animal.    1520 Patient voided, erna care provided and brief changed. Patient repositioned in bed with MELLISSA Benton. 1700 Patient's dinner tray set up for patient. 1745 Patient ate 50% of meal.     1800 Patient checked for incontinence, patient is clean and dry at this time. Patient turned and repositioned in bed, patient tolerated activity well. NAME OF PATIENT:  Adam Caballero    LEVEL OF CARE:  Respite       REASON FOR RESPITE:  Caregiver exhaustion     O2 SAFETY:  N/A    FALL INTERVENTIONS PROVIDED:   Implemented/recommended use of non-skid footwear, Implemented/recommended use of fall risk identification flag to all team members, Implemented/recommended environmental changes (remove hazards, lower bed, improve lighting, etc.), and Implemented/recommended increased supervision/assistance    INTERDISPLINARY COMMUNICATION/COLLABORATION:  Physician, MSW, Rafael, and RN, CNA    NEW MEDICATION INITIATION DOCUMENTATION:  N/a    Reason medication is being initiated:  n/a    MD / Provider name consulted re: change in status / initiation of new medication:  n/a    New Symptom(s):  n/a    New Order(s):  n/a    Name of the person notified of the changes:  n/a    Name of person being taught:  n/a    Instructions given:  n/a    Side Effects taught:  n/a    Response to teaching:  n/a      COMFORTABLE DYING MEASURE:  Is Patient/family satisfied with symptom level? N/a    DISCHARGE PLAN:  Home when respite stay ends

## 2022-10-22 NOTE — PROGRESS NOTES
Problem: Potential for Alteration in Skin Integrity  Goal: Monitor skin for areas of alteration in skin integrity  Description: Patient/family/caregiver will demonstrate ability to care for patient's skin, monitor for areas of breakdown, and demonstrate methods to prevent breakdown during hospice care. Outcome: Progressing Towards Goal     Problem: Risk for Falls  Goal: Free of falls during inpatient stay  Description: Patient will be free of falls during inpatient stay. Outcome: Progressing Towards Goal     Problem: Alteration in Mobility  Goal: Remain as independent as possible and remain safe in environment  Description: Patient will remain as independent as possible and remain safe in their environment. Outcome: Progressing Towards Goal     Problem: Pain  Goal: Assess satisfaction of level of comfort and symptom control  Outcome: Progressing Towards Goal     Problem: Anxiety/Agitation  Goal: Verbalize or staff assess the ability to manage anxiety  Description: The patient/family/caregiver will verbalize and demonstrate ability to manage the patient's anxiety throughout hospice care. Outcome: Progressing Towards Goal     Problem: Pressure Injury - Risk of  Goal: *Prevention of pressure injury  Outcome: Progressing Towards Goal  Note: Pressure Injury Interventions:  Sensory Interventions: Float heels, Minimize linen layers, Turn and reposition approx. every two hours (pillows and wedges if needed), Keep linens dry and wrinkle-free, Monitor skin under medical devices    Moisture Interventions: Absorbent underpads, Internal/External urinary devices, Minimize layers, Limit adult briefs, Moisture barrier    Activity Interventions: Assess need for specialty bed    Mobility Interventions: Assess need for specialty bed, Float heels, Turn and reposition approx.  every two hours(pillow and wedges)    Nutrition Interventions: Document food/fluid/supplement intake    Friction and Shear Interventions: Apply protective barrier, creams and emollients, Minimize layers                Problem: Pressure Injury - Risk of  Goal: *Prevention of pressure injury  Description: Document Adolph Scale and appropriate interventions in the flowsheet. Outcome: Progressing Towards Goal  Note: Pressure Injury Interventions:  Sensory Interventions: Float heels, Minimize linen layers, Turn and reposition approx. every two hours (pillows and wedges if needed), Keep linens dry and wrinkle-free, Monitor skin under medical devices    Moisture Interventions: Absorbent underpads, Internal/External urinary devices, Minimize layers, Limit adult briefs, Moisture barrier    Activity Interventions: Assess need for specialty bed    Mobility Interventions: Assess need for specialty bed, Float heels, Turn and reposition approx. every two hours(pillow and wedges)    Nutrition Interventions: Document food/fluid/supplement intake    Friction and Shear Interventions: Apply protective barrier, creams and emollients, Minimize layers                Problem: Falls - Risk of  Goal: *Absence of Falls  Description: Document Cole Fall Risk and appropriate interventions in the flowsheet.   Outcome: Progressing Towards Goal  Note: Fall Risk Interventions:       Mentation Interventions: Adequate sleep, hydration, pain control, Bed/chair exit alarm, Door open when patient unattended    Medication Interventions: Bed/chair exit alarm    Elimination Interventions: Call light in reach, Bed/chair exit alarm

## 2022-10-22 NOTE — PROGRESS NOTES
Problem: Potential for Alteration in Skin Integrity  Goal: Monitor skin for areas of alteration in skin integrity  Description: Patient/family/caregiver will demonstrate ability to care for patient's skin, monitor for areas of breakdown, and demonstrate methods to prevent breakdown during hospice care. Outcome: Progressing Towards Goal     Problem: Risk for Falls  Goal: Free of falls during inpatient stay  Description: Patient will be free of falls during inpatient stay. Outcome: Progressing Towards Goal     Problem: Alteration in Mobility  Goal: Remain as independent as possible and remain safe in environment  Description: Patient will remain as independent as possible and remain safe in their environment. Outcome: Progressing Towards Goal     Problem: Pain  Goal: Assess satisfaction of level of comfort and symptom control  Outcome: Progressing Towards Goal  Goal: *Control of acute pain  Outcome: Progressing Towards Goal     Problem: Anxiety/Agitation  Goal: Verbalize or staff assess the ability to manage anxiety  Description: The patient/family/caregiver will verbalize and demonstrate ability to manage the patient's anxiety throughout hospice care.   Outcome: Progressing Towards Goal     Problem: Pressure Injury - Risk of  Goal: *Prevention of pressure injury  Outcome: Progressing Towards Goal  Note: Pressure Injury Interventions:  Sensory Interventions: Float heels, Pressure redistribution bed/mattress (bed type), Check visual cues for pain    Moisture Interventions: Apply protective barrier, creams and emollients    Activity Interventions: Pressure redistribution bed/mattress(bed type)    Mobility Interventions: Float heels, Pressure redistribution bed/mattress (bed type)    Nutrition Interventions: Document food/fluid/supplement intake, Offer support with meals,snacks and hydration    Friction and Shear Interventions: Apply protective barrier, creams and emollients                Problem: Pressure Injury - Risk of  Goal: *Prevention of pressure injury  Description: Document Adolph Scale and appropriate interventions in the flowsheet. Outcome: Progressing Towards Goal  Note: Pressure Injury Interventions:  Sensory Interventions: Float heels, Pressure redistribution bed/mattress (bed type), Check visual cues for pain    Moisture Interventions: Apply protective barrier, creams and emollients    Activity Interventions: Pressure redistribution bed/mattress(bed type)    Mobility Interventions: Float heels, Pressure redistribution bed/mattress (bed type)    Nutrition Interventions: Document food/fluid/supplement intake, Offer support with meals,snacks and hydration    Friction and Shear Interventions: Apply protective barrier, creams and emollients                Problem: Patient Education: Go to Patient Education Activity  Goal: Patient/Family Education  Outcome: Progressing Towards Goal     Problem: Falls - Risk of  Goal: *Absence of Falls  Description: Document Cole Fall Risk and appropriate interventions in the flowsheet.   Outcome: Progressing Towards Goal     Problem: Patient Education: Go to Patient Education Activity  Goal: Patient/Family Education  Outcome: Progressing Towards Goal

## 2022-10-22 NOTE — PROGRESS NOTES
1903-received report from [facility]    2015-pt awake and watching tv in bed. Pt seemed timid as I entered the room. Reassured pt to establish trust. Pt nonverbal, but attempted to communicate making sounds. Pt with clear, unlabored resp. Pt skin intact. No visual cues of pain. 2220-pt awake and watching tv. Pt brief wet. Changed brief, performed erna care, and repositioned pt on left side. 0015-pt awake looking through magazine. Repositioned pt for sleep. No visual cues of pain. 0149-pt asleep and appears comfortable. No visual cues of pain. 0345-pt asleep w/ unlabored breathing    0550-pt awake. Brief wet. Performed erna care and changed brief. NAME OF PATIENT:  Alyssa Edwards    LEVEL OF CARE:  respite    REASON FOR GIP:   N/A    *PATIENT REMAINS ELIGIBLE FOR GIP LEVEL OF CARE AS EVIDENCED BY: (MUST BE ADDRESSED OF PATIENT GIP)  N/A    REASON FOR RESPITE:  Caregiver cannot care for patient due to:  exhaustion    O2 SAFETY:  N/A    FALL INTERVENTIONS PROVIDED:   Implemented/recommended use of fall risk identification flag to all team members, Implemented/recommended resources for alarm system (personal alarm, bed alarm, call bell, etc.) , and Implemented/recommended environmental changes (remove hazards, lower bed, improve lighting, etc.)    INTERDISPLINARY COMMUNICATION/COLLABORATION:  Physician, Ellis SCHWARZ, and RN, CNA    NEW MEDICATION INITIATION DOCUMENTATION:  N/A    Reason medication is being initiated:  N/A    MD / Provider name consulted re: change in status / initiation of new medication:  N/A    New Symptom(s):  N/A    New Order(s):  N/A    Name of the person notified of the changes:  N/A    Name of person being taught:  N/A    Instructions given:  N/A    Side Effects taught:  N/A    Response to teaching:  N/A      COMFORTABLE DYING MEASURE:  Is Patient/family satisfied with symptom level?  yes    DISCHARGE PLAN:  home with hospice upon completion of stay.

## 2022-10-23 PROCEDURE — G0299 HHS/HOSPICE OF RN EA 15 MIN: HCPCS

## 2022-10-23 PROCEDURE — 74011250637 HC RX REV CODE- 250/637: Performed by: FAMILY MEDICINE

## 2022-10-23 PROCEDURE — G0156 HHCP-SVS OF AIDE,EA 15 MIN: HCPCS

## 2022-10-23 PROCEDURE — 0655 HSPC INPATIENT RESPITE

## 2022-10-23 RX ADMIN — HYDROXYCHLOROQUINE SULFATE 200 MG: 200 TABLET, FILM COATED ORAL at 08:24

## 2022-10-23 RX ADMIN — DAPSONE 100 MG: 100 TABLET ORAL at 08:24

## 2022-10-23 NOTE — HOSPICE
1900 Report received from [address] Patient awake in bed, flipping through her magazine. Patient nonverbal. Respirations unlabored. Heel protectors placed back on heels. 2120 Patient resting in bed. Had dropped her magazine in the floor, given back to patient. 2230 Patient incontinent of urine. Priscila care provided. Repositioned onto right side. 2305 Patient resting quietly with eyes closed. 0130 Patient resting quietly, has pulled the blanket up over her head. 0310 Patient resting quietly with eyes closed, respirations unlabored. 0500 Patient resting with eyes closed and neutral facial expression. 0600 Patient incontinent of urine. Priscila care provided. Repositioned onto left side. 0700 Report given to RODRIGUEZ Bowles      NAME OF PATIENT:  Regan Maxwell    LEVEL OF CARE:  Respite    REASON FOR GIP:   N/a    *PATIENT REMAINS ELIGIBLE FOR GIP LEVEL OF CARE AS EVIDENCED BY: (MUST BE ADDRESSED OF PATIENT GIP)      REASON FOR RESPITE:  Caregiver exhaustion    O2 SAFETY:  N/a    FALL INTERVENTIONS PROVIDED:   Implemented/recommended resources for alarm system (personal alarm, bed alarm, call bell, etc.) , Implemented/recommended environmental changes (remove hazards, lower bed, improve lighting, etc.), and Implemented/recommended increased supervision/assistance    INTERDISPLINARY COMMUNICATION/COLLABORATION:  Physician, CHONG, Daniel Aviles, and RN, CNA    NEW MEDICATION INITIATION DOCUMENTATION:  N/a    Reason medication is being initiated:  n/a    MD / Provider name consulted re: change in status / initiation of new medication:  n/a    New Symptom(s):  n/a    New Order(s):  n/a    Name of the person notified of the changes:  n/a    Name of person being taught:  n/a    Instructions given:  n/a    Side Effects taught:  n/a    Response to teaching:  n/a      COMFORTABLE DYING MEASURE:  Is Patient/family satisfied with symptom level?  yes    DISCHARGE PLAN:  Return home at end of respite stay.

## 2022-10-23 NOTE — HOSPICE
0700 Report received from Reji Champion.    6569 Patient resting in bed quietly, eyes are closed. Patient appears to be sleeping at this time. 0800 Patient's breakfast tray set up by [employer]. 7974 Patient given scheduled medications, see MAR. Patient smiling and laughing at staff, patient appears to be enjoying [place] toast sticks. 2382 Patient voided, erna care provided and brief changed. 1055 Patient given bed bath, gown change, and linen change. Patient turned and repositioned in bed. Patient tolerated activity well. 1200 Patient's lunch tray set up for patient. 1250 Patient ate 75% of  meal.     1355 Patient resting in bed quietly, patient appears to be sleeping at this time. 1500 Patient voided, erna care provided, brief changed. Patient turned and repositioned in bed.     1620 Patient resting in bed quietly, eyes are closed. Patient appears to be sleeping at this time. 1700 Patient's dinner tray set up for patient by CNA Lindalee Dakins. 1800 Patient ate 75% of meal.    1845 Patient checked for incontinence, patient is clean and dry at this time.        NAME OF PATIENT:  Sylvester Randhawa    LEVEL OF CARE:  Respite     REASON FOR RESPITE:  Caregiver exhaustion     O2 SAFETY:  N/a    FALL INTERVENTIONS PROVIDED:   Implemented/recommended use of non-skid footwear, Implemented/recommended use of fall risk identification flag to all team members, Implemented/recommended environmental changes (remove hazards, lower bed, improve lighting, etc.), and Implemented/recommended increased supervision/assistance    INTERDISPLINARY COMMUNICATION/COLLABORATION:  Physician, MSW, Rafael, and RN, CNA    NEW MEDICATION INITIATION DOCUMENTATION:  N/a    Reason medication is being initiated:  n/a    MD / Provider name consulted re: change in status / initiation of new medication:  n/a    New Symptom(s):  n/a    New Order(s):  n/a    Name of the person notified of the changes:  n/a    Name of person being taught:  n/a    Instructions given:  n/a    Side Effects taught:  n/a    Response to teaching:  n/a      COMFORTABLE DYING MEASURE:  Is Patient/family satisfied with symptom level?   Yes    DISCHARGE PLAN:  Home when respite stay ends

## 2022-10-23 NOTE — PROGRESS NOTES
Problem: Potential for Alteration in Skin Integrity  Goal: Monitor skin for areas of alteration in skin integrity  Description: Patient/family/caregiver will demonstrate ability to care for patient's skin, monitor for areas of breakdown, and demonstrate methods to prevent breakdown during hospice care. Outcome: Progressing Towards Goal     Problem: Risk for Falls  Goal: Free of falls during inpatient stay  Description: Patient will be free of falls during inpatient stay. Outcome: Progressing Towards Goal     Problem: Alteration in Mobility  Goal: Remain as independent as possible and remain safe in environment  Description: Patient will remain as independent as possible and remain safe in their environment. Outcome: Progressing Towards Goal     Problem: Pain  Goal: Assess satisfaction of level of comfort and symptom control  Outcome: Progressing Towards Goal  Goal: *Control of acute pain  Outcome: Progressing Towards Goal     Problem: Anxiety/Agitation  Goal: Verbalize or staff assess the ability to manage anxiety  Description: The patient/family/caregiver will verbalize and demonstrate ability to manage the patient's anxiety throughout hospice care.   Outcome: Progressing Towards Goal     Problem: Pressure Injury - Risk of  Goal: *Prevention of pressure injury  Outcome: Progressing Towards Goal  Note: Pressure Injury Interventions:  Sensory Interventions: Assess changes in LOC, Check visual cues for pain, Float heels, Keep linens dry and wrinkle-free, Minimize linen layers    Moisture Interventions: Absorbent underpads, Apply protective barrier, creams and emollients, Maintain skin hydration (lotion/cream)    Activity Interventions: Pressure redistribution bed/mattress(bed type)    Mobility Interventions: Float heels, HOB 30 degrees or less, Pressure redistribution bed/mattress (bed type)    Nutrition Interventions: Document food/fluid/supplement intake, Offer support with meals,snacks and hydration    Friction and Shear Interventions: Apply protective barrier, creams and emollients, HOB 30 degrees or less, Lift sheet, Minimize layers                Problem: Pressure Injury - Risk of  Goal: *Prevention of pressure injury  Description: Document Adolph Scale and appropriate interventions in the flowsheet. Outcome: Progressing Towards Goal  Note: Pressure Injury Interventions:  Sensory Interventions: Assess changes in LOC, Check visual cues for pain, Float heels, Keep linens dry and wrinkle-free, Minimize linen layers    Moisture Interventions: Absorbent underpads, Apply protective barrier, creams and emollients, Maintain skin hydration (lotion/cream)    Activity Interventions: Pressure redistribution bed/mattress(bed type)    Mobility Interventions: Float heels, HOB 30 degrees or less, Pressure redistribution bed/mattress (bed type)    Nutrition Interventions: Document food/fluid/supplement intake, Offer support with meals,snacks and hydration    Friction and Shear Interventions: Apply protective barrier, creams and emollients, HOB 30 degrees or less, Lift sheet, Minimize layers                Problem: Patient Education: Go to Patient Education Activity  Goal: Patient/Family Education  Outcome: Progressing Towards Goal     Problem: Falls - Risk of  Goal: *Absence of Falls  Description: Document Cole Fall Risk and appropriate interventions in the flowsheet.   Outcome: Progressing Towards Goal     Problem: Patient Education: Go to Patient Education Activity  Goal: Patient/Family Education  Outcome: Progressing Towards Goal

## 2022-10-24 PROCEDURE — G0300 HHS/HOSPICE OF LPN EA 15 MIN: HCPCS

## 2022-10-24 PROCEDURE — 74011250637 HC RX REV CODE- 250/637: Performed by: FAMILY MEDICINE

## 2022-10-24 PROCEDURE — 0655 HSPC INPATIENT RESPITE

## 2022-10-24 PROCEDURE — G0299 HHS/HOSPICE OF RN EA 15 MIN: HCPCS

## 2022-10-24 RX ADMIN — DAPSONE 100 MG: 100 TABLET ORAL at 08:37

## 2022-10-24 RX ADMIN — HYDROXYCHLOROQUINE SULFATE 200 MG: 200 TABLET, FILM COATED ORAL at 08:00

## 2022-10-24 RX ADMIN — TRAMADOL HYDROCHLORIDE 50 MG: 50 TABLET, COATED ORAL at 11:55

## 2022-10-24 NOTE — HOSPICE
1900 Report received from Cathy Galvez, [address] Patient in bed with eyes open. RN assessment and vitals completed. Patient in not verbal but smiles and shakes her head. Took a few sips of water. Brief is dry. 2150 Patient incontinent of urine. Priscila care provided. Repositioned onto right side. 2330 Patient resting quietly with eyes closed. Respirations unlabored. 0125 Patient resting quietly with eyes closed and neutral facial expression. 7068 Patient awake flipping through the pages of her book. Started smiling and covered her face with the book to hide. 0400 Patient resting with eye closed, respirations unlabored. 1490 Patient incontinent of urine. Priscila care provided. Repositioned onto left side. 0700 Report given to oncoming shift. NAME OF PATIENT:  Chon Martinez    LEVEL OF CARE:  Respite    REASON FOR GIP:   N/a    *PATIENT REMAINS ELIGIBLE FOR GIP LEVEL OF CARE AS EVIDENCED BY: (MUST BE ADDRESSED OF PATIENT GIP)      REASON FOR RESPITE:  Caregiver exhaustion    O2 SAFETY:  N/a    FALL INTERVENTIONS PROVIDED:   Implemented/recommended resources for alarm system (personal alarm, bed alarm, call bell, etc.)  and Implemented/recommended environmental changes (remove hazards, lower bed, improve lighting, etc.)    INTERDISPLINARY COMMUNICATION/COLLABORATION:  Physician, MSW, Joss العلي, and RN, CNA    NEW MEDICATION INITIATION DOCUMENTATION:  N/a    Reason medication is being initiated:  n/a    MD / Provider name consulted re: change in status / initiation of new medication:  n/a    New Symptom(s):  n/a    New Order(s):  n/a    Name of the person notified of the changes:  n/a    Name of person being taught:  n/a    Instructions given:  n/a    Side Effects taught:  n/a    Response to teaching:  n/a      COMFORTABLE DYING MEASURE:  Is Patient/family satisfied with symptom level?  yes    DISCHARGE PLAN:  Return home at end of respite stay.

## 2022-10-24 NOTE — PROGRESS NOTES
..  0700:Report received from [facility] using [facility] I/O and [facility] Pt is awake alert no sign or symptom of pain assessment done in flow sheet. 0800: Adm am meds, tolerated well.  09:45:Pt resting quietly no discomfort noted. 1100:[facility] CNA at bedside gave pt a full bed bath. 11:55:Pt is grimacing adm PRN tramadol for pain, [facility] Masseuse is at bedside. 12:30:Pt eating lunch, no discomfort noted. 13:50:Pt resting quietly no discomfort noted. 15:55:Pt voids incontinent care done, repositioned for comfort. 17:45:Meal setup pt eating dinner, call return to sister. 18:15:Pt voids incontinent care done repositioned for comfort. 1900:Report given to Adam Noriega.         NAME OF PATIENT:      LEVEL OF CARE: Respite  REASON FOR GIP: N/A     *PATIENT REMAINS ELIGIBLE FOR GIP LEVEL OF CARE AS EVIDENCED BY:n/a      REASON FOR RESPITE:  Caregiver Exhaustion     O2 SAFETY:  Concentrator positioning (6\" from furniture/drapes), Tanks stored in manuel , No petroleum based products on face while oxygen in use and Oxygen sign on the door     FALL INTERVENTIONS PROVIDED:   Implemented/recommended use of fall risk identification flag to all team members, Implemented/recommended assistive devices and encouraged their use, Implemented/recommended resources for alarm system (personal alarm, bed alarm, call bell, etc.)  and Implemented/recommended environmental changes (remove hazards, lower bed, improve lighting, etc.)     INTERDISPLINARY COMMUNICATION/COLLABORATION:  Physician, MSW, Rafael and RN, CNA     NEW MEDICATION INITIATION DOCUMENTATION:N/A      Reason medication is being initiated:  N/A     MD / Provider name consulted re: change in status / initiation of new medication:  N/A     New Symptom(s):  N/A     New Order(s):  N/A     Name of the person notified of the changes:  N/A     Name of person being taught:  N/A     Instructions given:  N/A     Side Effects taught:  N/A     Response to teaching:  N/A COMFORTABLE DYING MEASURE:  Is Patient/family satisfied with symptom level?  yes     DISCHARGE PLAN:Pt will complete respite stay and return home with her sister.

## 2022-10-24 NOTE — PROGRESS NOTES
Problem: Risk for Falls  Goal: Free of falls during inpatient stay  Description: Patient will be free of falls during inpatient stay.   Outcome: Progressing Towards Goal     Problem: Pain  Goal: Assess satisfaction of level of comfort and symptom control  Outcome: Progressing Towards Goal     Problem: Pressure Injury - Risk of  Goal: *Prevention of pressure injury  Outcome: Progressing Towards Goal  Note: Pressure Injury Interventions:  Sensory Interventions: Assess changes in LOC, Avoid rigorous massage over bony prominences, Float heels    Moisture Interventions: Absorbent underpads, Apply protective barrier, creams and emollients, Check for incontinence Q2 hours and as needed    Activity Interventions: Pressure redistribution bed/mattress(bed type)    Mobility Interventions: Float heels    Nutrition Interventions: Document food/fluid/supplement intake, Offer support with meals,snacks and hydration    Friction and Shear Interventions: Apply protective barrier, creams and emollients, HOB 30 degrees or less, Minimize layers

## 2022-10-25 PROCEDURE — 0655 HSPC INPATIENT RESPITE

## 2022-10-25 PROCEDURE — G0299 HHS/HOSPICE OF RN EA 15 MIN: HCPCS

## 2022-10-25 PROCEDURE — 74011250637 HC RX REV CODE- 250/637: Performed by: FAMILY MEDICINE

## 2022-10-25 RX ADMIN — DAPSONE 100 MG: 100 TABLET ORAL at 09:19

## 2022-10-25 RX ADMIN — HYDROXYCHLOROQUINE SULFATE 200 MG: 200 TABLET, FILM COATED ORAL at 09:19

## 2022-10-25 NOTE — PROGRESS NOTES
SHIFT REPORT  1900-Handoff report received from [facility]. Respite LOC with and admitting diagnosis of systolic HF, lupus and MR.    1930-Patient resting quietly in bed with eyes open. No signs of discomfort or distress    2000-Shift assessment completed, see flow sheets     2245-Patient saturated brief was changes. Patient was also turned and repositioned. 0030-Patient resting quietly in bed with eyes open. No signs of discomfort or distress. 0150-Patient resting quietly in bed with the sheet pulled over her head. No signs of discomfort or distress    0340-Patient resting quietly in bed with eyes closed. . No signs of discomfort or distress    0525-Patient resting quietly in bed with eyes closed. . No signs of discomfort or distress    0620-Changed patient saturated brief.     0700-Handoff report given to oncoming nurse      SHIFT SUMMARY  NAME OF PATIENT:  Chon Martinez    LEVEL OF CARE:  Respite    REASON FOR GIP:   N/A    *PATIENT REMAINS ELIGIBLE FOR GIP LEVEL OF CARE AS EVIDENCED BY: (MUST BE ADDRESSED OF PATIENT GIP)      REASON FOR RESPITE:  Caregiver breakdown    O2 SAFETY:  N/A    FALL INTERVENTIONS PROVIDED:   Implemented/recommended use of non-skid footwear, Implemented/recommended use of fall risk identification flag to all team members, Implemented/recommended assistive devices and encouraged their use, Implemented/recommended resources for alarm system (personal alarm, bed alarm, call bell, etc.) , Implemented/recommended environmental changes (remove hazards, lower bed, improve lighting, etc.), and Implemented/recommended increased supervision/assistance    INTERDISPLINARY COMMUNICATION/COLLABORATION:  Physician, MSW, Joss العلي, and RN, CNA    NEW MEDICATION INITIATION DOCUMENTATION:  N/A    Reason medication is being initiated:  N/A    MD / Provider name consulted re: change in status / initiation of new medication:  N/A    New Symptom(s):  N/A    New Order(s):  N/A    Name of the person notified of the changes:  N/A    Name of person being taught:  N/A    Instructions given:  N/A    Side Effects taught:  N/A    Response to teaching:  N/A      COMFORTABLE DYING MEASURE:  Is Patient/family satisfied with symptom level?  yes    DISCHARGE PLAN:  Discharge home on 10/26/22

## 2022-10-25 NOTE — PROGRESS NOTES
Problem: Potential for Alteration in Skin Integrity  Goal: Monitor skin for areas of alteration in skin integrity  Description: Patient/family/caregiver will demonstrate ability to care for patient's skin, monitor for areas of breakdown, and demonstrate methods to prevent breakdown during hospice care. Outcome: Progressing Towards Goal     Problem: Risk for Falls  Goal: Free of falls during inpatient stay  Description: Patient will be free of falls during inpatient stay. Outcome: Progressing Towards Goal     Problem: Alteration in Mobility  Goal: Remain as independent as possible and remain safe in environment  Description: Patient will remain as independent as possible and remain safe in their environment. Outcome: Progressing Towards Goal     Problem: Pain  Goal: Assess satisfaction of level of comfort and symptom control  Outcome: Progressing Towards Goal  Goal: *Control of acute pain  Outcome: Progressing Towards Goal     Problem: Anxiety/Agitation  Goal: Verbalize or staff assess the ability to manage anxiety  Description: The patient/family/caregiver will verbalize and demonstrate ability to manage the patient's anxiety throughout hospice care.   Outcome: Progressing Towards Goal     Problem: Pressure Injury - Risk of  Goal: *Prevention of pressure injury  Outcome: Progressing Towards Goal  Note: Pressure Injury Interventions:  Sensory Interventions: Assess changes in LOC, Avoid rigorous massage over bony prominences, Float heels    Moisture Interventions: Absorbent underpads, Apply protective barrier, creams and emollients, Check for incontinence Q2 hours and as needed    Activity Interventions: Pressure redistribution bed/mattress(bed type)    Mobility Interventions: Float heels    Nutrition Interventions: Document food/fluid/supplement intake, Offer support with meals,snacks and hydration    Friction and Shear Interventions: Apply protective barrier, creams and emollients, HOB 30 degrees or less, Minimize layers                Problem: Pressure Injury - Risk of  Goal: *Prevention of pressure injury  Description: Document Adolph Scale and appropriate interventions in the flowsheet. Outcome: Progressing Towards Goal  Note: Pressure Injury Interventions:  Sensory Interventions: Assess changes in LOC, Avoid rigorous massage over bony prominences, Float heels    Moisture Interventions: Absorbent underpads, Apply protective barrier, creams and emollients, Check for incontinence Q2 hours and as needed    Activity Interventions: Pressure redistribution bed/mattress(bed type)    Mobility Interventions: Float heels    Nutrition Interventions: Document food/fluid/supplement intake, Offer support with meals,snacks and hydration    Friction and Shear Interventions: Apply protective barrier, creams and emollients, HOB 30 degrees or less, Minimize layers                Problem: Patient Education: Go to Patient Education Activity  Goal: Patient/Family Education  Outcome: Progressing Towards Goal     Problem: Falls - Risk of  Goal: *Absence of Falls  Description: Document Cole Fall Risk and appropriate interventions in the flowsheet.   Outcome: Progressing Towards Goal  Note: Fall Risk Interventions:       Mentation Interventions: Adequate sleep, hydration, pain control, Bed/chair exit alarm, Door open when patient unattended, Evaluate medications/consider consulting pharmacy    Medication Interventions: Bed/chair exit alarm, Evaluate medications/consider consulting pharmacy    Elimination Interventions: Bed/chair exit alarm, Call light in reach              Problem: Patient Education: Go to Patient Education Activity  Goal: Patient/Family Education  Outcome: Progressing Towards Goal

## 2022-10-25 NOTE — PROGRESS NOTES
0700: report received from [facility].  0730: pt resting quietly with eyes closed, respirations even and unlabored. Bed alarm on.   0920: pt awake in bed, full assessment done, see flow sheets. Pt alert but does not verbalize anything, will smile at RN. Morning meds given per MAR. Breakfast tray set up for patient. Pt feeds herself finger foods. Sippy cup filled with juice. 1045: pt awake in bed, watching TV. No signs of pain or distress. Bed alarm on.   1200: lunch tray set up for patient. Pt able to feed herself finger foods. 1230: Home CNA at bedside seeing patient and providing full bath. 1345: CNA requested RN to bedside, noted patient to have 3 small linear abrasions on L side, inside skin fold where diaper sits. Area clean and dry. Covered with foam dressing. 1500: pt looking at her magazines. No signs of pain or distress. 1700: dinner tray set up for patient. Pt feeding herself. 1845: wet brief changed, small BM. Pericare done. 1900: report given to oncoming nurse.      NAME OF PATIENT:  Grayson Guo    LEVEL OF CARE:  respite    REASON FOR GIP:   N/a    *PATIENT REMAINS ELIGIBLE FOR GIP LEVEL OF CARE AS EVIDENCED BY: (MUST BE ADDRESSED OF PATIENT GIP)      REASON FOR RESPITE:  Caregiver cannot care for patient due to:  exhaustion    O2 SAFETY:  Pt is on room air    FALL INTERVENTIONS PROVIDED:   Implemented/recommended use of non-skid footwear, Implemented/recommended use of fall risk identification flag to all team members, Implemented/recommended assistive devices and encouraged their use, Implemented/recommended resources for alarm system (personal alarm, bed alarm, call bell, etc.) , Implemented/recommended environmental changes (remove hazards, lower bed, improve lighting, etc.), and Implemented/recommended increased supervision/assistance    INTERDISPLINARY COMMUNICATION/COLLABORATION:  Physician, CHONG, Angie Davies, and RN, CNA    NEW MEDICATION INITIATION DOCUMENTATION:  N/a    Reason medication is being initiated:  n/a    MD / Provider name consulted re: change in status / initiation of new medication:  n/a    New Symptom(s):  n/a    New Order(s):  n/a    Name of the person notified of the changes:  n/a    Name of person being taught:  n/a    Instructions given:  n/a    Side Effects taught:  n/a    Response to teaching:  n/a      COMFORTABLE DYING MEASURE:  Is Patient/family satisfied with symptom level?  yes    DISCHARGE PLAN:  return home 10/26 at 11am and continue to be followed by [facility].

## 2022-10-25 NOTE — PROGRESS NOTES
Problem: Potential for Alteration in Skin Integrity  Goal: Monitor skin for areas of alteration in skin integrity  Description: Patient/family/caregiver will demonstrate ability to care for patient's skin, monitor for areas of breakdown, and demonstrate methods to prevent breakdown during hospice care. Outcome: Progressing Towards Goal     Problem: Risk for Falls  Goal: Free of falls during inpatient stay  Description: Patient will be free of falls during inpatient stay. Outcome: Progressing Towards Goal     Problem: Alteration in Mobility  Goal: Remain as independent as possible and remain safe in environment  Description: Patient will remain as independent as possible and remain safe in their environment. Outcome: Progressing Towards Goal     Problem: Pain  Goal: Assess satisfaction of level of comfort and symptom control  Outcome: Progressing Towards Goal  Goal: *Control of acute pain  Outcome: Progressing Towards Goal     Problem: Anxiety/Agitation  Goal: Verbalize or staff assess the ability to manage anxiety  Description: The patient/family/caregiver will verbalize and demonstrate ability to manage the patient's anxiety throughout hospice care. Outcome: Progressing Towards Goal     Problem: Pressure Injury - Risk of  Goal: *Prevention of pressure injury  Outcome: Progressing Towards Goal  Note: Pressure Injury Interventions:  Sensory Interventions: Check visual cues for pain, Float heels, Keep linens dry and wrinkle-free, Maintain/enhance activity level, Minimize linen layers, Pad between skin to skin, Pressure redistribution bed/mattress (bed type), Turn and reposition approx.  every two hours (pillows and wedges if needed)    Moisture Interventions: Absorbent underpads, Apply protective barrier, creams and emollients, Check for incontinence Q2 hours and as needed, Maintain skin hydration (lotion/cream), Minimize layers, Moisture barrier    Activity Interventions: Pressure redistribution bed/mattress(bed type)    Mobility Interventions: Float heels, HOB 30 degrees or less, Pressure redistribution bed/mattress (bed type), Turn and reposition approx. every two hours(pillow and wedges)    Nutrition Interventions: Document food/fluid/supplement intake, Offer support with meals,snacks and hydration    Friction and Shear Interventions: Apply protective barrier, creams and emollients, HOB 30 degrees or less, Lift sheet, Minimize layers                Problem: Pressure Injury - Risk of  Goal: *Prevention of pressure injury  Description: Document Adolph Scale and appropriate interventions in the flowsheet. Outcome: Progressing Towards Goal  Note: Pressure Injury Interventions:  Sensory Interventions: Check visual cues for pain, Float heels, Keep linens dry and wrinkle-free, Maintain/enhance activity level, Minimize linen layers, Pad between skin to skin, Pressure redistribution bed/mattress (bed type), Turn and reposition approx. every two hours (pillows and wedges if needed)    Moisture Interventions: Absorbent underpads, Apply protective barrier, creams and emollients, Check for incontinence Q2 hours and as needed, Maintain skin hydration (lotion/cream), Minimize layers, Moisture barrier    Activity Interventions: Pressure redistribution bed/mattress(bed type)    Mobility Interventions: Float heels, HOB 30 degrees or less, Pressure redistribution bed/mattress (bed type), Turn and reposition approx.  every two hours(pillow and wedges)    Nutrition Interventions: Document food/fluid/supplement intake, Offer support with meals,snacks and hydration    Friction and Shear Interventions: Apply protective barrier, creams and emollients, HOB 30 degrees or less, Lift sheet, Minimize layers                Problem: Patient Education: Go to Patient Education Activity  Goal: Patient/Family Education  Outcome: Progressing Towards Goal     Problem: Falls - Risk of  Goal: *Absence of Falls  Description: Document Cole Fall Risk and appropriate interventions in the flowsheet.   Outcome: Progressing Towards Goal  Note: Fall Risk Interventions:       Mentation Interventions: Bed/chair exit alarm, Adequate sleep, hydration, pain control    Medication Interventions: Bed/chair exit alarm    Elimination Interventions: Bed/chair exit alarm, Call light in reach              Problem: Patient Education: Go to Patient Education Activity  Goal: Patient/Family Education  Outcome: Progressing Towards Goal

## 2022-10-26 VITALS
OXYGEN SATURATION: 90 % | SYSTOLIC BLOOD PRESSURE: 104 MMHG | TEMPERATURE: 98.5 F | HEART RATE: 58 BPM | RESPIRATION RATE: 16 BRPM | DIASTOLIC BLOOD PRESSURE: 59 MMHG

## 2022-10-26 PROCEDURE — 0655 HSPC INPATIENT RESPITE

## 2022-10-26 PROCEDURE — 0651 HSPC ROUTINE HOME CARE

## 2022-10-26 PROCEDURE — 74011250637 HC RX REV CODE- 250/637: Performed by: FAMILY MEDICINE

## 2022-10-26 RX ADMIN — DAPSONE 100 MG: 100 TABLET ORAL at 08:05

## 2022-10-26 RX ADMIN — HYDROXYCHLOROQUINE SULFATE 200 MG: 200 TABLET, FILM COATED ORAL at 08:05

## 2022-10-26 NOTE — HOSPICE
0700 Report received from [facility].    8165 Patient resting in bed quietly, eyes are closed, patient appears to be sleeping at this time. 0805 Patient's breakfast tray set up, patient eating bites of pancake. Patient given scheduled medications, see MAR.     0915 MSW at bedside with patient. 1030  at bedside with patient. 1050 Patient voided, erna care provided and brief changed. 1110 Patient left via [facility], patient left with belongings, DDNR, and medications.

## 2022-10-26 NOTE — PROGRESS NOTES
1900 Report received from [facility], [address] [place] [address]. Pt is Respite level of care for caregiver breakdown. Dx CHF. 2000 Resting quietly in bed, awake. Watching TV. Has  smile on face. Repositioned in bed to left side. 2200 Resting with eyes closed. Arouses easily. Appears in no distress. 0000 Resting quietly, eyes closed. 0200 Resting quietly, eyes open. Appears in no discomfort. 0400 Pt sleeps very little but resting quietly in bed.  0530 Complete bath given. Pt incontinent of urine and large soft BM. Dressed in clothing prepared for discharge. 0700 Resting quietly in bed, awake. Report to oncoming nurse. NAME OF PATIENT:  Chon Martinez    LEVEL OF CARE:  Respite    REASON FOR GIP:   na    *PATIENT REMAINS ELIGIBLE FOR GIP LEVEL OF CARE AS EVIDENCED BY: na      REASON FOR RESPITE:  Caregiver breakdown    O2 SAFETY:  na    FALL INTERVENTIONS PROVIDED:   Implemented/recommended use of fall risk identification flag to all team members, Implemented/recommended resources for alarm system (personal alarm, bed alarm, call bell, etc.) , Implemented/recommended environmental changes (remove hazards, lower bed, improve lighting, etc.), and Implemented/recommended increased supervision/assistance    INTERDISPLINARY COMMUNICATION/COLLABORATION:  Physician, CHONG, Joss العلي, and RNMELLISSA    NEW MEDICATION INITIATION DOCUMENTATION:  na    Reason medication is being initiated:  manas    MD / Provider name consulted re: change in status / initiation of new medication:  na    New Symptom(s):  na    New Order(s):  na    Name of the person notified of the changes:  na    Name of person being taught:  na    Instructions given:  na    Side Effects taught:  na    Response to teaching:  na      COMFORTABLE DYING MEASURE:  Is Patient/family satisfied with symptom level?  yes    DISCHARGE PLAN:  Return home when Respite stay is complete.

## 2022-10-26 NOTE — PROGRESS NOTES
Problem: Pressure Injury - Risk of  Goal: *Prevention of pressure injury  Outcome: Progressing Towards Goal  Note: Pressure Injury Interventions:  Sensory Interventions: Check visual cues for pain, Float heels, Keep linens dry and wrinkle-free, Maintain/enhance activity level, Minimize linen layers, Pad between skin to skin, Pressure redistribution bed/mattress (bed type), Turn and reposition approx. every two hours (pillows and wedges if needed)    Moisture Interventions: Absorbent underpads, Apply protective barrier, creams and emollients, Check for incontinence Q2 hours and as needed, Maintain skin hydration (lotion/cream), Minimize layers, Moisture barrier    Activity Interventions: Pressure redistribution bed/mattress(bed type)    Mobility Interventions: Float heels, HOB 30 degrees or less, Pressure redistribution bed/mattress (bed type), Turn and reposition approx.  every two hours(pillow and wedges)    Nutrition Interventions: Document food/fluid/supplement intake, Offer support with meals,snacks and hydration    Friction and Shear Interventions: Apply protective barrier, creams and emollients, HOB 30 degrees or less, Lift sheet, Minimize layers

## 2022-10-26 NOTE — PROGRESS NOTES
Problem: Potential for Alteration in Skin Integrity  Goal: Monitor skin for areas of alteration in skin integrity  Description: Patient/family/caregiver will demonstrate ability to care for patient's skin, monitor for areas of breakdown, and demonstrate methods to prevent breakdown during hospice care. 10/26/2022 0740 by Janine Murphy RN  Outcome: Progressing Towards Goal  10/26/2022 0737 by Janine Murphy RN  Outcome: Progressing Towards Goal     Problem: Risk for Falls  Goal: Free of falls during inpatient stay  Description: Patient will be free of falls during inpatient stay. 10/26/2022 0740 by Janine Murphy RN  Outcome: Progressing Towards Goal  10/26/2022 0737 by Janine Murphy RN  Outcome: Progressing Towards Goal     Problem: Risk for Falls  Goal: Free of falls during inpatient stay  Description: Patient will be free of falls during inpatient stay. 10/26/2022 0740 by Janine Murphy RN  Outcome: Progressing Towards Goal  10/26/2022 0737 by Janine Murphy RN  Outcome: Progressing Towards Goal     Problem: Alteration in Mobility  Goal: Remain as independent as possible and remain safe in environment  Description: Patient will remain as independent as possible and remain safe in their environment.   10/26/2022 0740 by Janine Murphy RN  Outcome: Progressing Towards Goal  10/26/2022 0737 by Janine Murphy RN  Outcome: Progressing Towards Goal     Problem: Pain  Goal: Assess satisfaction of level of comfort and symptom control  10/26/2022 0740 by Janine Murphy RN  Outcome: Progressing Towards Goal  10/26/2022 0737 by Janine Murphy RN  Outcome: Progressing Towards Goal  Goal: *Control of acute pain  10/26/2022 0740 by Janine Murphy RN  Outcome: Progressing Towards Goal  10/26/2022 0737 by Janine Murphy RN  Outcome: Progressing Towards Goal     Problem: Anxiety/Agitation  Goal: Verbalize or staff assess the ability to manage anxiety  Description: The patient/family/caregiver will verbalize and demonstrate ability to manage the patient's anxiety throughout hospice care. 10/26/2022 0740 by Drew Fenton RN  Outcome: Progressing Towards Goal  10/26/2022 0737 by Drew Fenton RN  Outcome: Progressing Towards Goal     Problem: Pressure Injury - Risk of  Goal: *Prevention of pressure injury  10/26/2022 0740 by Drew Fenton RN  Outcome: Progressing Towards Goal  Note: Pressure Injury Interventions:  Sensory Interventions: Float heels, Minimize linen layers, Turn and reposition approx. every two hours (pillows and wedges if needed), Check visual cues for pain, Keep linens dry and wrinkle-free, Monitor skin under medical devices    Moisture Interventions: Absorbent underpads, Minimize layers, Limit adult briefs, Moisture barrier    Activity Interventions: Assess need for specialty bed    Mobility Interventions: Assess need for specialty bed, Float heels, Turn and reposition approx. every two hours(pillow and wedges)    Nutrition Interventions: Document food/fluid/supplement intake    Friction and Shear Interventions: Apply protective barrier, creams and emollients, Minimize layers, Lift sheet             10/26/2022 0737 by Drew Fenton RN  Outcome: Progressing Towards Goal  Note: Pressure Injury Interventions:  Sensory Interventions: Check visual cues for pain, Float heels, Keep linens dry and wrinkle-free, Maintain/enhance activity level, Minimize linen layers, Pad between skin to skin, Pressure redistribution bed/mattress (bed type), Turn and reposition approx.  every two hours (pillows and wedges if needed)    Moisture Interventions: Absorbent underpads, Apply protective barrier, creams and emollients, Check for incontinence Q2 hours and as needed, Maintain skin hydration (lotion/cream), Minimize layers, Moisture barrier    Activity Interventions: Pressure redistribution bed/mattress(bed type)    Mobility Interventions: Float heels, HOB 30 degrees or less, Pressure redistribution bed/mattress (bed type), Turn and reposition approx. every two hours(pillow and wedges)    Nutrition Interventions: Document food/fluid/supplement intake, Offer support with meals,snacks and hydration    Friction and Shear Interventions: Apply protective barrier, creams and emollients, HOB 30 degrees or less, Lift sheet, Minimize layers                Problem: Pressure Injury - Risk of  Goal: *Prevention of pressure injury  Description: Document Adolph Scale and appropriate interventions in the flowsheet. 10/26/2022 0740 by Trenton Gill RN  Outcome: Progressing Towards Goal  Note: Pressure Injury Interventions:  Sensory Interventions: Float heels, Minimize linen layers, Turn and reposition approx. every two hours (pillows and wedges if needed), Check visual cues for pain, Keep linens dry and wrinkle-free, Monitor skin under medical devices    Moisture Interventions: Absorbent underpads, Minimize layers, Limit adult briefs, Moisture barrier    Activity Interventions: Assess need for specialty bed    Mobility Interventions: Assess need for specialty bed, Float heels, Turn and reposition approx. every two hours(pillow and wedges)    Nutrition Interventions: Document food/fluid/supplement intake    Friction and Shear Interventions: Apply protective barrier, creams and emollients, Minimize layers, Lift sheet             10/26/2022 0737 by Trenton Gill RN  Outcome: Progressing Towards Goal  Note: Pressure Injury Interventions:  Sensory Interventions: Check visual cues for pain, Float heels, Keep linens dry and wrinkle-free, Maintain/enhance activity level, Minimize linen layers, Pad between skin to skin, Pressure redistribution bed/mattress (bed type), Turn and reposition approx.  every two hours (pillows and wedges if needed)    Moisture Interventions: Absorbent underpads, Apply protective barrier, creams and emollients, Check for incontinence Q2 hours and as needed, Maintain skin hydration (lotion/cream), Minimize layers, Moisture barrier    Activity Interventions: Pressure redistribution bed/mattress(bed type)    Mobility Interventions: Float heels, HOB 30 degrees or less, Pressure redistribution bed/mattress (bed type), Turn and reposition approx. every two hours(pillow and wedges)    Nutrition Interventions: Document food/fluid/supplement intake, Offer support with meals,snacks and hydration    Friction and Shear Interventions: Apply protective barrier, creams and emollients, HOB 30 degrees or less, Lift sheet, Minimize layers                Problem: Patient Education: Go to Patient Education Activity  Goal: Patient/Family Education  10/26/2022 0740 by Tay Woods RN  Outcome: Progressing Towards Goal  10/26/2022 0737 by Tay Woods RN  Outcome: Progressing Towards Goal     Problem: Falls - Risk of  Goal: *Absence of Falls  Description: Document Cedric Srinivasan Fall Risk and appropriate interventions in the flowsheet.   10/26/2022 0740 by Tay Woods RN  Outcome: Progressing Towards Goal  Note: Fall Risk Interventions:       Mentation Interventions: Adequate sleep, hydration, pain control, Bed/chair exit alarm, Door open when patient unattended    Medication Interventions: Bed/chair exit alarm    Elimination Interventions: Bed/chair exit alarm, Call light in reach           10/26/2022 0737 by Tay Woods RN  Outcome: Progressing Towards Goal  Note: Fall Risk Interventions:       Mentation Interventions: Bed/chair exit alarm, Adequate sleep, hydration, pain control    Medication Interventions: Bed/chair exit alarm    Elimination Interventions: Bed/chair exit alarm, Call light in reach              Problem: Patient Education: Go to Patient Education Activity  Goal: Patient/Family Education  10/26/2022 0740 by Tay Woods RN  Outcome: Progressing Towards Goal  10/26/2022 0737 by Tay Woods RN  Outcome: Progressing Towards Goal

## 2022-12-02 ENCOUNTER — HOSPICE ADMISSION (OUTPATIENT)
Dept: HOSPICE | Facility: HOSPICE | Age: 77
End: 2022-12-02
Payer: OTHER MISCELLANEOUS

## 2022-12-07 ENCOUNTER — HOSPITAL ENCOUNTER (INPATIENT)
Age: 77
LOS: 5 days | Discharge: HOSPICE | End: 2022-12-12
Attending: FAMILY MEDICINE | Admitting: FAMILY MEDICINE

## 2022-12-07 PROCEDURE — G0299 HHS/HOSPICE OF RN EA 15 MIN: HCPCS

## 2022-12-07 PROCEDURE — 0655 HSPC INPATIENT RESPITE

## 2022-12-07 RX ORDER — ACETAMINOPHEN 650 MG/1
650 SUPPOSITORY RECTAL
Status: DISCONTINUED | OUTPATIENT
Start: 2022-12-07 | End: 2022-12-12 | Stop reason: HOSPADM

## 2022-12-07 RX ORDER — AMOXICILLIN 250 MG
2 CAPSULE ORAL
Status: DISCONTINUED | OUTPATIENT
Start: 2022-12-07 | End: 2022-12-12 | Stop reason: HOSPADM

## 2022-12-07 RX ORDER — HYDROXYCHLOROQUINE SULFATE 200 MG/1
200 TABLET, FILM COATED ORAL
Status: DISCONTINUED | OUTPATIENT
Start: 2022-12-08 | End: 2022-12-12 | Stop reason: HOSPADM

## 2022-12-07 RX ORDER — ALPRAZOLAM 0.5 MG/1
0.5 TABLET ORAL
Status: DISCONTINUED | OUTPATIENT
Start: 2022-12-07 | End: 2022-12-12 | Stop reason: HOSPADM

## 2022-12-07 RX ORDER — NYSTATIN 100000 U/G
CREAM TOPICAL 3 TIMES DAILY
Status: DISCONTINUED | OUTPATIENT
Start: 2022-12-07 | End: 2022-12-07 | Stop reason: ALTCHOICE

## 2022-12-07 RX ORDER — LORAZEPAM 2 MG/ML
0.5 CONCENTRATE ORAL
Status: DISCONTINUED | OUTPATIENT
Start: 2022-12-07 | End: 2022-12-07 | Stop reason: ALTCHOICE

## 2022-12-07 RX ORDER — DAPSONE 100 MG/1
100 TABLET ORAL DAILY
Status: DISCONTINUED | OUTPATIENT
Start: 2022-12-08 | End: 2022-12-12 | Stop reason: HOSPADM

## 2022-12-07 RX ORDER — TRAMADOL HYDROCHLORIDE 50 MG/1
50 TABLET ORAL
Status: DISCONTINUED | OUTPATIENT
Start: 2022-12-07 | End: 2022-12-12 | Stop reason: HOSPADM

## 2022-12-07 RX ORDER — FACIAL-BODY WIPES
10 EACH TOPICAL DAILY PRN
Status: DISCONTINUED | OUTPATIENT
Start: 2022-12-07 | End: 2022-12-07 | Stop reason: ALTCHOICE

## 2022-12-07 NOTE — HOSPICE
1150 Patient arrived at [facility] via transport. Patient is alert to self, smiling appears comfortable. 1215 Patient is sitting up in bed eating lunch. 1323 Admission assessment complete. 1458 Changed patient's brief and repositioned in bed. 1700 Patient in room watching TV, dinner tray set up and patient fed dinner she  ate 15%. 1800 Changed patient's brief and repositioned in bed. Patient is resting with relaxed face. 1900 Report given. NAME OF PATIENT:  Grayson Guo    LEVEL OF CARE: Respite    REASON FOR GIP:   NA    *PATIENT REMAINS ELIGIBLE FOR GIP LEVEL OF CARE AS EVIDENCED BY: (MUST BE ADDRESSED OF PATIENT GIP)  NA    REASON FOR RESPITE:  Caregiver breakdown    O2 SAFETY:  NA    FALL INTERVENTIONS PROVIDED:   Implemented/recommended use of fall risk identification flag to all team members, Implemented/recommended assistive devices and encouraged their use, Implemented/recommended resources for alarm system (personal alarm, bed alarm, call bell, etc.) , Implemented/recommended environmental changes (remove hazards, lower bed, improve lighting, etc.), and Implemented/recommended increased supervision/assistance    INTERDISPLINARY COMMUNICATION/COLLABORATION:  Physician and RN, CNA    NEW MEDICATION INITIATION DOCUMENTATION:  NA    Reason medication is being initiated:  NA    MD / Provider name consulted re: change in status / initiation of new medication:  NA    New Symptom(s):  NA    New Order(s):  NA    Name of the person notified of the changes:  NA    Name of person being taught:  NA    Instructions given:  NA    Side Effects taught:  NA    Response to teaching:  NA      COMFORTABLE DYING MEASURE:  Is Patient/family satisfied with symptom level?  yes    DISCHARGE PLAN:  Home with sister after respite stay is complete.

## 2022-12-07 NOTE — H&P
[address] [facility] Those in Need  [phone]    Patient Name: Adam Caballero  YOB: 1945    Date of Provider Hospice Visit: 12/07/22    Level of Care:   [] General Inpatient (GIP)    [] Routine   [x] Respite    Current Location of Care:  [] [facility] [place] [] [facility] [] [address] [facility] [] [facility] [x] Hospice [facility]      Principle Hospice Diagnosis: Systolic heart failure  Lupus  MR       HOSPICE SUMMARY     Chart Reviewed for patient's medical history and hospice care plan. Hospice Physician Certification/Recertification Narrative per Thania Menjivar / other MD:     Patient sent to [facility] for respite LOC and being served by [facility] hospice. No acute symptoms at this time. Patient is nonverbal but eating ok    Patient being admitted for Respite care x 5 days for   [x]  Caregiver exhaustion and needing break  []  Caregiver unavailable       PLAN   Patient's home medications were reviewed and reconciled. Continue with current home medications and plan of care as outlined in chart. POC dictated by Ayo Galvan. No acute symptoms at this time     and SW to support family needs. Disposition: Home with hospice once respite stay is completed.

## 2022-12-07 NOTE — PROGRESS NOTES
Problem: Falls - Risk of  Goal: *Absence of Falls  Description: Document Nila Esqueda Fall Risk and appropriate interventions in the flowsheet. Outcome: Progressing Towards Goal  Note: Fall Risk Interventions:       Mentation Interventions: Bed/chair exit alarm, Door open when patient unattended    Medication Interventions: Bed/chair exit alarm    Elimination Interventions: Bed/chair exit alarm              Problem: Patient Education: Go to Patient Education Activity  Goal: Patient/Family Education  Outcome: Progressing Towards Goal     Problem: Pressure Injury - Risk of  Goal: *Prevention of pressure injury  Description: Document Adolph Scale and appropriate interventions in the flowsheet.   Outcome: Progressing Towards Goal  Note: Pressure Injury Interventions:  Sensory Interventions: Assess changes in LOC    Moisture Interventions: Absorbent underpads    Activity Interventions: Pressure redistribution bed/mattress(bed type)    Mobility Interventions: Float heels, HOB 30 degrees or less, Pressure redistribution bed/mattress (bed type)    Nutrition Interventions: Document food/fluid/supplement intake    Friction and Shear Interventions: Apply protective barrier, creams and emollients                Problem: Patient Education: Go to Patient Education Activity  Goal: Patient/Family Education  Outcome: Progressing Towards Goal

## 2022-12-08 PROCEDURE — 74011250637 HC RX REV CODE- 250/637: Performed by: FAMILY MEDICINE

## 2022-12-08 PROCEDURE — G0299 HHS/HOSPICE OF RN EA 15 MIN: HCPCS

## 2022-12-08 PROCEDURE — 0655 HSPC INPATIENT RESPITE

## 2022-12-08 RX ADMIN — HYDROXYCHLOROQUINE SULFATE 200 MG: 200 TABLET, FILM COATED ORAL at 08:16

## 2022-12-08 RX ADMIN — DAPSONE 100 MG: 100 TABLET ORAL at 08:16

## 2022-12-08 NOTE — PROGRESS NOTES
Problem: Falls - Risk of  Goal: *Absence of Falls  Description: Document Sara Kerr Fall Risk and appropriate interventions in the flowsheet. Outcome: Progressing Towards Goal  Note: Fall Risk Interventions:       Mentation Interventions: Adequate sleep, hydration, pain control, Bed/chair exit alarm, Door open when patient unattended, Increase mobility, More frequent rounding    Medication Interventions: Bed/chair exit alarm    Elimination Interventions: Bed/chair exit alarm, Call light in reach              Problem: Patient Education: Go to Patient Education Activity  Goal: Patient/Family Education  Outcome: Progressing Towards Goal     Problem: Pressure Injury - Risk of  Goal: *Prevention of pressure injury  Description: Document Adolph Scale and appropriate interventions in the flowsheet.   Outcome: Progressing Towards Goal  Note: Pressure Injury Interventions:  Sensory Interventions: Avoid rigorous massage over bony prominences, Check visual cues for pain, Float heels, Monitor skin under medical devices, Pressure redistribution bed/mattress (bed type), Minimize linen layers, Keep linens dry and wrinkle-free    Moisture Interventions: Maintain skin hydration (lotion/cream), Moisture barrier, Minimize layers, Apply protective barrier, creams and emollients    Activity Interventions: Pressure redistribution bed/mattress(bed type)    Mobility Interventions: Float heels, Pressure redistribution bed/mattress (bed type), HOB 30 degrees or less    Nutrition Interventions: Offer support with meals,snacks and hydration    Friction and Shear Interventions: Apply protective barrier, creams and emollients, Foam dressings/transparent film/skin sealants, Lift sheet, HOB 30 degrees or less, Minimize layers                Problem: Patient Education: Go to Patient Education Activity  Goal: Patient/Family Education  Outcome: Progressing Towards Goal

## 2022-12-08 NOTE — PROGRESS NOTES
1900 Report from [facility] Patient assessment completed. Patient is awake in bed looking at magazine. Patient has relaxed facial expression, but no verbalization at this time, unable to assess orientation. Bed alarm on.     2040 Patient resting in bed with eyes closed and neutral facial expression. 2230 Patient's brief checked, pt is dry. Patient repositioned on right side and heel elevated. Patient awake in bed with no facial grimacing. 0010 Patient awake in bed looking at magazine with unlabored respirations. 0210 Patient asleep in bed with relaxed facial expression. 7098 Patient bathed and repositioned on left side. Patient now awake in bed looking at magazine with neutral facial expression. 3852 Patient awake in bed with no facial grimacing.     0645 Patient's brief changed and incontinence care provided. Patient now awake in bed with unlabored respirations. 0700 Report to oncoming nurse.        NAME OF PATIENT:  Aleksandr Landaverde    LEVEL OF CARE:  Respite    REASON FOR GIP:   N/a    *PATIENT REMAINS ELIGIBLE FOR GIP LEVEL OF CARE AS EVIDENCED BY: n/a      REASON FOR RESPITE:  Caregiver breakdown    O2 SAFETY:  N/a    FALL INTERVENTIONS PROVIDED:   Implemented/recommended use of non-skid footwear, Implemented/recommended use of fall risk identification flag to all team members, Implemented/recommended resources for alarm system (personal alarm, bed alarm, call bell, etc.) , Implemented/recommended environmental changes (remove hazards, lower bed, improve lighting, etc.), and Implemented/recommended increased supervision/assistance    INTERDISPLINARY COMMUNICATION/COLLABORATION:  Physician, CHONG, Segun Balderas, and RN, CNA    NEW MEDICATION INITIATION DOCUMENTATION:  N/a    Reason medication is being initiated:  n/a    MD / Provider name consulted re: change in status / initiation of new medication:  n/a    New Symptom(s):  n/a    New Order(s):  n/a    Name of the person notified of the changes:  n/a    Name of person being taught:  n/a    Instructions given:  n/a    Side Effects taught:  n/a    Response to teaching:  n/a      COMFORTABLE DYING MEASURE:  Is Patient/family satisfied with symptom level?  yes    DISCHARGE PLAN:  home with sister at end of respite stay

## 2022-12-08 NOTE — PROGRESS NOTES
0700: received report from GIULIANO, [id number] [facility]: Patient awake in bed looking at her magazine book. Patient assessment complete, documented in flowsheets. Neutral facial expression during assessment. 0815: Scheduled medications administered. Patient eating breakfast with assistance of MELLISSA Mccoy. She is chewing but slow to swallow and needs prompting. 0930: Patient resting in bed, visitor from home hospice at the bedside. 1030: Patient resting quietly in bed  1115: Patient's home hospice aide at the bedside bathing patient. 1245: Patient eating lunch with Nestor Garcia CNA   1400: Patient awake in bed, flipping through her magazine book. Neutral facial position observed. 1530: Patient resting in bed with eyes closed, appears sleeping. 1700: Patient eating dinner with Nestor Garcia CNA. 1740: Assisted Nestor Garcia CNA to change patient for urinary incontinence. Patient tolerated well.    1830: Patient resting in bed quietly, giggling and playing bashful.    1900: Report given to RODRIGUEZ NOBLES

## 2022-12-09 PROCEDURE — 74011250637 HC RX REV CODE- 250/637: Performed by: FAMILY MEDICINE

## 2022-12-09 PROCEDURE — G0299 HHS/HOSPICE OF RN EA 15 MIN: HCPCS

## 2022-12-09 PROCEDURE — 0655 HSPC INPATIENT RESPITE

## 2022-12-09 RX ADMIN — HYDROXYCHLOROQUINE SULFATE 200 MG: 200 TABLET, FILM COATED ORAL at 08:37

## 2022-12-09 RX ADMIN — DAPSONE 100 MG: 100 TABLET ORAL at 08:37

## 2022-12-09 NOTE — PROGRESS NOTES
Problem: Falls - Risk of  Goal: *Absence of Falls  Description: Document Saige Lizama Fall Risk and appropriate interventions in the flowsheet. Outcome: Progressing Towards Goal  Note: Fall Risk Interventions:       Mentation Interventions: Adequate sleep, hydration, pain control, Bed/chair exit alarm, Increase mobility, Door open when patient unattended    Medication Interventions: Bed/chair exit alarm    Elimination Interventions: Bed/chair exit alarm, Call light in reach              Problem: Patient Education: Go to Patient Education Activity  Goal: Patient/Family Education  Outcome: Progressing Towards Goal     Problem: Pressure Injury - Risk of  Goal: *Prevention of pressure injury  Description: Document Adolph Scale and appropriate interventions in the flowsheet.   Outcome: Progressing Towards Goal  Note: Pressure Injury Interventions:  Sensory Interventions: Assess changes in LOC, Avoid rigorous massage over bony prominences, Check visual cues for pain, Float heels, Keep linens dry and wrinkle-free, Minimize linen layers, Pressure redistribution bed/mattress (bed type)    Moisture Interventions: Absorbent underpads, Apply protective barrier, creams and emollients, Limit adult briefs, Minimize layers, Maintain skin hydration (lotion/cream), Moisture barrier    Activity Interventions: Pressure redistribution bed/mattress(bed type)    Mobility Interventions: Float heels, HOB 30 degrees or less, Pressure redistribution bed/mattress (bed type)    Nutrition Interventions: Offer support with meals,snacks and hydration    Friction and Shear Interventions: Apply protective barrier, creams and emollients, Foam dressings/transparent film/skin sealants, Feet elevated on foot rest, Lift sheet, Minimize layers                Problem: Patient Education: Go to Patient Education Activity  Goal: Patient/Family Education  Outcome: Progressing Towards Goal

## 2022-12-09 NOTE — PROGRESS NOTES
0700: Received report from [facility], [facility]. Patient resting in bed with eyes closed, appears sleeping. 0730: Patient awake in bed, looking through her magazine book. Assessment complete, documented in flowsheets. No needs at this time. Television on in room   0830: Patient awake in bed with her magazine book, scheduled dapsone and hydroxychloroquine administered with half container of vanilla pudding. Patient smiling during interaction. 1000: Patient resting in bed with television on, flipping through her magazine book   1100: Patient resting in bed with eyes closed, appears sleeping. 1230: Patient's home hospice aide at the bedside for bathing and assisting with eating lunch  1400: Patient resting in bed flipping through magazine book, smiling  1545: Patient awake in bed, looking through her magazine book, television on  1700: Assisted patient to eat dinner. Patient completed 80% of meal  1740: Brief changed for urinary incontinence with Tony Ball CNA.    1830: Patient resting in bed with magazine book   1900: Report given to [facility]

## 2022-12-09 NOTE — PROGRESS NOTES
Problem: Falls - Risk of  Goal: *Absence of Falls  Description: Document Kristy Ground Fall Risk and appropriate interventions in the flowsheet. Outcome: Progressing Towards Goal  Note: Fall Risk Interventions:       Mentation Interventions: Adequate sleep, hydration, pain control, Bed/chair exit alarm, Door open when patient unattended, Increase mobility, More frequent rounding    Medication Interventions: Bed/chair exit alarm    Elimination Interventions: Bed/chair exit alarm, Call light in reach              Problem: Patient Education: Go to Patient Education Activity  Goal: Patient/Family Education  Outcome: Progressing Towards Goal     Problem: Pressure Injury - Risk of  Goal: *Prevention of pressure injury  Description: Document Adolph Scale and appropriate interventions in the flowsheet.   Outcome: Progressing Towards Goal  Note: Pressure Injury Interventions:  Sensory Interventions: Avoid rigorous massage over bony prominences, Check visual cues for pain, Float heels, Monitor skin under medical devices, Pressure redistribution bed/mattress (bed type), Minimize linen layers, Keep linens dry and wrinkle-free    Moisture Interventions: Maintain skin hydration (lotion/cream), Moisture barrier, Minimize layers, Apply protective barrier, creams and emollients    Activity Interventions: Pressure redistribution bed/mattress(bed type)    Mobility Interventions: Float heels, Pressure redistribution bed/mattress (bed type), HOB 30 degrees or less    Nutrition Interventions: Offer support with meals,snacks and hydration    Friction and Shear Interventions: Apply protective barrier, creams and emollients, Foam dressings/transparent film/skin sealants, Lift sheet, HOB 30 degrees or less, Minimize layers                Problem: Patient Education: Go to Patient Education Activity  Goal: Patient/Family Education  Outcome: Progressing Towards Goal

## 2022-12-09 NOTE — PROGRESS NOTES
1900 Report from Domenica Perez Patient assessment completed. Patient awake with relaxed facial expression, pt is nonverbal but laughs/smiles. 2110 Patient asleep with neutral facial expression. 2235 Patient's brief checked, pt is dry at this time. Patient repositioned on left side. Patient awake in bed smiling. 0005 Patient awake in bed looking at magazine with no facial grimacing.     0205 Patient resting in bed with eyes closed. 0330 Patient's brief changed and incontinence care provided. Patient also repositioned on right side. 0505 Patient awake in bed with neutral facial expression. 8374 Patient awake in bed looking at magazine.     0700 Report to [employer] RN      NAME OF PATIENT:  Grayson Guo    LEVEL OF CARE:  Respite    REASON FOR GIP:   N/a    *PATIENT REMAINS ELIGIBLE FOR GIP LEVEL OF CARE AS EVIDENCED BY: n/a      REASON FOR RESPITE:  Caregiver breakdown    O2 SAFETY:  N/a    FALL INTERVENTIONS PROVIDED:   Implemented/recommended use of non-skid footwear, Implemented/recommended use of fall risk identification flag to all team members, Implemented/recommended resources for alarm system (personal alarm, bed alarm, call bell, etc.) , Implemented/recommended environmental changes (remove hazards, lower bed, improve lighting, etc.), and Implemented/recommended increased supervision/assistance    INTERDISPLINARY COMMUNICATION/COLLABORATION:  Physician, CHONG, Angie Davies, and RN, CNA    NEW MEDICATION INITIATION DOCUMENTATION:  N/a    Reason medication is being initiated:  n/a    MD / Provider name consulted re: change in status / initiation of new medication:  n/a    New Symptom(s):  n/a    New Order(s):  n/a    Name of the person notified of the changes:  n/a    Name of person being taught:  n/a    Instructions given:  n/a    Side Effects taught:  n/a    Response to teaching:  n/a      COMFORTABLE DYING MEASURE:  Is Patient/family satisfied with symptom level?  yes    DISCHARGE PLAN: home with sister at end of respite stay

## 2022-12-10 PROCEDURE — G0299 HHS/HOSPICE OF RN EA 15 MIN: HCPCS

## 2022-12-10 PROCEDURE — G0156 HHCP-SVS OF AIDE,EA 15 MIN: HCPCS

## 2022-12-10 PROCEDURE — 74011250637 HC RX REV CODE- 250/637: Performed by: FAMILY MEDICINE

## 2022-12-10 PROCEDURE — 0655 HSPC INPATIENT RESPITE

## 2022-12-10 RX ADMIN — HYDROXYCHLOROQUINE SULFATE 200 MG: 200 TABLET, FILM COATED ORAL at 09:04

## 2022-12-10 RX ADMIN — DAPSONE 100 MG: 100 TABLET ORAL at 09:04

## 2022-12-10 NOTE — PROGRESS NOTES
1900 Report from [facility] Patient assessment completed. Patient is alert and nonverbal, but smiles and laughs. Patient awake in bed with no facial grimacing, looking at her magazine. 2125 Patient asleep with neutral facial expression. 2315 Patient awake in bed looking at magazine. 0103 Patient asleep in bed with relaxed facial expression. 8183 Patient awake in bed giggling while looking at magazine. 8246 Patient awake in bed looking at magazine. 0630 Patient resting in bed with eyes closed. 0700 Report to oncoming nurse.        NAME OF PATIENT:  Aleksandr Landaverde    LEVEL OF CARE:  Respite    REASON FOR GIP:   N/a    *PATIENT REMAINS ELIGIBLE FOR GIP LEVEL OF CARE AS EVIDENCED BY: n/a      REASON FOR RESPITE:  Caregiver breakdown    O2 SAFETY:  N/a    FALL INTERVENTIONS PROVIDED:   Implemented/recommended use of non-skid footwear, Implemented/recommended use of fall risk identification flag to all team members, Implemented/recommended resources for alarm system (personal alarm, bed alarm, call bell, etc.) , Implemented/recommended environmental changes (remove hazards, lower bed, improve lighting, etc.), and Implemented/recommended increased supervision/assistance    INTERDISPLINARY COMMUNICATION/COLLABORATION:  Physician, CHONG, [address], and RN, CNA    NEW MEDICATION INITIATION DOCUMENTATION:  N/a    Reason medication is being initiated:  n/a    MD / Provider name consulted re: change in status / initiation of new medication:  n/a    New Symptom(s):  n/a    New Order(s):  n/a    Name of the person notified of the changes:  n/a    Name of person being taught:  n/a    Instructions given:  n/a    Side Effects taught:  n/a    Response to teaching:  n/a      COMFORTABLE DYING MEASURE:  Is Patient/family satisfied with symptom level?  yes    DISCHARGE PLAN:  home with sister at end of respite stay

## 2022-12-10 NOTE — PROGRESS NOTES
Problem: Falls - Risk of  Goal: *Absence of Falls  Description: Document Rishi Anton Fall Risk and appropriate interventions in the flowsheet. Outcome: Progressing Towards Goal  Note: Fall Risk Interventions:       Mentation Interventions: Adequate sleep, hydration, pain control, Bed/chair exit alarm, Door open when patient unattended, More frequent rounding    Medication Interventions: Bed/chair exit alarm    Elimination Interventions: Bed/chair exit alarm, Call light in reach              Problem: Patient Education: Go to Patient Education Activity  Goal: Patient/Family Education  Outcome: Progressing Towards Goal     Problem: Pressure Injury - Risk of  Goal: *Prevention of pressure injury  Description: Document Adolph Scale and appropriate interventions in the flowsheet.   Outcome: Progressing Towards Goal  Note: Pressure Injury Interventions:  Sensory Interventions: Assess changes in LOC, Avoid rigorous massage over bony prominences, Check visual cues for pain, Float heels, Pressure redistribution bed/mattress (bed type), Use 30-degree side-lying position, Keep linens dry and wrinkle-free, Minimize linen layers, Pad between skin to skin    Moisture Interventions: Absorbent underpads, Moisture barrier, Maintain skin hydration (lotion/cream), Check for incontinence Q2 hours and as needed, Apply protective barrier, creams and emollients    Activity Interventions: Pressure redistribution bed/mattress(bed type)    Mobility Interventions: HOB 30 degrees or less, Float heels, Pressure redistribution bed/mattress (bed type)    Nutrition Interventions: Offer support with meals,snacks and hydration, Document food/fluid/supplement intake    Friction and Shear Interventions: Apply protective barrier, creams and emollients, Minimize layers, HOB 30 degrees or less                Problem: Patient Education: Go to Patient Education Activity  Goal: Patient/Family Education  Outcome: Progressing Towards Goal

## 2022-12-10 NOTE — PROGRESS NOTES
Problem: Falls - Risk of  Goal: *Absence of Falls  Description: Document Pecos Fall Risk and appropriate interventions in the flowsheet. Outcome: Progressing Towards Goal  Note: Fall Risk Interventions:       Mentation Interventions: Adequate sleep, hydration, pain control    Medication Interventions: Bed/chair exit alarm    Elimination Interventions: Bed/chair exit alarm              Problem: Patient Education: Go to Patient Education Activity  Goal: Patient/Family Education  Outcome: Progressing Towards Goal     Problem: Pressure Injury - Risk of  Goal: *Prevention of pressure injury  Description: Document Adolph Scale and appropriate interventions in the flowsheet.   Outcome: Progressing Towards Goal  Note: Pressure Injury Interventions:  Sensory Interventions: Assess changes in LOC    Moisture Interventions: Absorbent underpads    Activity Interventions: Pressure redistribution bed/mattress(bed type)    Mobility Interventions: Float heels    Nutrition Interventions: Offer support with meals,snacks and hydration    Friction and Shear Interventions: Apply protective barrier, creams and emollients                Problem: Patient Education: Go to Patient Education Activity  Goal: Patient/Family Education  Outcome: Progressing Towards Goal

## 2022-12-10 NOTE — PROGRESS NOTES
0700- Report received from Kerry Cazares [facility]. Care Assumed. Dx: Parkinsons. DNR.     5844- Pt resting in bed with eyes closed. RR even and unlabored. Facial expression neutral. Bed alarm on.    0900- Pt alert, confused and nonverbal. Smiles and laughs. Assisted with breakfast. Appetite fair. 1100- Pt alert and confused, resting in bed, watching TV. RR even and unlabored. Brief dry. Repositioned on left side. 1200- Assisted with lunch. Ate 100% without difficulty. 1400- Pt resting with eyes closed. Facial expression neutral.    1600- Pt sitting up in bed, facial expression neutral. Smiles and waves. Looking through her magazine book. 1730- Pt assisted with dinner. Pt completed 100% of dinner without difficulty. 1845- Pt incontinent of urine. Brief changed and erna care provided. Tolerated well. Pt repositioned on right side.      1900- Report given to Yumi Ponce RN    NAME OF PATIENT:  Harleen Hernandez     LEVEL OF CARE:  Respite     REASON FOR GIP:   N/A     *PATIENT REMAINS ELIGIBLE FOR [facility] LEVEL OF CARE AS EVIDENCED BY: N/A        REASON FOR RESPITE:  Caregiver breakdown     O2 SAFETY:  N/A     FALL INTERVENTIONS PROVIDED:   Implemented/recommended use of non-skid footwear, Implemented/recommended use of fall risk identification flag to all team members, Implemented/recommended resources for alarm system (personal alarm, bed alarm, call bell, etc.) , Implemented/recommended environmental changes (remove hazards, lower bed, improve lighting, etc.), and Implemented/recommended increased supervision/assistance     INTERDISPLINARY COMMUNICATION/COLLABORATION:  Physician, MSW, Yenifer Thacker, and RN, MELLISSA     NEW MEDICATION INITIATION DOCUMENTATION:  N/A     Reason medication is being initiated:  n/a     MD / Provider name consulted re: change in status / initiation of new medication:  n/a     New Symptom(s):  n/a     New Order(s):  n/a     Name of the person notified of the changes:  n/a     Name of person being taught:  n/a     Instructions given:  n/a     Side Effects taught:  n/a     Response to teaching:  n/a        COMFORTABLE DYING MEASURE:  Is Patient/family satisfied with symptom level?  yes     DISCHARGE PLAN:  home with sister at end of respite stay

## 2022-12-11 VITALS
RESPIRATION RATE: 16 BRPM | OXYGEN SATURATION: 92 % | HEART RATE: 83 BPM | DIASTOLIC BLOOD PRESSURE: 66 MMHG | TEMPERATURE: 98.7 F | SYSTOLIC BLOOD PRESSURE: 118 MMHG

## 2022-12-11 PROCEDURE — G0156 HHCP-SVS OF AIDE,EA 15 MIN: HCPCS

## 2022-12-11 PROCEDURE — 0655 HSPC INPATIENT RESPITE

## 2022-12-11 PROCEDURE — 74011250637 HC RX REV CODE- 250/637: Performed by: FAMILY MEDICINE

## 2022-12-11 PROCEDURE — G0299 HHS/HOSPICE OF RN EA 15 MIN: HCPCS

## 2022-12-11 RX ADMIN — DAPSONE 100 MG: 100 TABLET ORAL at 08:12

## 2022-12-11 RX ADMIN — HYDROXYCHLOROQUINE SULFATE 200 MG: 200 TABLET, FILM COATED ORAL at 08:12

## 2022-12-11 NOTE — PROGRESS NOTES
1900- Report received from [address] [place] [address]- Patient lying in bed with eyes closed. Patient easily arousable to verbal stimuli. Patient calm and pleasant smiling at nurse. Lungs diminished in bases to auscultation. Bowel sounds active in all quadrants . Patient incontinent of bowel and bladder. Patients brief dry at this time. Patient noted to have bilateral foot drop. Assessment complete, see flow sheet. Bed alarm on, bed in lowest position, call bell within reach. 2001- Patient turned on right side and positioned with pillows by Clara MALLOY. 2210- Patient resting in bed with eyes closed. Respirations even and unlabored. 2240-Patient brief changed due to incontinence of urine. Patient turned on left side and repositioned with pillows. 0005- Patient awake lying in bed. Beside table and magazine placed in front  of patient for comfort. 6189- Patient awake lying in bed, patient smiling at staff.     4342- Patient appears to be sleeping comfortably. Respirations even and unlabored. 2771- Patient incontinent of urine and large hard formed ball of stool. Brief changed, patient turned on right side with pillows. Patient anxious with turning but easily consolable. 4921- Patient awake flipping pages of magazine.      0700- Report given to Genna Robin RN      NAME OF PATIENT:  Dwayne Dominguez    LEVEL OF CARE:  Respite    REASON FOR GIP:   N/a    *PATIENT REMAINS ELIGIBLE FOR GIP LEVEL OF CARE AS EVIDENCED BY: (MUST BE ADDRESSED OF PATIENT GIP)      REASON FOR RESPITE:  Caregiver exhaustion     O2 SAFETY:  N/a    FALL INTERVENTIONS PROVIDED:   Implemented/recommended use of fall risk identification flag to all team members, Implemented/recommended assistive devices and encouraged their use, Implemented/recommended resources for alarm system (personal alarm, bed alarm, call bell, etc.) , Implemented/recommended environmental changes (remove hazards, lower bed, improve lighting, etc.), and Implemented/recommended increased supervision/assistance    INTERDISPLINARY COMMUNICATION/COLLABORATION:  Physician, CHONG, Jarvis Kanner, and RN, CNA    NEW MEDICATION INITIATION DOCUMENTATION:  N/a    Reason medication is being initiated:  n/a    MD / Provider name consulted re: change in status / initiation of new medication:  n/a    New Symptom(s):  n/a    New Order(s):  n/a    Name of the person notified of the changes:  n/a    Name of person being taught:  n/a    Instructions given:  n/a    Side Effects taught:  n/a    Response to teaching:  n/a      COMFORTABLE DYING MEASURE:  Is Patient/family satisfied with symptom level?  yes    DISCHARGE PLAN:  Patient to be discharged home with sister once respite stay is complete.

## 2022-12-11 NOTE — PROGRESS NOTES
Problem: Falls - Risk of  Goal: *Absence of Falls  Description: Document Oswaldo Almonte Fall Risk and appropriate interventions in the flowsheet. Outcome: Progressing Towards Goal  Note: Fall Risk Interventions:  Mobility Interventions: Bed/chair exit alarm    Mentation Interventions: Adequate sleep, hydration, pain control, Bed/chair exit alarm, Reorient patient, More frequent rounding, Room close to nurse's station, Update white board    Medication Interventions: Bed/chair exit alarm    Elimination Interventions: Bed/chair exit alarm, Call light in reach              Problem: Patient Education: Go to Patient Education Activity  Goal: Patient/Family Education  Outcome: Progressing Towards Goal     Problem: Pressure Injury - Risk of  Goal: *Prevention of pressure injury  Description: Document Adolph Scale and appropriate interventions in the flowsheet.   Outcome: Progressing Towards Goal  Note: Pressure Injury Interventions:  Sensory Interventions: Assess changes in LOC, Check visual cues for pain, Float heels, Keep linens dry and wrinkle-free, Minimize linen layers, Pad between skin to skin    Moisture Interventions: Apply protective barrier, creams and emollients, Absorbent underpads, Check for incontinence Q2 hours and as needed, Maintain skin hydration (lotion/cream), Minimize layers, Moisture barrier    Activity Interventions: Pressure redistribution bed/mattress(bed type)    Mobility Interventions: HOB 30 degrees or less, Float heels, Pressure redistribution bed/mattress (bed type)    Nutrition Interventions: Offer support with meals,snacks and hydration, Document food/fluid/supplement intake    Friction and Shear Interventions: Apply protective barrier, creams and emollients, HOB 30 degrees or less, Lift sheet, Minimize layers                Problem: Patient Education: Go to Patient Education Activity  Goal: Patient/Family Education  Outcome: Progressing Towards Goal

## 2022-12-11 NOTE — HOSPICE
0700 Report received from [address] [place] [address] Patient awake, smiling and flipping through book. Fresh juice brought to patient in sippy cup.    0825 Patient given scheduled medications, see MAR. Patient fed breakfast, patient ate 50% of meal.     0945 Patient resting in bed quietly, appears to be sleeping at this time. 1040 Patient voided and had a small bowel movement, erna care provided and brief changed. Patient turned and repositioned in bed, patient tolerated activity well. 1220 Patient fed lunch by [employer]. 1400 Patient resting in bed quietly reading magazines. 1540 Patient drank 1/2 cup of juice, patient reading magazines. 1630 Patient repositioned in bed.    1720 Patient ate bites of meal. Patient voided, erna care provided and brief changed. Patient given bath, linen change, and gown change. Patient tolerated activity well. 1830 Patient resting in bed quietly playing with magazines and stuffed animals.      NAME OF PATIENT:  Cricket Higginbotham    LEVEL OF CARE:  Respite       REASON FOR RESPITE:  Caregiver exhaustion       FALL INTERVENTIONS PROVIDED:   Implemented/recommended use of non-skid footwear, Implemented/recommended use of fall risk identification flag to all team members, Implemented/recommended environmental changes (remove hazards, lower bed, improve lighting, etc.), and Implemented/recommended increased supervision/assistance    INTERDISPLINARY COMMUNICATION/COLLABORATION:  Physician, MSW, Frederick, and RN, CNA    NEW MEDICATION INITIATION DOCUMENTATION:  N/a    Reason medication is being initiated:  n/a    MD / Provider name consulted re: change in status / initiation of new medication:  n/a    New Symptom(s):  n/a    New Order(s):  n/a    Name of the person notified of the changes:  n/a    Name of person being taught:  n/a    Instructions given:  n/a    Side Effects taught:  n/a    Response to teaching:  n/a      COMFORTABLE DYING MEASURE:  Is Patient/family satisfied with symptom level?  Yes    DISCHARGE PLAN:  home when respite stay ends

## 2022-12-11 NOTE — PROGRESS NOTES
Problem: Falls - Risk of  Goal: *Absence of Falls  Description: Document Elpidio Greene Fall Risk and appropriate interventions in the flowsheet. Outcome: Progressing Towards Goal  Note: Fall Risk Interventions:  Mobility Interventions: Bed/chair exit alarm    Mentation Interventions: Adequate sleep, hydration, pain control, Bed/chair exit alarm, Reorient patient, More frequent rounding, Room close to nurse's station, Update white board    Medication Interventions: Bed/chair exit alarm    Elimination Interventions: Bed/chair exit alarm, Call light in reach              Problem: Pressure Injury - Risk of  Goal: *Prevention of pressure injury  Description: Document Adolph Scale and appropriate interventions in the flowsheet.   Outcome: Progressing Towards Goal  Note: Pressure Injury Interventions:  Sensory Interventions: Assess changes in LOC, Check visual cues for pain, Float heels, Keep linens dry and wrinkle-free, Minimize linen layers, Pad between skin to skin    Moisture Interventions: Apply protective barrier, creams and emollients, Absorbent underpads, Check for incontinence Q2 hours and as needed, Maintain skin hydration (lotion/cream), Minimize layers, Moisture barrier    Activity Interventions: Pressure redistribution bed/mattress(bed type)    Mobility Interventions: HOB 30 degrees or less, Float heels, Pressure redistribution bed/mattress (bed type)    Nutrition Interventions: Offer support with meals,snacks and hydration, Document food/fluid/supplement intake    Friction and Shear Interventions: Apply protective barrier, creams and emollients, HOB 30 degrees or less, Lift sheet, Minimize layers

## 2022-12-12 PROCEDURE — 0651 HSPC ROUTINE HOME CARE

## 2022-12-12 PROCEDURE — 74011250637 HC RX REV CODE- 250/637: Performed by: FAMILY MEDICINE

## 2022-12-12 RX ADMIN — DAPSONE 100 MG: 100 TABLET ORAL at 08:16

## 2022-12-12 RX ADMIN — HYDROXYCHLOROQUINE SULFATE 200 MG: 200 TABLET, FILM COATED ORAL at 08:16

## 2022-12-12 NOTE — PROGRESS NOTES
1900- Report received from [address]- Patient sitting up in bed watching tv. Patient smiling at staff. Tremulous movement noted to head and right hand and arm. Lungs diminished bases to ausculation. Abdomen obsese, soft. Bowel sounds active in all quadrants. Patient checked for incontinence, brief dry. Upper back contracted, patient in upright position when lying flat. Foot drop noted to bilateral lower extremities. Assessment complete, see flow sheet. Bed alarm on, bed in lowest position, call bell within reach. Patient fed chocolate pudding for snack. 2105-Patient awake, resting in bed flipping pages in magazine. 2300- Patient appears to be sleeping, head resting on chest. Respirations even and unlabored. 0030- Patient awake, Clara MALLOY at bedside. Patient fed applesauce and pudding. 0116- Patients brief changed due to incontinence by Clara MALLOY    0210- Patient awake in bed slowly moving pages of magazine. 0405- Patient resting in bed with eyes closed. Respirations even and unlabored. 2117- Patients brief changed due to incontinence of urine. Barrier cream applied to buttocks and sacrum. Patient supine and lower extremities elevated on pillow.      0700- Report given to Dhiraj Mccallum RN    NAME OF PATIENT:  Irven Moritz    LEVEL OF CARE:  Respite    REASON FOR GIP:   N/a    *PATIENT REMAINS ELIGIBLE FOR [facility] LEVEL OF CARE AS EVIDENCED BY: (MUST BE ADDRESSED OF PATIENT GIP)      REASON FOR RESPITE:  Caregiver exhaustion    O2 SAFETY:  N/a    FALL INTERVENTIONS PROVIDED:   Implemented/recommended use of fall risk identification flag to all team members, Implemented/recommended assistive devices and encouraged their use, Implemented/recommended resources for alarm system (personal alarm, bed alarm, call bell, etc.) , Implemented/recommended environmental changes (remove hazards, lower bed, improve lighting, etc.), and Implemented/recommended increased supervision/assistance    INTERDISPLINARY COMMUNICATION/COLLABORATION:  Physician, CHONG, Harrison Aguirre, and RNMELLISSA    NEW MEDICATION INITIATION DOCUMENTATION:  N/a    Reason medication is being initiated:    N/a  MD / Provider name consulted re: change in status / initiation of new medication:  n/a    New Symptom(s):  n/a    New Order(s):  n/a    Name of the person notified of the changes:  n/a    Name of person being taught:  n/a    Instructions given:  n/a    Side Effects taught:  n/a    Response to teaching:  n/a      COMFORTABLE DYING MEASURE:  Is Patient/family satisfied with symptom level?  yes    DISCHARGE PLAN:  Patient to be discharged home once respite stay is complete.

## 2022-12-12 NOTE — HOSPICE
0700 Report received from [facility]. Patient resting in bed quietly, eyes are closed. Patient appears to be sleeping at this time. 5114 Patient givens scheduled medications, see MAR. Patient fed breakfast, patient ate 50% of meal.     [address] staff at bedside. Patient reading magazines and smiling to staff. 1110 Patient voided, erna care provided, brief changed. 1200 Patient being fed lunch by [employer]. 1245 Patient ate 50% of meal. Patient given bath and changed into clothes for discharge. Patient's belongings and medications packed. 1310 Patient left via hospital to home transport, patient's sister notified of discharge. NAME OF PATIENT:  Loly Powell    LEVEL OF CARE:  Respite     REASON FOR RESPITE:  Caregiver exhaustion       FALL INTERVENTIONS PROVIDED:   Implemented/recommended use of non-skid footwear, Implemented/recommended resources for alarm system (personal alarm, bed alarm, call bell, etc.) , Implemented/recommended environmental changes (remove hazards, lower bed, improve lighting, etc.), and Implemented/recommended increased supervision/assistance    INTERDISPLINARY COMMUNICATION/COLLABORATION:  Physician, MSW, Spencer Samayoa, and RN, CNA    NEW MEDICATION INITIATION DOCUMENTATION:  N/a    Reason medication is being initiated:  n/a    MD / Provider name consulted re: change in status / initiation of new medication:  n/a    New Symptom(s):  n/a    New Order(s):  n/a    Name of the person notified of the changes:  n/a    Name of person being taught:  n/a    Instructions given:  n/a    Side Effects taught:  n/a    Response to teaching:  n/a      COMFORTABLE DYING MEASURE:  Is Patient/family satisfied with symptom level?   Yes    DISCHARGE PLAN:  Home today at 1300

## 2022-12-12 NOTE — PROGRESS NOTES
Problem: Falls - Risk of  Goal: *Absence of Falls  Description: Document Ivonne Moise Fall Risk and appropriate interventions in the flowsheet. Outcome: Progressing Towards Goal  Note: Fall Risk Interventions:  Mobility Interventions: Bed/chair exit alarm    Mentation Interventions: Adequate sleep, hydration, pain control, Bed/chair exit alarm, Door open when patient unattended, Reorient patient, Room close to nurse's station, Update white board, More frequent rounding    Medication Interventions: Bed/chair exit alarm    Elimination Interventions: Bed/chair exit alarm, Call light in reach              Problem: Pressure Injury - Risk of  Goal: *Prevention of pressure injury  Description: Document Adolph Scale and appropriate interventions in the flowsheet.   Outcome: Progressing Towards Goal  Note: Pressure Injury Interventions:  Sensory Interventions: Assess changes in LOC, Check visual cues for pain, Float heels, Keep linens dry and wrinkle-free, Minimize linen layers, Pad between skin to skin, Pressure redistribution bed/mattress (bed type)    Moisture Interventions: Absorbent underpads, Apply protective barrier, creams and emollients, Check for incontinence Q2 hours and as needed, Minimize layers, Maintain skin hydration (lotion/cream), Moisture barrier    Activity Interventions: Pressure redistribution bed/mattress(bed type)    Mobility Interventions: Float heels, HOB 30 degrees or less, Pressure redistribution bed/mattress (bed type)    Nutrition Interventions: Offer support with meals,snacks and hydration, Document food/fluid/supplement intake    Friction and Shear Interventions: Apply protective barrier, creams and emollients, HOB 30 degrees or less

## 2022-12-12 NOTE — PROGRESS NOTES
Problem: Falls - Risk of  Goal: *Absence of Falls  Description: Document Bi Blight Fall Risk and appropriate interventions in the flowsheet. Outcome: Progressing Towards Goal  Note: Fall Risk Interventions:  Mobility Interventions: Assess mobility with egress test    Mentation Interventions: Bed/chair exit alarm, Door open when patient unattended    Medication Interventions: Bed/chair exit alarm    Elimination Interventions: Call light in reach, Bed/chair exit alarm              Problem: Patient Education: Go to Patient Education Activity  Goal: Patient/Family Education  Outcome: Progressing Towards Goal     Problem: Pressure Injury - Risk of  Goal: *Prevention of pressure injury  Description: Document Adolph Scale and appropriate interventions in the flowsheet. Outcome: Progressing Towards Goal  Note: Pressure Injury Interventions:  Sensory Interventions: Float heels, Maintain/enhance activity level, Monitor skin under medical devices, Minimize linen layers, Keep linens dry and wrinkle-free, Use 30-degree side-lying position    Moisture Interventions: Absorbent underpads, Apply protective barrier, creams and emollients, Maintain skin hydration (lotion/cream), Moisture barrier, Minimize layers    Activity Interventions: Assess need for specialty bed    Mobility Interventions: Assess need for specialty bed, Float heels, Turn and reposition approx.  every two hours(pillow and wedges)    Nutrition Interventions: Document food/fluid/supplement intake    Friction and Shear Interventions: Apply protective barrier, creams and emollients, Lift sheet, Minimize layers                Problem: Patient Education: Go to Patient Education Activity  Goal: Patient/Family Education  Outcome: Progressing Towards Goal

## 2023-01-20 ENCOUNTER — TELEPHONE (OUTPATIENT)
Dept: FAMILY MEDICINE CLINIC | Age: 78
End: 2023-01-20

## 2023-01-20 NOTE — TELEPHONE ENCOUNTER
Bereket Arriola is the patient's sister. She was referred to [facility] by Luis Adams of [address] Concepcion Amador is interested in information about [facility] for the patient.   Her callback is [phone]

## 2023-01-20 NOTE — TELEPHONE ENCOUNTER
Incoming call from Freddy Rivera [facility] with Nikki Clifford. She states that patient is going to be discharged from hospice soon and will need home visits for Primary Care. Ms. Katlyn Randall is not sure what insurance patient will have after leaving hospice. She will ask patient's sister/caregiver to call this nurse and give the insurance information. If [facility] providers are in-network with patient's insurance, this nurse explained that she can add patient to the new referral list.  The [facility] team discusses new referrals at the weekly team meetings on Tuesdays. Patient's sister/caregiver Kumar Castro called. This nurse explained [facility] services. Patient will go back to her [address] [place] [address] insurance when she is discharged from hospice. [facility] providers are in-network with this plan. This nurse explained that she will put patient on the new referral list - [facility] team with discuss the referral at the next team meeting on 1/24/23 and this nurse will call MsEneida Jose Sutton back to update her after 1/24/23. She voices understanding.

## 2023-01-24 ENCOUNTER — TELEPHONE (OUTPATIENT)
Dept: FAMILY MEDICINE CLINIC | Age: 78
End: 2023-01-24

## 2023-01-24 NOTE — TELEPHONE ENCOUNTER
Cristobal Litten, called and stated that [facility] has accepted the 2/15/2023 appointment for the patient.   Her callback [phone]

## 2023-01-24 NOTE — TELEPHONE ENCOUNTER
LCSW called pt's sister, Arvind Griffin, to inform that pt has been accepted by [facility] program, [facility] date of 2/15/23 with Davin Trent NP. Arvind Griffin stated that she has arranged for a 5 day respite starting 2/18/23 through hospice, and is hoping that [facility] can start care towards the beginning of March. She said that her sister was not planning on being discharged from hospice until the end of February. [facility] Katty Alexis[facility] this information, as Ms Severa Moan had been speaking with the hospice social worker who referred pt to [facility] program. Ms Severa Moan will call the  to confirm this scheduled respite and plan for hospice d/c at the end of February. Once confirmed, LCSW will call Arvind Griffin back to either reschedule provider visit or keep the scheduled visit on 2/15/23.      CHONG Nascimento, [facility]    [address]  (z) [phone]

## 2023-01-30 ENCOUNTER — TELEPHONE (OUTPATIENT)
Dept: FAMILY MEDICINE CLINIC | Age: 78
End: 2023-01-30

## 2023-01-30 NOTE — TELEPHONE ENCOUNTER
LCSW called pt's sister to schedule in-home appointment for completion of [facility] intake documents, and initial psychosocial assessment.  Visit scheduled for Monday 2/13 @ 1:30pm.     CHONG Bonds, [facility] Based [address] [place] [address]  (y) [phone]

## 2023-02-06 ENCOUNTER — TELEPHONE (OUTPATIENT)
Dept: FAMILY MEDICINE CLINIC | Age: 78
End: 2023-02-06

## 2023-02-06 NOTE — TELEPHONE ENCOUNTER
Steven Allen called and stated that the patient, as of yesterday, had only 1 bowel movement and her stools for the last month have been normal color, but soft & pasty. She said that she has been giving the patient a new cranberry pill w/ vit C. Since the patient has not had her [facility] appt with [facility], I told Annie Don to contact the patient's current PCP for assistance. She acknowledged.   Giovana's callback if needed [phone]

## 2023-02-10 ENCOUNTER — TELEPHONE (OUTPATIENT)
Dept: FAMILY MEDICINE CLINIC | Age: 78
End: 2023-02-10

## 2023-02-10 NOTE — TELEPHONE ENCOUNTER
[address] [facility]   Phone:  [phone]      Fax:  [phone] [facility], [address]    Name:  Adebayo Carmona  YOB: 1945      This nurse called [address] and confirmed that patient was discharged from hospice on 2/1/23. [facility] visit is scheduled for 2/15/23.     Guilherme Pinzon, MILADN, RN-BC, [facility]  Referral Navigator, [facility]

## 2023-02-13 ENCOUNTER — OFFICE VISIT (OUTPATIENT)
Dept: FAMILY MEDICINE CLINIC | Age: 78
End: 2023-02-13

## 2023-02-13 DIAGNOSIS — Z76.89 ENCOUNTER FOR SOCIAL WORK INTERVENTION: Primary | ICD-10-CM
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
## 2023-02-15 ENCOUNTER — DOCUMENTATION ONLY (OUTPATIENT)
Dept: FAMILY MEDICINE CLINIC | Age: 78
End: 2023-02-15

## 2023-02-15 ENCOUNTER — HOME VISIT (OUTPATIENT)
Dept: FAMILY MEDICINE CLINIC | Age: 78
End: 2023-02-15
Payer: MEDICARE

## 2023-02-15 VITALS
OXYGEN SATURATION: 95 % | SYSTOLIC BLOOD PRESSURE: 118 MMHG | HEART RATE: 68 BPM | TEMPERATURE: 98.5 F | DIASTOLIC BLOOD PRESSURE: 78 MMHG

## 2023-02-15 DIAGNOSIS — Z00.00 ENCOUNTER FOR MEDICAL EXAMINATION TO ESTABLISH CARE: Primary | ICD-10-CM

## 2023-02-15 DIAGNOSIS — H61.23 IMPACTED CERUMEN, BILATERAL: ICD-10-CM

## 2023-02-15 DIAGNOSIS — E55.9 VITAMIN D DEFICIENCY: ICD-10-CM

## 2023-02-15 DIAGNOSIS — E03.9 HYPOTHYROIDISM, UNSPECIFIED TYPE: ICD-10-CM

## 2023-02-15 DIAGNOSIS — M32.9 HX OF SYSTEMIC LUPUS ERYTHEMATOSUS (SLE) (HCC): ICD-10-CM

## 2023-02-15 DIAGNOSIS — F79 INTELLECTUAL DISABILITY: ICD-10-CM

## 2023-02-15 DIAGNOSIS — Z00.00 ENCOUNTER FOR MEDICARE ANNUAL WELLNESS EXAM: ICD-10-CM

## 2023-02-15 DIAGNOSIS — Z51.81 ENCOUNTER FOR MEDICATION MONITORING: ICD-10-CM

## 2023-02-15 DIAGNOSIS — M32.0 DRUG-INDUCED SYSTEMIC LUPUS ERYTHEMATOSUS, UNSPECIFIED ORGAN INVOLVEMENT STATUS (HCC): ICD-10-CM

## 2023-02-15 DIAGNOSIS — M35.00 SJOGREN'S SYNDROME, WITH UNSPECIFIED ORGAN INVOLVEMENT (HCC): ICD-10-CM

## 2023-02-15 DIAGNOSIS — R01.1 NEWLY RECOGNIZED HEART MURMUR: ICD-10-CM

## 2023-02-15 DIAGNOSIS — M81.0 OSTEOPOROSIS, SENILE: ICD-10-CM

## 2023-02-15 DIAGNOSIS — I10 PRIMARY HYPERTENSION: ICD-10-CM

## 2023-02-15 PROBLEM — Z86.39 HX OF DIABETES MELLITUS: Status: RESOLVED | Noted: 2018-02-02 | Resolved: 2023-02-15

## 2023-02-15 PROBLEM — N39.0 UTI (URINARY TRACT INFECTION): Status: RESOLVED | Noted: 2018-02-02 | Resolved: 2023-02-15

## 2023-02-15 PROBLEM — Z71.89 COUNSELING REGARDING GOALS OF CARE: Status: RESOLVED | Noted: 2018-02-12 | Resolved: 2023-02-15

## 2023-02-15 PROBLEM — N17.0 ACUTE RENAL FAILURE WITH TUBULAR NECROSIS (HCC): Status: RESOLVED | Noted: 2018-02-09 | Resolved: 2023-02-15

## 2023-02-15 PROBLEM — T17.908A ASPIRATION INTO RESPIRATORY TRACT: Status: RESOLVED | Noted: 2018-02-12 | Resolved: 2023-02-15

## 2023-02-15 PROBLEM — A41.9 SEPSIS (HCC): Status: RESOLVED | Noted: 2018-02-02 | Resolved: 2023-02-15

## 2023-02-15 LAB
25(OH)D3 SERPL-MCNC: 44 NG/ML (ref 30–100)
BASOPHILS # BLD: 0 K/UL (ref 0–0.1)
BASOPHILS NFR BLD: 1 % (ref 0–1)
DIFFERENTIAL METHOD BLD: ABNORMAL
EOSINOPHIL # BLD: 0.4 K/UL (ref 0–0.4)
EOSINOPHIL NFR BLD: 8 % (ref 0–7)
ERYTHROCYTE [DISTWIDTH] IN BLOOD BY AUTOMATED COUNT: 13.7 % (ref 11.5–14.5)
HCT VFR BLD AUTO: 35.4 % (ref 35–47)
HGB BLD-MCNC: 10.7 G/DL (ref 11.5–16)
IMM GRANULOCYTES # BLD AUTO: 0 K/UL (ref 0–0.04)
IMM GRANULOCYTES NFR BLD AUTO: 0 % (ref 0–0.5)
LYMPHOCYTES # BLD: 1.4 K/UL (ref 0.8–3.5)
LYMPHOCYTES NFR BLD: 33 % (ref 12–49)
MCH RBC QN AUTO: 27.9 PG (ref 26–34)
MCHC RBC AUTO-ENTMCNC: 30.2 G/DL (ref 30–36.5)
MCV RBC AUTO: 92.2 FL (ref 80–99)
MONOCYTES # BLD: 0.3 K/UL (ref 0–1)
MONOCYTES NFR BLD: 8 % (ref 5–13)
NEUTS SEG # BLD: 2.2 K/UL (ref 1.8–8)
NEUTS SEG NFR BLD: 50 % (ref 32–75)
NRBC # BLD: 0 K/UL (ref 0–0.01)
NRBC BLD-RTO: 0 PER 100 WBC
PLATELET # BLD AUTO: 142 K/UL (ref 150–400)
PMV BLD AUTO: 11.6 FL (ref 8.9–12.9)
RBC # BLD AUTO: 3.84 M/UL (ref 3.8–5.2)
T4 FREE SERPL-MCNC: 1.2 NG/DL (ref 0.8–1.5)
TSH SERPL DL<=0.05 MIU/L-ACNC: 2.34 UIU/ML (ref 0.36–3.74)
WBC # BLD AUTO: 4.3 K/UL (ref 3.6–11)

## 2023-02-15 PROCEDURE — 1101F PT FALLS ASSESS-DOCD LE1/YR: CPT | Performed by: NURSE PRACTITIONER

## 2023-02-15 PROCEDURE — 1123F ACP DISCUSS/DSCN MKR DOCD: CPT | Performed by: NURSE PRACTITIONER

## 2023-02-15 PROCEDURE — G8427 DOCREV CUR MEDS BY ELIG CLIN: HCPCS | Performed by: NURSE PRACTITIONER

## 2023-02-15 PROCEDURE — G8432 DEP SCR NOT DOC, RNG: HCPCS | Performed by: NURSE PRACTITIONER

## 2023-02-15 PROCEDURE — 3074F SYST BP LT 130 MM HG: CPT | Performed by: NURSE PRACTITIONER

## 2023-02-15 PROCEDURE — 1090F PRES/ABSN URINE INCON ASSESS: CPT | Performed by: NURSE PRACTITIONER

## 2023-02-15 PROCEDURE — 99345 HOME/RES VST NEW HIGH MDM 75: CPT | Performed by: NURSE PRACTITIONER

## 2023-02-15 PROCEDURE — G8536 NO DOC ELDER MAL SCRN: HCPCS | Performed by: NURSE PRACTITIONER

## 2023-02-15 PROCEDURE — G0439 PPPS, SUBSEQ VISIT: HCPCS | Performed by: NURSE PRACTITIONER

## 2023-02-15 PROCEDURE — 3078F DIAST BP <80 MM HG: CPT | Performed by: NURSE PRACTITIONER

## 2023-02-15 PROCEDURE — G8421 BMI NOT CALCULATED: HCPCS | Performed by: NURSE PRACTITIONER

## 2023-02-15 RX ORDER — LORAZEPAM 2 MG/ML
0.5 CONCENTRATE ORAL
COMMUNITY

## 2023-02-15 RX ORDER — TRAMADOL HYDROCHLORIDE 50 MG/1
50 TABLET ORAL
COMMUNITY

## 2023-02-15 RX ORDER — NITROFURANTOIN 25; 75 MG/1; MG/1
100 CAPSULE ORAL 2 TIMES DAILY
COMMUNITY
Start: 2023-02-13 | End: 2023-02-20

## 2023-02-15 NOTE — PROGRESS NOTES
This is the Subsequent Medicare Annual Wellness Exam, performed 12 months or more after the Initial AWV or the last Subsequent AWV    I have reviewed the patient's medical history in detail and updated the computerized patient record. Assessment/Plan   Education and counseling provided:  Are appropriate based on today's review and evaluation  End-of-Life planning (with patient's consent)  Influenza Vaccine  Covid-19 vaccines    1. Encounter for medical examination to establish care  -     DO NOT RESUSCITATE  2. Drug-induced systemic lupus erythematosus, unspecified organ involvement status ([facility]  -     DO NOT RESUSCITATE  -     WI COLLECTION VENOUS BLOOD VENIPUNCTURE  -     SED RATE (ESR); Future  -     C REACTIVE PROTEIN, QT; Future  3. Sjogren's syndrome, with unspecified organ involvement ([facility]  -     CBC WITH AUTOMATED DIFF; Future  -     METABOLIC PANEL, COMPREHENSIVE; Future  -     SED RATE (ESR); Future  -     C REACTIVE PROTEIN, QT; Future  4. Hypothyroidism, unspecified type  -     TSH 3RD GENERATION; Future  -     T4, FREE; Future  -     WI COLLECTION VENOUS BLOOD VENIPUNCTURE  5. Vitamin D deficiency  -     T4, FREE; Future  -     VITAMIN D, 25 HYDROXY; Future  -     WI COLLECTION VENOUS BLOOD VENIPUNCTURE  6. Encounter for medication monitoring  7. Encounter for Medicare annual wellness exam  8. Osteoporosis, senile  9. Primary hypertension  10. Intellectual disability  11. Newly recognized heart murmur  12.  Impacted cerumen, bilateral       Depression Risk Factor Screening     3 most recent PHQ Screens 2/15/2023   PHQ Not Done Functional capacity motivation limits accuracy   Little interest or pleasure in doing things Not at all   Feeling down, depressed, irritable, or hopeless Not at all   Total Score PHQ 2 0       Alcohol & Drug Abuse Risk Screen    Do you average more than 1 drink per night or more than 7 drinks a week:  No    On any one occasion in the past three months have you have had more than 3 drinks containing alcohol:  No     Opioid Risk: (Low risk score <55, High risk score ?55)  Opioid risk score: The patient doesn't have any registry metric data available      Click here to complete the Controlled Substance Monitoring SmartForm        Last PDMP Jose as Reviewed:  Review User Review Instant Review Result              Functional Ability and Level of Safety    Hearing: Hearing is good. Impacted cerumen bilaterally. Activities of Daily Living: The home contains: no safety equipment. Patient needs help with:  phone, transportation, shopping, preparing meals, laundry, housework, managing medications, managing money, dressing, bathing, hygiene, bathroom needs, and walking      Ambulation:  bedbound     Fall Risk:  Fall Risk Assessment, last 12 mths 2/15/2023   Able to walk? No   Fall in past 12 months? -   Number of falls in past 12 months -   Fall with injury?  -      Abuse Screen:  Patient is not abused       Cognitive Screening    Has your family/caregiver stated any concerns about your memory: no     Cognitive Screening: unable to complete due to cognitive impairment    Health Maintenance Due     Health Maintenance Due   Topic Date Due    Shingles Vaccine (1 of 2) Never done    COVID-19 Vaccine (5 - Booster for Moderna series) 12/29/2022       Patient Care Team   Patient Care Team:  Red Matos NP as PCP - General (Nurse Practitioner)  Mariana Willingham MD (Gastroenterology)    History     Patient Active Problem List   Diagnosis Code    Environmental allergies Z91.09    HTN (hypertension) I10    Weight loss R63.4    Intellectual disability F79    Osteoporosis, senile M81.0    Colon polyp K63.5    Hypothyroid E03.9    Encounter for long-term (current) use of medications Z79.899    SLE (systemic lupus erythematosus) ([facility] M32.9    Sjogren's syndrome ([facility] M35.00    Advance care planning Z71.89    Mental retardation F79    Hx of essential hypertension Z86.79    Idiopathic hypotension I95.0 Past Medical History:   Diagnosis Date    Arthritis     Colon polyp     Diabetes ([facility]     borderline no medications    Environmental allergies 4/28/2010    GERD (gastroesophageal reflux disease)     HTN (hypertension) 4/28/2010    dosent take medication now    Lupus     MR (mental retardation)     Osteoporosis, senile     Other ill-defined conditions     parkinson's disease possibly    Psychiatric disorder     anxious    PUD (peptic ulcer disease)     Scolioses     Sjogren's syndrome ([facility] 3/4/2015      Past Surgical History:   Procedure Laterality Date    HX TONSILLECTOMY      LA COLONOSCOPY FLX DX W/COLLJ SPEC WHEN PFRMD  2/17/2011    due 2013     Current Outpatient Medications   Medication Sig Dispense Refill    traMADoL (ULTRAM) 50 mg tablet Take 50 mg by mouth every six (6) hours as needed for Pain. Indications: pain      LORazepam (INTENSOL) 2 mg/mL concentrated solution Take 0.5 mg by mouth every eight (8) hours as needed for Anxiety. cholecalciferol, vitamin D3, 50 mcg (2,000 unit) tab Take 1 Tab by mouth daily. acetaminophen (TYLENOL) 650 mg TbER Take 650 mg by mouth every eight (8) hours as needed for Pain.      hydroxychloroquine (PLAQUENIL) 200 mg tablet TAKE 1 TABLET EVERY DAY 90 Tab 1    pantoprazole (PROTONIX) 40 mg tablet Take 1 Tab by mouth Daily (before breakfast). (Patient not taking: Reported on 2/15/2023) 30 Tab 0    levothyroxine (SYNTHROID) 25 mcg tablet TAKE 1 TABLET BY MOUTH DAILY BEFORE BREAKFAST (Patient not taking: Reported on 2/15/2023) 90 Tab 3    cetirizine (ZYRTEC) 10 mg tablet Take 1 Tab by mouth daily as needed for Allergies.  (Patient not taking: Reported on 2/15/2023) 30 Tab 0     Allergies   Allergen Reactions    Adhesive Tape Other (comments)     Redness under that adhesive       Family History   Problem Relation Age of Onset    Cancer Mother         pancreas    Heart Disease Father     Heart Attack Father      Social History     Tobacco Use    Smoking status: Never    Smokeless tobacco: Never   Substance Use Topics    Alcohol use: No         Trevor Hendrix NP

## 2023-02-15 NOTE — PROGRESS NOTES
[facility]  Plan of Care    [facility] Team Members: Sergio Cross MD; Rhonda Woods NP; Sigrid Carolina NP; Jose Morse, RN; Charly Mohamud, RN; Magda Romero RN; ISACC Iqbal  1945 / [id number]  female    Date of Initial Visit (Start of Care): 2/15/2023    Diagnosis:   Patient Active Problem List   Diagnosis Code    Environmental allergies Z91.09    HTN (hypertension) I10    Weight loss R63.4    Intellectual disability F79    Osteoporosis, senile M81.0    Colon polyp K63.5    Hypothyroid E03.9    Encounter for long-term (current) use of medications Z79.899    SLE (systemic lupus erythematosus) ([facility] M32.9    Sjogren's syndrome ([facility] M35.00    Advance care planning Z71.89    Mental retardation F79    Hx of essential hypertension Z86.79    Idiopathic hypotension I95.0       Patient status summary: 70 y.o. female who was referred to the [facility] program due to bedbound status/hospice discharge. Patient discussed today for initial POC review.     Advance Care Planning:  Code status: DNR       Advance Care Planning 2/12/2018   Patient's Healthcare Decision Maker is: Named in scanned ACP document   Primary Decision Maker Name Bernard Duran   Primary Decision Maker Phone Number [phone] H/ [phone] C   Primary Decision Maker Relationship to Patient -   Confirm Advance Directive None   Patient Would Like to Complete Advance Directive Unable   Does the patient have other document types Guardianship       DME/Supplies:  Hospital Bed     Allergies   Allergen Reactions    Adhesive Tape Other (comments)     Redness under that adhesive       Nutritional Requirements:   Oral with supported meal preparation    Functional/Activity Level:  Bedbound only    Safety Measures:   Aspiration risk, Caregiver deficit, and Self-care deficity    Acuity Level Rating: Level 2 - Medium    Current Outpatient Medications   Medication Sig    traMADoL (ULTRAM) 50 mg tablet Take 50 mg by mouth every six (6) hours as needed for Pain. Indications: pain    LORazepam (INTENSOL) 2 mg/mL concentrated solution Take 0.5 mg by mouth every eight (8) hours as needed for Anxiety. nitrofurantoin, macrocrystal-monohydrate, (MACROBID) 100 mg capsule Take 100 mg by mouth two (2) times a day. cholecalciferol, vitamin D3, 50 mcg (2,000 unit) tab Take 1 Tab by mouth daily. acetaminophen (TYLENOL) 650 mg TbER Take 650 mg by mouth every eight (8) hours as needed for Pain.    hydroxychloroquine (PLAQUENIL) 200 mg tablet TAKE 1 TABLET EVERY DAY     No current facility-administered medications for this visit. The Plan of Care has been initiated for within 15 days of New Visit  and reviewed and updated by the Interdisciplinary Group (IDG) as frequently as the patient condition warrants. Plan of Care by Discipline:    1. Provider  Identify patient's health care goal(s), Reduction of polypharmacy within benefit/burden framework appropriate to age, function and disease state, Determine need for laboratory assessment based on patient disease status , Assess results of laboratory testing and adjust treatment plan as appropriate, Enhance visit frequency to monitor high risk health care needs, Discuss goals of care and treatment preferences, including resuscitation, and Assess patient for transition to Hospice as medically indicated    2. Nursing  Introduce Home Based Primary Care and Supportive Services, Coordination of care with specialty services, Review Health Maintenance with patient and provider; update as appropriate, Education regarding current medications and adverse effects, Education for skin care in patients with limited mobility; with focus on preventing skin breakdown, and Provider caregiver / family support for patient's current and changing condition    3.  Social Work  Introduce Home Based Primary Care and Supportive Services, New patient assessment of psychosocial needs and social determinants of health, Review Emergency Preparedness Plan, Establish therapeutic relationship through in home visits and telephonic touch points, Assess for caregiver burden and intervene as psychosocially indicated, and Provide information on available community resources      Plan of Care Orders / Action Items:  Encounter to establish care - Discussed goals of care, along with medical/surgical/family history and outlined the structure of our [facility] program.  Drug-induced SLE/Sjogren's Syndrome -  Reportedly secondary to long-term aldomet use. Previously followed by Dr. Britt Snyder (rheum), though she has not seen him in many years due to immobility. Currently taking hydroxychloroquine, but asymptomatic and unable to have regular eye exams. We discussed the discontinuation of plaquenil, and sister is very open to this. Will check labs today and discuss further at next visit. Hypothyroidism - Previously on levothyroxine 25mcg daily, but her sister discontinued at some point in the past to reduce pill burden. Recheck TFTs today. Vit D deficiency/osteoporosis - Previously on Prolia injections, though not in many years. Now taking OTC vitamin D; recheck levels today. Hx hypertension/CHF - Reportedly was on hospice services for CHF, though was never followed by cardiology and most recent echocardiogram in 2018 showed grade 2 diastolic dysfunction with EF of approximately 60%. Was on aldomet for many years for BP management, but it sounds like this caused drug-induced SLE. Long-term use of plaquenil may have been contributory to development of heart failure. Blood pressure excellent today without any medications on board. Heart murmur - Reportedly a new finding, per patient's sister. Will discuss workup at next visit if desired, but favor observation given patient's overall condition. Impacted cerumen, bilaterally - Start debrox drops nightly, with plan for cerumen removal at next visit.    Pain/anxiety - Very sparing use of tramadol and benzodiazepines, generally < 1x/month per sister's report, which is validated by fill dates on pill bottles. Encouraged limiting these medications further and using acetaminophen as first-line agent for pain. Intellectual disability - Cared for very well by her sister, who is her legal guardian. Our team LCSW is helping with caregiving resources, as she is at real risk of caregiver burnout. Appears to be at baseline. Medicare AWV - Completed; see separate note. Discussed pros/cons of labwork with sister Mykel Ewing today, and she would like to get a baseline set of labs. Labs drawn today; will call with any urgent results, otherwise will discuss at next visit. Return in about 6 weeks (around 3/27/2023) for 6 week follow up.       Health Maintenance   Topic Date Due    Shingles Vaccine (1 of 2) Never done    COVID-19 Vaccine (5 - Booster for Moderna series) 12/29/2022    Depression Screen  02/15/2024    Medicare Yearly Exam  02/16/2024    DTaP/Tdap/Td series (4 - Td or Tdap) 05/23/2027    Flu Vaccine  Completed    Pneumococcal 65+ years  Completed    Hepatitis C Screening  Addressed         Estimated Visit Frequency:  Every 2 month Provider Visit  Last NP visit: 2/15/2023  SW visits PRN

## 2023-02-15 NOTE — PROGRESS NOTES
Home Based Primary Care [facility]) & Supportive Services    [phone]      NOTE: Home Based Primary Care is a PROVIDER (MD/NP) based interdisciplinary practice (RN, LCSW) for patients who cannot access (or have a taxing effort) primary / speciality medical care in an office setting. \[facility]\"" is NOT [facility] but works with [employer]. when there is a skilled need. Our MD/NPs are integral in Care Transitions; PLEASE CALL [phone] if this patient arrives in the Emergency Department or Hospital.        Date of Current Visit: 02/15/23  Patient/Family present for Current Visit: patient, sister/guardian Avni Bonilla  Goals of Care as stated by the patient/family is: \"for her last days to be as good as they can be\"     Preferences for hospitalization if that were to be necessary:  [] Patient DOES NOT WANT hospitalization; focus all treatments at home  [x] Patient WOULD WANT hospitalization for potentially reversible causes  [facility] at: [facility] 1945) is a 70 y.o. female, new patient, here for evaluation of the following chief complaint(s):  Establish Care       ASSESSMENT/PLAN:  Below is the assessment and plan developed based on review of pertinent history, physical exam, labs, studies, and medications. 1. Encounter for medical examination to establish care  -     DO NOT RESUSCITATE  2. Drug-induced systemic lupus erythematosus, unspecified organ involvement status ([facility] [address]  -     DO NOT RESUSCITATE  -     [place] COLLECTION VENOUS BLOOD VENIPUNCTURE  -     SED RATE (ESR); Future  -     C REACTIVE PROTEIN, QT; Future  3. Sjogren's syndrome, with unspecified organ involvement ([facility] [address]  -     CBC WITH AUTOMATED DIFF; Future  -     METABOLIC PANEL, COMPREHENSIVE; Future  -     SED RATE (ESR); Future  -     C REACTIVE PROTEIN, QT; Future  4. Hypothyroidism, unspecified type  -     TSH 3RD GENERATION; Future  -     T4, FREE;  Future  -     [place] COLLECTION VENOUS BLOOD VENIPUNCTURE  5. Vitamin D deficiency  -     T4, FREE; Future  -     VITAMIN D, 25 HYDROXY; Future  -     [place] COLLECTION VENOUS BLOOD VENIPUNCTURE  6. Encounter for medication monitoring  7. Encounter for Medicare annual wellness exam  8. Osteoporosis, senile  9. Primary hypertension  10. Intellectual disability  11. Newly recognized heart murmur  12. Impacted cerumen, bilateral    Encounter to establish care - Discussed goals of care, along with medical/surgical/family history and outlined the structure of our [facility] program.  Drug-induced SLE/Sjogren's Syndrome -  Reportedly secondary to long-term aldomet use. Previously followed by Dr. Ivanna Galaviz (rheum), though she has not seen him in many years due to immobility. Currently taking hydroxychloroquine, but asymptomatic and unable to have regular eye exams. We discussed the discontinuation of plaquenil, and sister is very open to this. Will check labs today and discuss further at next visit. Hypothyroidism - Previously on levothyroxine 25mcg daily, but her sister discontinued at some point in the past to reduce pill burden. Recheck TFTs today. Vit D deficiency/osteoporosis - Previously on Prolia injections, though not in many years. Now taking OTC vitamin D; recheck levels today. Hx hypertension/CHF - Reportedly was on hospice services for CHF, though was never followed by cardiology and most recent echocardiogram in 2018 showed grade 2 diastolic dysfunction with EF of approximately 60%. Was on aldomet for many years for BP management, but it sounds like this caused drug-induced SLE. Long-term use of plaquenil may have been contributory to development of heart failure. Blood pressure excellent today without any medications on board. Heart murmur - Reportedly a new finding, per patient's sister. Will discuss workup at next visit if desired, but favor observation given patient's overall condition.    Impacted cerumen, bilaterally - Start debrox drops nightly, with plan for cerumen removal at next visit. Pain/anxiety - Very sparing use of tramadol and benzodiazepines, generally < 1x/month per sister's report, which is validated by fill dates on pill bottles. Encouraged limiting these medications further and using acetaminophen as first-line agent for pain. Intellectual disability - Cared for very well by her sister, who is her legal guardian. Our team LCSW is helping with caregiving resources, as she is at real risk of caregiver burnout. Appears to be at baseline. Medicare AWV - Completed; see separate note. Discussed pros/cons of labwork with sister Kyaw Goss today, and she would like to get a baseline set of labs. Labs drawn today; will call with any urgent results, otherwise will discuss at next visit. Return in about 6 weeks (around 3/27/2023) for 6 week follow up. SUBJECTIVE/OBJECTIVE:  HPI    The patient is seen at home today to establish care with the [facility] program. She is seen at home due to taxing effort to seek primary care services in the community due to being bedbound. The history is provided by the patient's sister and legal guardian, Neva Shepherd, as the patient is essentially nonverbal, able to repeat a few words and make her wishes known (\"no! \"). Per Ms. Juan Grossman, Ms. Guicho Morales developed a high fever after tonsilectomy in her childhood and subsequently developed cognitive impairment. She was cared for by her parents until their deaths, and Kyaw Goss has been her legal guardian since 48. Kyaw Goss is her primary caregiver, with some assistance from her children and grandchildren. She has a pending Medicaid application, and is hoping for some in-home assistance with caregiving.  She is adamant that she does not want her sister moved to a facility if it can be at all avoided, as she has had several bad experiences with facilities in the past.     Ms. Guicho Morales has been on hospice services on-and-off since 2012, most recently with [facility] since 2018 until her discharge about 2 weeks ago. She was reportedly on hospice services for CHF, though she never saw a cardiologist. She did have a long-standing history of hypertension, per Ms. Cathy Ro, and was treated with aldomet, which reportedly caused drug-induced SLE. She was taking a \"chemo med\" for a while (possibly methotrexate, but sister is unsure) but \"hated it\" and this was stopped in . She has been maintained on hydroxychloroquine for many years. She was previously followed by Dr. Andres Mendieta (rheum), though she has not been able to see him in several years due to immobility. Prior to 2018, she was mobile, able to transfer and using a chair lift and wheelchair to get out and \"went everywhere\" with her sister, Dane Schaeffer. However, while Dane Schaeffer was recovering from her own surgery and Ms. Klaus Pimentel was in a respite stay at a group home, she contracted the flu, developed sepsis, and nearly . She did recover and was discharged home with hospice, but has been stable and was recently discharged, as above. She has been bed-bound since her hospitalization in 2018; her sister tearfully admits she hopes that her sister will \"get up and walk again\" but admits that she knows this is very unlikely. She is able to feed herself, but is dependent on all of her other ADLs. She is incontinent of stool and urine and is currently being treated for a UTI, diagnosed recently by Renato Rodriguez. She eats about 50 - 60% of the 2 meals per day that she is served and seems to be in relatively good spirits most of the time, per her sister. She generally sleeps well, though occasionally she will have her days and nights mixed up, but this is not bothersome to her or her sister. She is not able to shift herself much in the bed anymore due to stiffness/contractures, but her skin is intact. She has received her annual flu shot and Covid-19 vaccines.    She does have tramadol and liquid lorazepam/oral alprazolam at home as well, which were previously prescribed by hospice. These are reportedly used sparingly, <1x/month generally, for complaints of pain or agitation. The bottles examined today were dispensed in 2019/2020. Visit Vitals  /78 (BP 1 Location: Right arm, BP Patient Position: Supine, BP Cuff Size: Adult)   Pulse 68   Temp 98.5 °F (36.9 °C) (Temporal)   SpO2 95%     Current Outpatient Medications on File Prior to Visit   Medication Sig Dispense Refill    traMADoL (ULTRAM) 50 mg tablet Take 50 mg by mouth every six (6) hours as needed for Pain. Indications: pain      LORazepam (INTENSOL) 2 mg/mL concentrated solution Take 0.5 mg by mouth every eight (8) hours as needed for Anxiety. nitrofurantoin, macrocrystal-monohydrate, (MACROBID) 100 mg capsule Take 100 mg by mouth two (2) times a day. cholecalciferol, vitamin D3, 50 mcg (2,000 unit) tab Take 1 Tab by mouth daily. acetaminophen (TYLENOL) 650 mg TbER Take 650 mg by mouth every eight (8) hours as needed for Pain.      hydroxychloroquine (PLAQUENIL) 200 mg tablet TAKE 1 TABLET EVERY DAY 90 Tab 1    [DISCONTINUED] pantoprazole (PROTONIX) 40 mg tablet Take 1 Tab by mouth Daily (before breakfast). (Patient not taking: Reported on 2/15/2023) 30 Tab 0    [DISCONTINUED] red yeast rice extract 600 mg cap Take 600 mg by mouth daily. [DISCONTINUED] levothyroxine (SYNTHROID) 25 mcg tablet TAKE 1 TABLET BY MOUTH DAILY BEFORE BREAKFAST (Patient not taking: Reported on 2/15/2023) 90 Tab 3    [DISCONTINUED] cetirizine (ZYRTEC) 10 mg tablet Take 1 Tab by mouth daily as needed for Allergies. (Patient not taking: Reported on 2/15/2023) 30 Tab 0     No current facility-administered medications on file prior to visit.        Review of Systems - obtained from sister Shirlie Leventhal  Constitutional: denies activity change, appetite change, fatigue, fever  HEENT: denies congestion, sinus pressure, sore throat  CV: denies chest pain, palpitations, edema  Respiratory: denies shortness of breath, wheezing  GI: denies abdominal pain, blood in stool, diarrhea, constipation  MSK: denies arthralgias, joint swelling  Neuro: denies frequent headaches, dizziness, numbness/tingling  Skin: denies skin breakdown  Psych: denies depression, anxiety, confusion, memory changes        Physical Exam  Constitutional: no acute distress, bedbound elderly AAF with noted kyphosis and muscle wasting, stiffness/contractures of bilateral UEs and LEs  HEENT: normocephalic, atraumatic, external ears normal bilaterally; moist mucous membranes; EOM intact, impacted cerumen bilateral ears  CV: regular rate and rhythm, 4/6 pansystolic heart murmur auscultated, 2+ pedal pulses bilaterally  Pulm: normal effort, normal breath sounds without wheezing or rhonchi  Abd: normal bowel sounds, soft, non-tender  : deferred  Skin: warm, dry; no breakdown noted; hypopigmented malar rash  Neuro: at baseline, alert; nonverbal  Psych: calm, pleasant        Advanced Care Planning  Code Status: DNR  Advanced Medical Directive: guardianship document on file, DDNR on file          On this date 02/15/2023 I have spent 95 minutes reviewing previous notes, test results and face to face with the patient discussing the diagnosis and importance of compliance with the treatment plan as well as documenting on the day of the visit. An electronic signature was used to authenticate this note.   -- Michaela Kaiser NP Elvia Gore no

## 2023-02-15 NOTE — PROGRESS NOTES
SN joint visit with Guilherme Rangel NP for Start of Care    Patient in bed, alert, able to track with her eyes and speak a few words. Patient's sister at bedside. Vital Signs:  BP: 118/78  HR: 68  PO2: 95%  Temp: 98.5 F    Venipuncture x2 - L AC - unsuccessful / R AC - Blood collected, and specimen taken to [facility] at [facility].      Patient's DME (Bed) from [facility] - [phone]  and [phone]

## 2023-02-16 ENCOUNTER — TELEPHONE (OUTPATIENT)
Dept: FAMILY MEDICINE CLINIC | Age: 78
End: 2023-02-16

## 2023-02-16 DIAGNOSIS — M32.0 DRUG-INDUCED SYSTEMIC LUPUS ERYTHEMATOSUS, UNSPECIFIED ORGAN INVOLVEMENT STATUS (HCC): Primary | ICD-10-CM

## 2023-02-16 NOTE — TELEPHONE ENCOUNTER
Telephone call to sister/POA Maximilian Agrawal. Discussed that TSH, FT4, blood counts all stable. However, lab was unable to process CMP & ESR/CRP due to insufficient blood. We will plan to recollect at next visit in March, which she is agreeable to . No med changes at this time.    Angel Chamorro, NP

## 2023-02-16 NOTE — TELEPHONE ENCOUNTER
Call placed to [facility] to enquire about remaining lab results. I was informed by Damaris Lopez that after running the tests that have resulted, there was \"no blood left\", he verbalized he actually looked at the vial and confirmed that. I informed him that we would reach out to patient to re-collect the tests.

## 2023-02-20 ENCOUNTER — TELEPHONE (OUTPATIENT)
Dept: FAMILY MEDICINE CLINIC | Age: 78
End: 2023-02-20

## 2023-02-20 ENCOUNTER — HOME VISIT (OUTPATIENT)
Dept: FAMILY MEDICINE CLINIC | Age: 78
End: 2023-02-20
Payer: MEDICARE

## 2023-02-20 VITALS
RESPIRATION RATE: 20 BRPM | SYSTOLIC BLOOD PRESSURE: 118 MMHG | DIASTOLIC BLOOD PRESSURE: 68 MMHG | OXYGEN SATURATION: 93 % | TEMPERATURE: 98.6 F | HEART RATE: 68 BPM

## 2023-02-20 DIAGNOSIS — M32.0 DRUG-INDUCED SYSTEMIC LUPUS ERYTHEMATOSUS, UNSPECIFIED ORGAN INVOLVEMENT STATUS (HCC): ICD-10-CM

## 2023-02-20 DIAGNOSIS — F79 INTELLECTUAL DISABILITY: ICD-10-CM

## 2023-02-20 DIAGNOSIS — R19.7 DIARRHEA, UNSPECIFIED TYPE: Primary | ICD-10-CM

## 2023-02-20 DIAGNOSIS — Z86.79 HX OF ESSENTIAL HYPERTENSION: ICD-10-CM

## 2023-02-20 LAB
ALBUMIN SERPL-MCNC: 2.8 G/DL (ref 3.5–5)
ALBUMIN/GLOB SERPL: 0.6 (ref 1.1–2.2)
ALP SERPL-CCNC: 133 U/L (ref 45–117)
ALT SERPL-CCNC: 21 U/L (ref 12–78)
ANION GAP SERPL CALC-SCNC: 6 MMOL/L (ref 5–15)
AST SERPL-CCNC: 24 U/L (ref 15–37)
BILIRUB SERPL-MCNC: 0.1 MG/DL (ref 0.2–1)
BUN SERPL-MCNC: 19 MG/DL (ref 6–20)
BUN/CREAT SERPL: 24 (ref 12–20)
CALCIUM SERPL-MCNC: 9.2 MG/DL (ref 8.5–10.1)
CHLORIDE SERPL-SCNC: 110 MMOL/L (ref 97–108)
CO2 SERPL-SCNC: 26 MMOL/L (ref 21–32)
CREAT SERPL-MCNC: 0.8 MG/DL (ref 0.55–1.02)
CRP SERPL-MCNC: 1 MG/DL (ref 0–0.6)
ERYTHROCYTE [SEDIMENTATION RATE] IN BLOOD: 59 MM/HR (ref 0–30)
GLOBULIN SER CALC-MCNC: 4.6 G/DL (ref 2–4)
GLUCOSE SERPL-MCNC: 72 MG/DL (ref 65–100)
POTASSIUM SERPL-SCNC: 4.3 MMOL/L (ref 3.5–5.1)
PROT SERPL-MCNC: 7.4 G/DL (ref 6.4–8.2)
SODIUM SERPL-SCNC: 142 MMOL/L (ref 136–145)

## 2023-02-20 PROCEDURE — 1090F PRES/ABSN URINE INCON ASSESS: CPT | Performed by: NURSE PRACTITIONER

## 2023-02-20 PROCEDURE — 1123F ACP DISCUSS/DSCN MKR DOCD: CPT | Performed by: NURSE PRACTITIONER

## 2023-02-20 PROCEDURE — 99349 HOME/RES VST EST MOD MDM 40: CPT | Performed by: NURSE PRACTITIONER

## 2023-02-20 PROCEDURE — G8536 NO DOC ELDER MAL SCRN: HCPCS | Performed by: NURSE PRACTITIONER

## 2023-02-20 PROCEDURE — 3078F DIAST BP <80 MM HG: CPT | Performed by: NURSE PRACTITIONER

## 2023-02-20 PROCEDURE — G8427 DOCREV CUR MEDS BY ELIG CLIN: HCPCS | Performed by: NURSE PRACTITIONER

## 2023-02-20 PROCEDURE — 1101F PT FALLS ASSESS-DOCD LE1/YR: CPT | Performed by: NURSE PRACTITIONER

## 2023-02-20 PROCEDURE — 3074F SYST BP LT 130 MM HG: CPT | Performed by: NURSE PRACTITIONER

## 2023-02-20 NOTE — PROGRESS NOTES
Home Based Primary Care [facility]) & Supportive Services    [phone]      NOTE: Home Based Primary Care is a PROVIDER (MD/NP) based interdisciplinary practice (RN, LCSW) for patients who cannot access (or have a taxing effort) primary / speciality medical care in an office setting. [facility] is NOT [employer] but works with [address] when there is a skilled need. Our MD/NPs are integral in Care Transitions; PLEASE CALL [phone] if this patient arrives in the Emergency Department or Hospital.        Date of Current Visit: 23  Patient/Family present for Current Visit: patient, sister/guardian Lisa Marlow  Goals of Care as stated by the patient/family is: \"for her last days to be as good as they can be\"     Preferences for hospitalization if that were to be necessary:  [] Patient DOES NOT WANT hospitalization; focus all treatments at home  [x] Patient WOULD WANT hospitalization for potentially reversible causes  Patient [facility] at: [facility] (: 1945) is a 70 y.o. female, established patient, here for evaluation of the following chief complaint(s):  Diarrhea       ASSESSMENT/PLAN:  Below is the assessment and plan developed based on review of pertinent history, physical exam, labs, studies, and medications. 1. Diarrhea, unspecified type  2. Drug-induced systemic lupus erythematosus, unspecified organ involvement status ([facility] [address]  3. Hx of essential hypertension  4. Intellectual disability    Diarrhea - Likely related to recent antibiotic usage or viral GI illness. Low suspicion for C. Diff or other bacterial infection. Vitals are reassuring and physical exam is normal. Recommended BRAT diet, Imodium dosing x 1 - 2 days per box recommendations, and pushing fluids. Encouraged sister to call our office for recommendations should symptoms persist or worsen.     Repeat labs drawn today as labs drawn last week were insufficient quantity; will review results at upcoming routine visit. Return in 5 weeks (on 3/27/2023) for 6 week follow up. SUBJECTIVE/OBJECTIVE:  HPI    The patient is seen at home today for follow up with the [facility] program. She is seen at home due to taxing effort to seek primary care services in the community due to being bedbound. The history is provided by the patient's sister and legal guardian, Zohreh Ramirez, as the patient is essentially nonverbal, able to repeat a few words and make her wishes known (\"no! \"). She has a history of CHF, SLE, Sjogren's, cognitive impairment, and is seen today for an acute visit with chief complaint of diarrhea. When we first met her last week, she was on macrobid for a UTI, given by Renato Rodriguez for a UTI (sister reports that she was told it was E. Coli), and had been called for loose stools. Rachel Dumont reports today that she had been having loose stools for about 3 weeks prior to that Renato Rodriguez visit, which was the reason for their call to Renato Rodriguez. Rachel Dumont reports that she was told that the macrobid would help with the loose stools, but she called our office today due to 3 loose stools in the past 48 hours, which is causing trouble for her as the patient's caregiver, stating to our team RN this morning \"I can't take care of her like this. \" No fevers, chills, or other systemic symptoms. Per Rachel Dumont, she has had 3 loose/pasty stools over the past 48 hours, lighter-colored than her previous stools. She has not had any nausea or vomiting and has had a normal appetite. Prior to the Renato Michael visit, she had had about 3 weeks of loose stools, but prior to that, her baseline was a solid bowel movement every 2 days. She tells us today that she does not want to [facility] the symptoms\" of infection, so is hesitant to give imodium.      Visit Vitals  /68 (BP 1 Location: Left upper arm, BP Patient Position: Sitting, BP Cuff Size: Adult)   Pulse 68   Temp 98.6 °F (37 °C) (Temporal)   Resp 20   SpO2 93%     Current Outpatient Medications on File Prior to Visit   Medication Sig Dispense Refill    traMADoL (ULTRAM) 50 mg tablet Take 50 mg by mouth every six (6) hours as needed for Pain. Indications: pain      LORazepam (INTENSOL) 2 mg/mL concentrated solution Take 0.5 mg by mouth every eight (8) hours as needed for Anxiety. nitrofurantoin, macrocrystal-monohydrate, (MACROBID) 100 mg capsule Take 100 mg by mouth two (2) times a day. cholecalciferol, vitamin D3, 50 mcg (2,000 unit) tab Take 1 Tab by mouth daily. acetaminophen (TYLENOL) 650 mg TbER Take 650 mg by mouth every eight (8) hours as needed for Pain.      hydroxychloroquine (PLAQUENIL) 200 mg tablet TAKE 1 TABLET EVERY DAY 90 Tab 1     No current facility-administered medications on file prior to visit.        Review of Systems - obtained from sister Nacho Mantilla  Constitutional: denies activity change, appetite change, fatigue, fever  HEENT: denies congestion, sinus pressure, sore throat  CV: denies chest pain, palpitations, edema  Respiratory: denies shortness of breath, wheezing  GI: denies abdominal pain, blood in stool, constipation; + diarrhea  MSK: denies arthralgias, joint swelling  Neuro: denies frequent headaches, dizziness, numbness/tingling  Skin: denies skin breakdown  Psych: denies depression, anxiety, confusion, memory changes        Physical Exam  Constitutional: no acute distress, bedbound elderly AAF with noted kyphosis and muscle wasting, stiffness/contractures of bilateral UEs and LEs  HEENT: normocephalic, atraumatic, external ears normal bilaterally; moist mucous membranes; EOM intact  CV: regular rate and rhythm, 4/6 pansystolic heart murmur auscultated, 2+ pedal pulses bilaterally  Pulm: normal effort, normal breath sounds without wheezing or rhonchi  Abd: normal bowel sounds, soft, non-tender  : deferred  Skin: warm, dry; no breakdown noted; hypopigmented malar rash; skin turgor normal  Neuro: at baseline, alert; nonverbal  Psych: calm, pleasant        Advanced Care Planning  Code Status: DNR  Advanced Medical Directive: guardianship document on file, DDNR on file          On this date 02/20/2023 I have spent 45 minutes reviewing previous notes, test results and face to face with the patient discussing the diagnosis and importance of compliance with the treatment plan as well as documenting on the day of the visit. An electronic signature was used to authenticate this note.   -- Silvina Fuentes NP

## 2023-02-20 NOTE — TELEPHONE ENCOUNTER
Call returned to patient's sister - she reported that patient had finished her antibiotic on Saturday, since that time, she had 1 solid BM, 1 soft and 3 loose, the loose stools are pasty. I advised to continue to give the probiotic recommended by [facility]. Per discussion with Suzie Perales NP I recommended using Imodium, sister responded she does not want to give the medication, as it would \"mask\" the cause of diarrhea. I tried to explain that the antibiotic that the patient was taking (Jeramy Spina) was not for diarrhea, it was for UTI, therefore it should not have much connection to the BM's. Oralia Davidson responded that she was told the medication was for both, that the patient had e-coli on her urine and Macrobid should treat it. I called NP and discussed the phone call above. Rosina Olvera gave 2 options: Oralia Davidson could either try the imodium, even start at 1 tab now and 1 after each loose BM, or NP could make a PRN acute visit this morning to collect urine for culture. I spoke with Oralia Davidson again and she chose to have a visit from NP. She also verbalized she \"didn't want to call [facility] again because we're now her PCP\". I explained that this is correct, but I also explained that at times, if the providers are making visits on the opposite side of [facility], patient's should need to use [facility] for immediate needs. She verbalized understanding.

## 2023-02-20 NOTE — TELEPHONE ENCOUNTER
The patient's sister Azar Marcano is calling to advise she finished the medication and it did not work. She has only had one solid stool. Now back to loose pasty stool.  # [id number].

## 2023-03-16 ENCOUNTER — TELEPHONE (OUTPATIENT)
Dept: FAMILY MEDICINE CLINIC | Age: 78
End: 2023-03-16

## 2023-03-16 NOTE — TELEPHONE ENCOUNTER
LCSW called pt's sister, Rachel Dumont, to inquire about status of Medicaid application that had been completed prior to initial social work appointment on 2/13/23. Rachel Dumont stated that she rec'd information in the mail that she needed to submit information re: pt's life insurance policy and bank statements. She turned in those documents to [employer] yesterday, so no determination has been made yet. UAI screening to be completed once determination of Medicaid eligibility has been rec'd. Rachel Dumont stated that her own left wrist has been very painful, but her right wrist is still healing post-surgery. She is having a hard time with caregiving, but having the recommended surgery on her left wrist is not an option at this time as she would not be able to provide any assistance to her sister, and she is the only caregiver. Her doctor gave her an injection for the pain, which has helped alleviate the severity but it is still uncomfortable. She uses her forearms to roll her sister and provide hygiene care. Rachel Dumont said that pt has been having issues with her bowels. She had given her immodium which helped with the loose stools, but once she stopped the immodium and gave her a probiotic, pt has been having \"blow outs\". She said it is not diarrhea, just very loose stool. hospitalsW will update pt's provider of Giovana's concern. Rachel Dumont said pt does not have a fever, and is still eating well. LCSW encouraged Rachel Dumont to reach out if she has any questions or needs any further guidance re: Medicaid and UAI screening. She expressed appreciation for the conversation today.      CHONG Khan, [facility]    [place] Based [address] [place] [address]  (n) [phone]

## 2023-03-17 NOTE — TELEPHONE ENCOUNTER
Telephone call to sister Jordyn Reddy to follow up on concerns related to patient's loose stools as forwarded from team LCSW. No answer, LVM to call the office if she wanted to discuss this further. Could consider cholestyramine for loose stools, if desired.   Parish Patterson, NP

## 2023-03-27 ENCOUNTER — HOME VISIT (OUTPATIENT)
Dept: FAMILY MEDICINE CLINIC | Age: 78
End: 2023-03-27
Payer: MEDICARE

## 2023-03-27 VITALS
OXYGEN SATURATION: 97 % | DIASTOLIC BLOOD PRESSURE: 66 MMHG | RESPIRATION RATE: 20 BRPM | SYSTOLIC BLOOD PRESSURE: 122 MMHG | TEMPERATURE: 98.6 F | HEART RATE: 66 BPM

## 2023-03-27 DIAGNOSIS — E55.9 VITAMIN D DEFICIENCY: ICD-10-CM

## 2023-03-27 DIAGNOSIS — M35.00 SJOGREN'S SYNDROME, WITH UNSPECIFIED ORGAN INVOLVEMENT (HCC): ICD-10-CM

## 2023-03-27 DIAGNOSIS — E03.9 HYPOTHYROIDISM, UNSPECIFIED TYPE: ICD-10-CM

## 2023-03-27 DIAGNOSIS — M32.0 DRUG-INDUCED SYSTEMIC LUPUS ERYTHEMATOSUS, UNSPECIFIED ORGAN INVOLVEMENT STATUS (HCC): Primary | ICD-10-CM

## 2023-03-27 DIAGNOSIS — H61.23 IMPACTED CERUMEN, BILATERAL: ICD-10-CM

## 2023-03-27 DIAGNOSIS — F79 INTELLECTUAL DISABILITY: ICD-10-CM

## 2023-03-27 DIAGNOSIS — M81.0 OSTEOPOROSIS, SENILE: ICD-10-CM

## 2023-03-27 DIAGNOSIS — Z86.79 HX OF ESSENTIAL HYPERTENSION: ICD-10-CM

## 2023-03-27 DIAGNOSIS — R01.1 NEWLY RECOGNIZED HEART MURMUR: ICD-10-CM

## 2023-03-27 PROCEDURE — G8427 DOCREV CUR MEDS BY ELIG CLIN: HCPCS | Performed by: NURSE PRACTITIONER

## 2023-03-27 PROCEDURE — 69209 REMOVE IMPACTED EAR WAX UNI: CPT | Performed by: NURSE PRACTITIONER

## 2023-03-27 PROCEDURE — 3078F DIAST BP <80 MM HG: CPT | Performed by: NURSE PRACTITIONER

## 2023-03-27 PROCEDURE — 1123F ACP DISCUSS/DSCN MKR DOCD: CPT | Performed by: NURSE PRACTITIONER

## 2023-03-27 PROCEDURE — 99350 HOME/RES VST EST HIGH MDM 60: CPT | Performed by: NURSE PRACTITIONER

## 2023-03-27 PROCEDURE — 3074F SYST BP LT 130 MM HG: CPT | Performed by: NURSE PRACTITIONER

## 2023-03-27 PROCEDURE — G8536 NO DOC ELDER MAL SCRN: HCPCS | Performed by: NURSE PRACTITIONER

## 2023-03-27 PROCEDURE — 1090F PRES/ABSN URINE INCON ASSESS: CPT | Performed by: NURSE PRACTITIONER

## 2023-03-27 PROCEDURE — 1101F PT FALLS ASSESS-DOCD LE1/YR: CPT | Performed by: NURSE PRACTITIONER

## 2023-03-27 RX ORDER — HYDROXYCHLOROQUINE SULFATE 200 MG/1
100 TABLET, FILM COATED ORAL DAILY
Qty: 1 TABLET | Refills: 0
Start: 2023-03-27

## 2023-03-27 NOTE — PROGRESS NOTES
Home Based Primary Care [facility]) & Supportive Services    [phone]      NOTE: Home Based Primary Care is a PROVIDER (MD/NP) based interdisciplinary practice (RN, LCSW) for patients who cannot access (or have a taxing effort) primary / speciality medical care in an office setting. [facility] is NOT [employer] but works with [address] [place]. when there is a skilled need. Our MD/NPs are integral in Care Transitions; PLEASE CALL [phone] if this patient arrives in the Emergency Department or Hospital.        Date of Current Visit: 23  Patient/Family present for Current Visit: patient, sister/guardian Sabine Chambers  Goals of Care as stated by the patient/family is: \"for her last days to be as good as they can be\"     Preferences for hospitalization if that were to be necessary:  [] Patient DOES NOT WANT hospitalization; focus all treatments at home  [x] Patient WOULD WANT hospitalization for potentially reversible causes  Patient prefers hospitalization at: [facility] (: 1945) is a 70 y.o. female, established patient, here for evaluation of the following chief complaint(s):  Follow Up Chronic Condition       ASSESSMENT/PLAN:  Below is the assessment and plan developed based on review of pertinent history, physical exam, labs, studies, and medications. 1. Drug-induced systemic lupus erythematosus, unspecified organ involvement status ([facility])  -     hydrOXYchloroQUINE (PLAQUENIL) 200 mg tablet; Take 0.5 Tablets by mouth daily. , No Print, Disp-1 Tablet, R-0  2. Sjogren's syndrome, with unspecified organ involvement ([facility]  -     hydrOXYchloroQUINE (PLAQUENIL) 200 mg tablet; Take 0.5 Tablets by mouth daily. , No Print, Disp-1 Tablet, R-0  3. Hx of essential hypertension  4. Intellectual disability  5. Osteoporosis, senile  6. Hypothyroidism, unspecified type  7. Vitamin D deficiency  8. Newly recognized heart murmur  9.  Impacted cerumen, bilateral  -     [place] REMOVAL IMPACTED CERUMEN IRRIGATION/LVG UNILAT    Drug-induced SLE/Sjogren's Syndrome -  Reportedly secondary to long-term aldomet use. Previously followed by Dr. Beena Yanez (rheum), though she has not seen him in many years due to immobility. Currently taking hydroxychloroquine, but asymptomatic and unable to have regular eye exams. We discussed the discontinuation of plaquenil, and sister is very open to this. For now, will decrease plaquenil dose to 100mg daily to see how she does; if she tolerates this well, will consider further taper at next visit. Diarrhea - Now resolved. Likely related to antibiotic usage for UTI. Continue to monitor. Hypothyroidism - Previously on levothyroxine 25mcg daily, but her sister discontinued at some point in the past to reduce pill burden. TFTs normal in February 2023; no indication to restart at this time. Vit D deficiency/osteoporosis - Previously on Prolia injections, though not in many years. Now taking OTC vitamin D; levels WNL February 2023. Recheck annually. Hx hypertension/CHF - Reportedly was on hospice services for CHF, though was never followed by cardiology and most recent echocardiogram in 2018 showed grade 2 diastolic dysfunction with EF of approximately 60%. Was on aldomet for many years for BP management, but it sounds like this caused drug-induced SLE. Long-term use of plaquenil may have been contributory to development of heart failure. Blood pressure excellent today without any medications on board. Heart murmur - Reportedly a new finding, per patient's sister. Discussed workup today, but patient's sister declines today given benefit/burden. Encouraged her to call our office should she change her mind. Impacted cerumen, bilaterally - Moderate amount of impacted cerumen removed from bilateral ears via irrigation and curettage by this provider. Encouraged continuing debrox drops 2x/week to prevent reaccumulation.    Pain/anxiety - Very sparing use of tramadol and benzodiazepines, generally < 1x/month per sister's report, which is validated by fill dates on pill bottles. Encouraged limiting these medications further and using acetaminophen as first-line agent for pain. Intellectual disability - Cared for very well by her sister, who is her legal guardian. Our team LCSW is helping with caregiving resources, as she is at real risk of caregiver burnout. Appears to be at baseline. As she has been recently approved for Medicaid, will ask our team LCSW to contact the patient's sister to discuss next steps in obtaining caregiver assistance. Today's changes:  - reduce plaquenil dose to 100mg daily; see how she does, consider further taper at next visit  - will ask LCSW to reach out re: Medicaid approval/caregiving resources  - declines heart murmur evaluation  - ear wax removed; continue debrox twice weekly to prevent reaccumulation    Plan for 3 month follow up, sister encouraged to call with any concerns in the interim. Plan to repeat labs annually, sooner if warranted by change in patient status. Return in 3 months (on 6/22/2023) for 3 month follow up. SUBJECTIVE/OBJECTIVE:  HPI    The patient is seen at home today for follow up with the [facility]. She is seen at home due to taxing effort to seek primary care services in the community due to being bedbound. The history is provided by the patient's sister and legal guardian, Otto Wallis, as the patient is essentially nonverbal, able to repeat a few words and make her wishes known (\"no! \"). Per Ms. Cathy Ro, Ms. Klaus Pimentel developed a high fever after tonsillectomy in her childhood and subsequently developed cognitive impairment. She was cared for by her parents until their deaths, and Dane Schaeffer has been her legal guardian since 48. Dane Schaeffer is her primary caregiver, with some assistance from her children and grandchildren.  She has recently been approved for Medicaid, and is waiting for [employer] screening on the 7th for some caregiving assistance. Mykel Ewing is adamant that she does not want her sister moved to a facility if it can be at all avoided, as she has had several bad experiences with facilities in the past.     Ms. Hansa Boyce has been on hospice services on-and-off since , most recently with [facility] since  until her discharge in 2023. She was reportedly on hospice services for CHF, though she never saw a cardiologist. She did have a long-standing history of hypertension, per Ms. Leydi Sharp, and was treated with aldomet, which reportedly caused drug-induced SLE. She was taking a \"chemo med\" for a while (possibly methotrexate, but sister is unsure) but \"hated it\" and this was stopped in . She has been maintained on hydroxychloroquine for many years, which may have caused her CHF. She was previously followed by Dr. Nic Cline (rheum), though she has not been able to see him in several years due to immobility. She has not had a lupus flare in several years and does not complain of pain, per her sister Mykel Ewing. Prior to 2018, she was mobile, able to transfer and using a chair lift and wheelchair to get out and \"went everywhere\" with her sister, Mykel Ewing. However, while Mykel Ewing was recovering from her own surgery and Ms. Hansa Boyce was in a respite stay at a group home, she contracted the flu, developed sepsis, and nearly . She did recover and was discharged home with hospice, but has been stable and was recently discharged, as above. She has been bed-bound since her hospitalization in 2018; her sister tearfully admits she hopes that her sister will \"get up and walk again\" but admits that she knows this is very unlikely. She is able to feed herself, but is dependent on all of her other ADLs. She is incontinent of stool and urine and was recently treated for a UTI, diagnosed recently by Renato Rodriguez.  She eats about 50 - 60% of the 2 meals per day that she is served and seems to be in relatively good spirits most of the time, per her sister. She did have several weeks of diarrhea after the antibiotics for the UTI, but her sister reports that she has had several days of solid stools again, which is very important to her as the caregiver. She generally sleeps well, though occasionally she will have her days and nights mixed up, but this is not bothersome to her or her sister. She is not able to shift herself much in the bed anymore due to stiffness/contractures, but her skin is intact. She does have tramadol and liquid lorazepam/oral alprazolam at home as well, which were previously prescribed by hospice. These are reportedly used sparingly, <1x/month generally, for complaints of pain or agitation. The bottles examined today were dispensed in 2019/2020. She had labs drawn in February 2023, which were significant for albumin of 2.8, Hgb of 10.7 (higher than previously-reviewed baseline Hgb) and slightly elevated ESR (59) and CRP (1.0). TSH and FT4 were normal, as was vitamin D. Visit Vitals  /66 (BP 1 Location: Right upper arm, BP Patient Position: Sitting, BP Cuff Size: Adult)   Pulse 66   Temp 98.6 °F (37 °C) (Temporal)   Resp 20   SpO2 97%     Current Outpatient Medications on File Prior to Visit   Medication Sig Dispense Refill    traMADoL (ULTRAM) 50 mg tablet Take 50 mg by mouth every six (6) hours as needed for Pain. Indications: pain      LORazepam (INTENSOL) 2 mg/mL concentrated solution Take 0.5 mg by mouth every eight (8) hours as needed for Anxiety. cholecalciferol, vitamin D3, 50 mcg (2,000 unit) tab Take 1 Tab by mouth daily. acetaminophen (TYLENOL) 650 mg TbER Take 650 mg by mouth every eight (8) hours as needed for Pain. [DISCONTINUED] hydroxychloroquine (PLAQUENIL) 200 mg tablet TAKE 1 TABLET EVERY DAY 90 Tab 1     No current facility-administered medications on file prior to visit.        Review of Systems - obtained from sister Dylan Mcclure  Constitutional: denies activity change, appetite change, fatigue, fever  HEENT: denies congestion, sinus pressure, sore throat  CV: denies chest pain, palpitations, edema  Respiratory: denies shortness of breath, wheezing  GI: denies abdominal pain, blood in stool, diarrhea, constipation  MSK: denies arthralgias, joint swelling  Neuro: denies frequent headaches, dizziness, numbness/tingling  Skin: denies skin breakdown  Psych: denies depression, anxiety, confusion, memory changes        Physical Exam  Constitutional: no acute distress, bedbound elderly AAF with noted kyphosis and muscle wasting, stiffness/contractures of bilateral UEs and LEs  HEENT: normocephalic, atraumatic, external ears normal bilaterally; moist mucous membranes; EOM intact, impacted cerumen bilateral ears -- improved after irrigation and curettage of bilatearl ears  CV: regular rate and rhythm, 4/6 pansystolic heart murmur auscultated, 2+ pedal pulses bilaterally  Pulm: normal effort, normal breath sounds without wheezing or rhonchi  Abd: normal bowel sounds, soft, non-tender  : deferred  Skin: warm, dry; no breakdown noted; hypopigmented malar rash  Neuro: at baseline, alert; nonverbal  Psych: calm, pleasant        Advanced Care Planning  Code Status: DNR  Advanced Medical Directive: guardianship document on file, DDNR on file          On this date 03/27/2023 I have spent 65 minutes reviewing previous notes, performing ear irrigation/curettage, reviewing test results and face to face with the patient discussing the diagnosis and importance of compliance with the treatment plan as well as documenting on the day of the visit. An electronic signature was used to authenticate this note.   -- Michaela Kaiser NP

## 2023-03-29 ENCOUNTER — TELEPHONE (OUTPATIENT)
Dept: FAMILY MEDICINE CLINIC | Age: 78
End: 2023-03-29

## 2023-03-29 NOTE — TELEPHONE ENCOUNTER
LCSW called pt's sister, Eimly Rivera, for update re: Medicaid application completed in February. Emily Rivera stated that her sister has been approved for \"partial Medicaid to pay the premiums for Medicare\" but not full Medicaid benefits at this time. She said she has an in-home screening scheduled to complete the [employer]AI on April 7th, with a nurse and  from [employer]. She understands that even if the screening determines that her sister is eligible for in-home personal care waiver [facility]), she cannot utilize the service without being approved for [facility] waiver benefits. Emily Rivera talked about feeling overwhelmed, however she said she has no alternative options, as placing her sister in a long term care facility is not ever going to be an option, and they would not be able to afford adult group home placement. She has been her sister's primary caregiver for 35 years, but pt's care needs have increased at the same time that Giovana's physical health has decreased including pain in her hands and wrist surgery. She said it is challenging, but she has figured out ways to change her sister by using her own forearms and elbows to roll her instead of using her hands and wrists. She said she understands she might never take a vacation again, but providing her sister with love and care is one of her purposes in life. She stated, \"If I never get respite again, then, oh well. \" [facility] has a limited support system, but a strong racquel and states that God is giving her the strength to get through the challenging days. LCSW provided reflective listening, validation, and emotional support to [facility] today. LCSW to remain available for support and resources as needed.      CHONG Ambrosio, [facility]    [facility] [address] [place] [address]  (e) [phone]

## 2023-04-24 ENCOUNTER — TELEPHONE (OUTPATIENT)
Dept: FAMILY MEDICINE CLINIC | Age: 78
End: 2023-04-24

## 2023-04-24 RX ORDER — GEL DRESSING 2" X 2"
1 BANDAGE TOPICAL AS NEEDED
Qty: 30 EACH | Refills: 1 | Status: SHIPPED | OUTPATIENT
Start: 2023-04-24

## 2023-04-24 NOTE — TELEPHONE ENCOUNTER
Carla Wang called and stated that the patient has additional skin breaks and a couple of blisters. She said she wants to make sure she is taking care of the wounds the right way.   Hedy Braun asks for a  [phone]

## 2023-04-24 NOTE — TELEPHONE ENCOUNTER
Call returned to patient's sister, Caesar Carter - She reports patient has several blisters, on the side of her thigh, when she previously had a pressure ulcer, close to her breast, and between her legs (these were ruptured and the skin is broken), with clear exudate. Caesar Carter reports that when the blisters appeared, she returned the dose of hydroxychloroquine to 200 mg. Sister reports applying Medi-honey and Calmoseptine. I discourage the use of Calmoseptine on fragile or broken skin, and instructed to keep applying of medi-honey (sister verbalizes she is almost out of [address], it was provided by hospice in the past). I asked if patient was having sufficient nutrition, Caesar Carter responded that she is eating well, she did not have a BM for several days, sister started giving pro-biotics and increased fluid intake, and patient had a small BM yesterday. I informed Caesar Carter that I would provide this information to Dom Quinones NP and would call her with further recommendations.

## 2023-04-24 NOTE — TELEPHONE ENCOUNTER
Telephone call to patient's sister to discuss wounds. Discussed that it's okay that she increased hydroxychloroquine back to 200mg daily. Discussed continuing medihoney alone (as per conversation with Tamara Chaudhary earlier today); Rx sent to local pharmacy. Encouraged her to call if there is no progress or if wounds change by the end of the week for further instructions/possible visit. She verbalized understanding.   Claudia Salguero, NP

## 2023-04-26 ENCOUNTER — TELEPHONE (OUTPATIENT)
Dept: FAMILY MEDICINE CLINIC | Age: 78
End: 2023-04-26

## 2023-04-26 ENCOUNTER — DOCUMENTATION ONLY (OUTPATIENT)
Dept: FAMILY MEDICINE CLINIC | Age: 78
End: 2023-04-26

## 2023-04-26 NOTE — PROGRESS NOTES
Home Based Primary Care  Plan of Care    [facility] Team Members: Rob Morales MD; Solange Maloney NP; Teresa Vanegas NP; Ebony Hodgkin, RN; Vidya Seals, RN; Magda Romero RN; ISACC Nguyensharon RamiresYosvany  1945 / [id number]  female      Date of Initial Visit (Start of Care): 2/15/2023    Diagnosis:   Patient Active Problem List   Diagnosis Code    Environmental allergies Z91.09    HTN (hypertension) I10    Weight loss R63.4    Intellectual disability F79    Osteoporosis, senile M81.0    Colon polyp K63.5    Hypothyroid E03.9    Encounter for long-term (current) use of medications Z79.899    SLE (systemic lupus erythematosus) ([facility] M32.9    Sjogren's syndrome ([facility] M35.00    Advance care planning Z71.89    Mental retardation F79    Hx of essential hypertension Z86.79    Idiopathic hypotension I95.0       Patient status summary: 70 y.o. female who was referred to the [facility] program due to bedbound status/hospice discharge. Patient discussed today for POC review.     Advance Care Planning:  Code status: DNR       Advance Care Planning 2/12/2018   Patient's Healthcare Decision Maker is: Named in scanned ACP document   Primary Decision Maker Name Raciel Jarvis   Primary Decision Maker Phone Number [phone] H/ [phone] C   Primary Decision Maker Relationship to Patient -   Confirm Advance Directive None   Patient Would Like to Complete Advance Directive Unable   Does the patient have other document types Guardianship       DME/Supplies:  Hospital Bed     Allergies   Allergen Reactions    Adhesive Tape Other (comments)     Redness under that adhesive       Nutritional Requirements:   Oral with supported meal preparation    Functional/Activity Level:  Bedbound only    Safety Measures:   Aspiration risk, Caregiver deficit, and Self-care deficity    Acuity Level Rating: Level 2 - Medium    Current Outpatient Medications   Medication Sig    calcium alginate-honey (MediHoney, juliet alginate-honey,) 2 X 2 \" bndg 1 Each by Apply Externally route as needed for PRN Reason (Other) (wound). hydrOXYchloroQUINE (PLAQUENIL) 200 mg tablet Take 0.5 Tablets by mouth daily. traMADoL (ULTRAM) 50 mg tablet Take 50 mg by mouth every six (6) hours as needed for Pain. Indications: pain    LORazepam (INTENSOL) 2 mg/mL concentrated solution Take 0.5 mg by mouth every eight (8) hours as needed for Anxiety. cholecalciferol, vitamin D3, 50 mcg (2,000 unit) tab Take 1 Tab by mouth daily. acetaminophen (TYLENOL) 650 mg TbER Take 650 mg by mouth every eight (8) hours as needed for Pain. No current facility-administered medications for this visit. The Plan of Care has been initiated for during discussion at interdisciplinary group meeting  and reviewed and updated by the Interdisciplinary Group (IDG) as frequently as the patient condition warrants. Plan of Care by Discipline:    1. Provider  Identify patient's health care goal(s), Reduction of polypharmacy within benefit/burden framework appropriate to age, function and disease state, Determine need for laboratory assessment based on patient disease status , Assess results of laboratory testing and adjust treatment plan as appropriate, Enhance visit frequency to monitor high risk health care needs, Discuss goals of care and treatment preferences, including resuscitation, and Assess patient for transition to Hospice as medically indicated    2. Nursing  Introduce Home Based Primary Care and Supportive Services, Coordination of care with specialty services, Review Health Maintenance with patient and provider; update as appropriate, Education regarding current medications and adverse effects, Education for skin care in patients with limited mobility; with focus on preventing skin breakdown, and Provider caregiver / family support for patient's current and changing condition    3.  Social Work  Introduce Home Based Primary Care and Supportive Services, New patient assessment of psychosocial needs and social determinants of health, Review Emergency Preparedness Plan, Establish therapeutic relationship through in home visits and telephonic touch points, Assess for caregiver burden and intervene as psychosocially indicated, and Provide information on available community resources      Plan of Care Orders / Action Items:  Drug-induced SLE/Sjogren's Syndrome -  Reportedly secondary to long-term aldomet use. Previously followed by Dr. Abe Ortega (rheum), though she has not seen him in many years due to immobility. Currently taking hydroxychloroquine, but asymptomatic and unable to have regular eye exams. We discussed the discontinuation of plaquenil, and sister is very open to this. For now, will decrease plaquenil dose to 100mg daily to see how she does; if she tolerates this well, will consider further taper at next visit. Diarrhea - Now resolved. Likely related to antibiotic usage for UTI. Continue to monitor. Hypothyroidism - Previously on levothyroxine 25mcg daily, but her sister discontinued at some point in the past to reduce pill burden. TFTs normal in February 2023; no indication to restart at this time. Vit D deficiency/osteoporosis - Previously on Prolia injections, though not in many years. Now taking OTC vitamin D; levels WNL February 2023. Recheck annually. Hx hypertension/CHF - Reportedly was on hospice services for CHF, though was never followed by cardiology and most recent echocardiogram in 2018 showed grade 2 diastolic dysfunction with EF of approximately 60%. Was on aldomet for many years for BP management, but it sounds like this caused drug-induced SLE. Long-term use of plaquenil may have been contributory to development of heart failure. Blood pressure excellent on 3/27/23 without any medications on board. Heart murmur - Reportedly a new finding, per patient's sister. Discussed workup on 3/27/23, but patient's sister declines given benefit/burden.  Encouraged her to call our office should she change her mind. Impacted cerumen, bilaterally - Moderate amount of impacted cerumen removed from bilateral ears via irrigation and curettage by this provider. Encouraged continuing debrox drops 2x/week to prevent reaccumulation. Pain/anxiety - Very sparing use of tramadol and benzodiazepines, generally < 1x/month per sister's report, which is validated by fill dates on pill bottles. Encouraged limiting these medications further and using acetaminophen as first-line agent for pain. Intellectual disability - Cared for very well by her sister, who is her legal guardian. Our team LCSW is helping with caregiving resources, as she is at real risk of caregiver burnout. Appears to be at baseline. As she has been recently approved for Medicaid, will ask our team LCSW to contact the patient's sister to discuss next steps in obtaining caregiver assistance. 3/27/23 changes:  - reduce plaquenil dose to 100mg daily; see how she does, consider further taper at next visit  - will ask LCSW to reach out re: Medicaid approval/caregiving resources  - declines heart murmur evaluation  - ear wax removed; continue debrox twice weekly to prevent reaccumulation     Plan for 3 month follow up, sister encouraged to call with any concerns in the interim. Plan to repeat labs annually, sooner if warranted by change in patient status. Return in 3 months (on 6/22/2023) for 3 month follow up.     Health Maintenance   Topic Date Due    Shingles Vaccine (1 of 2) Never done    COVID-19 Vaccine (5 - Booster for Moderna series) 12/29/2022    Depression Screen  02/15/2024    Medicare Yearly Exam  02/16/2024    DTaP/Tdap/Td series (4 - Td or Tdap) 05/23/2027    Flu Vaccine  Completed    Pneumococcal 65+ years  Completed    Hepatitis C Screening  Addressed    Colorectal Cancer Screening Combo  Discontinued    Breast Cancer Screen Mammogram  Discontinued    Lipid Screen  Discontinued         Estimated Visit Frequency:  Every 3 month Provider Visit  Last NP visit: 3/27/2023  SW visits PRN

## 2023-04-26 NOTE — TELEPHONE ENCOUNTER
Call placed to [facility] - I was informed they do not have ANY honey wound products. Call placed to [facility] - I was informed that they do have Medihoney - 0.5oz tube - for $7.99. Not covered by insurance. Discussed the information above with Frandy Holbrook, NP - Provider gives preference for Medihoney product. If not possible, ok to apply antibiotic cream to wounds/blisters. Call [facility] if wounds not improving by Monday 5/1. Call returned to patient's sister, Malachi Lesches - I provided the information above. Marisol Fisher responded that the 0.5oz tube is what she has at home. Sister is ok with cost of the product. I provided the information that the product is available at [facility]. She asked what she could do in case they didn't have the product. I provided the possible substitution with antibiotic cream, and educated to call [facility] by Monday if wounds fail to improve. Marisol Fisher verbalized agreement.

## 2023-04-26 NOTE — TELEPHONE ENCOUNTER
Patsy Rivera called and said that [facility] does not have the medihoney patches in stock. Lakshmi Cruz asks if there is another product that would work or another pharmacy that may have the [address] [place] [address] patches.   Her callback is [phone]

## 2023-05-15 ENCOUNTER — TELEPHONE (OUTPATIENT)
Dept: PRIMARY CARE CLINIC | Facility: CLINIC | Age: 78
End: 2023-05-15

## 2023-05-15 NOTE — TELEPHONE ENCOUNTER
Kirsten Pacheco is the patient's sister. She called and stated that she had received a letter from the [place] instructing her to choose a managed medicaid plan for the patient. Wendy Kent said she chose [facility], and asked if the patient would be able to continue services with \[facility]\"". I told Wendy Kent that I would pass on this information to Josefina Dickson and our clinical team and recommended that Wendy Kent call [employer] to verify if our providers are in network. She acknowledged.   Her callback if needed is [phone]

## 2023-05-19 ENCOUNTER — CLINICAL DOCUMENTATION (OUTPATIENT)
Facility: CLINIC | Age: 78
End: 2023-05-19

## 2023-05-22 ENCOUNTER — TELEPHONE (OUTPATIENT)
Dept: PRIMARY CARE CLINIC | Facility: CLINIC | Age: 78
End: 2023-05-22

## 2023-05-22 NOTE — TELEPHONE ENCOUNTER
Dee Oviedode called and said that she chose [employer] as the patient's managed medicaid. She stated that she later found out that this plan is not in network with [address] [place] [address] providers. Deo Santos said that she contacted [facility] and they told her that she had to find out through [address] [place] [address] which providers are in network. Deo Santos said that she has to make a decision on this today and choose from [facility], [facility], or [facility]. [facility]. She asks for a callback at [phone].

## 2023-05-22 NOTE — TELEPHONE ENCOUNTER
I called Jemma Enriquez to let her know that Almas Ruano is away from her desk today and at another location. I told Geri Mcgarry that Vera Fernandez would be back at [facility] tomorrow afternoon and will call her to verify which meicaid plans are in network with our providers. Geri Mcgarry acknowledged.   Angelika's callback is [phone]

## 2023-05-24 ENCOUNTER — TELEPHONE (OUTPATIENT)
Dept: PRIMARY CARE CLINIC | Facility: CLINIC | Age: 78
End: 2023-05-24

## 2023-05-24 NOTE — TELEPHONE ENCOUNTER
Jose E Meadows is the patient's sister. She called to follow up on insurance questions regarding the patient's [employer], with Baltazar Scales.   Her callback is [phone] and an alternate calback is [phone]

## 2023-05-24 NOTE — TELEPHONE ENCOUNTER
[facility]   Phone:  [phone]      Fax:  [phone] [address]    Name:  Willy Holliday  YOB: 1945    This nurse returned call to patient's sister/caregiver Raúl Tripathi. Ms. Tomas Kidd states that patient has been contacted by Medicaid. They are going to assign patient to a [facility] CCCP plan unless Ms. Tomas Kidd requests a different choice. Ms. Tomas Kidd advised Medicaid that she wanted a plan that has aMin Mckee NP in-network because patient is homebound and it is important for her to keep [facility]. She says they told her to call our office to find out which CCCP plans we accept. This nurse explained that there are various plans offered by companies. For instance, when this nurse has called [facility] about other referral requests, she was told by [facility] rep on 3/31/23 that Main Mckee is not in-network with [facility] Dual Complete ONE Plus; on 11/10/22 [facility] rep said Main Mckee is not in-network with [facility] CCCP plan with group # [id number], but she is in-network with the [facility] CCCP plan with group #[id number]. This nurse acknowledged that this is confusing, and [facility] is aware of the issue with [facility]. This nurse explained that we are not able to give out advice on which insurance to choose, but hospitals does have patients with [facility] and Rexene Mole and we are in-network with these plans. Ms. Tomas Kidd says she will call Medicaid back to find out which plan the [facility] CCCP would be and she will let this nurse know.     AMBER WymanN, RN-BC, [facility]  Referral Navigator, Home Based Primary Care & Supportive Services

## 2023-05-24 NOTE — TELEPHONE ENCOUNTER
[facility]   Phone:  [phone]      Fax:  [phone] [place] [address]    Name:  Ingrid Collins  YOB: 1945    This nurse returned call to patient's sister/caregiver Roxana Barreto. Ms. Atilio Oglesby says that she called Medicaid back and they were unable to tell her which plan #/group# patient would be assigned to if she chooses [facility] CCCP plan (see this nurse's note from 5/23/23). So she says she will call Medicaid back and request Chidi Casey or Dante, both of which [employer] NP is supposed to be in-network with. This nurse requested that as soon as she gets a plan name and number for Ms. Aurelia Hall, please call our office and inform us of new insurance and this nurse will verify that [employer] NP is in-network. Ms. Atilio Oglesby voices understanding.       AMBER ChoiN, RN-BC, [facility]  Referral Navigator, Home Based Primary Care & Supportive Services

## 2023-06-01 ENCOUNTER — CLINICAL DOCUMENTATION (OUTPATIENT)
Facility: CLINIC | Age: 78
End: 2023-06-01

## 2023-06-06 ENCOUNTER — TELEPHONE (OUTPATIENT)
Facility: CLINIC | Age: 78
End: 2023-06-06

## 2023-06-06 DIAGNOSIS — M35.00 SJOGREN'S SYNDROME, WITH UNSPECIFIED ORGAN INVOLVEMENT (HCC): Primary | ICD-10-CM

## 2023-06-06 DIAGNOSIS — M32.0 DRUG-INDUCED SYSTEMIC LUPUS ERYTHEMATOSUS, UNSPECIFIED ORGAN INVOLVEMENT STATUS (HCC): ICD-10-CM

## 2023-06-06 RX ORDER — HYDROXYCHLOROQUINE SULFATE 200 MG/1
200 TABLET, FILM COATED ORAL DAILY
Qty: 90 TABLET | Refills: 1 | Status: SHIPPED | OUTPATIENT
Start: 2023-06-06

## 2023-06-06 NOTE — TELEPHONE ENCOUNTER
Telephone call from sister Dl Shukla with questions about respite. She reports that patient is new to Medicaid (MAY) and she was advised that patient has 120 hours of respite care that she has to use or lose by June 30th so she is trying to find out about the service. She states she plans to use [facility] for 7 - 8 days. Kelly Shank that it is up to the facility to tell us what they need from us prior to her stay with them. Jaswant Bello states she asked about evidence of no TB as she has had that requirement when she was paying out of her own pocket for patient's respite in the past.  She states Sergio said they do not need any TB certification. Jaswant Bello will check with Sergio and let us know of any documents they require. She tentatively plans for respite to start day after Tammie Cohen NP next visit on 6/22.

## 2023-06-06 NOTE — TELEPHONE ENCOUNTER
Tricia Scruggs called and said that the patient needs a refill on her hydroxychloroquine, 200mgs, medication. She said that [facility], 3 month supply. The patient has 2 weeks of medication left.   Angelika's callback if needed [phone]

## 2023-06-20 ENCOUNTER — TELEPHONE (OUTPATIENT)
Facility: CLINIC | Age: 78
End: 2023-06-20

## 2023-06-20 NOTE — TELEPHONE ENCOUNTER
I called Fidelina Barrera and she confirmed the patient's in home visit on Thursday, 6/22/23 with Sha Haskins. There are no changes to insurance or location and no covid. Fidelina Barrera stated that she has bronchitis.

## 2023-06-22 ENCOUNTER — OFFICE VISIT (OUTPATIENT)
Facility: CLINIC | Age: 78
End: 2023-06-22
Payer: MEDICARE

## 2023-06-22 ENCOUNTER — TELEPHONE (OUTPATIENT)
Facility: CLINIC | Age: 78
End: 2023-06-22

## 2023-06-22 VITALS
DIASTOLIC BLOOD PRESSURE: 60 MMHG | TEMPERATURE: 98.2 F | HEART RATE: 70 BPM | RESPIRATION RATE: 20 BRPM | SYSTOLIC BLOOD PRESSURE: 108 MMHG

## 2023-06-22 DIAGNOSIS — M35.00 SJOGREN'S SYNDROME, WITH UNSPECIFIED ORGAN INVOLVEMENT (HCC): ICD-10-CM

## 2023-06-22 DIAGNOSIS — M32.0 DRUG-INDUCED SYSTEMIC LUPUS ERYTHEMATOSUS, UNSPECIFIED ORGAN INVOLVEMENT STATUS (HCC): Primary | ICD-10-CM

## 2023-06-22 DIAGNOSIS — Z86.79 HX OF ESSENTIAL HYPERTENSION: ICD-10-CM

## 2023-06-22 DIAGNOSIS — Z79.899 ENCOUNTER FOR LONG-TERM (CURRENT) USE OF MEDICATIONS: ICD-10-CM

## 2023-06-22 DIAGNOSIS — F79 INTELLECTUAL DISABILITY: ICD-10-CM

## 2023-06-22 PROCEDURE — 3074F SYST BP LT 130 MM HG: CPT | Performed by: NURSE PRACTITIONER

## 2023-06-22 PROCEDURE — G8421 BMI NOT CALCULATED: HCPCS | Performed by: NURSE PRACTITIONER

## 2023-06-22 PROCEDURE — 1036F TOBACCO NON-USER: CPT | Performed by: NURSE PRACTITIONER

## 2023-06-22 PROCEDURE — 1090F PRES/ABSN URINE INCON ASSESS: CPT | Performed by: NURSE PRACTITIONER

## 2023-06-22 PROCEDURE — 1123F ACP DISCUSS/DSCN MKR DOCD: CPT | Performed by: NURSE PRACTITIONER

## 2023-06-22 PROCEDURE — 99349 HOME/RES VST EST MOD MDM 40: CPT | Performed by: NURSE PRACTITIONER

## 2023-06-22 PROCEDURE — 3078F DIAST BP <80 MM HG: CPT | Performed by: NURSE PRACTITIONER

## 2023-06-22 RX ORDER — SENNOSIDES 8.6 MG
CAPSULE ORAL
Qty: 30 TABLET | Refills: 0
Start: 2023-06-22

## 2023-06-22 NOTE — PROGRESS NOTES
Graham Hernandez. Her hydroxychloroquine dose was decreased in 2023 to 100mg daily, but she did not tolerate this, developing several painful skin eruptions shortly thereafter. Her dose was increased back to 200mg daily, and there have been no further issues. Prior to 2018, she was mobile, able to transfer and using a chair lift and wheelchair to get out and \"went everywhere\" with her sister, Graham Hernandez. However, while Graham Hernandez was recovering from her own surgery and Ms. Rita Vasquez was in a respite stay at a group home, she contracted the flu, developed sepsis, and nearly . She did recover and was discharged home with hospice, but has been stable and was recently discharged, as above. She has been bed-bound since her hospitalization in 2018; her sister tearfully admits she hopes that her sister will \"get up and walk again\" but admits that she knows this is very unlikely. She is able to feed herself, but is dependent on all of her other ADLs. She is incontinent of stool and urine. She eats about 50 - 60% of the 2 meals per day that she is served and seems to be in relatively good spirits most of the time, per her sister. She generally sleeps well, though occasionally she will have her days and nights mixed up, but this is not bothersome to her or her sister. She is not able to shift herself much in the bed anymore due to stiffness/contractures, but her skin is intact. She does have tramadol and liquid lorazepam/oral alprazolam at home as well, which were previously prescribed by hospice. These are reportedly used sparingly, <1x/month generally, for complaints of pain or agitation. She had labs drawn in 2023, which were significant for albumin of 2.8, Hgb of 10.7 (higher than previously-reviewed baseline Hgb) and slightly elevated ESR (59) and CRP (1.0). TSH and FT4 were normal, as was vitamin D.      Specialists:  - none    Vitals:    23 1100   BP: 108/60   Pulse: 70   Resp: 20   Temp: 98.2 °F

## 2023-06-22 NOTE — TELEPHONE ENCOUNTER
Brad Castillo called stating that Odalis Raymundo had just left the house and Yvrose Bustillo had just seen a message from [facility] that they need a medication list for the patient with directions on when the patient's medications should be taken. Yvrose Bustillo said that some of the medications the patient takes as needed and others are take more regularly. Yvrose Bustillo said that there is one medication on the list that she is not sure about or if it is needed. Her callback is [phone]. Sergio fax [phone], attention Mateo Banks.

## 2023-06-22 NOTE — TELEPHONE ENCOUNTER
Call returned to patient's Sister - Ms. Karlene Castelan requested a med list sent to the facility. I discussed with sister and with Bethel Lynch, NP and NP recommended scheduling Tylenol 650mg BID for pain. Lorazepam and Tramadol will remain PRN, for acute needs. Updated Med list faxed to facility as requested (confirmation in Media).

## 2023-06-26 ENCOUNTER — OUTSIDE SERVICES (OUTPATIENT)
Age: 78
End: 2023-06-26
Payer: MEDICARE

## 2023-06-26 DIAGNOSIS — Z86.79 HX OF ESSENTIAL HYPERTENSION: ICD-10-CM

## 2023-06-26 DIAGNOSIS — F79 INTELLECTUAL DISABILITY: ICD-10-CM

## 2023-06-26 DIAGNOSIS — M32.0 DRUG-INDUCED SYSTEMIC LUPUS ERYTHEMATOSUS, UNSPECIFIED ORGAN INVOLVEMENT STATUS (HCC): Primary | ICD-10-CM

## 2023-06-26 DIAGNOSIS — M35.00 SJOGREN'S SYNDROME, WITH UNSPECIFIED ORGAN INVOLVEMENT (HCC): ICD-10-CM

## 2023-06-26 PROCEDURE — 99306 1ST NF CARE HIGH MDM 50: CPT | Performed by: INTERNAL MEDICINE

## 2023-07-18 ENCOUNTER — CLINICAL DOCUMENTATION (OUTPATIENT)
Facility: CLINIC | Age: 78
End: 2023-07-18

## 2023-07-18 NOTE — PROGRESS NOTES
[facility]  Chronic Care Management - Comprehensive Care Plan    I. PATIENT DEMOGRAPHICS/OVERVIEW  Dawson Vázquez  1945  Date CCM Consent Signed: 2/13/2023  Date of Initialing Visit: 2/15/2023  Date of Most Recent Provider Visit: 6/22/2023  Patient's Primary Care Provider: Jaime Wilkinson NP    II. MEDICAL OVERVIEW    Allergies   Allergen Reactions    Adhesive Tape Other (See Comments)     Redness under that adhesive     Current Outpatient Medications on File Prior to Visit   Medication Sig Dispense Refill    acetaminophen (TYLENOL) 650 MG extended release tablet Take 1 tablet by mouth in the morning and 1 at night for pain 30 tablet 0    hydroxychloroquine (PLAQUENIL) 200 MG tablet Take 1 tablet by mouth daily 90 tablet 1    LORazepam (ATIVAN) 2 MG/ML concentrated solution Take 0.25 mLs by mouth every 8 hours as needed. traMADol (ULTRAM) 50 MG tablet Take 1 tablet by mouth every 6 hours as needed. No current facility-administered medications on file prior to visit. Patient Active Problem List    Diagnosis Date Noted    Intellectual disability     Idiopathic hypotension 02/09/2018    Hx of essential hypertension 02/02/2018    Sjogren's syndrome ([address] [facility]) 03/04/2015    SLE (systemic lupus erythematosus) ([address] [facility]) 03/04/2015    Encounter for long-term (current) use of medications 03/05/2013    Colon polyp     Osteoporosis, senile     Weight loss 02/17/2011    Environmental allergies 04/28/2010    HTN (hypertension) 04/28/2010         III. CHRONIC MEDICAL CONDITIONS  Chronic Medical Condition & ICD-10 Code: Essential Hypertension - I10   Current Treatment Plan:    I. Overall Impression/Status: Stable    II. Related Medications:  none presently, previously aldomet    III. Relevant labs/imaging/test results/vital signs (ie. BP):  /60 June 2023  IV. Treatment goals (A1C, BP goal, avoid hospitalization, etc.):  BP < 140/90 due to frailty  V.  Any related symptoms & symptom

## 2023-07-19 ENCOUNTER — TELEPHONE (OUTPATIENT)
Facility: CLINIC | Age: 78
End: 2023-07-19

## 2023-07-19 NOTE — TELEPHONE ENCOUNTER
Hortencia Champagne called and asked for a callback from [facility]. She states that there was blood in the patient's stool last night when cleaning up the patient. Guy Daughters said that she is using the imodium and pro biotics.   Her CB3 [phone]

## 2023-07-19 NOTE — TELEPHONE ENCOUNTER
Call returned to patient's sister - Ms. Janell Jimenez reports patient \"just had another BM\" and she didn't see any blood this morning. I asked how much blood and the color of it (last night). Ms. Janell Jimenez reports it was not much, it was visible on the wiping tissue. Sister reports patient had 3 Bm's (pasty) yesterday, but this was following several loose BM's recently. Sister used Imodium and probiotic, as recommended and stool is becoming more formed. I explained that this is most likely irritation due to frequent stooling and wiping. I advised to use moist wipes and be gentle when cleaning, and monitor for increased bleeding and report back if the bleeding increased. Stool color is \"normal\" and the blood noted was bright red. I explained that most of the concerning GI bleeds would cause dark or tarry stools.

## 2023-07-26 NOTE — ED PROVIDER NOTES
EMERGENCY DEPARTMENT HISTORY AND PHYSICAL EXAM      Date: 2/2/2018  Patient Name: Robin Harvey    History of Presenting Illness     Chief Complaint   Patient presents with   Lizeth Vega     patient arrived via EMS for unresponsive and glucose of 27       History Provided By: EMS and nursing    HPI: Robin Harvey, 72 y.o. female with PMHx significant for Lupus, PUD, GERD, HTN, DM, and mental retardation, presents via EMS from assisted living home to the ED after being found unresponsive by a caregiver. EMS reports vitals of HR in the 30s and a BG of 27. EMS gave the pt Glucagon IM en route and now has a BG of 200 in the ED. Pt is currently seizing and has a NRB in place. She has had 2 mg of ativan since arrival without change in seizing. Caregiver and family are not at bedside, but family will be arriving soon. PCP: Vic Schroeder MD    HPI limited due to pt seizing. Current Facility-Administered Medications   Medication Dose Route Frequency Provider Last Rate Last Dose    LORazepam (ATIVAN) 2 mg/mL injection             LORazepam (ATIVAN) injection 1 mg  1 mg IntraVENous NOW Romel Grimes MD        levETIRAcetam (KEPPRA) 1,000 mg in saline (iso-osm) 100 ml IVPB  1,000 mg IntraVENous ONCE Romel Grimes MD        sodium chloride 0.9 % bolus infusion 1,000 mL  1,000 mL IntraVENous ONCE Romel Grimes MD         Current Outpatient Prescriptions   Medication Sig Dispense Refill    hydroxychloroquine (PLAQUENIL) 200 mg tablet TAKE 1 TABLET EVERY DAY 90 Tab 1    levothyroxine (SYNTHROID) 25 mcg tablet TAKE 1 TABLET BY MOUTH DAILY BEFORE BREAKFAST 90 Tab 3    diclofenac (VOLTAREN) 1 % gel Apply  to affected area four (4) times daily. 1 Each 3    cetirizine (ZYRTEC) 10 mg tablet Take 1 Tab by mouth daily as needed for Allergies. 30 Tab 0    multivitamin (ONE A DAY) tablet Take 1 Tab by mouth daily.  Cholecalciferol, Vitamin D3, 1,000 unit cap Take  by mouth.       ibuprofen (MOTRIN) 600 mg tablet Take 600 mg by mouth every four (4) hours as needed. Past History     Past Medical History:  Past Medical History:   Diagnosis Date    Arthritis     Colon polyp     Diabetes ([facility] [address]     borderline no medications    Environmental allergies 4/28/2010    GERD (gastroesophageal reflux disease)     HTN (hypertension) 4/28/2010    dosent take medication now    Lupus     MR (mental retardation)     Osteoporosis, senile     Other ill-defined conditions     parkinson's disease possibly    Psychiatric disorder     anxious    PUD (peptic ulcer disease)     Scolioses     Sjogren's syndrome ([facility] [address] 3/4/2015       Past Surgical History:  Past Surgical History:   Procedure Laterality Date    HX TONSILLECTOMY      KS COLONOSCOPY FLX DX W/COLLJ SPEC WHEN PFRMD  2/17/2011    due 2013       Family History:  Family History   Problem Relation Age of Onset    Cancer Mother      pancreas    Heart Disease Father     Heart Attack Father        Social History:  Social History   Substance Use Topics    Smoking status: Never Smoker    Smokeless tobacco: Never Used    Alcohol use No       Allergies: Allergies   Allergen Reactions    Adhesive Tape Other (comments)     Redness under that adhesive         Review of Systems   Review of Systems   Unable to perform ROS: Other (Seizing)       Physical Exam   Physical Exam   Constitutional: She is oriented to person, place, and time. She appears well-developed. She appears toxic. She appears distressed. HENT:   Head: Normocephalic and atraumatic. Head is without raccoon's eyes, without Duvall's sign and without contusion. Right Ear: Tympanic membrane normal.   Left Ear: Tympanic membrane normal.   Mouth/Throat: No oropharyngeal exudate. Eyes: Conjunctivae are normal. Pupils are equal, round, and reactive to light. Right eye exhibits no discharge. Left eye exhibits no discharge. No scleral icterus. Neck: Normal range of motion. Neck supple.  No JVD present. Cardiovascular: Normal rate, regular rhythm, normal heart sounds and intact distal pulses. Exam reveals no gallop and no friction rub. No murmur heard. Pulmonary/Chest: Effort normal and breath sounds normal. No stridor. No respiratory distress. She has no wheezes. She has no rales. She exhibits no tenderness. Abdominal: Soft. Bowel sounds are normal. She exhibits no distension, no pulsatile midline mass and no mass. Neurological: She is oriented to person, place, and time. She is unresponsive. She displays normal reflexes. She displays seizure activity. GCS eye subscore is 1. GCS verbal subscore is 1. GCS motor subscore is 3. Initial Rhythmic shaking of left sig    Gag intact    No doll's eye    Toe are down going bilaterally   Skin: Skin is warm. No rash noted. She is not diaphoretic. No pallor. cool temp   Vitals reviewed. Diagnostic Study Results     Labs -     Recent Results (from the past 12 hour(s))   GLUCOSE, POC    Collection Time: 02/02/18  2:39 PM   Result Value Ref Range    Glucose (POC) 27 (LL) 65 - 100 mg/dL    Performed by Austin Wright, POC    Collection Time: 02/02/18  2:53 PM   Result Value Ref Range    Glucose (POC) 200 (H) 65 - 100 mg/dL    Performed by Yeni Gonzalez (SON)        Radiologic Studies -   XR ABD PORT  1 V   Final Result      CT CHEST WO CONT   Final Result      CT ABD PELV WO CONT   Final Result      XR CHEST PORT   Final Result      CT HEAD WO CONT   Final Result        CT Results  (Last 48 hours)               02/02/18 2030  CT CHEST WO CONT Final result    Impression:  IMPRESSION:    1. Severe scoliosis. 2. Atelectasis in the lower lobes, left greater than right. 3. Anemia. 4. Atherosclerotic abdominal aorta without aneurysm. 5. Status post hysterectomy. Narrative:  EXAM:  CT CHEST ABDOMEN PELVIS WITHOUT CONTRAST    INDICATION: Unresponsive   COMPARISON: 1/19/2011. CONTRAST: None.        TECHNIQUE: Unenhanced multislice helical CT was performed from the thoracic   inlet to the symphysis pubis without intravenous contrast administration. Oral   contrast was not administered. Contiguous 5 mm axial images were reconstructed   and lung and soft tissue windows were generated. Coronal and sagittal   reformations were generated. CT dose reduction was achieved through use of a   standardized protocol tailored for this examination and automatic exposure   control for dose modulation. FINDINGS:   LOWER NECK: The visualized portions of the thyroid and structures of the lower   neck are within normal limits. CHEST:   The absence of intravenous contrast material reduces the sensitivity for   evaluation of the mediastinal structures. Lungs: There is atelectasis in the lower lobes, greater on the left than on the   right. The interventricular septum of the heart is visible. Pleura: There is no pleural effusion or pneumothorax. Aorta: The aorta has a normal contour without evidence of aneurysm. Mediastinum: There is no mediastinal or hilar adenopathy or mass. Bones and soft tissues: The bones and soft tissues of the chest wall are normal.       ABDOMEN:   The absence of intravenous contrast material reduces the sensitivity for   evaluation of the solid parenchymal organs of the abdomen. Liver: The liver is normal in size and contour with no focal abnormality. Gallbladder and bile ducts: There is no calcified gallstone or biliary duct   dilatation. Spleen: No abnormality. Pancreas: No abnormality. Adrenal glands: No abnormality. Kidneys: No abnormality. PELVIS:   Reproductive organs: The uterus is absent. Bladder: There is a Meade catheter in the urinary bladder which contains some   air. BOWEL AND MESENTERY: The small bowel is normal. There is no mesenteric mass or   adenopathy. The appendix is not identified. PERITONEUM: There is no ascites or free intraperitoneal air.    RETROPERITONEUM: The aorta is atherosclerotic and tapers without aneurysm There   is no retroperitoneal adenopathy or mass. There is no pelvic mass or adenopathy. BONES AND SOFT TISSUES: There is dextroconvex scoliosis of the thoracic spine   and levoconvex scoliosis of the lumbar spine. There is a right femoral catheter   in the femoral vein. 02/02/18 2030  CT ABD PELV WO CONT Final result    Impression:  IMPRESSION:    1. Severe scoliosis. 2. Atelectasis in the lower lobes, left greater than right. 3. Anemia. 4. Atherosclerotic abdominal aorta without aneurysm. 5. Status post hysterectomy. Narrative:  EXAM:  CT CHEST ABDOMEN PELVIS WITHOUT CONTRAST    INDICATION: Unresponsive   COMPARISON: 1/19/2011. CONTRAST: None. TECHNIQUE: Unenhanced multislice helical CT was performed from the thoracic   inlet to the symphysis pubis without intravenous contrast administration. Oral   contrast was not administered. Contiguous 5 mm axial images were reconstructed   and lung and soft tissue windows were generated. Coronal and sagittal   reformations were generated. CT dose reduction was achieved through use of a   standardized protocol tailored for this examination and automatic exposure   control for dose modulation. FINDINGS:   LOWER NECK: The visualized portions of the thyroid and structures of the lower   neck are within normal limits. CHEST:   The absence of intravenous contrast material reduces the sensitivity for   evaluation of the mediastinal structures. Lungs: There is atelectasis in the lower lobes, greater on the left than on the   right. The interventricular septum of the heart is visible. Pleura: There is no pleural effusion or pneumothorax. Aorta: The aorta has a normal contour without evidence of aneurysm. Mediastinum: There is no mediastinal or hilar adenopathy or mass. Bones and soft tissues:  The bones and soft tissues of the chest wall are normal.       ABDOMEN:   The 18 absence of intravenous contrast material reduces the sensitivity for   evaluation of the solid parenchymal organs of the abdomen. Liver: The liver is normal in size and contour with no focal abnormality. Gallbladder and bile ducts: There is no calcified gallstone or biliary duct   dilatation. Spleen: No abnormality. Pancreas: No abnormality. Adrenal glands: No abnormality. Kidneys: No abnormality. PELVIS:   Reproductive organs: The uterus is absent. Bladder: There is a Meade catheter in the urinary bladder which contains some   air. BOWEL AND MESENTERY: The small bowel is normal. There is no mesenteric mass or   adenopathy. The appendix is not identified. PERITONEUM: There is no ascites or free intraperitoneal air. RETROPERITONEUM: The aorta is atherosclerotic and tapers without aneurysm There   is no retroperitoneal adenopathy or mass. There is no pelvic mass or adenopathy. BONES AND SOFT TISSUES: There is dextroconvex scoliosis of the thoracic spine   and levoconvex scoliosis of the lumbar spine. There is a right femoral catheter   in the femoral vein. 02/02/18 1516  CT HEAD WO CONT Final result    Impression:  IMPRESSION: No acute findings. Narrative:  EXAM:  CT HEAD WO CONT       INDICATION:   AMS       COMPARISON: August 5, 2013. CONTRAST:  None. TECHNIQUE: Unenhanced CT of the head was performed using 5 mm images. Brain and   bone windows were generated. CT dose reduction was achieved through use of a   standardized protocol tailored for this examination and automatic exposure   control for dose modulation. FINDINGS:   There is no extra-axial fluid collection hemorrhage shift or masses . CXR Results  (Last 48 hours)               02/02/18 1615  XR CHEST PORT Final result    Impression:  IMPRESSION: No acute abnormality and no change. Narrative:  EXAM:  XR CHEST PORT. INDICATION: Short of breath. COMPARISON: 8/5/2013. FINDINGS:    A portable AP radiograph of the chest was obtained at 1608 hours. Lines and tubes: The patient is on a cardiac monitor. Lungs: The lungs are clear. Pleura: There is no pneumothorax or pleural effusion. Mediastinum: The cardiac and mediastinal contours and pulmonary vascularity are   normal.   Bones and soft tissues: There is marked dextroconvex scoliosis of the spine. Medical Decision Making   I am the first provider for this patient. I reviewed the vital signs, available nursing notes, past medical history, past surgical history, family history and social history. Vital Signs-Reviewed the patient's vital signs. Patient Vitals for the past 12 hrs:   Pulse Resp BP   02/02/18 1451 (!) 52 20 (!) 131/109       Pulse Oximetry Analysis - 97% on NRB      EKG interpretation: (Preliminary) 1517  Rhythm: A Fib with slow ventricular response; and irregular. Rate (approx.): 40; Axis: normal; CT interval: normal; QRS interval: normal ; ST/T wave: ST and T wave abnormality; Other findings: LVH with QRS widening. Written by CAROL Proctoribsharon, as dictated by Mercedes Mcgee MD.    Records Reviewed: Old Medical Records    Provider Notes (Medical Decision Making):     DDx:  seizure, hypoglycemia, hypothermia, ICH, adverse drug reaction, electrolyte abnormality, occult infection    Pt with hx of MR, DM, HTN presents in extremus with initial seizure and low BG. Pt probably had episode of prolonged hypoglycemia initiated by influenza and poor PO intake. PT required central line presser support. Danlio admit to hospitalist for further treatment. ED Course:   Initial assessment performed. The patients presenting problems have been discussed, and they are in agreement with the care plan formulated and outlined with them. I have encouraged them to ask questions as they arise throughout their visit.     3:17 PM  Pt has stopped seizing after second dose of Ativan    3:49 PM  Family has arrived, sister is power of . States pt has been temporarily at an assisted living home while she recovers from surgery. Last seen normal yesterday. They had received reports she did not eat well yesterday and found her unresponsive this morning. Pt had been DNR and was in hospice in the past related to Lupus. At this time they wish to proceed with full treatment. Procedure Note - Central Line Placement:   5:29 PM  Performed by: Raciel Alex MD    Immediately prior to the procedure, the patient was reevaluated and found suitable for the planned procedure and any planned medications. Immediately prior to the procedure a time out was called to verify the correct patient, procedure, equipment, staff, and marking as appropriate. Area was cleansed with Chlorprep and was not anesthetized. Prepped and draped in sterile fashion. Landmarks identified. 20 gauge needle with triple lumen catheter was inserted into pt's R Femoral Vein with ultrasound guidance. Line sutured in place; sterile dressing applied. Position: Trendelenburg  Number of attempts: 2  Estimated blood loss: < 10 mLs  The procedure took 16-30 minutes, and pt tolerated well. CONSULT NOTE:   5:58 PM  Raciel Alex MD spoke with Dr. Bertrand Heaton,   Specialty: Hospitalist  Discussed pt's hx, disposition, and available diagnostic and imaging results. Reviewed care plans. Consultant will evaluate pt for admission. Written by CAROL Garrison, as dictated by Raciel Alex MD.      6:07 PM - I suspect that this patient has an active infection. 6:07 PM - The patient met criteria for severe sepsis at this time.       PROVIDER SEPSIS PHYSICAL EXAM EVAL  Vital signs reviewed (see nursing documentation for further details):  Vitals:    02/02/18 1451 02/02/18 1530 02/02/18 1545   BP: (!) 131/109 103/60 90/51   Pulse: (!) 52 (!) 37 (!) 40   Resp: 20 15 16   Temp: (!) 85.3 °F (29.6 °C)     SpO2:  100% 100% Cardiac exam:Bradycardic    Pulmonary exam:Normal    Peripheral pulses:Diminished    Capillary refill:Delayed    Skin exam:no pallor    Exam performed Venecia Warner MD    SEP-1 Core Measure Exclusion Criteria  Decreased platelet count due toOther none  Has the patient/family refused IV fluids? no      Critical Care Time:   CRITICAL CARE NOTE :    6:06 PM      IMPENDING DETERIORATION -Airway, Respiratory, Cardiovascular, CNS, Metabolic and Renal    ASSOCIATED RISK FACTORS - Hypotension, Shock, Dysrhythmia, Metabolic changes, Dehydration, Vascular Compromise and CNS Decompensation    MANAGEMENT- Bedside Assessment and Supervision of Care    INTERPRETATION -  Xrays, CT Scan, Blood Gases, ECG, Blood Pressure and Cardiac Output Measures     INTERVENTIONS - hemodynamic mngmt and Metobolic interventions    CASE REVIEW - Hospitalist, Medical Sub-Specialist, Nursing and Family    TREATMENT RESPONSE -Stable    PERFORMED BY - Self        NOTES   :      I have spent 50 minutes of critical care time involved in lab review, consultations with specialist, family decision- making, bedside attention and documentation. During this entire length of time I was immediately available to the patient . Justyn Ramírez MD      Disposition:  Admit Note:  6:06 PM  Pt is being admitted by Dr. Olive Prather. The results of their tests and reason(s) for their admission have been discussed with pt and/or available family. They convey agreement and understanding for the need to be admitted and for admission diagnosis. PLAN:  1. Admit    Diagnosis     Clinical Impression:   1. Sepsis, due to unspecified organism ([address]    2. Hypothermia, initial encounter    3. Hypoglycemia    4. Seizure ([address]    5. Acute hyperkalemia    6. Influenza        Attestations:    Attestations:    This note is prepared by Jo Ann Cowan, acting as Scribe for MD Justyn Hart MD: The scribe's documentation has been prepared under my direction and personally reviewed by me in its entirety. I confirm that the note above accurately reflects all work, treatment, procedures, and medical decision making performed by me.

## 2023-08-03 NOTE — PROGRESS NOTES
THIS IS A PATIENT AT [facility] , [address] [place] [address]. [place] [id number]  THE COMPLETE RECORD RESIDES WITH THIS LONG TERM CARE FACILITY    SNF H&P      Dieter Garduno is a 68-year-old lady who was admitted to [facility] for respite. I reviewed records that came in with her. Has been followed by home-based primary care services. According to records she has been in hospice off-and-on previously. Sister is primary caregiver. She has intellectual disability and unable to give me any history. According to staff she is doing well. PMH: SLE, Sjogren's, HTN, intellectual disability, low vitamin D, diastolic CHF    Allergies: Adhesive tape  Medications: Tylenol, Plaquenil, tramadol as needed, lorazepam as needed  SH: No tobacco or alcohol use  FH: Not contributory  Most recent labs significant for albumin 2.8, hemoglobin 10, sed rate 59    Exam: Vital signs stable. Afebrile. Lying in bed. NAD. No meaningful conversation/nonverbal.  HEENT: Oral mucosa is moist  Neck: No JVD or bruits  Heart: Regular with distant sounds  Lungs: Clear with diminished sounds  Abdomen: Nontender  Extremities: No edema. DJD changes. Muscle atrophy. Neuro: Moves with touch    Impression:  SLE  Sjogren's  HTN  Intellectual disability  Diastolic CHF  Low vitamin D    Plan:  Continue current meds  Focus on comfort  PT and OT as tolerated  Overall seems stable    I personally spent 45 minutes providing the above care inclusive of: preparation, chart review, charting, examining and counseling patient, and coordinating care. Over 50% of this time was spent in counseling and coordination of care.     Danna Lockhart D.O.

## 2023-08-28 ENCOUNTER — TELEPHONE (OUTPATIENT)
Facility: CLINIC | Age: 78
End: 2023-08-28

## 2023-08-28 NOTE — TELEPHONE ENCOUNTER
Kacey Damon called and stated that she and the patient have tested positive for covid. She said the patient was fine when she returned home from a facility, but was sick by Wednesday, 8/23. [facility], [facility] visited [facility] but could not treat the [facility]. She said that the patient needs a referral from [place] to be seen by [facility]. [facility] and [facility] have tested positive for Covid as of 8/27/23. Babita Juarez said that she needs to know what to do for the patient. She stated that she gave the patient one of her prescription nausea pills given to her by [address] [place] [address] [facility]   Rosario HERNANDEZ# [phone]

## 2023-08-28 NOTE — TELEPHONE ENCOUNTER
The patient's sister Paulo Palacio left a message on voicemail. She and the patient both have Covid and are very sick. She is requesting to speak to someone to give advice.  # [phone].

## 2023-08-28 NOTE — TELEPHONE ENCOUNTER
Call returned to patient's sister, Josef Bennett - she confirmed that patient's symptoms started on Wednesday. She initially attributed them to allergy (runny nose). Patient then developed a cough and has lost appetite and had diarrhea. Ms. Arabella Dong also confirms that she gave 1 meclizine tablet to patient ([facility] prescribed meclizine and ondansetron). I explained that these medications are used for dizziness and nausea/vomiting, and unless the patient is experiencing these symptoms, they should not be considered for treating. I discussed that I had spoken with Jerri Dodd NP, who confirmed that Paxlovid would not interact with Plaquenil and could be helpful. Ms. Arabella Dong asked that Rx to be sent to [facility] at [address] I also encouraged making sure patient is drinking plenty of fluid, and eating as much as she tolerates. OTC cough syrup such as mucinex could be helpful to reduce coughing.

## 2023-08-29 ENCOUNTER — TELEPHONE (OUTPATIENT)
Facility: CLINIC | Age: 78
End: 2023-08-29

## 2023-08-29 DIAGNOSIS — R63.4 WEIGHT LOSS: ICD-10-CM

## 2023-08-29 DIAGNOSIS — M35.00 SJOGREN'S SYNDROME, WITH UNSPECIFIED ORGAN INVOLVEMENT (HCC): Primary | ICD-10-CM

## 2023-08-29 NOTE — TELEPHONE ENCOUNTER
[facility] called from [facility] to update Saima Knowles NP that the patient's sister Anthony Mg requested a hospice visit  for the patient. Abril Stoner asks for an order to evaluate the patient and the last visit notes.       Fax: [phone]  Cell: [phone]

## 2023-08-29 NOTE — TELEPHONE ENCOUNTER
Call returned to [facility] - spoke with [address]. I informed him that patient has received hospice care in the past, and was discharged (he was aware). Order for Eval and last Clinical note faxed to the number provided.

## 2023-08-30 ENCOUNTER — TELEPHONE (OUTPATIENT)
Facility: CLINIC | Age: 78
End: 2023-08-30

## 2023-08-30 NOTE — TELEPHONE ENCOUNTER
Call placed to [facility] to follow up with patient's referral.   I spoke with their clinical director who informed that patient's case was discussed today during their IDT, but patient does NOT meet hospice criteria at this time.

## 2023-08-31 ENCOUNTER — TELEMEDICINE (OUTPATIENT)
Facility: CLINIC | Age: 78
End: 2023-08-31

## 2023-08-31 ENCOUNTER — CLINICAL DOCUMENTATION (OUTPATIENT)
Facility: CLINIC | Age: 78
End: 2023-08-31

## 2023-08-31 DIAGNOSIS — Z76.89 ENCOUNTER FOR SOCIAL WORK INTERVENTION: Primary | ICD-10-CM

## 2023-08-31 NOTE — PROGRESS NOTES
[facility] at [facility] Assessment    Patient name: Светлана Sultnaa    Date of visit: 8/31/23    Session today completed with: Jeremie Olmstead    Relationship to patient: Sister    Purpose of visit: Routine psychosocial assessment    Visit narrative: LCSW spoke with pt's sister, Hermilo Arellano, for routine psychosocial assessment. Hermilo Arellano answered the phone, informed that she and pt are both \"going through [facility] now\". This is their first time with the COVID-19 virus. Recently, Hermilo Arellano went to [facility] for 11 days to visit with family and pt went to [address] [place] [address] for respite (formerly [facility]). Pt and Hermilo Arellano became ill after Angelika's vacation. Hermilo Arellano stated that she chose [facility] for agency-directed personal care through [facility]. However, she said that they require the aide to be in the home for 4 hours/day, 3 days/week at a minimum, and Hermilo Arellano does not have that much for the aide to do since she cleans her own home and takes care of most things for her sister. She said she needs the help with providing personal care to pt, but she does not need 12 hours/week. She said [facility] does not allow for the 4 hours/day to be broken up in to morning and evening shifts, either. CRUZW advised that this may be the same for most home care agencies. Hermilo Arellano stated that pt was recently evaluated for hospice but she does not meet hospice criteria at this time. She said the admission nurse from hospice would send her a list of other home care agencies if she wishes to switch from [address] NUR advised Hermilo Arlelano to speak with her Medicaid care manager about switching agencies, to ensure that a different company is contracted with Medicaid. She expressed understanding. Plan for follow up: CRUZW to remain available for support and resources as needed.        KASIE Horton, CRUZW    [facility]  (f) [phone]
[phone]

## 2023-09-01 ENCOUNTER — TELEPHONE (OUTPATIENT)
Facility: CLINIC | Age: 78
End: 2023-09-01

## 2023-09-01 NOTE — TELEPHONE ENCOUNTER
Kacey Damon called and stated that today is the patient's last dose of paxlovid. She said that the patient has been chewing the medication and she just realized today that the medication label says not to chew the medication. Babita Juarez asks for a callback at [phone]. She asks if she has harmed the patient in anyway by letting her chew the tablets and is it ok to go ahead with the last dose.

## 2023-09-01 NOTE — TELEPHONE ENCOUNTER
Call returned to patient's sister - I informed Ms. Leroy Ch that according to recent studies ([facility] and [facility]AW[facility]) it is safe to split or crush paxlovid. Both patient and sister are better today, the only symptom that the patient is still experiencing is cough. No fever, no runny nose reported.

## 2023-09-05 ENCOUNTER — TELEPHONE (OUTPATIENT)
Facility: CLINIC | Age: 78
End: 2023-09-05

## 2023-09-05 ENCOUNTER — SOCIAL WORK (OUTPATIENT)
Facility: CLINIC | Age: 78
End: 2023-09-05

## 2023-09-05 SDOH — HEALTH STABILITY: PHYSICAL HEALTH: ON AVERAGE, HOW MANY MINUTES DO YOU ENGAGE IN EXERCISE AT THIS LEVEL?: 0 MIN

## 2023-09-05 SDOH — SOCIAL STABILITY: SOCIAL INSECURITY: WITHIN THE LAST YEAR, HAVE YOU BEEN HUMILIATED OR EMOTIONALLY ABUSED IN OTHER WAYS BY YOUR PARTNER OR EX-PARTNER?: NO

## 2023-09-05 SDOH — SOCIAL STABILITY: SOCIAL NETWORK
DO YOU BELONG TO ANY CLUBS OR ORGANIZATIONS SUCH AS CHURCH GROUPS UNIONS, FRATERNAL OR ATHLETIC GROUPS, OR SCHOOL GROUPS?: NO

## 2023-09-05 SDOH — ECONOMIC STABILITY: TRANSPORTATION INSECURITY
IN THE PAST 12 MONTHS, HAS LACK OF TRANSPORTATION KEPT YOU FROM MEETINGS, WORK, OR FROM GETTING THINGS NEEDED FOR DAILY LIVING?: NO

## 2023-09-05 SDOH — SOCIAL STABILITY: SOCIAL NETWORK: HOW OFTEN DO YOU GET TOGETHER WITH FRIENDS OR RELATIVES?: MORE THAN THREE TIMES A WEEK

## 2023-09-05 SDOH — SOCIAL STABILITY: SOCIAL NETWORK: HOW OFTEN DO YOU ATTENT MEETINGS OF THE CLUB OR ORGANIZATION YOU BELONG TO?: NEVER

## 2023-09-05 SDOH — SOCIAL STABILITY: SOCIAL NETWORK: ARE YOU MARRIED, WIDOWED, DIVORCED, SEPARATED, NEVER MARRIED, OR LIVING WITH A PARTNER?: NEVER MARRIED

## 2023-09-05 SDOH — ECONOMIC STABILITY: INCOME INSECURITY: IN THE LAST 12 MONTHS, WAS THERE A TIME WHEN YOU WERE NOT ABLE TO PAY THE MORTGAGE OR RENT ON TIME?: NO

## 2023-09-05 SDOH — SOCIAL STABILITY: SOCIAL INSECURITY
WITHIN THE LAST YEAR, HAVE YOU BEEN KICKED, HIT, SLAPPED, OR OTHERWISE PHYSICALLY HURT BY YOUR PARTNER OR EX-PARTNER?: NO

## 2023-09-05 SDOH — SOCIAL STABILITY: SOCIAL NETWORK: IN A TYPICAL WEEK, HOW MANY TIMES DO YOU TALK ON THE PHONE WITH FAMILY, FRIENDS, OR NEIGHBORS?: NEVER

## 2023-09-05 SDOH — HEALTH STABILITY: MENTAL HEALTH: HOW MANY STANDARD DRINKS CONTAINING ALCOHOL DO YOU HAVE ON A TYPICAL DAY?: PATIENT DOES NOT DRINK

## 2023-09-05 SDOH — ECONOMIC STABILITY: HOUSING INSECURITY
IN THE LAST 12 MONTHS, WAS THERE A TIME WHEN YOU DID NOT HAVE A STEADY PLACE TO SLEEP OR SLEPT IN A SHELTER (INCLUDING NOW)?: NO

## 2023-09-05 SDOH — ECONOMIC STABILITY: INCOME INSECURITY: HOW HARD IS IT FOR YOU TO PAY FOR THE VERY BASICS LIKE FOOD, HOUSING, MEDICAL CARE, AND HEATING?: NOT HARD AT ALL

## 2023-09-05 SDOH — HEALTH STABILITY: MENTAL HEALTH
STRESS IS WHEN SOMEONE FEELS TENSE, NERVOUS, ANXIOUS, OR CAN'T SLEEP AT NIGHT BECAUSE THEIR MIND IS TROUBLED. HOW STRESSED ARE YOU?: NOT AT ALL

## 2023-09-05 SDOH — SOCIAL STABILITY: SOCIAL NETWORK: HOW OFTEN DO YOU ATTEND CHURCH OR RELIGIOUS SERVICES?: NEVER

## 2023-09-05 SDOH — ECONOMIC STABILITY: TRANSPORTATION INSECURITY
IN THE PAST 12 MONTHS, HAS THE LACK OF TRANSPORTATION KEPT YOU FROM MEDICAL APPOINTMENTS OR FROM GETTING MEDICATIONS?: NO

## 2023-09-05 SDOH — ECONOMIC STABILITY: HOUSING INSECURITY: IN THE LAST 12 MONTHS, HOW MANY PLACES HAVE YOU LIVED?: 1

## 2023-09-05 SDOH — HEALTH STABILITY: PHYSICAL HEALTH: ON AVERAGE, HOW MANY DAYS PER WEEK DO YOU ENGAGE IN MODERATE TO STRENUOUS EXERCISE (LIKE A BRISK WALK)?: 0 DAYS

## 2023-09-05 SDOH — HEALTH STABILITY: MENTAL HEALTH: HOW OFTEN DO YOU HAVE A DRINK CONTAINING ALCOHOL?: NEVER

## 2023-09-05 SDOH — ECONOMIC STABILITY: FOOD INSECURITY: WITHIN THE PAST 12 MONTHS, THE FOOD YOU BOUGHT JUST DIDN'T LAST AND YOU DIDN'T HAVE MONEY TO GET MORE.: NEVER TRUE

## 2023-09-05 SDOH — SOCIAL STABILITY: SOCIAL INSECURITY
WITHIN THE LAST YEAR, HAVE TO BEEN RAPED OR FORCED TO HAVE ANY KIND OF SEXUAL ACTIVITY BY YOUR PARTNER OR EX-PARTNER?: NO

## 2023-09-05 SDOH — SOCIAL STABILITY: SOCIAL INSECURITY: WITHIN THE LAST YEAR, HAVE YOU BEEN AFRAID OF YOUR PARTNER OR EX-PARTNER?: NO

## 2023-09-05 SDOH — ECONOMIC STABILITY: FOOD INSECURITY: WITHIN THE PAST 12 MONTHS, YOU WORRIED THAT YOUR FOOD WOULD RUN OUT BEFORE YOU GOT MONEY TO BUY MORE.: NEVER TRUE

## 2023-09-05 NOTE — TELEPHONE ENCOUNTER
Urvashi Richard called and expressed concern over the patient insurance. She was confused about an [employer] card sent to her with an unknown provider name on the card. Denzelgaby Leelee said that she is filling out forms to transfer the patient's care to [facility] from [facility] and to release everythings. She also gave me the patient's medicaid information. It would not verify under [employer] but it did verify under Medicaid [place].       [place] [id number] (verified)  [employer]: [id number] (not verified)    Angelika's callback is [phone]

## 2023-09-21 ENCOUNTER — TELEPHONE (OUTPATIENT)
Facility: CLINIC | Age: 78
End: 2023-09-21

## 2023-09-21 NOTE — TELEPHONE ENCOUNTER
I called Shakir Santiago to remind of the patient in home visit, 9/25/23, and Marvin Cohen let me know that the patient went to [facility] a week ago.

## 2023-09-22 ENCOUNTER — TELEPHONE (OUTPATIENT)
Facility: CLINIC | Age: 78
End: 2023-09-22

## 2023-09-22 NOTE — TELEPHONE ENCOUNTER
Call placed to [facility] [phone] - i received confirmation that the patient was admitted to hospice on 9/8/23.

## 2023-10-27 PROBLEM — E43 SEVERE PROTEIN-CALORIE MALNUTRITION (GOMEZ: LESS THAN 60% OF STANDARD WEIGHT) (HCC): Status: ACTIVE | Noted: 2023-10-27

## 2024-02-12 DIAGNOSIS — M35.00 SJOGREN'S SYNDROME, WITH UNSPECIFIED ORGAN INVOLVEMENT (HCC): ICD-10-CM

## 2024-02-12 DIAGNOSIS — M32.0 DRUG-INDUCED SYSTEMIC LUPUS ERYTHEMATOSUS, UNSPECIFIED ORGAN INVOLVEMENT STATUS (HCC): ICD-10-CM

## 2024-02-12 RX ORDER — HYDROXYCHLOROQUINE SULFATE 200 MG/1
200 TABLET, FILM COATED ORAL DAILY
Qty: 90 TABLET | Refills: 3 | Status: SHIPPED | OUTPATIENT
Start: 2024-02-12

## 2024-05-30 NOTE — PROGRESS NOTES
1330-Pt arrived to [facility] via [facility] transport team on a stretcher. Pt awake and alert, nonverbal.  Pt transferred to bed safely without difficulty. Old linens removed and pt positioned for comfort. 1345-RN assessment completed. Skin assessment done. Pt tolerated care well, no distress or discomfort noted. Pt nonverbal at this time. Pt given stuffed animal and book from home. Akira Sullivan RN present, assessed patient and wrote/verified orders for pt stay at [facility]. 1530-Pt resting in bed, eyes closed. No distress or discomfort noted. TV on.  1700-Pt positioned in bed. Dinner tray setup for pt. Pt able to feed herself with finger foods. 1800-Pt ate most of her meal, sandwich and fries and apple pie. Pt brief changed, pt had small BM. Priscila area cleaned, powder applied. Pt positioned for comfort. TV on the game show for pt.  1900-report given to oncoming RN. done

## 2024-06-05 PROBLEM — E46 PROTEIN CALORIE MALNUTRITION (HCC): Status: ACTIVE | Noted: 2024-06-05

## 2024-07-02 ENCOUNTER — TELEPHONE (OUTPATIENT)
Facility: CLINIC | Age: 79
End: 2024-07-02

## 2024-07-02 NOTE — TELEPHONE ENCOUNTER
[facility] ([facility])   (formerly [facility])   Phone:  [phone]      Fax:  [phone] [address], [address]  [place] [address]    Name:  Lien Chavez  YOB: 1945    Returned call to Angelika Dominguez.  She states that Lien Chavez is being discharged from hospice today and she would like for patient to return to [facility] (patient was discharged from [facility] and admitted to [facility] on 9/8/23).    This nurse explained that patient's former PCP, Evelyn Rodríguez NP is leaving the practice as of 7/16/24.   Due to a high patient census and a recent decline in provider staffing, patients referred to [facility] are now being put on a wait list.  Priority is being given to [facility] patients and patients that are referred from within the [facility] system.  As a current patient is removed from the [facility] census (due to referral to hospice, admission to long term care, etc) [facility] will accept a new patient off of the wait list that is in that particular zip code.      This nurse gave Ms. Dominguez the phone number for [facility], [phone] (referral intake line).  Ms. Dominguez says that she will call them.  She would like for Ms. Chavez to be put on the [facility] wait list.  She will call this nurse back if patient goes with [facility].      Radha Linda RN, Gerontological Nursing-BC, PN

## 2024-07-02 NOTE — TELEPHONE ENCOUNTER
Angelika Dominguez called to update [facility] that the patient is leaving hospice today.  She asks for the patient to be readmitted to [facility].  Her callback is [phone].